# Patient Record
Sex: FEMALE | Race: WHITE | NOT HISPANIC OR LATINO | Employment: OTHER | URBAN - METROPOLITAN AREA
[De-identification: names, ages, dates, MRNs, and addresses within clinical notes are randomized per-mention and may not be internally consistent; named-entity substitution may affect disease eponyms.]

---

## 2023-10-16 ENCOUNTER — ANESTHESIA EVENT (OUTPATIENT)
Dept: PERIOP | Facility: HOSPITAL | Age: 83
End: 2023-10-16
Payer: MEDICARE

## 2023-10-16 RX ORDER — FAMOTIDINE 10 MG/ML
20 INJECTION, SOLUTION INTRAVENOUS ONCE
Status: CANCELLED | OUTPATIENT
Start: 2023-10-16 | End: 2023-10-16

## 2023-10-16 RX ORDER — SODIUM CHLORIDE 0.9 % (FLUSH) 0.9 %
10 SYRINGE (ML) INJECTION EVERY 12 HOURS SCHEDULED
Status: CANCELLED | OUTPATIENT
Start: 2023-10-16

## 2023-10-16 RX ORDER — SODIUM CHLORIDE 0.9 % (FLUSH) 0.9 %
10 SYRINGE (ML) INJECTION AS NEEDED
Status: CANCELLED | OUTPATIENT
Start: 2023-10-16

## 2023-10-16 RX ORDER — SODIUM CHLORIDE 9 MG/ML
40 INJECTION, SOLUTION INTRAVENOUS AS NEEDED
Status: CANCELLED | OUTPATIENT
Start: 2023-10-16

## 2023-10-17 ENCOUNTER — ANESTHESIA EVENT CONVERTED (OUTPATIENT)
Dept: ANESTHESIOLOGY | Facility: HOSPITAL | Age: 83
End: 2023-10-17
Payer: MEDICARE

## 2023-10-17 ENCOUNTER — ANESTHESIA (OUTPATIENT)
Dept: PERIOP | Facility: HOSPITAL | Age: 83
End: 2023-10-17
Payer: MEDICARE

## 2023-10-17 ENCOUNTER — HOSPITAL ENCOUNTER (INPATIENT)
Facility: HOSPITAL | Age: 83
LOS: 6 days | Discharge: HOME | End: 2023-10-23
Attending: COLON & RECTAL SURGERY | Admitting: COLON & RECTAL SURGERY
Payer: MEDICARE

## 2023-10-17 DIAGNOSIS — G89.18 ACUTE POSTOPERATIVE PAIN: ICD-10-CM

## 2023-10-17 DIAGNOSIS — Z90.49 S/P RIGHT COLECTOMY: Primary | ICD-10-CM

## 2023-10-17 DIAGNOSIS — C18.9 COLON CANCER: ICD-10-CM

## 2023-10-17 PROBLEM — E03.9 HYPOTHYROIDISM: Status: ACTIVE | Noted: 2023-10-17

## 2023-10-17 PROBLEM — J45.909 ASTHMA: Status: ACTIVE | Noted: 2023-10-17

## 2023-10-17 PROBLEM — K21.9 GERD (GASTROESOPHAGEAL REFLUX DISEASE): Status: ACTIVE | Noted: 2023-10-17

## 2023-10-17 LAB
ABO GROUP BLD: NORMAL
ABO GROUP BLD: NORMAL
ALBUMIN SERPL-MCNC: 3.3 G/DL (ref 3.5–5.2)
ALBUMIN/GLOB SERPL: 1.1 G/DL
ALP SERPL-CCNC: 116 U/L (ref 39–117)
ALT SERPL W P-5'-P-CCNC: 10 U/L (ref 1–33)
ANION GAP SERPL CALCULATED.3IONS-SCNC: 8 MMOL/L (ref 5–15)
AST SERPL-CCNC: 19 U/L (ref 1–32)
BILIRUB SERPL-MCNC: <0.2 MG/DL (ref 0–1.2)
BLD GP AB SCN SERPL QL: NEGATIVE
BUN SERPL-MCNC: 5 MG/DL (ref 8–23)
BUN/CREAT SERPL: 8.9 (ref 7–25)
CALCIUM SPEC-SCNC: 8.9 MG/DL (ref 8.6–10.5)
CEA SERPL-MCNC: 76.5 NG/ML
CHLORIDE SERPL-SCNC: 106 MMOL/L (ref 98–107)
CO2 SERPL-SCNC: 25 MMOL/L (ref 22–29)
CREAT SERPL-MCNC: 0.56 MG/DL (ref 0.57–1)
DEPRECATED RDW RBC AUTO: 67.3 FL (ref 37–54)
EGFRCR SERPLBLD CKD-EPI 2021: 91.3 ML/MIN/1.73
ERYTHROCYTE [DISTWIDTH] IN BLOOD BY AUTOMATED COUNT: 23 % (ref 12.3–15.4)
GLOBULIN UR ELPH-MCNC: 3 GM/DL
GLUCOSE SERPL-MCNC: 111 MG/DL (ref 65–99)
HCT VFR BLD AUTO: 31.8 % (ref 34–46.6)
HGB BLD-MCNC: 9.3 G/DL (ref 12–15.9)
MCH RBC QN AUTO: 23.9 PG (ref 26.6–33)
MCHC RBC AUTO-ENTMCNC: 29.2 G/DL (ref 31.5–35.7)
MCV RBC AUTO: 81.7 FL (ref 79–97)
PLATELET # BLD AUTO: 561 10*3/MM3 (ref 140–450)
PMV BLD AUTO: 8.8 FL (ref 6–12)
POTASSIUM SERPL-SCNC: 3.7 MMOL/L (ref 3.5–5.2)
PROT SERPL-MCNC: 6.3 G/DL (ref 6–8.5)
RBC # BLD AUTO: 3.89 10*6/MM3 (ref 3.77–5.28)
RH BLD: POSITIVE
RH BLD: POSITIVE
SODIUM SERPL-SCNC: 139 MMOL/L (ref 136–145)
T&S EXPIRATION DATE: NORMAL
WBC NRBC COR # BLD: 8.49 10*3/MM3 (ref 3.4–10.8)

## 2023-10-17 PROCEDURE — 94640 AIRWAY INHALATION TREATMENT: CPT

## 2023-10-17 PROCEDURE — 25010000002 SUGAMMADEX 200 MG/2ML SOLUTION: Performed by: NURSE ANESTHETIST, CERTIFIED REGISTERED

## 2023-10-17 PROCEDURE — 86900 BLOOD TYPING SEROLOGIC ABO: CPT | Performed by: COLON & RECTAL SURGERY

## 2023-10-17 PROCEDURE — 25010000002 BUPIVACAINE (PF) 0.25 % SOLUTION: Performed by: NURSE ANESTHETIST, CERTIFIED REGISTERED

## 2023-10-17 PROCEDURE — 88309 TISSUE EXAM BY PATHOLOGIST: CPT | Performed by: COLON & RECTAL SURGERY

## 2023-10-17 PROCEDURE — 94799 UNLISTED PULMONARY SVC/PX: CPT

## 2023-10-17 PROCEDURE — 86901 BLOOD TYPING SEROLOGIC RH(D): CPT

## 2023-10-17 PROCEDURE — 86850 RBC ANTIBODY SCREEN: CPT | Performed by: COLON & RECTAL SURGERY

## 2023-10-17 PROCEDURE — 80053 COMPREHEN METABOLIC PANEL: CPT | Performed by: COLON & RECTAL SURGERY

## 2023-10-17 PROCEDURE — 25010000002 HEPARIN (PORCINE) PER 1000 UNITS: Performed by: COLON & RECTAL SURGERY

## 2023-10-17 PROCEDURE — 88342 IMHCHEM/IMCYTCHM 1ST ANTB: CPT | Performed by: COLON & RECTAL SURGERY

## 2023-10-17 PROCEDURE — 88341 IMHCHEM/IMCYTCHM EA ADD ANTB: CPT | Performed by: COLON & RECTAL SURGERY

## 2023-10-17 PROCEDURE — 85027 COMPLETE CBC AUTOMATED: CPT | Performed by: COLON & RECTAL SURGERY

## 2023-10-17 PROCEDURE — 25010000002 ERTAPENEM PER 500 MG: Performed by: COLON & RECTAL SURGERY

## 2023-10-17 PROCEDURE — 93010 ELECTROCARDIOGRAM REPORT: CPT | Performed by: INTERNAL MEDICINE

## 2023-10-17 PROCEDURE — 25010000002 SODIUM CHLORIDE 0.9 % WITH KCL 20 MEQ 20-0.9 MEQ/L-% SOLUTION: Performed by: COLON & RECTAL SURGERY

## 2023-10-17 PROCEDURE — 25010000002 PROPOFOL 10 MG/ML EMULSION: Performed by: NURSE ANESTHETIST, CERTIFIED REGISTERED

## 2023-10-17 PROCEDURE — 25010000002 DEXAMETHASONE PER 1 MG: Performed by: NURSE ANESTHETIST, CERTIFIED REGISTERED

## 2023-10-17 PROCEDURE — 25010000002 HYDROMORPHONE PER 4 MG: Performed by: INTERNAL MEDICINE

## 2023-10-17 PROCEDURE — 0DTF4ZZ RESECTION OF RIGHT LARGE INTESTINE, PERCUTANEOUS ENDOSCOPIC APPROACH: ICD-10-PCS | Performed by: COLON & RECTAL SURGERY

## 2023-10-17 PROCEDURE — 25810000003 LACTATED RINGERS PER 1000 ML: Performed by: ANESTHESIOLOGY

## 2023-10-17 PROCEDURE — 25010000002 FENTANYL CITRATE (PF) 100 MCG/2ML SOLUTION: Performed by: NURSE ANESTHETIST, CERTIFIED REGISTERED

## 2023-10-17 PROCEDURE — 86900 BLOOD TYPING SEROLOGIC ABO: CPT

## 2023-10-17 PROCEDURE — 82378 CARCINOEMBRYONIC ANTIGEN: CPT | Performed by: COLON & RECTAL SURGERY

## 2023-10-17 PROCEDURE — 25010000002 ONDANSETRON PER 1 MG: Performed by: NURSE ANESTHETIST, CERTIFIED REGISTERED

## 2023-10-17 PROCEDURE — 86901 BLOOD TYPING SEROLOGIC RH(D): CPT | Performed by: COLON & RECTAL SURGERY

## 2023-10-17 PROCEDURE — 93005 ELECTROCARDIOGRAM TRACING: CPT | Performed by: ANESTHESIOLOGY

## 2023-10-17 PROCEDURE — 25010000002 DEXAMETHASONE SODIUM PHOSPHATE 10 MG/ML SOLUTION: Performed by: NURSE ANESTHETIST, CERTIFIED REGISTERED

## 2023-10-17 PROCEDURE — 25010000002 ONDANSETRON PER 1 MG: Performed by: COLON & RECTAL SURGERY

## 2023-10-17 DEVICE — PROXIMATE RELOADABLE LINEAR STAPLER
Type: IMPLANTABLE DEVICE | Site: ABDOMEN | Status: FUNCTIONAL
Brand: PROXIMATE

## 2023-10-17 DEVICE — PROXIMATE RELOADABLE LINEAR CUTTER WITH SAFETY LOCK-OUT, 75MM
Type: IMPLANTABLE DEVICE | Site: ABDOMEN | Status: FUNCTIONAL
Brand: PROXIMATE

## 2023-10-17 DEVICE — PROXIMATE LINEAR CUTTER RELOAD, BLUE, 75MM
Type: IMPLANTABLE DEVICE | Site: ABDOMEN | Status: FUNCTIONAL
Brand: PROXIMATE

## 2023-10-17 RX ORDER — GABAPENTIN 100 MG/1
100 CAPSULE ORAL 2 TIMES DAILY
Status: COMPLETED | OUTPATIENT
Start: 2023-10-17 | End: 2023-10-20

## 2023-10-17 RX ORDER — ALVIMOPAN 12 MG/1
12 CAPSULE ORAL ONCE
Status: COMPLETED | OUTPATIENT
Start: 2023-10-17 | End: 2023-10-17

## 2023-10-17 RX ORDER — ACETAMINOPHEN 500 MG
1000 TABLET ORAL ONCE
Status: COMPLETED | OUTPATIENT
Start: 2023-10-17 | End: 2023-10-17

## 2023-10-17 RX ORDER — LEVOTHYROXINE SODIUM 88 UG/1
88 TABLET ORAL
Status: DISCONTINUED | OUTPATIENT
Start: 2023-10-18 | End: 2023-10-23 | Stop reason: HOSPADM

## 2023-10-17 RX ORDER — NITROGLYCERIN 0.4 MG/1
0.4 TABLET SUBLINGUAL
Status: DISCONTINUED | OUTPATIENT
Start: 2023-10-17 | End: 2023-10-23 | Stop reason: HOSPADM

## 2023-10-17 RX ORDER — DEXAMETHASONE SODIUM PHOSPHATE 10 MG/ML
INJECTION, SOLUTION INTRAMUSCULAR; INTRAVENOUS
Status: COMPLETED | OUTPATIENT
Start: 2023-10-17 | End: 2023-10-17

## 2023-10-17 RX ORDER — ONDANSETRON 2 MG/ML
4 INJECTION INTRAMUSCULAR; INTRAVENOUS ONCE AS NEEDED
Status: DISCONTINUED | OUTPATIENT
Start: 2023-10-17 | End: 2023-10-17 | Stop reason: HOSPADM

## 2023-10-17 RX ORDER — HYDROCODONE BITARTRATE AND ACETAMINOPHEN 7.5; 325 MG/1; MG/1
1 TABLET ORAL EVERY 4 HOURS PRN
Status: DISCONTINUED | OUTPATIENT
Start: 2023-10-17 | End: 2023-10-23 | Stop reason: HOSPADM

## 2023-10-17 RX ORDER — LEVOTHYROXINE SODIUM 88 UG/1
88 TABLET ORAL DAILY
COMMUNITY

## 2023-10-17 RX ORDER — HEPARIN SODIUM 5000 [USP'U]/ML
5000 INJECTION, SOLUTION INTRAVENOUS; SUBCUTANEOUS ONCE
Status: COMPLETED | OUTPATIENT
Start: 2023-10-17 | End: 2023-10-17

## 2023-10-17 RX ORDER — ONDANSETRON 4 MG/1
4 TABLET, FILM COATED ORAL EVERY 6 HOURS PRN
Status: DISCONTINUED | OUTPATIENT
Start: 2023-10-17 | End: 2023-10-19

## 2023-10-17 RX ORDER — HYDROMORPHONE HYDROCHLORIDE 1 MG/ML
0.5 INJECTION, SOLUTION INTRAMUSCULAR; INTRAVENOUS; SUBCUTANEOUS
Status: DISCONTINUED | OUTPATIENT
Start: 2023-10-17 | End: 2023-10-17 | Stop reason: HOSPADM

## 2023-10-17 RX ORDER — MAGNESIUM HYDROXIDE 1200 MG/15ML
LIQUID ORAL AS NEEDED
Status: DISCONTINUED | OUTPATIENT
Start: 2023-10-17 | End: 2023-10-17 | Stop reason: HOSPADM

## 2023-10-17 RX ORDER — LIDOCAINE HYDROCHLORIDE 10 MG/ML
0.5 INJECTION, SOLUTION EPIDURAL; INFILTRATION; INTRACAUDAL; PERINEURAL ONCE AS NEEDED
Status: COMPLETED | OUTPATIENT
Start: 2023-10-17 | End: 2023-10-17

## 2023-10-17 RX ORDER — NALOXONE HCL 0.4 MG/ML
0.4 VIAL (ML) INJECTION
Status: DISCONTINUED | OUTPATIENT
Start: 2023-10-17 | End: 2023-10-23 | Stop reason: HOSPADM

## 2023-10-17 RX ORDER — ALVIMOPAN 12 MG/1
12 CAPSULE ORAL 2 TIMES DAILY
Status: DISCONTINUED | OUTPATIENT
Start: 2023-10-18 | End: 2023-10-18

## 2023-10-17 RX ORDER — BUDESONIDE AND FORMOTEROL FUMARATE DIHYDRATE 160; 4.5 UG/1; UG/1
2 AEROSOL RESPIRATORY (INHALATION)
Status: DISCONTINUED | OUTPATIENT
Start: 2023-10-17 | End: 2023-10-23 | Stop reason: HOSPADM

## 2023-10-17 RX ORDER — LIDOCAINE HYDROCHLORIDE 10 MG/ML
INJECTION, SOLUTION EPIDURAL; INFILTRATION; INTRACAUDAL; PERINEURAL AS NEEDED
Status: DISCONTINUED | OUTPATIENT
Start: 2023-10-17 | End: 2023-10-17 | Stop reason: SURG

## 2023-10-17 RX ORDER — PHENYLEPHRINE HCL IN 0.9% NACL 1 MG/10 ML
SYRINGE (ML) INTRAVENOUS AS NEEDED
Status: DISCONTINUED | OUTPATIENT
Start: 2023-10-17 | End: 2023-10-17 | Stop reason: SURG

## 2023-10-17 RX ORDER — FAMOTIDINE 20 MG/1
20 TABLET, FILM COATED ORAL ONCE
Status: COMPLETED | OUTPATIENT
Start: 2023-10-17 | End: 2023-10-17

## 2023-10-17 RX ORDER — PROPOFOL 10 MG/ML
VIAL (ML) INTRAVENOUS AS NEEDED
Status: DISCONTINUED | OUTPATIENT
Start: 2023-10-17 | End: 2023-10-17 | Stop reason: SURG

## 2023-10-17 RX ORDER — ACETAMINOPHEN 325 MG/1
650 TABLET ORAL EVERY 8 HOURS
Status: DISCONTINUED | OUTPATIENT
Start: 2023-10-17 | End: 2023-10-23 | Stop reason: HOSPADM

## 2023-10-17 RX ORDER — SODIUM CHLORIDE, SODIUM LACTATE, POTASSIUM CHLORIDE, CALCIUM CHLORIDE 600; 310; 30; 20 MG/100ML; MG/100ML; MG/100ML; MG/100ML
9 INJECTION, SOLUTION INTRAVENOUS CONTINUOUS
Status: DISCONTINUED | OUTPATIENT
Start: 2023-10-17 | End: 2023-10-18

## 2023-10-17 RX ORDER — ROCURONIUM BROMIDE 10 MG/ML
INJECTION, SOLUTION INTRAVENOUS AS NEEDED
Status: DISCONTINUED | OUTPATIENT
Start: 2023-10-17 | End: 2023-10-17 | Stop reason: SURG

## 2023-10-17 RX ORDER — HEPARIN SODIUM 5000 [USP'U]/ML
5000 INJECTION, SOLUTION INTRAVENOUS; SUBCUTANEOUS EVERY 8 HOURS SCHEDULED
Status: DISCONTINUED | OUTPATIENT
Start: 2023-10-18 | End: 2023-10-23 | Stop reason: HOSPADM

## 2023-10-17 RX ORDER — SODIUM CHLORIDE AND POTASSIUM CHLORIDE 150; 900 MG/100ML; MG/100ML
100 INJECTION, SOLUTION INTRAVENOUS CONTINUOUS
Status: DISCONTINUED | OUTPATIENT
Start: 2023-10-17 | End: 2023-10-23 | Stop reason: HOSPADM

## 2023-10-17 RX ORDER — HYDROMORPHONE HYDROCHLORIDE 1 MG/ML
0.5 INJECTION, SOLUTION INTRAMUSCULAR; INTRAVENOUS; SUBCUTANEOUS
Status: DISCONTINUED | OUTPATIENT
Start: 2023-10-17 | End: 2023-10-23 | Stop reason: HOSPADM

## 2023-10-17 RX ORDER — BUPIVACAINE HYDROCHLORIDE 2.5 MG/ML
INJECTION, SOLUTION EPIDURAL; INFILTRATION; INTRACAUDAL
Status: COMPLETED | OUTPATIENT
Start: 2023-10-17 | End: 2023-10-17

## 2023-10-17 RX ORDER — ONDANSETRON 2 MG/ML
4 INJECTION INTRAMUSCULAR; INTRAVENOUS EVERY 6 HOURS PRN
Status: DISCONTINUED | OUTPATIENT
Start: 2023-10-17 | End: 2023-10-19

## 2023-10-17 RX ORDER — LORATADINE 10 MG/1
CAPSULE, LIQUID FILLED ORAL
COMMUNITY

## 2023-10-17 RX ORDER — IBUPROFEN 400 MG/1
400 TABLET ORAL EVERY 6 HOURS PRN
Status: DISCONTINUED | OUTPATIENT
Start: 2023-10-17 | End: 2023-10-23 | Stop reason: HOSPADM

## 2023-10-17 RX ORDER — LABETALOL HYDROCHLORIDE 5 MG/ML
10 INJECTION, SOLUTION INTRAVENOUS EVERY 4 HOURS PRN
Status: DISCONTINUED | OUTPATIENT
Start: 2023-10-17 | End: 2023-10-23 | Stop reason: HOSPADM

## 2023-10-17 RX ORDER — FENTANYL CITRATE 50 UG/ML
INJECTION, SOLUTION INTRAMUSCULAR; INTRAVENOUS AS NEEDED
Status: DISCONTINUED | OUTPATIENT
Start: 2023-10-17 | End: 2023-10-17 | Stop reason: SURG

## 2023-10-17 RX ORDER — ONDANSETRON 2 MG/ML
INJECTION INTRAMUSCULAR; INTRAVENOUS AS NEEDED
Status: DISCONTINUED | OUTPATIENT
Start: 2023-10-17 | End: 2023-10-17 | Stop reason: SURG

## 2023-10-17 RX ORDER — MIDAZOLAM HYDROCHLORIDE 1 MG/ML
0.5 INJECTION INTRAMUSCULAR; INTRAVENOUS
Status: DISCONTINUED | OUTPATIENT
Start: 2023-10-17 | End: 2023-10-17 | Stop reason: HOSPADM

## 2023-10-17 RX ORDER — FLUTICASONE PROPIONATE AND SALMETEROL 250; 50 UG/1; UG/1
POWDER RESPIRATORY (INHALATION)
COMMUNITY

## 2023-10-17 RX ORDER — PREGABALIN 75 MG/1
75 CAPSULE ORAL ONCE
Status: COMPLETED | OUTPATIENT
Start: 2023-10-17 | End: 2023-10-17

## 2023-10-17 RX ORDER — DEXAMETHASONE SODIUM PHOSPHATE 4 MG/ML
INJECTION, SOLUTION INTRA-ARTICULAR; INTRALESIONAL; INTRAMUSCULAR; INTRAVENOUS; SOFT TISSUE AS NEEDED
Status: DISCONTINUED | OUTPATIENT
Start: 2023-10-17 | End: 2023-10-17 | Stop reason: SURG

## 2023-10-17 RX ORDER — DIAZEPAM 5 MG/1
5 TABLET ORAL EVERY 6 HOURS PRN
Status: DISCONTINUED | OUTPATIENT
Start: 2023-10-17 | End: 2023-10-23 | Stop reason: HOSPADM

## 2023-10-17 RX ORDER — FAMOTIDINE 10 MG
10 TABLET ORAL 2 TIMES DAILY
COMMUNITY

## 2023-10-17 RX ORDER — FENTANYL CITRATE 50 UG/ML
50 INJECTION, SOLUTION INTRAMUSCULAR; INTRAVENOUS
Status: DISCONTINUED | OUTPATIENT
Start: 2023-10-17 | End: 2023-10-17 | Stop reason: HOSPADM

## 2023-10-17 RX ADMIN — ACETAMINOPHEN 1000 MG: 500 TABLET ORAL at 13:47

## 2023-10-17 RX ADMIN — Medication 100 MCG: at 15:34

## 2023-10-17 RX ADMIN — POTASSIUM CHLORIDE AND SODIUM CHLORIDE 100 ML/HR: 900; 150 INJECTION, SOLUTION INTRAVENOUS at 18:41

## 2023-10-17 RX ADMIN — HYDROMORPHONE HYDROCHLORIDE 0.5 MG: 1 INJECTION, SOLUTION INTRAMUSCULAR; INTRAVENOUS; SUBCUTANEOUS at 21:30

## 2023-10-17 RX ADMIN — DEXAMETHASONE SODIUM PHOSPHATE 4 MG: 10 INJECTION, SOLUTION INTRAMUSCULAR; INTRAVENOUS at 15:26

## 2023-10-17 RX ADMIN — ONDANSETRON 4 MG: 2 INJECTION INTRAMUSCULAR; INTRAVENOUS at 16:58

## 2023-10-17 RX ADMIN — PREGABALIN 75 MG: 75 CAPSULE ORAL at 13:48

## 2023-10-17 RX ADMIN — Medication 100 MCG: at 15:25

## 2023-10-17 RX ADMIN — HYDROCODONE BITARTRATE AND ACETAMINOPHEN 1 TABLET: 7.5; 325 TABLET ORAL at 18:56

## 2023-10-17 RX ADMIN — BUDESONIDE AND FORMOTEROL FUMARATE DIHYDRATE 2 PUFF: 160; 4.5 AEROSOL RESPIRATORY (INHALATION) at 20:10

## 2023-10-17 RX ADMIN — FAMOTIDINE 20 MG: 20 TABLET, FILM COATED ORAL at 13:48

## 2023-10-17 RX ADMIN — ONDANSETRON 4 MG: 2 INJECTION INTRAMUSCULAR; INTRAVENOUS at 19:22

## 2023-10-17 RX ADMIN — BUPIVACAINE HYDROCHLORIDE 50 ML: 2.5 INJECTION, SOLUTION EPIDURAL; INFILTRATION; INTRACAUDAL; PERINEURAL at 15:26

## 2023-10-17 RX ADMIN — FENTANYL CITRATE 100 MCG: 50 INJECTION, SOLUTION INTRAMUSCULAR; INTRAVENOUS at 15:18

## 2023-10-17 RX ADMIN — LIDOCAINE HYDROCHLORIDE 0.5 ML: 10 INJECTION, SOLUTION EPIDURAL; INFILTRATION; INTRACAUDAL; PERINEURAL at 13:48

## 2023-10-17 RX ADMIN — PROPOFOL 120 MG: 10 INJECTION, EMULSION INTRAVENOUS at 15:18

## 2023-10-17 RX ADMIN — SUGAMMADEX 200 MG: 100 INJECTION, SOLUTION INTRAVENOUS at 16:58

## 2023-10-17 RX ADMIN — LIDOCAINE HYDROCHLORIDE 50 MG: 10 INJECTION, SOLUTION EPIDURAL; INFILTRATION; INTRACAUDAL; PERINEURAL at 15:18

## 2023-10-17 RX ADMIN — GABAPENTIN 100 MG: 100 CAPSULE ORAL at 21:23

## 2023-10-17 RX ADMIN — ACETAMINOPHEN 650 MG: 325 TABLET ORAL at 21:24

## 2023-10-17 RX ADMIN — DEXAMETHASONE SODIUM PHOSPHATE 4 MG: 4 INJECTION, SOLUTION INTRAMUSCULAR; INTRAVENOUS at 15:18

## 2023-10-17 RX ADMIN — HEPARIN SODIUM 5000 UNITS: 5000 INJECTION INTRAVENOUS; SUBCUTANEOUS at 13:48

## 2023-10-17 RX ADMIN — ROCURONIUM BROMIDE 60 MG: 10 SOLUTION INTRAVENOUS at 15:18

## 2023-10-17 RX ADMIN — PROPOFOL 30 MG: 10 INJECTION, EMULSION INTRAVENOUS at 17:04

## 2023-10-17 RX ADMIN — SODIUM CHLORIDE, POTASSIUM CHLORIDE, SODIUM LACTATE AND CALCIUM CHLORIDE 9 ML/HR: 600; 310; 30; 20 INJECTION, SOLUTION INTRAVENOUS at 13:48

## 2023-10-17 RX ADMIN — ALVIMOPAN 12 MG: 12 CAPSULE ORAL at 14:38

## 2023-10-17 RX ADMIN — ERTAPENEM SODIUM 1000 MG: 1 INJECTION INTRAMUSCULAR; INTRAVENOUS at 15:18

## 2023-10-17 RX ADMIN — ROCURONIUM BROMIDE 5 MG: 10 SOLUTION INTRAVENOUS at 16:15

## 2023-10-17 RX ADMIN — Medication 100 MCG: at 15:47

## 2023-10-17 NOTE — ANESTHESIA PROCEDURE NOTES
Airway  Urgency: elective    Date/Time: 10/17/2023 3:20 PM  Airway not difficult    General Information and Staff    Patient location during procedure: OR  CRNA/CAA: Edy Bahena, CRNA    Indications and Patient Condition  Indications for airway management: airway protection    Preoxygenated: yes  MILS not maintained throughout  Mask difficulty assessment: 1 - vent by mask    Final Airway Details  Final airway type: endotracheal airway      Successful airway: ETT  Cuffed: yes   Successful intubation technique: direct laryngoscopy  Facilitating devices/methods: intubating stylet  Endotracheal tube insertion site: oral  Blade: La  Blade size: 3  ETT size (mm): 7.0  Cormack-Lehane Classification: grade I - full view of glottis  Placement verified by: chest auscultation and capnometry   Measured from: lips  ETT/EBT  to lips (cm): 20  Number of attempts at approach: 1  Assessment: lips, teeth, and gum same as pre-op and atraumatic intubation    Additional Comments  Negative epigastric sounds, Breath sound equal bilaterally with symmetric chest rise and fall

## 2023-10-17 NOTE — OP NOTE
[facility] ([facility])    LAPAROSCOPIC RIGHT COLECTOMY  Resection including abdominal wall peritoneum with findings of puckering suspicious for direct invasion.     Procedure Note    Valeria Tracy  10/17/2023    Pre-op Diagnosis:   Referral with obstructing right colon cancer and newly diagnosed distal rectal cancer within 10 mm the dentate line.  Unintentional weight loss 15 pounds over 8 months  Rectal cancer, nonobstructing.    Post-op Diagnosis:     No evidence of hepatobiliary or other metastatic disease, very large right colon mass, resected, grossly clear margins.    Anesthesia: General with Block    Staff:   Circulator: Taniya Gilbert, STEVE; Aj Miller RN  Scrub Person: Rosa Zendejas; Bushra Trujillo  Assistant: Lakshmi Wolfe RNFA    Assistant: Lakshmi Wolfe RNFA was responsible for performing the following activities: Retraction, Suction, and Irrigation and their skilled assistance was necessary for the success of this case.    Colon Resection  Operation performed with curative intent Yes   Tumor Location (select all that apply) Ascending colon   Extent of colon and vascular resection  (select all that apply) Extended right hemicolectomy - ileocolic, right colic (if present), middle colic          Estimated Blood Loss: 100ml    Specimens: Right colon and regional lymph nodes        Drains: JESUS drain    Findings: Large mass    Complications: None evident    The procedure was reviewed in the office and expedited surgical intervention was scheduled.    Increased risk and complexity associated with synchronous cancers unintentional weight loss and deconditioning.    Plan for right colectomy noting very low rectal cancer likely to require APR and colostomy.  I would rather perform right colectomy with anastomosis and then subsequently do APR  If coloanal/ultralow anastomosis was planned with protecting loop ileostomy then I would have moved to ileostomy  today    There certainly would be some risk to primary anastomosis given comorbidities and deconditioning, seems reasonable though to proceed with primary anastomosis and close postoperative care/follow-up.    Patient was counseled on stoma locations and management as this was a possibility going into surgery depending on intraoperative findings.    We advanced to the operating room with antibiotic and DVT prophylaxis.    Block was performed by anesthesia.    GelPort was placed at the umbilicus    Laparoscopic exploration demonstrated right sided large mass with direct invasion from the ascending colon to the proximal transverse colon and appearance to suggest invasion into the peritoneal surface of the abdominal wall anterior laterally.    The ileocolic region was mobilized, the right ureter was identified, this appeared to be free of the tumor involvement.    There was puckering of the anterior lateral abdominal wall peritoneum and this was resected as well as some abdominal wall muscle in an effort to achieve radial clear margin.    The mass was dissected away from the kidney and away from the proximal duodenum, gradually the mass was mobilized away from the entire duodenum with grossly clear margin.    Having fully mobilized the mass and right colon and ileocolic region and proximal transverse colon and mid transverse colon, this was delivered extracorporeally.    The mesentery was carefully divided to optimize mesenteric harvest.    This included resection of the right colic artery.    High ligation of the ileocolic artery was performed.    TERESA division of the distal ileum and mid transverse colon was performed.    The specimen was passed off.    The antimesenteric corners of the intestine were incised, TERESA was introduced, a common lumen was established with good hemostasis.    The anastomosis was complete with the TA 60 blue load.    The tissue distal to the 60 was oversewn with running locked 3-0 Vicryl to  provide additional reinforcement.    The confluence of the anastomosis was also reinforced with interrupted 3-0 Vicryl.    This was returned to the peritoneal cavity, irrigation aspiration was performed.    10 flat JESUS was placed in the right abdomen.    The fascia was reapproximated with internal retention of 0 Vicryl #1 PDS    The incision was irrigated with dilute antiseptic solution    The final counts were announced as correct    The procedure appeared well-tolerated.      Angelito Ruiz MD     Date: 10/17/2023  Time: 17:00 EDT

## 2023-10-17 NOTE — ANESTHESIA POSTPROCEDURE EVALUATION
Patient: Valeria Tracy    Procedure Summary       Date: 10/17/23 Room / Location:  [facility] /  [facility]    Anesthesia Start: 1515 Anesthesia Stop: 1714    Procedure: COLON RESECTION RIGHT LAPAROSCOPIC (Abdomen) Diagnosis:     Surgeons: Angelito Ruiz MD Provider: José Miguel Núñez MD    Anesthesia Type: general ASA Status: 2            Anesthesia Type: general    Vitals  Vitals Value Taken Time   BP     Temp     Pulse 85 10/17/23 1713   Resp     SpO2 99 % 10/17/23 1714   Vitals shown include unfiled device data.        Post Anesthesia Care and Evaluation    Patient location during evaluation: PACU  Patient participation: complete - patient participated  Level of consciousness: awake and alert  Pain management: adequate    Airway patency: patent  Anesthetic complications: No anesthetic complications  PONV Status: none  Cardiovascular status: hemodynamically stable and acceptable  Respiratory status: nonlabored ventilation, acceptable and nasal cannula  Hydration status: acceptable

## 2023-10-17 NOTE — NURSING NOTE
"Patient seen in pre-op for stoma marking    Patient placed in sitting and lying position with abdomen exposed.  Stoma marked at the RLQ and LLQ.     Both the right upper quadrant and left upper quadrant would not be ideal locations as the patient's rib cage is directly superior to the sites and will interfere with any appliance.    Folds, creases/scars, ribs & underwear line avoided. Rectus muscle identified.    Patient able to visualize stoma marking(s) and point to \"X\" with their finger.    WOC Nurse will continue to follow daily if an ostomy is required.     Yassine Ascencio RN, BSN, CCRN, CWOCN  Wound, Ostomy and Continence (WOC) Department  [facility]        "

## 2023-10-17 NOTE — H&P
Admission HP     Patient Name: Valeria Tracy  MRN: [id number]  : 1940  DOS: 10/17/2023    Attending: Angelito Ruiz MD    Primary Care Provider: Peyton Vázquez PA      Patient Care Team:  Peyton Vázquez PA as PCP - General (Physician Assistant)    Chief complaint:  Colon mass, adenocarcinoma    Subjective   Patient is a pleasant 83 y.o. female presented for scheduled surgery by .     Per his note (Pre-op Diagnosis:   Referral with obstructing right colon cancer and newly diagnosed distal rectal cancer within 10 mm the dentate line.  Unintentional weight loss 15 pounds over 8 months  Rectal cancer, nonobstructing.).    I saw patient preoperatively and reviewed with her her diagnosis and planned surgery.    Subsequent notes indicate she underwent the following procedures:  LAPAROSCOPIC RIGHT COLECTOMY  Resection including abdominal wall peritoneum with findings of puckering suspicious for direct invasion.  Surgery was done under general anesthesia and a block and was she was admitted for further management    Allergies:  No Known Allergies     Medications Prior to Admission   Medication Sig Dispense Refill Last Dose    famotidine (PEPCID) 10 MG tablet Take 1 tablet by mouth 2 (Two) Times a Day.   Past Week    Fluticasone-Salmeterol (ADVAIR/WIXELA) 250-50 MCG/ACT DISKUS Inhale 2 (Two) Times a Day.   10/17/2023    levothyroxine (SYNTHROID, LEVOTHROID) 88 MCG tablet Take 1 tablet by mouth Daily.   10/16/2023    FLUTICASONE PROPIONATE NA 2 sprays into the nostril(s) as directed by provider Daily.       Loratadine 10 MG capsule Take  by mouth.   10/15/2023       Past Medical History:   Diagnosis Date    Acid reflux     Anemia     Asthma     History of transfusion     No Reaction    Hypothyroid     Pneumonia      Past Surgical History:   Procedure Laterality Date    BREAST LUMPECTOMY Right     CATARACT EXTRACTION Bilateral     COLONOSCOPY      SHOULDER SURGERY Right      History reviewed. No pertinent  "family history.  Social History     Tobacco Use    Smoking status: Former     Types: Cigarettes   Vaping Use    Vaping Use: Never used   Substance Use Topics    Alcohol use: Never    Drug use: Never       Review of Systems  Pertinent items are noted in HPI    Vital Signs  /77 (BP Location: Right arm, Patient Position: Sitting)   Pulse 98   Temp 97.4 °F (36.3 °C) (Temporal)   Resp 18   Ht 165.1 cm (65\")   Wt 61.2 kg (135 lb)   SpO2 95%   BMI 22.47 kg/m²     Physical Exam:    General Appearance:    Alert, cooperative, in no acute distress   Head:    Normocephalic, without obvious abnormality, atraumatic   Eyes:            Lids and lashes normal, conjunctivae and sclerae normal, no   icterus, no pallor, corneas clear   Ears:    Ears appear intact with no abnormalities noted   Throat:   No oral lesions, no thrush, oral mucosa moist   Neck:   No adenopathy, supple, trachea midline, no thyromegaly         Lungs:     Clear to auscultation,respirations regular, even and       unlabored. No wheezes or rales.    Heart:    Regular rhythm and normal rate, normal S1 and S2, no murmur    Abdomen:      Soft and benign, nontender nondistended when seen preop   Genitalia:    Deferred   Extremities:   Moves all extremities well, no edema, no cyanosis, no              redness   Pulses:   Pulses palpable and equal bilaterally   Skin:   No bleeding, bruising or rash   Neurologic:   Cranial nerves 2 - 12 grossly intact, no gross motor deficit     Results from last 7 days   Lab Units 10/17/23  1305   WBC 10*3/mm3 8.49   HEMOGLOBIN g/dL 9.3*   HEMATOCRIT % 31.8*   PLATELETS 10*3/mm3 561*     Results from last 7 days   Lab Units 10/17/23  1305   SODIUM mmol/L 139   POTASSIUM mmol/L 3.7   CHLORIDE mmol/L 106   CO2 mmol/L 25.0   BUN mg/dL 5*   CREATININE mg/dL 0.56*   CALCIUM mg/dL 8.9   BILIRUBIN mg/dL <0.2   ALK PHOS U/L 116   ALT (SGPT) U/L 10   AST (SGOT) U/L 19   GLUCOSE mg/dL 111*     No results found for: " "\"HGBA1C\"    Assessment and Plan:   Status post LAPAROSCOPIC RIGHT COLECTOMY  Resection including abdominal wall peritoneum with findings of puckering suspicious for direct invasion.    GERD (gastroesophageal reflux disease)    Asthma    Hypothyroidism      Plan postop management to include  1. Ambulation, several times daily  2. Pain control-prns   3. IS-encourage  4. DVT proph- Mechanical, subcutaneous heparin  5. Bowel regimen, alvimopan  6. Resume home medications as appropriate  7. Monitor post-op labs  8. DC planning   9. Diet, Clears, advance diet as tolerated.  IVF initially, monitor volume status.    -Asthma: Maintained on home regimen with formulary substitution as indicated.     -GERD:  Resume PPI.  Formulary substitution when indicated.    -Hypothyroidism: Maintain on replacement.    Dragon disclaimer:  Part of this encounter note is an electronic transcription/translation of spoken language to printed text. The electronic translation of spoken language may permit erroneous, or at times, nonsensical words or phrases to be inadvertently transcribed; Although I have reviewed the note for such errors, some may still exist.    Maggi Lance MD  10/17/23  15:58 EDT            "

## 2023-10-17 NOTE — ANESTHESIA PREPROCEDURE EVALUATION
Anesthesia Evaluation                  Airway   Mallampati: II  TM distance: >3 FB  Neck ROM: full  No difficulty expected  Dental - normal exam   (+) upper dentures and poor dentition    Pulmonary - normal exam   (+) asthma,  Cardiovascular - normal exam        Neuro/Psych  GI/Hepatic/Renal/Endo    (+) GERD, thyroid problem hypothyroidism    Musculoskeletal     Abdominal  - normal exam    Bowel sounds: normal.   Substance History      OB/GYN          Other                    Anesthesia Plan    ASA 2     general     (TAP blocks)  intravenous induction     Anesthetic plan, risks, benefits, and alternatives have been provided, discussed and informed consent has been obtained with: patient.    Plan discussed with CRNA.    CODE STATUS:

## 2023-10-17 NOTE — ANESTHESIA PROCEDURE NOTES
"Peripheral Block      Patient reassessed immediately prior to procedure    Patient location during procedure: OR  Reason for block: at surgeon's request and post-op pain management  Performed by  CRNA/CAA: Edy Bahena, COSMENA: Angel Ch SRNA  Preanesthetic Checklist  Completed: patient identified, IV checked, site marked, risks and benefits discussed, surgical consent, monitors and equipment checked, pre-op evaluation and timeout performed  Prep:  Pt Position: supine  Sterile barriers:cap, gloves, mask and washed/disinfected hands  Prep: ChloraPrep  Patient monitoring: blood pressure monitoring, continuous pulse oximetry and EKG  Procedure    Sedation: yes  Performed under: general  Guidance:ultrasound guided  Images:still images obtained, printed/placed on chart    Laterality:Bilateral  Block Type:TAP  Injection Technique:single-shot  Needle Type:short-bevel and echogenic  Needle Gauge:20 G  Resistance on Injection: none    Medications Used: bupivacaine PF (MARCAINE) 0.25 % injection - Injection   50 mL - 10/17/2023 3:26:00 PM  dexamethasone sodium phosphate injection - Injection   4 mg - 10/17/2023 3:26:00 PM      Medications  Preservative Free Saline:10ml  Comment:Block Injection:  LA dose divided between Right and Left block        Post Assessment  Injection Assessment: negative aspiration for heme, incremental injection and no paresthesia on injection  Patient Tolerance:comfortable throughout block  Complications:no  Additional Notes    Subcostal TAPs    A high-frequency linear transducer, with sterile cover, was placed sub-xiphoid to identify [place], right and left Rectus Abdominus Muscles (WILBUR). The transducer was moved either right or left subcostally to identify the WILBUR and the Transverse Abdominus Muscle (LLAMAS). The insertion site was prepped in sterile fashion and then localized with 2-5 ml of 1% Lidocaine. Using ultrasound-guidance, a 20-gauge B-Lopez 4\" Ultraplex 360 " "non-stimulating echogenic needle was advanced in plane, from medial to lateral, until the tip of the needle was in the fascial plane between the WILBUR and LLAMAS. 1-3ml of preservative free normal saline was used to hydro-dissect the fascial planes. After the fascial plane was verified, the local anesthetic (LA) was injected. The procedure was repeated on the opposite side for bilateral coverage. Aspiration every 5 ml to prevent intravascular injection. Injection was completed with negative aspiration of blood and negative intravascular injection. Injection pressures were normal with minimal resistance. The subcostal approach to the TAP nerve block ideally anesthetizes the intercostal nerves T6-T9.     Mid-Axillary/Lateral TAPs    A high-frequency linear transducer, with sterile cover, was placed in the midaxillary line between the ASIS and costal margin. The External Oblique Muscle (EOM), Internal Oblique Muscle (IOM), Transverse Abdominus Muscle (LLAMAS), and Peritoneum were identified. The insertion site was prepped in sterile fashion and then localized with 2-5 ml of 1% Lidocaine. Using ultrasound-guidance, a 20-gauge B-Lopez 4\" Ultraplex 360 non-stimulating echogenic needle was advanced in plane, from medial to lateral, until the tip of the needle was in the fascial plane between the IOM and LLAMAS. 1-3ml of preservative free normal saline was used to hydro-dissect the fascial planes. After the fascial plane was verified, the local anesthetic (LA) was injected. The procedure was repeated on the opposite side for bilateral coverage. Aspiration every 5 ml to prevent intravascular injection. Injection was completed with negative aspiration of blood and negative intravascular injection. Injection pressures were normal with minimal resistance. Midaxillary TAPs should reach intercostal nerves T10- T11 and the subcostal nerve T12.            "

## 2023-10-17 NOTE — INTERVAL H&P NOTE
" Pre-op    Full history and physical note from office is attached.    /77 (BP Location: Right arm, Patient Position: Sitting)   Pulse 98   Temp 97.4 °F (36.3 °C) (Temporal)   Resp 18   Ht 165.1 cm (65\")   Wt 61.2 kg (135 lb)   SpO2 95%   BMI 22.47 kg/m²     LAB Results:  Lab Results   Component Value Date    WBC 8.49 10/17/2023    HGB 9.3 (L) 10/17/2023    HCT 31.8 (L) 10/17/2023    MCV 81.7 10/17/2023     (H) 10/17/2023    GLUCOSE 111 (H) 10/17/2023    BUN 5 (L) 10/17/2023    CREATININE 0.56 (L) 10/17/2023     10/17/2023    K 3.7 10/17/2023     10/17/2023    CO2 25.0 10/17/2023    CALCIUM 8.9 10/17/2023    ALBUMIN 3.3 (L) 10/17/2023    AST 19 10/17/2023    ALT 10 10/17/2023    BILITOT <0.2 10/17/2023       Cancer Staging (if applicable)  Cancer Patient: __ yes __no __unknown__N/A; If yes, clinical stage T:__ N:__M:__, stage group or __N/A      Impression: rectal cancer      Plan: COLON RESECTION RIGHT LAPAROSCOPIC, POSSIBLE STOMA       RAINA Arana   10/17/2023   14:02 EDT  "

## 2023-10-18 PROBLEM — D64.9 ANEMIA: Status: ACTIVE | Noted: 2023-10-18

## 2023-10-18 PROBLEM — Z90.49 S/P RIGHT COLECTOMY: Status: ACTIVE | Noted: 2023-10-18

## 2023-10-18 PROBLEM — G89.18 ACUTE POSTOPERATIVE PAIN: Status: ACTIVE | Noted: 2023-10-18

## 2023-10-18 LAB
ANION GAP SERPL CALCULATED.3IONS-SCNC: 10 MMOL/L (ref 5–15)
BASOPHILS # BLD AUTO: 0.02 10*3/MM3 (ref 0–0.2)
BASOPHILS NFR BLD AUTO: 0.2 % (ref 0–1.5)
BUN SERPL-MCNC: 6 MG/DL (ref 8–23)
BUN/CREAT SERPL: 12.5 (ref 7–25)
CALCIUM SPEC-SCNC: 8.4 MG/DL (ref 8.6–10.5)
CHLORIDE SERPL-SCNC: 108 MMOL/L (ref 98–107)
CO2 SERPL-SCNC: 21 MMOL/L (ref 22–29)
CREAT SERPL-MCNC: 0.48 MG/DL (ref 0.57–1)
DEPRECATED RDW RBC AUTO: 68 FL (ref 37–54)
EGFRCR SERPLBLD CKD-EPI 2021: 94.7 ML/MIN/1.73
EOSINOPHIL # BLD AUTO: 0 10*3/MM3 (ref 0–0.4)
EOSINOPHIL NFR BLD AUTO: 0 % (ref 0.3–6.2)
ERYTHROCYTE [DISTWIDTH] IN BLOOD BY AUTOMATED COUNT: 22.7 % (ref 12.3–15.4)
GLUCOSE BLDC GLUCOMTR-MCNC: 104 MG/DL (ref 70–130)
GLUCOSE SERPL-MCNC: 130 MG/DL (ref 65–99)
HCT VFR BLD AUTO: 30.8 % (ref 34–46.6)
HGB BLD-MCNC: 8.6 G/DL (ref 12–15.9)
HYPOCHROMIA BLD QL: NORMAL
IMM GRANULOCYTES # BLD AUTO: 0.03 10*3/MM3 (ref 0–0.05)
IMM GRANULOCYTES NFR BLD AUTO: 0.3 % (ref 0–0.5)
LYMPHOCYTES # BLD AUTO: 0.47 10*3/MM3 (ref 0.7–3.1)
LYMPHOCYTES NFR BLD AUTO: 5.2 % (ref 19.6–45.3)
MCH RBC QN AUTO: 23.2 PG (ref 26.6–33)
MCHC RBC AUTO-ENTMCNC: 27.9 G/DL (ref 31.5–35.7)
MCV RBC AUTO: 83.2 FL (ref 79–97)
MICROCYTES BLD QL: NORMAL
MONOCYTES # BLD AUTO: 0.13 10*3/MM3 (ref 0.1–0.9)
MONOCYTES NFR BLD AUTO: 1.4 % (ref 5–12)
NEUTROPHILS NFR BLD AUTO: 8.36 10*3/MM3 (ref 1.7–7)
NEUTROPHILS NFR BLD AUTO: 92.9 % (ref 42.7–76)
NRBC BLD AUTO-RTO: 0 /100 WBC (ref 0–0.2)
OVALOCYTES BLD QL SMEAR: NORMAL
PLAT MORPH BLD: NORMAL
PLATELET # BLD AUTO: 505 10*3/MM3 (ref 140–450)
PMV BLD AUTO: 9 FL (ref 6–12)
POTASSIUM SERPL-SCNC: 4.9 MMOL/L (ref 3.5–5.2)
QT INTERVAL: 350 MS
QTC INTERVAL: 432 MS
RBC # BLD AUTO: 3.7 10*6/MM3 (ref 3.77–5.28)
SODIUM SERPL-SCNC: 139 MMOL/L (ref 136–145)
WBC MORPH BLD: NORMAL
WBC NRBC COR # BLD: 9.01 10*3/MM3 (ref 3.4–10.8)

## 2023-10-18 PROCEDURE — 82948 REAGENT STRIP/BLOOD GLUCOSE: CPT

## 2023-10-18 PROCEDURE — 97161 PT EVAL LOW COMPLEX 20 MIN: CPT

## 2023-10-18 PROCEDURE — 25010000002 SODIUM CHLORIDE 0.9 % WITH KCL 20 MEQ 20-0.9 MEQ/L-% SOLUTION: Performed by: COLON & RECTAL SURGERY

## 2023-10-18 PROCEDURE — 85007 BL SMEAR W/DIFF WBC COUNT: CPT | Performed by: COLON & RECTAL SURGERY

## 2023-10-18 PROCEDURE — 85025 COMPLETE CBC W/AUTO DIFF WBC: CPT | Performed by: COLON & RECTAL SURGERY

## 2023-10-18 PROCEDURE — 80048 BASIC METABOLIC PNL TOTAL CA: CPT | Performed by: COLON & RECTAL SURGERY

## 2023-10-18 PROCEDURE — 25010000002 HEPARIN (PORCINE) PER 1000 UNITS: Performed by: COLON & RECTAL SURGERY

## 2023-10-18 PROCEDURE — 94664 DEMO&/EVAL PT USE INHALER: CPT

## 2023-10-18 PROCEDURE — 25010000002 ONDANSETRON PER 1 MG: Performed by: COLON & RECTAL SURGERY

## 2023-10-18 PROCEDURE — 94799 UNLISTED PULMONARY SVC/PX: CPT

## 2023-10-18 PROCEDURE — 97530 THERAPEUTIC ACTIVITIES: CPT

## 2023-10-18 RX ADMIN — ALVIMOPAN 12 MG: 12 CAPSULE ORAL at 08:00

## 2023-10-18 RX ADMIN — BUDESONIDE AND FORMOTEROL FUMARATE DIHYDRATE 2 PUFF: 160; 4.5 AEROSOL RESPIRATORY (INHALATION) at 09:19

## 2023-10-18 RX ADMIN — ACETAMINOPHEN 650 MG: 325 TABLET ORAL at 13:34

## 2023-10-18 RX ADMIN — HEPARIN SODIUM 5000 UNITS: 5000 INJECTION INTRAVENOUS; SUBCUTANEOUS at 05:36

## 2023-10-18 RX ADMIN — GABAPENTIN 100 MG: 100 CAPSULE ORAL at 20:33

## 2023-10-18 RX ADMIN — BUDESONIDE AND FORMOTEROL FUMARATE DIHYDRATE 2 PUFF: 160; 4.5 AEROSOL RESPIRATORY (INHALATION) at 19:22

## 2023-10-18 RX ADMIN — GABAPENTIN 100 MG: 100 CAPSULE ORAL at 08:01

## 2023-10-18 RX ADMIN — ACETAMINOPHEN 650 MG: 325 TABLET ORAL at 20:33

## 2023-10-18 RX ADMIN — HEPARIN SODIUM 5000 UNITS: 5000 INJECTION INTRAVENOUS; SUBCUTANEOUS at 20:33

## 2023-10-18 RX ADMIN — POTASSIUM CHLORIDE AND SODIUM CHLORIDE 100 ML/HR: 900; 150 INJECTION, SOLUTION INTRAVENOUS at 05:38

## 2023-10-18 RX ADMIN — ONDANSETRON 4 MG: 2 INJECTION INTRAMUSCULAR; INTRAVENOUS at 20:38

## 2023-10-18 RX ADMIN — HEPARIN SODIUM 5000 UNITS: 5000 INJECTION INTRAVENOUS; SUBCUTANEOUS at 13:34

## 2023-10-18 RX ADMIN — ACETAMINOPHEN 650 MG: 325 TABLET ORAL at 05:36

## 2023-10-18 RX ADMIN — LEVOTHYROXINE SODIUM 88 MCG: 0.09 TABLET ORAL at 05:38

## 2023-10-18 NOTE — PLAN OF CARE
Problem: Adult Inpatient Plan of Care  Goal: Plan of Care Review  Outcome: Ongoing, Progressing  Goal: Patient-Specific Goal (Individualized)  Outcome: Ongoing, Progressing  Goal: Absence of Hospital-Acquired Illness or Injury  Outcome: Ongoing, Progressing  Intervention: Identify and Manage Fall Risk  Recent Flowsheet Documentation  Taken 10/18/2023 1600 by Maria Isabel Villarreal RN  Safety Promotion/Fall Prevention:   activity supervised   assistive device/personal items within reach   clutter free environment maintained   toileting scheduled   safety round/check completed   room organization consistent  Taken 10/18/2023 1400 by Maria Isabel Villarreal RN  Safety Promotion/Fall Prevention:   activity supervised   assistive device/personal items within reach   clutter free environment maintained   toileting scheduled   safety round/check completed   room organization consistent  Taken 10/18/2023 1200 by Maria Isabel Villarreal RN  Safety Promotion/Fall Prevention:   activity supervised   assistive device/personal items within reach   clutter free environment maintained   toileting scheduled   safety round/check completed   room organization consistent  Taken 10/18/2023 1000 by Maria Isabel Villarreal RN  Safety Promotion/Fall Prevention:   activity supervised   assistive device/personal items within reach   clutter free environment maintained   toileting scheduled   room organization consistent   safety round/check completed  Taken 10/18/2023 0800 by Maria Isabel Villarreal RN  Safety Promotion/Fall Prevention:   activity supervised   assistive device/personal items within reach   clutter free environment maintained   toileting scheduled   safety round/check completed   room organization consistent  Intervention: Prevent Skin Injury  Recent Flowsheet Documentation  Taken 10/18/2023 1600 by Maria Isabel Villarreal RN  Body Position: position changed independently  Skin Protection:   adhesive use limited   tubing/devices free from skin contact    transparent dressing maintained   skin-to-skin areas padded   skin-to-device areas padded  Taken 10/18/2023 1400 by Maria Isabel Villarreal RN  Body Position: position changed independently  Skin Protection:   adhesive use limited   tubing/devices free from skin contact   transparent dressing maintained   skin-to-skin areas padded   skin-to-device areas padded  Taken 10/18/2023 1200 by Maria Isabel Villarreal RN  Body Position: position changed independently  Skin Protection:   adhesive use limited   transparent dressing maintained   tubing/devices free from skin contact   skin-to-skin areas padded   skin-to-device areas padded  Taken 10/18/2023 1000 by Maria Isabel Villarreal RN  Body Position: position changed independently  Skin Protection:   adhesive use limited   tubing/devices free from skin contact   transparent dressing maintained   skin-to-skin areas padded   skin-to-device areas padded  Taken 10/18/2023 0800 by Maria Isabel Villarreal RN  Body Position: position changed independently  Skin Protection:   adhesive use limited   tubing/devices free from skin contact   transparent dressing maintained   skin-to-skin areas padded   skin-to-device areas padded  Intervention: Prevent and Manage VTE (Venous Thromboembolism) Risk  Recent Flowsheet Documentation  Taken 10/18/2023 1600 by Maria Isabel Villarreal RN  Activity Management: activity encouraged  Taken 10/18/2023 1400 by Maria Isabel Villarreal RN  Activity Management:   ambulated in room   back to bed  Taken 10/18/2023 1200 by Maria Isabel Villarreal RN  Activity Management: up in chair  Taken 10/18/2023 1000 by Maria Isabel Villarreal RN  Activity Management:   ambulated to bathroom   back to bed  Taken 10/18/2023 0800 by Maria Isabel Villarreal RN  Activity Management: activity encouraged  Goal: Optimal Comfort and Wellbeing  Outcome: Ongoing, Progressing  Goal: Readiness for Transition of Care  Outcome: Ongoing, Progressing     Problem: Skin Injury Risk Increased  Goal: Skin Health and Integrity  Outcome:  Ongoing, Progressing  Intervention: Optimize Skin Protection  Recent Flowsheet Documentation  Taken 10/18/2023 1600 by Maria Isabel Villarreal RN  Pressure Reduction Techniques: frequent weight shift encouraged  Head of Bed (HOB) Positioning: [facility] elevated  Pressure Reduction Devices: pressure-redistributing mattress utilized  Skin Protection:   adhesive use limited   tubing/devices free from skin contact   transparent dressing maintained   skin-to-skin areas padded   skin-to-device areas padded  Taken 10/18/2023 1400 by Maria Isabel Villarreal RN  Pressure Reduction Techniques: frequent weight shift encouraged  Head of Bed (HOB) Positioning: [facility] elevated  Pressure Reduction Devices: pressure-redistributing mattress utilized  Skin Protection:   adhesive use limited   tubing/devices free from skin contact   transparent dressing maintained   skin-to-skin areas padded   skin-to-device areas padded  Taken 10/18/2023 1200 by Maria Isabel Villarreal RN  Pressure Reduction Techniques: frequent weight shift encouraged  Head of Bed (HOB) Positioning: [facility] elevated  Pressure Reduction Devices: pressure-redistributing mattress utilized  Skin Protection:   adhesive use limited   transparent dressing maintained   tubing/devices free from skin contact   skin-to-skin areas padded   skin-to-device areas padded  Taken 10/18/2023 1000 by Maria Isabel Villarreal RN  Pressure Reduction Techniques: frequent weight shift encouraged  Head of Bed (HOB) Positioning: [facility] elevated  Pressure Reduction Devices: pressure-redistributing mattress utilized  Skin Protection:   adhesive use limited   tubing/devices free from skin contact   transparent dressing maintained   skin-to-skin areas padded   skin-to-device areas padded  Taken 10/18/2023 0800 by Maria Isabel Villarreal RN  Pressure Reduction Techniques: frequent weight shift encouraged  Head of Bed (HOB) Positioning: [facility] elevated  Pressure Reduction Devices: pressure-redistributing mattress utilized  Skin Protection:    adhesive use limited   tubing/devices free from skin contact   transparent dressing maintained   skin-to-skin areas padded   skin-to-device areas padded     Problem: Fall Injury Risk  Goal: Absence of Fall and Fall-Related Injury  Outcome: Ongoing, Progressing  Intervention: Promote Injury-Free Environment  Recent Flowsheet Documentation  Taken 10/18/2023 1600 by Maria Isabel Villarreal RN  Safety Promotion/Fall Prevention:   activity supervised   assistive device/personal items within reach   clutter free environment maintained   toileting scheduled   safety round/check completed   room organization consistent  Taken 10/18/2023 1400 by Maria Isabel Villarreal RN  Safety Promotion/Fall Prevention:   activity supervised   assistive device/personal items within reach   clutter free environment maintained   toileting scheduled   safety round/check completed   room organization consistent  Taken 10/18/2023 1200 by Maria Isabel Villarreal RN  Safety Promotion/Fall Prevention:   activity supervised   assistive device/personal items within reach   clutter free environment maintained   toileting scheduled   safety round/check completed   room organization consistent  Taken 10/18/2023 1000 by Maria Isabel Villarreal RN  Safety Promotion/Fall Prevention:   activity supervised   assistive device/personal items within reach   clutter free environment maintained   toileting scheduled   room organization consistent   safety round/check completed  Taken 10/18/2023 0800 by Maria Isabel Villarreal RN  Safety Promotion/Fall Prevention:   activity supervised   assistive device/personal items within reach   clutter free environment maintained   toileting scheduled   safety round/check completed   room organization consistent   Goal Outcome Evaluation:

## 2023-10-18 NOTE — PLAN OF CARE
Goal Outcome Evaluation:  Plan of Care Reviewed With: patient           Outcome Evaluation: Patient presents with mild deficits in strength, endurance, and balance. She ambulated 200' CGA with support of IV pole. She had no LOB but did appear reliant on IV pole, recommend trialing SPC next session. IPPT is indicated to address current deficits. Recommend D/C home with assist when medically appropriate.      Anticipated Discharge Disposition (PT): home with assist

## 2023-10-18 NOTE — CASE MANAGEMENT/SOCIAL WORK
Continued Stay Note  [facility]     Patient Name: Valeria Tracy  MRN: [id number]  Today's Date: 10/18/2023    Admit Date: 10/17/2023    Plan: home   Discharge Plan       Row Name 10/18/23 1004       Plan    Plan home    Plan Comments Met with Ms. Tracy at the bedside to initiate discharge plan. She lives alone in [place]. She is independent with activities of daily living and does not us any DME. She is not current with home health or outpatient services at this time. She reported that her granddaughter lives a few miles away and checks on her regularly. Ms. Tracy's Primary care provider is [facility]. She denies problems with accessing medical care or obtaining medication. Her discharge plan is home, and her granddaughter will transport. No discharge needs were identified today.  will continue to follow plan of care and assist with discharge planning needs as indicated.d    Final Discharge Disposition Code 01 - home or self-care                   Discharge Codes    No documentation.                       Rosa Schmidt RN

## 2023-10-18 NOTE — PROGRESS NOTES
"IM progress note      Valeria Tracy  [id number]  1940     LOS: 1 day     Attending: Angelito Ruiz MD    Primary Care Provider: Peyton Vázquez PA      Chief Complaint/Reason for visit:  Abd pain    Subjective   Doing well. Adequate pain control. Sitting in recliner. Tolerating sips of clears. No flatus. Voiding. Denies f/c/n/v/sob/cp.    Objective     Vital Signs    Visit Vitals  /71 (BP Location: Left arm, Patient Position: Sitting)   Pulse 82   Temp 97.5 °F (36.4 °C) (Oral)   Resp 18   Ht 165.1 cm (65\")   Wt 61.2 kg (135 lb)   SpO2 98%   BMI 22.47 kg/m²     Temp (24hrs), Av.8 °F (36.6 °C), Min:97.4 °F (36.3 °C), Max:98.4 °F (36.9 °C)      Nutrition: Clears    Respiratory: RA    Physical Exam:     General Appearance:    Alert, cooperative, in no acute distress   Head:    Normocephalic, without obvious abnormality, atraumatic    Lungs:     Normal effort, symmetric chest rise, no crepitus, clear to      auscultation bilaterally             Heart:    Regular rhythm and normal rate, normal S1 and S2   Abdomen:     Soft, expected tenderness, mild distention. Midline incision CDI   Extremities:   No clubbing, cyanosis or edema.  No deformities.    Pulses:   Pulses palpable and equal bilaterally   Skin:   No bleeding, bruising or rash   Neurologic:   Moves all extremities with no obvious focal motor deficit.  Cranial nerves 2 - 12 grossly intact     Results Review:     I reviewed the patient's new clinical results.   Results from last 7 days   Lab Units 10/18/23  0317 10/17/23  1305   WBC 10*3/mm3 9.01 8.49   HEMOGLOBIN g/dL 8.6* 9.3*   HEMATOCRIT % 30.8* 31.8*   PLATELETS 10*3/mm3 505* 561*     Results from last 7 days   Lab Units 10/18/23  0317 10/17/23  1305   SODIUM mmol/L 139 139   POTASSIUM mmol/L 4.9 3.7   CHLORIDE mmol/L 108* 106   CO2 mmol/L 21.0* 25.0   BUN mg/dL 6* 5*   CREATININE mg/dL 0.48* 0.56*   CALCIUM mg/dL 8.4* 8.9   BILIRUBIN mg/dL  --  <0.2   ALK PHOS U/L  --  116   ALT (SGPT) U/L  " --  10   AST (SGOT) U/L  --  19   GLUCOSE mg/dL 130* 111*     I reviewed the patient's new imaging including images and reports.    All medications reviewed.   acetaminophen, 650 mg, Oral, Q8H  alvimopan, 12 mg, Oral, BID  budesonide-formoterol, 2 puff, Inhalation, BID - RT  gabapentin, 100 mg, Oral, BID  heparin (porcine), 5,000 Units, Subcutaneous, Q8H  levothyroxine, 88 mcg, Oral, Q AM        Assessment & Plan     S/P right colectomy    Colon cancer    GERD (gastroesophageal reflux disease)    Asthma    Hypothyroidism    Acute postoperative pain    Anemia      Plan  1. Ambulation  2. Pain control-prns   3. IS-encouraged  4. DVT proph- mechs/heparin  5. Bowel regimen  6. Clear diet. Continue IVF   7. Monitor post-op labs  8. DC planning for home     -Asthma: Maintained on home regimen with formulary substitution as indicated.      -GERD:  Resume PPI.  Formulary substitution when indicated.     -Hypothyroidism: Maintain on replacement.      RAINA Arana  10/18/23  13:07 EDT

## 2023-10-18 NOTE — PROGRESS NOTES
"[facility] ([facility])    One bowel movement  Transient cramping earlier.    Vital Signs:  Blood pressure 146/71, pulse 82, temperature 97.5 °F (36.4 °C), temperature source Oral, resp. rate 18, height 165.1 cm (65\"), weight 61.2 kg (135 lb), SpO2 98%.    Labs past 24 hours:  Lab Results (last 24 hours)       Procedure Component Value Units Date/Time    POC Glucose Once [[id number]]  (Normal) Collected: 10/18/23 1236    Specimen: Blood Updated: 10/18/23 1238     Glucose 104 mg/dL     CBC & Differential [[id number]]  (Abnormal) Collected: 10/18/23 0317    Specimen: Blood Updated: 10/18/23 0737    Narrative:      The following orders were created for panel order CBC & Differential.  Procedure                               Abnormality         Status                     ---------                               -----------         ------                     CBC Auto Differential[[id number]]        Abnormal            Final result               Scan Slide[[id number]]                                       Final result                 Please view results for these tests on the individual orders.    CBC Auto Differential [[id number]]  (Abnormal) Collected: 10/18/23 0317    Specimen: Blood Updated: 10/18/23 0737     WBC 9.01 10*3/mm3      RBC 3.70 10*6/mm3      Hemoglobin 8.6 g/dL      Hematocrit 30.8 %      MCV 83.2 fL      MCH 23.2 pg      MCHC 27.9 g/dL      RDW 22.7 %      RDW-SD 68.0 fl      MPV 9.0 fL      Platelets 505 10*3/mm3      Neutrophil % 92.9 %      Lymphocyte % 5.2 %      Monocyte % 1.4 %      Eosinophil % 0.0 %      Basophil % 0.2 %      Immature Grans % 0.3 %      Neutrophils, Absolute 8.36 10*3/mm3      Lymphocytes, Absolute 0.47 10*3/mm3      Monocytes, Absolute 0.13 10*3/mm3      Eosinophils, Absolute 0.00 10*3/mm3      Basophils, Absolute 0.02 10*3/mm3      Immature Grans, Absolute 0.03 10*3/mm3      nRBC 0.0 /100 WBC     Narrative:      Appended report. These results have " been appended to a previously verified report.    Scan Slide [[id number]] Collected: 10/18/23 0317    Specimen: Blood Updated: 10/18/23 0737     Hypochromia Mod/2+     Microcytes Slight/1+     Ovalocytes Slight/1+     WBC Morphology Normal     Platelet Morphology Normal    Basic Metabolic Panel [[id number]]  (Abnormal) Collected: 10/18/23 0317    Specimen: Blood Updated: 10/18/23 0714     Glucose 130 mg/dL      BUN 6 mg/dL      Creatinine 0.48 mg/dL      Sodium 139 mmol/L      Potassium 4.9 mmol/L      Chloride 108 mmol/L      CO2 21.0 mmol/L      Calcium 8.4 mg/dL      BUN/Creatinine Ratio 12.5     Anion Gap 10.0 mmol/L      eGFR 94.7 mL/min/1.73     Narrative:      GFR Normal >60  Chronic Kidney Disease <60  Kidney Failure <15    The GFR formula is only valid for adults with stable renal function between ages 18 and 70.    Tissue Pathology Exam [[id number]] Collected: 10/17/23 1625    Specimen: Tissue from Large Intestine, Right / Ascending Colon Updated: 10/18/23 0709    CEA [[id number]] Collected: 10/17/23 1305    Specimen: Blood Updated: 10/17/23 1919     CEA 76.50 ng/mL     Narrative:      CEA Reference Range:    Non Smokers:   Less than 3 ng/mL  Smokers:       Less than 5 ng/mL  Results may be falsely decreased if patient taking Biotin.    Testing Method: [employer] Electrochemiluminescence Immunoassay(ECLIA)  Values obtained with different assay methods or kits cannot be used interchangeably.            I/O last shift:  I/O this shift:  In: 840 [P.O.:840]  Out: -      PHYSICAL EXAM-  Comfortable  Not distended  Soft  Minimal tenderness    Pathology:  Order Name Source Comment Collection Info Order Time   CBC (NO DIFF)   Collected By: Rafael Miles RN 10/17/2023 12:34 PM     Release to patient   Routine Release        COMPREHENSIVE METABOLIC PANEL   Collected By: Rafael Miles RN 10/17/2023 12:34 PM     Release to patient   Routine Release        CEA   Collected By: Rafael Miles RN 10/17/2023  12:34 PM     Release to patient   Routine Release        TYPE AND SCREEN   Collected By: Bebe Wynn RN 10/17/2023  1:08 PM     Release to patient   Routine Release        TISSUE PATHOLOGY EXAM Large Intestine, Right / Ascending Colon  Collected By: Angelito Ruiz MD 10/17/2023  4:26 PM     Release to patient   Routine Release        .    Assessment and Plan:  Frequent ambulation encouraged.  Diet as tolerated.    Angelito Ruiz MD  10/18/23  16:48 EDT

## 2023-10-18 NOTE — THERAPY EVALUATION
"Patient Name: Valeria Tracy  : 1940    MRN: [id number]                              Today's Date: 10/18/2023       Admit Date: 10/17/2023    Visit Dx:     ICD-10-CM ICD-9-CM   1. Colon cancer  C18.9 153.9     Patient Active Problem List   Diagnosis    GERD (gastroesophageal reflux disease)    Asthma    Hypothyroidism    Colon cancer    S/P right colectomy    Acute postoperative pain    Anemia     Past Medical History:   Diagnosis Date    Acid reflux     Anemia     Asthma     History of transfusion     No Reaction    Hypothyroid     Pneumonia      Past Surgical History:   Procedure Laterality Date    BREAST LUMPECTOMY Right     CATARACT EXTRACTION Bilateral     COLON RESECTION N/A 10/17/2023    Procedure: COLON RESECTION RIGHT LAPAROSCOPIC;  Surgeon: Angelito Ruiz MD;  Location: [facility];  Service: General;  Laterality: N/A;    COLONOSCOPY      SHOULDER SURGERY Right       General Information       Row Name 10/18/23 1309          Physical Therapy Time and Intention    Document Type evaluation  -CM     Mode of Treatment physical therapy;individual therapy  -CM       Row Name 10/18/23 1309          General Information    Patient Profile Reviewed yes  -CM     Prior Level of Function independent:;all household mobility;ADL's  ind no AD  -CM     Existing Precautions/Restrictions fall;other (see comments)  abdominal incision and JESUS  -CM     Barriers to Rehab none identified  -CM       Row Name 10/18/23 1309          Living Environment    People in Home alone;other (see comments)  her granddaughter lives close and plans to check on her at D/C  -CM       Row Name 10/18/23 1309          Home Main Entrance    Number of Stairs, Main Entrance two  -CM     Stair Railings, Main Entrance none;other (see comments)  patient reports there are \"posts\" at main entrance that she holds on to  -CM       Row Name 10/18/23 1309          Stairs Within Home, Primary    Number of Stairs, Within Home, Primary none  -CM       Row " Name 10/18/23 1309          Cognition    Orientation Status (Cognition) oriented x 4  -CM       Row Name 10/18/23 1309          Safety Issues, Functional Mobility    Safety Issues Affecting Function (Mobility) insight into deficits/self-awareness;safety precaution awareness  -CM     Impairments Affecting Function (Mobility) balance;endurance/activity tolerance;pain;postural/trunk control;strength  -CM               User Key  (r) = Recorded By, (t) = Taken By, (c) = Cosigned By      Initials Name Provider Type    Neda Abad PT Physical Therapist                   Mobility       Row Name 10/18/23 1310          Bed Mobility    Bed Mobility supine-sit  -CM     Supine-Sit [place] (Bed Mobility) contact guard;verbal cues  -CM     Assistive Device (Bed Mobility) head of bed elevated;bed rails  -CM     Comment, (Bed Mobility) cues provided for logroll technique for comfort, patient sequences well with cues  -CM       Row Name 10/18/23 1310          Sit-Stand Transfer    Sit-Stand [place] (Transfers) contact guard  -CM       Row Name 10/18/23 1310          Gait/Stairs (Locomotion)    [place] Level (Gait) contact guard;1 person assist;verbal cues  -CM     Assistive Device (Gait) other (see comments)  unilateral support on IV pole  -CM     Distance in Feet (Gait) 200  -CM     Deviations/Abnormal Patterns (Gait) bilateral deviations;tanya decreased;gait speed decreased;stride length decreased  -CM     Bilateral Gait Deviations forward flexed posture  -CM     Comment, (Gait/Stairs) Patient ambulated in moses with a step through gait pattern. No LOB or knee buckling noted. VCs provided for upright posture. Patient does appear mildly reliant on support of IV pole and reports she grabs onto things at home. Recommend trialing SPC next session.  -CM               User Key  (r) = Recorded By, (t) = Taken By, (c) = Cosigned By      Initials Name Provider Type    Neda Abad PT Physical  Therapist                   Obj/Interventions       Row Name 10/18/23 1312          Range of Motion Comprehensive    General Range of Motion bilateral lower extremity ROM WFL  -CM       Row Name 10/18/23 1312          Strength Comprehensive (MMT)    General Manual Muscle Testing (MMT) Assessment lower extremity strength deficits identified  -CM     Comment, General Manual Muscle Testing (MMT) Assessment formal assessment deferred due to pain with abdominal incision, she demonstrates at least 3+/5 bilaterally with functional activity  -CM       Row Name 10/18/23 1312          Balance    Balance Assessment sitting static balance;standing static balance;standing dynamic balance  -CM     Static Sitting Balance standby assist  -CM     Position, Sitting Balance unsupported;sitting edge of bed  -CM     Static Standing Balance contact guard  -CM     Dynamic Standing Balance contact guard  -CM     Position/Device Used, Standing Balance supported;other (see comments)  unilateral support on IV pole  -CM     Comment, Balance no LOB  -CM       Row Name 10/18/23 1312          Sensory Assessment (Somatosensory)    Sensory Assessment (Somatosensory) LE sensation intact  -CM               User Key  (r) = Recorded By, (t) = Taken By, (c) = Cosigned By      Initials Name Provider Type    CM Neda Saunders, PT Physical Therapist                   Goals/Plan       Row Name 10/18/23 1315          Bed Mobility Goal 1 (PT)    Activity/Assistive Device (Bed Mobility Goal 1, PT) sit to supine;supine to sit  -CM     [place] Level/Cues Needed (Bed Mobility Goal 1, PT) modified independence  -CM     Time Frame (Bed Mobility Goal 1, PT) long term goal (LTG);10 days  -CM     Progress/Outcomes (Bed Mobility Goal 1, PT) new goal  -CM       Row Name 10/18/23 1315          Transfer Goal 1 (PT)    Activity/Assistive Device (Transfer Goal 1, PT) sit-to-stand/stand-to-sit;bed-to-chair/chair-to-bed  -CM     [place] Level/Cues Needed  (Transfer Goal 1, PT) standby assist  -CM     Time Frame (Transfer Goal 1, PT) long term goal (LTG);10 days  -CM     Progress/Outcome (Transfer Goal 1, PT) new goal  -CM       Row Name 10/18/23 1315          Gait Training Goal 1 (PT)    Activity/Assistive Device (Gait Training Goal 1, PT) gait (walking locomotion);assistive device use  -CM     [place] Level (Gait Training Goal 1, PT) standby assist  -CM     Distance (Gait Training Goal 1, PT) 500'  -CM     Time Frame (Gait Training Goal 1, PT) long term goal (LTG);10 days  -CM     Progress/Outcome (Gait Training Goal 1, PT) new goal  -CM       Row Name 10/18/23 1315          Stairs Goal 1 (PT)    Activity/Assistive Device (Stairs Goal 1, PT) ascending stairs;descending stairs  -CM     [place] Level/Cues Needed (Stairs Goal 1, PT) contact guard required  -CM     Time Frame (Stairs Goal 1, PT) long term goal (LTG);10 days  -CM     Progress/Outcome (Stairs Goal 1, PT) new goal  -CM       Row Name 10/18/23 1315          Therapy Assessment/Plan (PT)    Planned Therapy Interventions (PT) balance training;bed mobility training;gait training;home exercise program;stretching;strengthening;stair training;ROM (range of motion);postural re-education;patient/family education;transfer training  -CM               User Key  (r) = Recorded By, (t) = Taken By, (c) = Cosigned By      Initials Name Provider Type    Neda Abad, PT Physical Therapist                   Clinical Impression       Row Name 10/18/23 1318          Pain    Pretreatment Pain Rating 3/10  -CM     Posttreatment Pain Rating 3/10  -CM     Pain Location - Side/Orientation Bilateral  -CM     Pain Location generalized  -CM     Pain Location - abdomen  -CM     Pain Intervention(s) Ambulation/increased activity;Repositioned  -CM       Row Name 10/18/23 1311          Plan of Care Review    Plan of Care Reviewed With patient  -CM     Outcome Evaluation Patient presents with mild deficits in strength,  endurance, and balance. She ambulated 200' CGA with support of IV pole. She had no LOB but did appear reliant on IV pole, recommend trialing SPC next session. IPPT is indicated to address current deficits. Recommend D/C home with assist when medically appropriate.  -CM       Row Name 10/18/23 1313          Therapy Assessment/Plan (PT)    Rehab Potential (PT) good, to achieve stated therapy goals  -CM     Criteria for Skilled Interventions Met (PT) yes;meets criteria;skilled treatment is necessary  -CM     Therapy Frequency (PT) daily  -CM       Row Name 10/18/23 1313          Vital Signs    Pre Systolic BP Rehab 131  -CM     Pre Treatment Diastolic BP 88  -CM     Pretreatment Heart Rate (beats/min) 106  -CM     Posttreatment Heart Rate (beats/min) 70  -CM     Pre SpO2 (%) 94  -CM     O2 Delivery Pre Treatment room air  -CM     O2 Delivery Intra Treatment room air  -CM     Post SpO2 (%) 96  -CM     O2 Delivery Post Treatment room air  -CM     Pre Patient Position Supine  -CM     Intra Patient Position Standing  -CM     Post Patient Position Sitting  -CM       Row Name 10/18/23 1313          Positioning and Restraints    Pre-Treatment Position in bed  -CM     Post Treatment Position chair  -CM     In Chair reclined;call light within reach;encouraged to call for assist;exit alarm on;waffle cushion;notified nsg  pillow provided to splint abdomen  -CM               User Key  (r) = Recorded By, (t) = Taken By, (c) = Cosigned By      Initials Name Provider Type    Neda Abad, PT Physical Therapist                   Outcome Measures       Row Name 10/18/23 1316 10/18/23 0800       How much help from another person do you currently need...    Turning from your back to your side while in flat bed without using bedrails? 3  -CM 3  -SW    Moving from lying on back to sitting on the side of a flat bed without bedrails? 3  -CM 3  -SW    Moving to and from a bed to a chair (including a wheelchair)? 3  -CM 3  -SW     Standing up from a chair using your arms (e.g., wheelchair, bedside chair)? 3  -CM 3  -SW    Climbing 3-5 steps with a railing? 3  -CM 3  -SW    To walk in hospital room? 3  -CM 3  -SW    AM-PAC 6 Clicks Score (PT) 18  -CM 18  -SW    Highest level of mobility 6 --> Walked 10 steps or more  -CM 6 --> Walked 10 steps or more  -SW      Row Name 10/18/23 1316          Functional Assessment    Outcome Measure Options AM-PAC 6 Clicks Basic Mobility (PT)  -CM               User Key  (r) = Recorded By, (t) = Taken By, (c) = Cosigned By      Initials Name Provider Type    Neda Abad, PT Physical Therapist    Maria Isabel Larose RN Registered Nurse                                 Physical Therapy Education       Title: PT OT SLP Therapies (In Progress)       Topic: Physical Therapy (In Progress)       Point: Mobility training (Done)       Learning Progress Summary             Patient Acceptance, E, VU by CM at 10/18/2023 1316                         Point: Home exercise program (Not Started)       Learner Progress:  Not documented in this visit.              Point: Body mechanics (Done)       Learning Progress Summary             Patient Acceptance, E, VU by CM at 10/18/2023 1316                         Point: Precautions (Done)       Learning Progress Summary             Patient Acceptance, E, VU by CM at 10/18/2023 1316                                         User Key       Initials Effective Dates Name Provider Type Discipline     09/22/22 -  Neda Saunders, PATRICIA Physical Therapist PT                  PT Recommendation and Plan  Planned Therapy Interventions (PT): balance training, bed mobility training, gait training, home exercise program, stretching, strengthening, stair training, ROM (range of motion), postural re-education, patient/family education, transfer training  Plan of Care Reviewed With: patient  Outcome Evaluation: Patient presents with mild deficits in strength, endurance, and balance.  She ambulated 200' CGA with support of IV pole. She had no LOB but did appear reliant on IV pole, recommend trialing SPC next session. IPPT is indicated to address current deficits. Recommend D/C home with assist when medically appropriate.     Time Calculation:   PT Evaluation Complexity  History, PT Evaluation Complexity: 1-2 personal factors and/or comorbidities  Examination of Body Systems (PT Eval Complexity): total of 3 or more elements  Clinical Presentation (PT Evaluation Complexity): stable  Clinical Decision Making (PT Evaluation Complexity): low complexity  Overall Complexity (PT Evaluation Complexity): low complexity     PT Charges       Row Name 10/18/23 1316             Time Calculation    Start Time 1048  -CM      PT Received On 10/18/23  -CM      PT Goal Re-Cert Due Date 10/28/23  -CM         Timed Charges    59395 - PT Therapeutic Activity Minutes 10  -CM         Untimed Charges    PT Eval/Re-eval Minutes 49  -CM         Total Minutes    Timed Charges Total Minutes 10  -CM      Untimed Charges Total Minutes 49  -CM       Total Minutes 59  -CM                User Key  (r) = Recorded By, (t) = Taken By, (c) = Cosigned By      Initials Name Provider Type    CM Neda Saunders, PT Physical Therapist                  Therapy Charges for Today       Code Description Service Date Service Provider Modifiers Qty    38263129154 HC PT THERAPEUTIC ACT EA 15 MIN 10/18/2023 Neda Saunders, PT GP 1    84930905999 HC PT EVAL LOW COMPLEXITY 4 10/18/2023 Neda Saunders, PT GP 1            PT G-Codes  Outcome Measure Options: AM-PAC 6 Clicks Basic Mobility (PT)  AM-PAC 6 Clicks Score (PT): 18  PT Discharge Summary  Anticipated Discharge Disposition (PT): home with assist    eNda Saunders PT  10/18/2023

## 2023-10-19 PROCEDURE — 25010000002 SODIUM CHLORIDE 0.9 % WITH KCL 20 MEQ 20-0.9 MEQ/L-% SOLUTION: Performed by: COLON & RECTAL SURGERY

## 2023-10-19 PROCEDURE — 25010000002 PROCHLORPERAZINE 10 MG/2ML SOLUTION: Performed by: INTERNAL MEDICINE

## 2023-10-19 PROCEDURE — 97116 GAIT TRAINING THERAPY: CPT

## 2023-10-19 PROCEDURE — 97110 THERAPEUTIC EXERCISES: CPT

## 2023-10-19 PROCEDURE — 94799 UNLISTED PULMONARY SVC/PX: CPT

## 2023-10-19 PROCEDURE — 25010000002 ONDANSETRON PER 1 MG: Performed by: COLON & RECTAL SURGERY

## 2023-10-19 PROCEDURE — 25010000002 HEPARIN (PORCINE) PER 1000 UNITS: Performed by: COLON & RECTAL SURGERY

## 2023-10-19 RX ORDER — PROCHLORPERAZINE EDISYLATE 5 MG/ML
5 INJECTION INTRAMUSCULAR; INTRAVENOUS EVERY 6 HOURS PRN
Status: DISCONTINUED | OUTPATIENT
Start: 2023-10-19 | End: 2023-10-23 | Stop reason: HOSPADM

## 2023-10-19 RX ADMIN — PROCHLORPERAZINE EDISYLATE 5 MG: 5 INJECTION INTRAMUSCULAR; INTRAVENOUS at 09:32

## 2023-10-19 RX ADMIN — GABAPENTIN 100 MG: 100 CAPSULE ORAL at 21:11

## 2023-10-19 RX ADMIN — PROCHLORPERAZINE EDISYLATE 5 MG: 5 INJECTION INTRAMUSCULAR; INTRAVENOUS at 15:23

## 2023-10-19 RX ADMIN — POTASSIUM CHLORIDE AND SODIUM CHLORIDE 100 ML/HR: 900; 150 INJECTION, SOLUTION INTRAVENOUS at 05:38

## 2023-10-19 RX ADMIN — ACETAMINOPHEN 650 MG: 325 TABLET ORAL at 14:12

## 2023-10-19 RX ADMIN — LEVOTHYROXINE SODIUM 88 MCG: 0.09 TABLET ORAL at 05:07

## 2023-10-19 RX ADMIN — HEPARIN SODIUM 5000 UNITS: 5000 INJECTION INTRAVENOUS; SUBCUTANEOUS at 21:11

## 2023-10-19 RX ADMIN — BUDESONIDE AND FORMOTEROL FUMARATE DIHYDRATE 2 PUFF: 160; 4.5 AEROSOL RESPIRATORY (INHALATION) at 20:17

## 2023-10-19 RX ADMIN — ACETAMINOPHEN 650 MG: 325 TABLET ORAL at 21:11

## 2023-10-19 RX ADMIN — ONDANSETRON 4 MG: 2 INJECTION INTRAMUSCULAR; INTRAVENOUS at 04:28

## 2023-10-19 RX ADMIN — HEPARIN SODIUM 5000 UNITS: 5000 INJECTION INTRAVENOUS; SUBCUTANEOUS at 05:07

## 2023-10-19 RX ADMIN — GABAPENTIN 100 MG: 100 CAPSULE ORAL at 08:33

## 2023-10-19 RX ADMIN — HEPARIN SODIUM 5000 UNITS: 5000 INJECTION INTRAVENOUS; SUBCUTANEOUS at 14:12

## 2023-10-19 NOTE — CONSULTS
Clinical Nutrition   Nutrition Support Assessment  Reason for Visit: Identified at risk by screening criteria, MST score 2+      Patient Name: Valeria Tracy  YOB: 1940  MRN: [id number]  Date of Encounter: 10/19/23 14:24 EDT  Admission date: 10/17/2023    Comments:      Nutrition Assessment   Admission Diagnosis:  Colon cancer [C18.9]      Problem List:    S/P right colectomy    GERD (gastroesophageal reflux disease)    Asthma    Hypothyroidism    Colon cancer    Acute postoperative pain    Anemia      PMH:   She  has a past medical history of Acid reflux, Anemia, Asthma, History of transfusion, Hypothyroid, and Pneumonia.    PSH:  She  has a past surgical history that includes Shoulder surgery (Right); Breast lumpectomy (Right); Colonoscopy; Cataract extraction (Bilateral); and Colectomy (N/A, 10/17/2023).    Applicable Nutrition Concerns:   Skin:  Oral:  GI:    Applicable Interval History:   (10/17) s/p right colon resection, clear liquid diet      Reported/Observed/Food/Nutrition Related History:   Patient and RN present during visit. Patient reports she was eating fine prior to this admission other than having to stop eating solid foods 2 days prior to day of admission/surgery. Denies any recent unintentional weight loss. Reports UBW ~140 lbs. RD checked bed scale weight - 131 lbs. Informed patient and asked patient if this sounded accurate to her based on recent weight hx. Responds that she has probably lost weight since her colonoscopy on (10/5). When asked why, patient states she is unsure. RD notes patient weighed 133 lbs at CRS office visit 1 week ago on (10/12), so 131 lbs likely accurate. Has been drinking some dairy-free OWYN ONS at home prior to surgery. Reports she typically avoids all dairy d/t it bothering her sinuses and creating drainage that bothers her throat. Discussed RD placing an actual allergy in EMR versus her choosing foods/drinks on her own from a diet  "with no restrictions (since patient occasionally consumes small amounts of dairy items per her report). Pt would like there to be no diet restrictions at this time. RD unable to offer ONS drinks during this admission d/t all in-stock options containing dairy. Encouraged pt to have her family bring the OWYN drinks from home as feasible.    Had episode of N/V this morning that was relieved with compazine. Hasn't consumed much by mouth today as she is belching with intake and is worried she might vomit again.    BM x3 within past 24 hours. No BM today yet, just flatus.      Anthropometrics     Flowsheet Rows      Flowsheet Row First Filed Value   Admission Height 165.1 cm (65\") Documented at 10/17/2023 1324   Admission Weight 61.2 kg (135 lb) Documented at 10/17/2023 1324       Height: Height: 165.1 cm (65\")  Last Filed Weight: Weight: 61.2 kg (135 lb) (10/17/23 1324)  Method: Weight Method: Stated  BMI: BMI (Calculated): 22.5  BMI classification: Normal: 18.5-24.9kg/m2  IBW:  125 lbs    UBW: Per EMR (office visits)  133 lbs (10/12/23)  139 lbs (9/26/23)  136 lbs (9/6/23)  140 lbs (6/5/23)  148 lbs (6/2022)    Weight change: Unintentional weight loss of 8 lbs (5.8%) x 3 weeks.      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)    Current Nutrition Prescription   PO: Diet: Liquid Diets; Clear Liquid; Texture: Regular Texture (IDDSI 7); Fluid Consistency: Thin (IDDSI 0)  Oral Nutrition Supplement: N/A  Intake: Insufficient data      Nutrition Diagnosis     Date:  10/19            Updated:    Problem Unintended weight loss   Etiology Energy intake < Energy needs   Signs/Symptoms Unintentional weight loss of 8 lbs (5.8%) x 3 weeks   Status:     Goal:   General: Nutrition to support treatment  PO: Tolerate PO, Increase intake, Advace diet as medically feasible/appropriate  EN/PN: N/A    Nutrition Intervention      Follow " treatment progress, Care plan reviewed, Interview for preferences, Encourage intake, Supplement offered/refused      Monitoring/Evaluation:   Per protocol, I&O, PO intake, Pertinent labs, Weight, Skin status, GI status, Symptoms      Zari Garcia RD  Time Spent: 45 min

## 2023-10-19 NOTE — THERAPY TREATMENT NOTE
Patient Name: Valeria Tracy  : 1940    MRN: [id number]                              Today's Date: 10/19/2023       Admit Date: 10/17/2023    Visit Dx:     ICD-10-CM ICD-9-CM   1. Colon cancer  C18.9 153.9     Patient Active Problem List   Diagnosis    GERD (gastroesophageal reflux disease)    Asthma    Hypothyroidism    Colon cancer    S/P right colectomy    Acute postoperative pain    Anemia     Past Medical History:   Diagnosis Date    Acid reflux     Anemia     Asthma     History of transfusion     No Reaction    Hypothyroid     Pneumonia      Past Surgical History:   Procedure Laterality Date    BREAST LUMPECTOMY Right     CATARACT EXTRACTION Bilateral     COLON RESECTION N/A 10/17/2023    Procedure: COLON RESECTION RIGHT LAPAROSCOPIC;  Surgeon: Angelito Ruiz MD;  [facility];  Service: General;  Laterality: N/A;    COLONOSCOPY      SHOULDER SURGERY Right       General Information       Row Name 10/19/23 1513          Physical Therapy Time and Intention    Document Type therapy note (daily note)  -AS     Mode of Treatment physical therapy  -AS       Row Name 10/19/23 1513          General Information    Patient Profile Reviewed yes  -AS     Existing Precautions/Restrictions fall;other (see comments)  abdominal incision, JESUS drain  -AS     Barriers to Rehab medically complex  -AS       Row Name 10/19/23 1513          Cognition    Orientation Status (Cognition) oriented x 4  -AS       Row Name 10/19/23 1513          Safety Issues, Functional Mobility    Safety Issues Affecting Function (Mobility) insight into deficits/self-awareness;safety precaution awareness;sequencing abilities;positioning of assistive device  -AS     Impairments Affecting Function (Mobility) balance;endurance/activity tolerance;pain;postural/trunk control;strength  -AS     Comment, Safety Issues/Impairments (Mobility) alert and following commands, c/o nausea  -AS               User Key  (r) = Recorded By, (t) = Taken By,  (c) = Cosigned By      Initials Name Provider Type    AS Viky Juarez, PTA Physical Therapist Assistant                   Mobility       Row Name 10/19/23 1514          Bed Mobility    Supine-Sit [place] (Bed Mobility) verbal cues;contact guard;1 person assist  -AS     Sit-Supine [place] (Bed Mobility) verbal cues;minimum assist (75% patient effort);1 person assist  -AS     Assistive Device (Bed Mobility) head of bed elevated  -AS     Comment, (Bed Mobility) increased assist with B LE's back to bed  -AS       Row Name 10/19/23 1514          Transfers    Comment, (Transfers) cues for hand placement, min assist from commode, CGA from bed  -AS       Row Name 10/19/23 1514          Bed-Chair Transfer    Bed-Chair [place] (Transfers) other (see comments)  -AS     Comment, (Bed-Chair Transfer) patient requested back to bed due to nausea  -AS       Row Name 10/19/23 1514          Sit-Stand Transfer    Sit-Stand [place] (Transfers) verbal cues;contact guard;minimum assist (75% patient effort);1 person assist  -AS     Assistive Device (Sit-Stand Transfers) walker, front-wheeled  -AS     Comment, (Sit-Stand Transfer) increased assist from lower commode  -AS       Row Name 10/19/23 1514          Gait/Stairs (Locomotion)    [place] Level (Gait) verbal cues;contact guard;1 person assist  -AS     Assistive Device (Gait) walker, front-wheeled  -AS     Distance in Feet (Gait) 10 + 120  -AS     Deviations/Abnormal Patterns (Gait) bilateral deviations;tanya decreased;gait speed decreased;stride length decreased  -AS     Bilateral Gait Deviations forward flexed posture  -AS     Comment, (Gait/Stairs) patient ambulated 10' + 120' with CGA x1 and rolling walker for support, 2 short standing rest breaks due to weakness, fatigue and nausea. Further ambulation deferred due to nausea.  -AS               User Key  (r) = Recorded By, (t) = Taken By, (c) = Cosigned By      Initials Name Provider Type    AS  Viky Juarez PTA Physical Therapist Assistant                   Obj/Interventions       Row Name 10/19/23 1518          Motor Skills    Therapeutic Exercise knee;ankle  -AS       [facility] Name 10/19/23 1518          Knee (Therapeutic Exercise)    Knee (Therapeutic Exercise) strengthening exercise  -AS     Knee Strengthening (Therapeutic Exercise) bilateral;marching while seated;LAQ (long arc quad);sitting;10 repetitions  -AS       [facility] Name 10/19/23 1518          Ankle (Therapeutic Exercise)    Ankle (Therapeutic Exercise) AROM (active range of motion)  -AS     Ankle AROM (Therapeutic Exercise) bilateral;dorsiflexion;plantarflexion;sitting;10 repetitions  -AS       [facility] Name 10/19/23 1518          Balance    Dynamic Standing Balance verbal cues;contact guard;1-person assist  -AS     Position/Device Used, Standing Balance supported;walker, front-wheeled  -AS     Comment, Balance no LOB  -AS               User Key  (r) = Recorded By, (t) = Taken By, (c) = Cosigned By      Initials Name Provider Type    AS Viky Juarez PTA Physical Therapist Assistant                   Goals/Plan    No documentation.                  Clinical Impression       Row Name 10/19/23 1518          Pain    Pretreatment Pain Rating 3/10  -AS     Posttreatment Pain Rating 3/10  -AS     Pain Location - abdomen  -AS     Pain Intervention(s) Repositioned;Ambulation/increased activity  -AS       Row Name 10/19/23 1518          Plan of Care Review    Plan of Care Reviewed With patient  -AS     Progress no change  -AS     Outcome Evaluation patient ambulated 10' + 120' with CGA x1 and rolling walker for support, 2 short standing rest breaks due to weakness, fatigue and nausea. Verbal cues for safe use of walker. Further ambulation deferred due to nausea. Recommend home with assist and HHPT at D/C.  -AS       Row Name 10/19/23 1518          Positioning and Restraints    Pre-Treatment Position in bed  -AS     Post Treatment Position bed  -AS      In Bed supine;call light within reach;encouraged to call for assist;exit alarm on  -AS               User Key  (r) = Recorded By, (t) = Taken By, (c) = Cosigned By      Initials Name Provider Type    AS Viky Juarez PTA Physical Therapist Assistant                   Outcome Measures       Row Name 10/19/23 1519 10/19/23 0800       How much help from another person do you currently need...    Turning from your back to your side while in flat bed without using bedrails? 3  -AS 3  -SW    Moving from lying on back to sitting on the side of a flat bed without bedrails? 3  -AS 3  -SW    Moving to and from a bed to a chair (including a wheelchair)? 3  -AS 3  -SW    Standing up from a chair using your arms (e.g., wheelchair, bedside chair)? 3  -AS 3  -SW    Climbing 3-5 steps with a railing? 3  -AS 3  -SW    To walk in hospital room? 3  -AS 3  -SW    AM-PAC 6 Clicks Score (PT) 18  -AS 18  -SW    Highest level of mobility 6 --> Walked 10 steps or more  -AS 6 --> Walked 10 steps or more  -SW      Row Name 10/19/23 1519          Functional Assessment    Outcome Measure Options AM-PAC 6 Clicks Basic Mobility (PT)  -AS               User Key  (r) = Recorded By, (t) = Taken By, (c) = Cosigned By      Initials Name Provider Type    AS Viky Juarez PTA Physical Therapist Assistant    Maria Isabel Larose RN Registered Nurse                                 Physical Therapy Education       Title: PT OT SLP Therapies (In Progress)       Topic: Physical Therapy (In Progress)       Point: Mobility training (In Progress)       Learning Progress Summary             Patient Acceptance, E, NR by AS at 10/19/2023 1520    Acceptance, E, VU by CM at 10/18/2023 1316                         Point: Home exercise program (In Progress)       Learning Progress Summary             Patient Acceptance, E, NR by AS at 10/19/2023 1520                         Point: Body mechanics (In Progress)       Learning Progress Summary              Patient Acceptance, E, NR by AS at 10/19/2023 1520    Acceptance, E, VU by CM at 10/18/2023 1316                         Point: Precautions (In Progress)       Learning Progress Summary             Patient Acceptance, E, NR by AS at 10/19/2023 1520    Acceptance, E, VU by CM at 10/18/2023 1316                                         User Key       Initials Effective Dates Name Provider Type Discipline    AS 04/28/23 -  Viky Juarez PTA Physical Therapist Assistant PT    CM 09/22/22 -  Neda Saunders PT Physical Therapist PT                  PT Recommendation and Plan     Plan of Care Reviewed With: patient  Progress: no change  Outcome Evaluation: patient ambulated 10' + 120' with CGA x1 and rolling walker for support, 2 short standing rest breaks due to weakness, fatigue and nausea. Verbal cues for safe use of walker. Further ambulation deferred due to nausea. Recommend home with assist and HHPT at D/C.     Time Calculation:         PT Charges       Row Name 10/19/23 1520             Time Calculation    Start Time 1443  -AS      PT Received On 10/19/23  -AS      PT Goal Re-Cert Due Date 10/28/23  -AS         Timed Charges    10862 - PT Therapeutic Exercise Minutes 8  -AS      56153 - Gait Training Minutes  15  -AS         Total Minutes    Timed Charges Total Minutes 23  -AS       Total Minutes 23  -AS                User Key  (r) = Recorded By, (t) = Taken By, (c) = Cosigned By      Initials Name Provider Type    AS Viky Juarez PTA Physical Therapist Assistant                  Therapy Charges for Today       Code Description Service Date Service Provider Modifiers Qty    19898858263 HC PT THER PROC EA 15 MIN 10/19/2023 Viky Juarez PTA GP 1    20675803431 HC GAIT TRAINING EA 15 MIN 10/19/2023 Viky Juarez PTA GP 1            PT G-Codes  Outcome Measure Options: AM-PAC 6 Clicks Basic Mobility (PT)  AM-PAC 6 Clicks Score (PT): 18       Viky Juarez PTA  10/19/2023

## 2023-10-19 NOTE — PLAN OF CARE
Problem: Adult Inpatient Plan of Care  Goal: Plan of Care Review  Outcome: Ongoing, Progressing  Goal: Patient-Specific Goal (Individualized)  Outcome: Ongoing, Progressing  Goal: Absence of Hospital-Acquired Illness or Injury  Outcome: Ongoing, Progressing  Intervention: Identify and Manage Fall Risk  Recent Flowsheet Documentation  Taken 10/19/2023 1400 by Maria Isabel Villarreal RN  Safety Promotion/Fall Prevention:   activity supervised   assistive device/personal items within reach   clutter free environment maintained   toileting scheduled   safety round/check completed   room organization consistent  Taken 10/19/2023 1200 by Maria Isabel Villarreal RN  Safety Promotion/Fall Prevention:   activity supervised   assistive device/personal items within reach   clutter free environment maintained   toileting scheduled   safety round/check completed   room organization consistent  Taken 10/19/2023 1000 by Maria Isabel Villarreal RN  Safety Promotion/Fall Prevention:   activity supervised   assistive device/personal items within reach   clutter free environment maintained   toileting scheduled   safety round/check completed   room organization consistent  Taken 10/19/2023 0800 by Maria Isabel Villarreal RN  Safety Promotion/Fall Prevention:   activity supervised   assistive device/personal items within reach   clutter free environment maintained   toileting scheduled   safety round/check completed   room organization consistent  Intervention: Prevent Skin Injury  Recent Flowsheet Documentation  Taken 10/19/2023 1400 by Maria Isabel Villarreal RN  Body Position: position changed independently  Skin Protection:   adhesive use limited   tubing/devices free from skin contact   transparent dressing maintained   skin-to-skin areas padded   skin-to-device areas padded  Taken 10/19/2023 1200 by Maria Isabel Villarreal RN  Body Position: position changed independently  Skin Protection:   adhesive use limited   tubing/devices free from skin contact    transparent dressing maintained   skin-to-skin areas padded   skin-to-device areas padded  Taken 10/19/2023 1000 by Maria Isabel Villarreal RN  Body Position: position changed independently  Skin Protection:   adhesive use limited   tubing/devices free from skin contact   transparent dressing maintained   skin-to-skin areas padded   skin-to-device areas padded  Taken 10/19/2023 0800 by Maria Isabel Villarreal RN  Body Position: position changed independently  Skin Protection:   adhesive use limited   tubing/devices free from skin contact   transparent dressing maintained   skin-to-skin areas padded   skin-to-device areas padded  Intervention: Prevent and Manage VTE (Venous Thromboembolism) Risk  Recent Flowsheet Documentation  Taken 10/19/2023 1400 by Maria Isabel Villarreal RN  Activity Management:   ambulated to bathroom   back to bed  Taken 10/19/2023 1200 by Maria Isabel Villarreal RN  Activity Management: activity encouraged  Taken 10/19/2023 1000 by Maria Isabel Villarreal RN  Activity Management: activity encouraged  Taken 10/19/2023 0800 by Maria Isabel Villarreal RN  Activity Management: activity encouraged  Goal: Optimal Comfort and Wellbeing  Outcome: Ongoing, Progressing  Goal: Readiness for Transition of Care  Outcome: Ongoing, Progressing     Problem: Skin Injury Risk Increased  Goal: Skin Health and Integrity  Outcome: Ongoing, Progressing  Intervention: Optimize Skin Protection  Recent Flowsheet Documentation  Taken 10/19/2023 1400 by Maria Isabel Villarreal RN  Pressure Reduction Techniques: frequent weight shift encouraged  Head of Bed (HOB) Positioning: HOB elevated  Pressure Reduction Devices: pressure-redistributing mattress utilized  Skin Protection:   adhesive use limited   tubing/devices free from skin contact   transparent dressing maintained   skin-to-skin areas padded   skin-to-device areas padded  Taken 10/19/2023 1200 by Maria Isabel Villarreal RN  Pressure Reduction Techniques: frequent weight shift encouraged  Head of Bed (HOB)  Positioning: [facility] elevated  Pressure Reduction Devices: pressure-redistributing mattress utilized  Skin Protection:   adhesive use limited   tubing/devices free from skin contact   transparent dressing maintained   skin-to-skin areas padded   skin-to-device areas padded  Taken 10/19/2023 1000 by Maria Isabel Villarreal RN  Pressure Reduction Techniques: frequent weight shift encouraged  Head of Bed ([facility]) Positioning: [facility] elevated  Pressure Reduction Devices: pressure-redistributing mattress utilized  Skin Protection:   adhesive use limited   tubing/devices free from skin contact   transparent dressing maintained   skin-to-skin areas padded   skin-to-device areas padded  Taken 10/19/2023 0800 by Maria Isabel Villarreal RN  Pressure Reduction Techniques: frequent weight shift encouraged  Head of Bed ([facility]) Positioning: [facility] elevated  Pressure Reduction Devices: pressure-redistributing mattress utilized  Skin Protection:   adhesive use limited   tubing/devices free from skin contact   transparent dressing maintained   skin-to-skin areas padded   skin-to-device areas padded     Problem: Fall Injury Risk  Goal: Absence of Fall and Fall-Related Injury  Outcome: Ongoing, Progressing  Intervention: Promote Injury-Free Environment  Recent Flowsheet Documentation  Taken 10/19/2023 1400 by Maria Isabel Villarreal RN  Safety Promotion/Fall Prevention:   activity supervised   assistive device/personal items within reach   clutter free environment maintained   toileting scheduled   safety round/check completed   room organization consistent  Taken 10/19/2023 1200 by Maria Isabel Villarreal RN  Safety Promotion/Fall Prevention:   activity supervised   assistive device/personal items within reach   clutter free environment maintained   toileting scheduled   safety round/check completed   room organization consistent  Taken 10/19/2023 1000 by Maria Isabel Villarreal RN  Safety Promotion/Fall Prevention:   activity supervised   assistive device/personal items within  reach   clutter free environment maintained   toileting scheduled   safety round/check completed   room organization consistent  Taken 10/19/2023 0800 by Maria Isabel Villarreal RN  Safety Promotion/Fall Prevention:   activity supervised   assistive device/personal items within reach   clutter free environment maintained   toileting scheduled   safety round/check completed   room organization consistent   Goal Outcome Evaluation:

## 2023-10-19 NOTE — PLAN OF CARE
Goal Outcome Evaluation:  Plan of Care Reviewed With: patient        Progress: no change  Outcome Evaluation: patient ambulated 10' + 120' with CGA x1 and rolling walker for support, 2 short standing rest breaks due to weakness, fatigue and nausea. Verbal cues for safe use of walker. Further ambulation deferred due to nausea. Recommend home with assist and HHPT at D/C.

## 2023-10-19 NOTE — PROGRESS NOTES
"[facility] ([facility])      Cramping earlier today, now feels better  Tolerating some diet.  Findings from surgery reviewed.    Vital Signs:  Blood pressure 150/77, pulse 78, temperature 98.2 °F (36.8 °C), temperature source Oral, resp. rate 18, height 165.1 cm (65\"), weight 61.2 kg (135 lb), SpO2 99%.    Labs past 24 hours:  Lab Results (last 24 hours)       ** No results found for the last 24 hours. **            I/O last shift:  I/O this shift:  In: 240 [P.O.:240]  Out: -      PHYSICAL EXAM-  Comfortable  Abdomen soft, incision looks good    Pathology:  Order Name Source Comment Collection Info Order Time   CBC (NO DIFF)   Collected By: Rafael Miles RN 10/17/2023 12:34 PM     Release to patient   Routine Release        COMPREHENSIVE METABOLIC PANEL   Collected By: Rafael Miles RN 10/17/2023 12:34 PM     Release to patient   Routine Release        CEA   Collected By: Rafael Miles RN 10/17/2023 12:34 PM     Release to patient   Routine Release        TYPE AND SCREEN   Collected By: Bebe Wynn RN 10/17/2023  1:08 PM     Release to patient   Routine Release        TISSUE PATHOLOGY EXAM Large Intestine, Right / Ascending Colon  Collected By: Angelito Ruiz MD 10/17/2023  4:26 PM     Release to patient   Routine Release        .    Assessment and Plan:  Goals of frequent ambulation and diet as tolerated reviewed.    Angelito Ruiz MD  10/19/23  18:53 EDT  "

## 2023-10-19 NOTE — PROGRESS NOTES
"IM progress note      Valeria Tracy  [id number]  1940     LOS: 2 days     Attending: Angelito Ruiz MD    Primary Care Provider: Peyton Vázquez PA      Chief Complaint/Reason for visit:  Abd pain    Subjective   Feels ok now. Had emesis once this am. Better after compazine.  Passed flatus.   On clears.   Good pain control.     Objective          Visit Vitals  /82 (BP Location: Left arm, Patient Position: Lying)   Pulse 78   Temp 97.9 °F (36.6 °C) (Oral)   Resp 18   Ht 165.1 cm (65\")   Wt 61.2 kg (135 lb)   SpO2 99%   BMI 22.47 kg/m²     Temp (24hrs), Av.1 °F (36.7 °C), Min:97.4 °F (36.3 °C), Max:98.6 °F (37 °C)      Nutrition: Clears    Respiratory: RA    Physical Exam:     General Appearance:    Alert, cooperative, in no acute distress   Head:    Normocephalic, without obvious abnormality, atraumatic    Lungs:     Normal effort, symmetric chest rise, no crepitus, clear to      auscultation bilaterally             Heart:    Regular rhythm and normal rate, normal S1 and S2   Abdomen:     Soft, expected tenderness, mild distention. Midline incision CDI   Extremities:   No clubbing, cyanosis or edema.  No deformities.    Pulses:   Pulses palpable and equal bilaterally   Skin:   No bleeding, bruising or rash   Neurologic:   Moves all extremities with no obvious focal motor deficit.  Cranial nerves 2 - 12 grossly intact     Results Review:     I reviewed the patient's new clinical results.   Results from last 7 days   Lab Units 10/18/23  0317 10/17/23  1305   WBC 10*3/mm3 9.01 8.49   HEMOGLOBIN g/dL 8.6* 9.3*   HEMATOCRIT % 30.8* 31.8*   PLATELETS 10*3/mm3 505* 561*     Results from last 7 days   Lab Units 10/18/23  0317 10/17/23  1305   SODIUM mmol/L 139 139   POTASSIUM mmol/L 4.9 3.7   CHLORIDE mmol/L 108* 106   CO2 mmol/L 21.0* 25.0   BUN mg/dL 6* 5*   CREATININE mg/dL 0.48* 0.56*   CALCIUM mg/dL 8.4* 8.9   BILIRUBIN mg/dL  --  <0.2   ALK PHOS U/L  --  116   ALT (SGPT) U/L  --  10   AST (SGOT) U/L  " --  19   GLUCOSE mg/dL 130* 111*     I reviewed the patient's new imaging including images and reports.    All medications reviewed.   acetaminophen, 650 mg, Oral, Q8H  budesonide-formoterol, 2 puff, Inhalation, BID - RT  gabapentin, 100 mg, Oral, BID  heparin (porcine), 5,000 Units, Subcutaneous, Q8H  levothyroxine, 88 mcg, Oral, Q AM        Assessment & Plan     S/P right colectomy    GERD (gastroesophageal reflux disease)    Asthma    Hypothyroidism    Colon cancer    Acute postoperative pain    Anemia      Plan  1. Ambulation, several times daily.   2. Pain control-prns   3. IS-encouraged  4. DVT proph- mechs/heparin  5. Bowel regimen  6. Clear diet. Continue IVF   7. Monitor post-op labs  8. DC planning for home     -Asthma: Maintained on home regimen with formulary substitution as indicated.      -GERD:  Resume PPI.  Formulary substitution when indicated.     -Hypothyroidism: Maintain on replacement.    -Symptomatic tx for emesis, if recurrent, consider NGT.    Maggi Lance MD  10/19/23  11:57 EDT

## 2023-10-19 NOTE — CASE MANAGEMENT/SOCIAL WORK
Continued Stay Note  [facility]     Patient Name: Valeria Tracy  MRN: [id number]  Today's Date: 10/19/2023    Admit Date: 10/17/2023    Plan: home   Discharge Plan       Row Name 10/19/23 1005       Plan    Plan home    Patient/Family in Agreement with Plan yes    Plan Comments Met with Ms. Tracy at the bedside to discuss discharge plan. Her plan is home with her family to transport. No discharge needs were identified today.  will continue to follow plan of care and assist with discharge planning needs as indicated.    Final Discharge Disposition Code 01 - home or self-care                   Discharge Codes    No documentation.                       Rosa Schmidt RN

## 2023-10-20 ENCOUNTER — APPOINTMENT (OUTPATIENT)
Dept: CT IMAGING | Facility: HOSPITAL | Age: 83
End: 2023-10-20
Payer: MEDICARE

## 2023-10-20 LAB
ANION GAP SERPL CALCULATED.3IONS-SCNC: 5 MMOL/L (ref 5–15)
BUN SERPL-MCNC: 4 MG/DL (ref 8–23)
BUN/CREAT SERPL: 7.8 (ref 7–25)
CALCIUM SPEC-SCNC: 8.6 MG/DL (ref 8.6–10.5)
CHLORIDE SERPL-SCNC: 106 MMOL/L (ref 98–107)
CO2 SERPL-SCNC: 27 MMOL/L (ref 22–29)
CREAT SERPL-MCNC: 0.51 MG/DL (ref 0.57–1)
CYTO UR: NORMAL
DEPRECATED RDW RBC AUTO: 65.4 FL (ref 37–54)
EGFRCR SERPLBLD CKD-EPI 2021: 93.3 ML/MIN/1.73
ERYTHROCYTE [DISTWIDTH] IN BLOOD BY AUTOMATED COUNT: 22.5 % (ref 12.3–15.4)
GLUCOSE SERPL-MCNC: 95 MG/DL (ref 65–99)
HCT VFR BLD AUTO: 29.8 % (ref 34–46.6)
HGB BLD-MCNC: 8.7 G/DL (ref 12–15.9)
LAB AP CASE REPORT: NORMAL
LAB AP CLINICAL INFORMATION: NORMAL
LAB AP DIAGNOSIS COMMENT: NORMAL
LAB AP SPECIAL STAINS: NORMAL
MCH RBC QN AUTO: 23.7 PG (ref 26.6–33)
MCHC RBC AUTO-ENTMCNC: 29.2 G/DL (ref 31.5–35.7)
MCV RBC AUTO: 81.2 FL (ref 79–97)
PATH REPORT.FINAL DX SPEC: NORMAL
PATH REPORT.GROSS SPEC: NORMAL
PLATELET # BLD AUTO: 525 10*3/MM3 (ref 140–450)
PMV BLD AUTO: 8.8 FL (ref 6–12)
POTASSIUM SERPL-SCNC: 4.1 MMOL/L (ref 3.5–5.2)
RBC # BLD AUTO: 3.67 10*6/MM3 (ref 3.77–5.28)
SODIUM SERPL-SCNC: 138 MMOL/L (ref 136–145)
WBC NRBC COR # BLD: 6.36 10*3/MM3 (ref 3.4–10.8)

## 2023-10-20 PROCEDURE — 74178 CT ABD&PLV WO CNTR FLWD CNTR: CPT

## 2023-10-20 PROCEDURE — 25010000002 HEPARIN (PORCINE) PER 1000 UNITS: Performed by: COLON & RECTAL SURGERY

## 2023-10-20 PROCEDURE — 97116 GAIT TRAINING THERAPY: CPT

## 2023-10-20 PROCEDURE — 85027 COMPLETE CBC AUTOMATED: CPT | Performed by: INTERNAL MEDICINE

## 2023-10-20 PROCEDURE — 25510000001 IOPAMIDOL 61 % SOLUTION: Performed by: COLON & RECTAL SURGERY

## 2023-10-20 PROCEDURE — 94799 UNLISTED PULMONARY SVC/PX: CPT

## 2023-10-20 PROCEDURE — 25010000002 SODIUM CHLORIDE 0.9 % WITH KCL 20 MEQ 20-0.9 MEQ/L-% SOLUTION: Performed by: COLON & RECTAL SURGERY

## 2023-10-20 PROCEDURE — 97110 THERAPEUTIC EXERCISES: CPT

## 2023-10-20 PROCEDURE — 80048 BASIC METABOLIC PNL TOTAL CA: CPT | Performed by: INTERNAL MEDICINE

## 2023-10-20 PROCEDURE — 25010000002 PROCHLORPERAZINE 10 MG/2ML SOLUTION: Performed by: INTERNAL MEDICINE

## 2023-10-20 PROCEDURE — 94761 N-INVAS EAR/PLS OXIMETRY MLT: CPT

## 2023-10-20 PROCEDURE — 71270 CT THORAX DX C-/C+: CPT

## 2023-10-20 RX ADMIN — ACETAMINOPHEN 650 MG: 325 TABLET ORAL at 13:58

## 2023-10-20 RX ADMIN — ACETAMINOPHEN 650 MG: 325 TABLET ORAL at 05:04

## 2023-10-20 RX ADMIN — HEPARIN SODIUM 5000 UNITS: 5000 INJECTION INTRAVENOUS; SUBCUTANEOUS at 21:07

## 2023-10-20 RX ADMIN — LEVOTHYROXINE SODIUM 88 MCG: 0.09 TABLET ORAL at 05:04

## 2023-10-20 RX ADMIN — HEPARIN SODIUM 5000 UNITS: 5000 INJECTION INTRAVENOUS; SUBCUTANEOUS at 13:58

## 2023-10-20 RX ADMIN — HEPARIN SODIUM 5000 UNITS: 5000 INJECTION INTRAVENOUS; SUBCUTANEOUS at 05:04

## 2023-10-20 RX ADMIN — POTASSIUM CHLORIDE AND SODIUM CHLORIDE 100 ML/HR: 900; 150 INJECTION, SOLUTION INTRAVENOUS at 08:17

## 2023-10-20 RX ADMIN — PROCHLORPERAZINE EDISYLATE 5 MG: 5 INJECTION INTRAMUSCULAR; INTRAVENOUS at 21:14

## 2023-10-20 RX ADMIN — BUDESONIDE AND FORMOTEROL FUMARATE DIHYDRATE 2 PUFF: 160; 4.5 AEROSOL RESPIRATORY (INHALATION) at 19:44

## 2023-10-20 RX ADMIN — IOPAMIDOL 100 ML: 612 INJECTION, SOLUTION INTRAVENOUS at 23:24

## 2023-10-20 RX ADMIN — BUDESONIDE AND FORMOTEROL FUMARATE DIHYDRATE 2 PUFF: 160; 4.5 AEROSOL RESPIRATORY (INHALATION) at 08:40

## 2023-10-20 RX ADMIN — GABAPENTIN 100 MG: 100 CAPSULE ORAL at 08:15

## 2023-10-20 NOTE — PROGRESS NOTES
Doing well despite overall deconditioned status with obstructing right colon cancer and synchronous rectal cancer.    The postoperative course has been uneventful    Tolerating diet, energy is increasing  Good bowel function.    No stoma  We will remove JESUS drain    Further staging of rectal cancer now that the acute phenomenon associated with the obstructing right colon cancer has been resected.    CT of the chest abdomen and pelvis tomorrow  MRI with rectal cancer protocol    If these are both done tomorrow, likely been home tomorrow  Otherwise home when the studies are complete -noting significant travel distance.

## 2023-10-20 NOTE — CASE MANAGEMENT/SOCIAL WORK
Continued Stay Note  [facility]     Patient Name: Valeria Tracy  MRN: [id number]  Today's Date: 10/20/2023    Admit Date: 10/17/2023    Plan: home with home health   Discharge Plan       Row Name 10/20/23 1231       Plan    Plan home with home health    Plan Comments Met with Ms. Tracy at the bedside to discuss discharge plan. Therapy is recommending home health at discharge. Patient was agreeable and requested referral to [facility]. CM spoke with [facility] and  faxed referral to [facility] in admissions. CM is awaiting response to referral. Ms. Tracy reported her granddaughter will provide transportation home at discharge.  will continue to follow plan of care and assist with discharge planning needs as indicated.    Prema with [facility] accepted patient for skilled nursing and PT. PT will evaluate for OT.    Final Discharge Disposition Code 06 - home with home health care                   Discharge Codes    No documentation.                       Rosa Schmidt RN

## 2023-10-20 NOTE — THERAPY TREATMENT NOTE
Patient Name: Valeria Tracy  : 1940    MRN: [id number]                              Today's Date: 10/20/2023       Admit Date: 10/17/2023    Visit Dx:     ICD-10-CM ICD-9-CM   1. Acute postoperative pain  G89.18 338.18   2. Colon cancer  C18.9 153.9   3. S/P right colectomy  Z90.49 V45.89     Patient Active Problem List   Diagnosis    GERD (gastroesophageal reflux disease)    Asthma    Hypothyroidism    Colon cancer    S/P right colectomy    Acute postoperative pain    Anemia     Past Medical History:   Diagnosis Date    Acid reflux     Anemia     Asthma     History of transfusion     No Reaction    Hypothyroid     Pneumonia      Past Surgical History:   Procedure Laterality Date    BREAST LUMPECTOMY Right     CATARACT EXTRACTION Bilateral     COLON RESECTION N/A 10/17/2023    Procedure: COLON RESECTION RIGHT LAPAROSCOPIC;  Surgeon: Angelito Ruiz MD;  Location: [facility];  Service: General;  Laterality: N/A;    COLONOSCOPY      SHOULDER SURGERY Right       General Information       Row Name 10/20/23 1521          Physical Therapy Time and Intention    Document Type therapy note (daily note)  -     Mode of Treatment physical therapy  -       Row Name 10/20/23 1521          General Information    Patient Profile Reviewed yes  -     Existing Precautions/Restrictions fall;other (see comments)  abdominal incision, JESUS drain  -     Barriers to Rehab medically complex;hearing deficit  -       Row Name 10/20/23 1521          Cognition    Orientation Status (Cognition) oriented x 4  -       Row Name 10/20/23 1521          Safety Issues, Functional Mobility    Safety Issues Affecting Function (Mobility) insight into deficits/self-awareness;safety precaution awareness;safety precautions follow-through/compliance  -     Impairments Affecting Function (Mobility) balance;endurance/activity tolerance;pain;postural/trunk control;strength  -               User Key  (r) = Recorded By, (t) = Taken By, (c) =  Cosigned By      Initials Name Provider Type     Massiel Everett PT Physical Therapist                   Mobility       Row Name 10/20/23 1522          Bed Mobility    Bed Mobility supine-sit  -     Supine-Sit Fisher (Bed Mobility) contact guard;verbal cues  -     Assistive Device (Bed Mobility) head of bed elevated  -     Comment, (Bed Mobility) VCs for hand placement and sequencing. Cues for log rolling for comfort. Denied dizziniess upon sitting  -[facility] Name 10/20/23 1522          Transfers    Comment, (Transfers) VCs for hand placement and sequencing using FWW. Denied dizziness or nausea  -[facility] Name 10/20/23 1522          Sit-Stand Transfer    Sit-Stand Fisher (Transfers) standby assist;verbal cues  -     Assistive Device (Sit-Stand Transfers) walker, front-wheeled  -     Comment, (Sit-Stand Transfer) STS x2 from EOB  -[facility] Name 10/20/23 1522          Gait/Stairs (Locomotion)    Fisher Level (Gait) contact guard;verbal cues  -     Assistive Device (Gait) walker, front-wheeled;other (see comments)  No AD  -     Distance in Feet (Gait) 350  -     Deviations/Abnormal Patterns (Gait) bilateral deviations;tanya decreased;gait speed decreased;stride length decreased;base of support, narrow  -     Bilateral Gait Deviations forward flexed posture;heel strike decreased  -     Comment, (Gait/Stairs) Pt ambulated 50 ft w/ FWW + 300 ft unsupported. Pt SBA when using FWW, CGA unsupported. Pt demo'd step through pattern w/ decreased speed. Cues for sequencing and upright posture. Pt noted to be furniture walking once back in room. No LOB noted. Further distance limited by fatigue  -               User Key  (r) = Recorded By, (t) = Taken By, (c) = Cosigned By      Initials Name Provider Type     Massiel Everett PT Physical Therapist                   Obj/Interventions       Row Name 10/20/23 1526          Motor Skills    Therapeutic Exercise hip;knee;ankle   -[facility] Name 10/20/23 1526          Hip (Therapeutic Exercise)    Hip (Therapeutic Exercise) strengthening exercise  -     Hip Strengthening (Therapeutic Exercise) left;aBduction;aDduction;heel slides;supine;marching while seated;sitting;15 repititions  -[facility] Name 10/20/23 1526          Knee (Therapeutic Exercise)    Knee (Therapeutic Exercise) strengthening exercise  -     Knee Strengthening (Therapeutic Exercise) bilateral;LAQ (long arc quad);supine;15 repititions  -[facility] Name 10/20/23 1526          Ankle (Therapeutic Exercise)    Ankle (Therapeutic Exercise) AROM (active range of motion)  -     Ankle AROM (Therapeutic Exercise) bilateral;dorsiflexion;plantarflexion;supine;15 repititions  -[facility] Name 10/20/23 1526          Balance    Balance Assessment sitting static balance;sitting dynamic balance;sit to stand dynamic balance;standing static balance;standing dynamic balance  -     Static Sitting Balance supervision  -     Dynamic Sitting Balance standby assist  -     Position, Sitting Balance unsupported;sitting edge of bed  -     Sit to Stand Dynamic Balance standby assist  -     Static Standing Balance standby assist  -     Dynamic Standing Balance contact guard  -     Position/Device Used, Standing Balance unsupported;supported;walker, 4-wheeled  -     Comment, Balance no LOB noted  -               User Key  (r) = Recorded By, (t) = Taken By, (c) = Cosigned By      Initials Name Provider Type     Massiel Everett, PATRICIA Physical Therapist                   Goals/Plan    No documentation.                  Clinical Impression       [facility] Name 10/20/23 1529          Pain    Pretreatment Pain Rating 0/10 - no pain  -     Posttreatment Pain Rating 0/10 - no pain  -     Pre/Posttreatment Pain Comment Pt denies pain at rest. c/o mild discomfort w/ sup>sit  -     Pain Intervention(s) Repositioned;Ambulation/increased activity  -[facility] Name 10/20/23 1529           Plan of Care Review    Plan of Care Reviewed With patient  -     Progress improving  -     Outcome Evaluation Pt demonstrating improvements this date by ambulating further distance, unsupported, w/ CGA. Pt continues to present below baseline d/t pain and deficits in strength, balance, and activity tolerance. Pt would conitnue to benefit from skilled IP PT. Recommend home w/ assist and HHPT at d/c.  -       Row Name 10/20/23 1529          Vital Signs    Pre Systolic BP Rehab 126  -     Pre Treatment Diastolic BP 66  -LH     Post Systolic BP Rehab 148  -LH     Post Treatment Diastolic BP 81  -LH     Pretreatment Heart Rate (beats/min) 88  -LH     Posttreatment Heart Rate (beats/min) 85  -LH     Pre SpO2 (%) 98  -LH     O2 Delivery Pre Treatment room air  -     O2 Delivery Intra Treatment room air  -     Post SpO2 (%) 99  -LH     O2 Delivery Post Treatment room air  -     Pre Patient Position Supine  -     Intra Patient Position Standing  -     Post Patient Position Sitting  -       Row Name 10/20/23 1529          Positioning and Restraints    Pre-Treatment Position in bed  -     Post Treatment Position chair  -     In Chair reclined;call light within reach;encouraged to call for assist;exit alarm on;waffle cushion;legs elevated;notified [facility]               User Key  (r) = Recorded By, (t) = Taken By, (c) = Cosigned By      Initials Name Provider Type     Massiel Everett, PT Physical Therapist                   Outcome Measures       Row Name 10/20/23 1531          How much help from another person do you currently need...    Turning from your back to your side while in flat bed without using bedrails? 3  -LH     Moving from lying on back to sitting on the side of a flat bed without bedrails? 3  -LH     Moving to and from a bed to a chair (including a wheelchair)? 3  -LH     Standing up from a chair using your arms (e.g., wheelchair, bedside chair)? 3  -LH     Climbing 3-5 steps with a  railing? 3  -     To walk in hospital room? 3  -     AM-PAC 6 Clicks Score (PT) 18  -     Highest level of mobility 6 --> Walked 10 steps or more  -       Row Name 10/20/23 1531          Functional Assessment    Outcome Measure Options AM-PAC 6 Clicks Basic Mobility (PT)  -               User Key  (r) = Recorded By, (t) = Taken By, (c) = Cosigned By      Initials Name Provider Type     Massiel Everett, PT Physical Therapist                                 Physical Therapy Education       Title: PT OT SLP Therapies (Done)       Topic: Physical Therapy (Done)       Point: Mobility training (Done)       Learning Progress Summary             Patient Acceptance, E, VU,NR by  at 10/20/2023 1531    Acceptance, E, NR by AS at 10/19/2023 1520    Acceptance, E, VU by CM at 10/18/2023 1316                         Point: Home exercise program (Done)       Learning Progress Summary             Patient Acceptance, E, VU,NR by  at 10/20/2023 1531    Acceptance, E, NR by AS at 10/19/2023 1520                         Point: Body mechanics (Done)       Learning Progress Summary             Patient Acceptance, E, VU,NR by  at 10/20/2023 1531    Acceptance, E, NR by AS at 10/19/2023 1520    Acceptance, E, VU by  at 10/18/2023 1316                         Point: Precautions (Done)       Learning Progress Summary             Patient Acceptance, E, VU,NR by  at 10/20/2023 1531    Acceptance, E, NR by AS at 10/19/2023 1520    Acceptance, E, VU by CM at 10/18/2023 1316                                         User Key       Initials Effective Dates Name Provider Type Discipline    AS 04/28/23 -  Viky Juarez, PTA Physical Therapist Assistant PT     09/22/22 -  Neda Saunders, PT Physical Therapist PT     09/21/23 -  Massiel Everett, PT Physical Therapist PT                  PT Recommendation and Plan     Plan of Care Reviewed With: patient  Progress: improving  Outcome Evaluation: Pt demonstrating  improvements this date by ambulating further distance, unsupported, w/ CGA. Pt continues to present below baseline d/t pain and deficits in strength, balance, and activity tolerance. Pt would conitnue to benefit from skilled IP PT. Recommend home w/ assist and HHPT at d/c.     Time Calculation:         PT Charges       Row Name 10/20/23 1532             Time Calculation    Start Time 1456  -LH      PT Received On 10/20/23  -      PT Goal Re-Cert Due Date 10/28/23  -         Timed Charges    03328 - PT Therapeutic Exercise Minutes 10  -      49489 - Gait Training Minutes  14  -LH         Total Minutes    Timed Charges Total Minutes 24  -       Total Minutes 24  -                User Key  (r) = Recorded By, (t) = Taken By, (c) = Cosigned By      Initials Name Provider Type     Massiel Everett, PT Physical Therapist                  Therapy Charges for Today       Code Description Service Date Service Provider Modifiers Qty    95091999637 HC PT THER PROC EA 15 MIN 10/20/2023 Massiel Everett, PT GP 1    92334093607 HC GAIT TRAINING EA 15 MIN 10/20/2023 Massiel Everett, PT GP 1            PT G-Codes  Outcome Measure Options: AM-PAC 6 Clicks Basic Mobility (PT)  AM-PAC 6 Clicks Score (PT): 18  PT Discharge Summary  Anticipated Discharge Disposition (PT): home with assist, home with home health    Massiel Everett PT  10/20/2023

## 2023-10-20 NOTE — PLAN OF CARE
Goal Outcome Evaluation:  Plan of Care Reviewed With: patient        Progress: improving  Outcome Evaluation: Pt demonstrating improvements this date by ambulating further distance, unsupported, w/ CGA. Pt continues to present below baseline d/t pain and deficits in strength, balance, and activity tolerance. Pt would conitnue to benefit from skilled IP PT. Recommend home w/ assist and HHPT at d/c.      Anticipated Discharge Disposition (PT): home with assist, home with home health

## 2023-10-20 NOTE — PROGRESS NOTES
"IM progress note      Valeria Tracy  [id number]  1940     LOS: 3 days     Attending: Angelito Ruiz MD    Primary Care Provider: Peyton Vázquez PA      Chief Complaint/Reason for visit:  Abd pain    Subjective   Doing well. Tolerating full liquids. No further nausea or emesis. No f/c    Objective          Visit Vitals  /66 (BP Location: Left arm, Patient Position: Lying)   Pulse 88   Temp 97.8 °F (36.6 °C) (Oral)   Resp 18   Ht 165.1 cm (65\")   Wt 61.2 kg (135 lb)   SpO2 95%   BMI 22.47 kg/m²     Temp (24hrs), Av.7 °F (36.5 °C), Min:96.7 °F (35.9 °C), Max:98.2 °F (36.8 °C)         Physical Exam:     General Appearance:    Alert, cooperative, in no acute distress   Head:    Normocephalic, without obvious abnormality, atraumatic    Lungs:     Normal effort, symmetric chest rise, no crepitus, clear to      auscultation bilaterally             Heart:    Regular rhythm and normal rate, normal S1 and S2   Abdomen:     Soft, expected tenderness, mild distention. Midline incision CDI   Extremities:   No clubbing, cyanosis or edema.  No deformities.    Pulses:   Pulses palpable and equal bilaterally   Skin:   No bleeding, bruising or rash   Neurologic:   Moves all extremities with no obvious focal motor deficit.  Cranial nerves 2 - 12 grossly intact     Results Review:     I reviewed the patient's new clinical results.   Results from last 7 days   Lab Units 10/20/23  0456 10/18/23  0317 10/17/23  1305   WBC 10*3/mm3 6.36 9.01 8.49   HEMOGLOBIN g/dL 8.7* 8.6* 9.3*   HEMATOCRIT % 29.8* 30.8* 31.8*   PLATELETS 10*3/mm3 525* 505* 561*     Results from last 7 days   Lab Units 10/20/23  0456 10/18/23  0317 10/17/23  1305   SODIUM mmol/L 138 139 139   POTASSIUM mmol/L 4.1 4.9 3.7   CHLORIDE mmol/L 106 108* 106   CO2 mmol/L 27.0 21.0* 25.0   BUN mg/dL 4* 6* 5*   CREATININE mg/dL 0.51* 0.48* 0.56*   CALCIUM mg/dL 8.6 8.4* 8.9   BILIRUBIN mg/dL  --   --  <0.2   ALK PHOS U/L  --   --  116   ALT (SGPT) U/L  --   --  " 10   AST (SGOT) U/L  --   --  19   GLUCOSE mg/dL 95 130* 111*     I reviewed the patient's new imaging including images and reports.    All medications reviewed.   acetaminophen, 650 mg, Oral, Q8H  budesonide-formoterol, 2 puff, Inhalation, BID - RT  heparin (porcine), 5,000 Units, Subcutaneous, Q8H  levothyroxine, 88 mcg, Oral, Q AM        Assessment & Plan     S/P right colectomy    GERD (gastroesophageal reflux disease)    Asthma    Hypothyroidism    Colon cancer    Acute postoperative pain    Anemia      Plan  1. Ambulation, several times daily.   2. Pain control-prns   3. IS-encouraged  4. DVT proph- mechs/heparin  5. Bowel regimen  6. ADAT  7. Monitor post-op labs  8. DC planning for home . Likely tomorrow if tolerating PO and has stable bowel function.     -Asthma: Maintained on home regimen with formulary substitution as indicated.      -GERD:  Resumed PPI.  Formulary substitution when indicated.     -Hypothyroidism: Maintain on replacement.    -n/vom, resolved.     Maggi Lance MD  10/20/23  15:12 EDT

## 2023-10-20 NOTE — DISCHARGE PLACEMENT REQUEST
To: Admissions (Prema)  From: Rosa Schmidt,    [phone]       [address]  [address] [facility] [address]  Phone:  [phone]  Fax:  [phone] Date: Oct 20, 2023      Ambulatory Referral to Home Health     Patient:  Valeria Tracy MRN:  [id number]   [address] [facility] [address] :  1940  SSN:    Phone: [phone] Sex:  F      INSURANCE PAYOR PLAN GROUP # SUBSCRIBER ID   Primary:    [employer] MEDICARE REPLACEMENT [id number]      Referring Provider Information:  NORRIS FRANCE Phone: [phone] Fax: [phone]       Referral Information:   # Visits:  999 Referral Type: Home Health [42]   Urgency:  Routine Referral Reason: Specialty Services Required   Start Date: Oct 20, 2023 End Date:  To be determined by Insurer   Diagnosis: Colon cancer (C18.9 [ICD-10-CM] 153.9 [ICD-9-CM])  Acute postoperative pain (G89.18 [ICD-10-CM] 338.18 [ICD-9-CM])  S/P right colectomy (Z90.49 [ICD-10-CM] V45.89 [ICD-9-CM])      Refer to Dept:   Refer to Provider:   Refer to Provider Phone:   Refer to Facility:    PT evaluate for OT.  Face to Face Visit Date: 10/20/2023  Follow-up provider for Plan of Care? I treated the patient in an acute care facility and will not continue treatment after discharge.  Follow-up provider: KATHRYN VALERA [[id number]]  Reason/Clinical Findings: Impaired functional mobility, endurance, balance, and gait.  Describe mobility limitations that make leaving home difficult: Impaired functional mobility, endurance, balance, and gait.  Nursing/Therapeutic Services Requested: Skilled Nursing  Nursing/Therapeutic Services Requested: Physical Therapy  Skilled nursing orders: Medication education  Skilled nursing orders: Cardiopulmonary assessments  PT orders: Therapeutic exercise  PT orders: Gait Training  PT orders: Strengthening  PT orders: Home safety assessment  Weight Bearing Status: As Tolerated  Frequency: 1  "Week 1     This document serves as a request of services and does not constitute Insurance authorization or approval of services.  To determine eligibility, please contact the members Insurance carrier to verify and review coverage.     If you have medical questions regarding this request for services. Please contact [address] at [phone] during normal business hours.        Authorizing Provider:Maggi Lance MD  Authorizing Provider's NPI: [id number]  Order Entered By: Rosa Schmidt RN 10/20/2023 11:58 AM     Electronically signed by: Maggi Lance MD 10/20/2023 11:58 AM           Valeria Tracy (83 y.o. Female)       Date of Birth   1940    Social Security Number       Address   [address] [place] [address]    Home Phone   [phone]    MRN   [id number]       Voodoo   None    Marital Status   Single                            Admission Date   10/17/23    Admission Type   Elective    Admitting Provider   Angelito Ruiz MD    Attending Provider   Angelito Ruiz MD    Department, Room/Bed   [address], S570/1       Discharge Date       Discharge Disposition       Discharge Destination                                 Attending Provider: Angelito Ruiz MD    Allergies: No Known Allergies    Isolation: None   Infection: None   Code Status: CPR    Ht: 165.1 cm (65\")   Wt: 61.2 kg (135 lb)    Admission Cmt: None   Principal Problem: S/P right colectomy [Z90.49]                   Active Insurance as of 10/17/2023       Primary Coverage       Payor Plan Insurance Group Employer/Plan Group    HUMANA MEDICARE REPLACEMENT HUMANA MEDICARE REPLACEMENT [id number]       Payor Plan Address Payor Plan Phone Number Payor Plan Fax Number Effective Dates    [address] [address] [phone]  1/1/2022 - None Entered    [facility] [address]         Subscriber Name Subscriber Birth Date Member ID       HORACIONEISHADU TORRESYCE 1940 [id number]          "            Emergency Contacts        (Rel.) Home Phone Work Phone Mobile Phone    ANNABELLA SALES (Grandchild) -- -- [phone]                 History & Physical        Maggi Lance MD at 10/17/23 1557          Admission HP     Patient Name: Valeria Tracy  MRN: [id number]  : 1940  DOS: 10/17/2023    Attending: Angelito Ruiz MD    Primary Care Provider: Peyton Vázquez PA      Patient Care Team:  Peyton Vázquez PA as PCP - General (Physician Assistant)    Chief complaint:  Colon mass, adenocarcinoma    Subjective  Patient is a pleasant 83 y.o. female presented for scheduled surgery by .     Per his note (Pre-op Diagnosis:   Referral with obstructing right colon cancer and newly diagnosed distal rectal cancer within 10 mm the dentate line.  Unintentional weight loss 15 pounds over 8 months  Rectal cancer, nonobstructing.).    I saw patient preoperatively and reviewed with her her diagnosis and planned surgery.    Subsequent notes indicate she underwent the following procedures:  LAPAROSCOPIC RIGHT COLECTOMY  Resection including abdominal wall peritoneum with findings of puckering suspicious for direct invasion.  Surgery was done under general anesthesia and a block and was she was admitted for further management    Allergies:  No Known Allergies     Medications Prior to Admission   Medication Sig Dispense Refill Last Dose    famotidine (PEPCID) 10 MG tablet Take 1 tablet by mouth 2 (Two) Times a Day.   Past Week    Fluticasone-Salmeterol (ADVAIR/WIXELA) 250-50 MCG/ACT DISKUS Inhale 2 (Two) Times a Day.   10/17/2023    levothyroxine (SYNTHROID, LEVOTHROID) 88 MCG tablet Take 1 tablet by mouth Daily.   10/16/2023    FLUTICASONE PROPIONATE NA 2 sprays into the nostril(s) as directed by provider Daily.       Loratadine 10 MG capsule Take  by mouth.   10/15/2023       Past Medical History:   Diagnosis Date    Acid reflux     Anemia     Asthma     History of transfusion     No  "Reaction    Hypothyroid     Pneumonia      Past Surgical History:   Procedure Laterality Date    BREAST LUMPECTOMY Right     CATARACT EXTRACTION Bilateral     COLONOSCOPY      SHOULDER SURGERY Right      History reviewed. No pertinent family history.  Social History     Tobacco Use    Smoking status: Former     Types: Cigarettes   Vaping Use    Vaping Use: Never used   Substance Use Topics    Alcohol use: Never    Drug use: Never       Review of Systems  Pertinent items are noted in HPI    Vital Signs  /77 (BP Location: Right arm, Patient Position: Sitting)   Pulse 98   Temp 97.4 °F (36.3 °C) (Temporal)   Resp 18   Ht 165.1 cm (65\")   Wt 61.2 kg (135 lb)   SpO2 95%   BMI 22.47 kg/m²     Physical Exam:    General Appearance:    Alert, cooperative, in no acute distress   Head:    Normocephalic, without obvious abnormality, atraumatic   Eyes:            Lids and lashes normal, conjunctivae and sclerae normal, no   icterus, no pallor, corneas clear   Ears:    Ears appear intact with no abnormalities noted   Throat:   No oral lesions, no thrush, oral mucosa moist   Neck:   No adenopathy, supple, trachea midline, no thyromegaly         Lungs:     Clear to auscultation,respirations regular, even and       unlabored. No wheezes or rales.    Heart:    Regular rhythm and normal rate, normal S1 and S2, no murmur    Abdomen:      Soft and benign, nontender nondistended when seen preop   Genitalia:    Deferred   Extremities:   Moves all extremities well, no edema, no cyanosis, no              redness   Pulses:   Pulses palpable and equal bilaterally   Skin:   No bleeding, bruising or rash   Neurologic:   Cranial nerves 2 - 12 grossly intact, no gross motor deficit     Results from last 7 days   Lab Units 10/17/23  1305   WBC 10*3/mm3 8.49   HEMOGLOBIN g/dL 9.3*   HEMATOCRIT % 31.8*   PLATELETS 10*3/mm3 561*     Results from last 7 days   Lab Units 10/17/23  1305   SODIUM mmol/L 139   POTASSIUM mmol/L 3.7   CHLORIDE " "mmol/L 106   CO2 mmol/L 25.0   BUN mg/dL 5*   CREATININE mg/dL 0.56*   CALCIUM mg/dL 8.9   BILIRUBIN mg/dL <0.2   ALK PHOS U/L 116   ALT (SGPT) U/L 10   AST (SGOT) U/L 19   GLUCOSE mg/dL 111*     No results found for: \"HGBA1C\"    Assessment and Plan:   Status post LAPAROSCOPIC RIGHT COLECTOMY  Resection including abdominal wall peritoneum with findings of puckering suspicious for direct invasion.    GERD (gastroesophageal reflux disease)    Asthma    Hypothyroidism      Plan postop management to include  1. Ambulation, several times daily  2. Pain control-prns   3. IS-encourage  4. DVT proph- Mechanical, subcutaneous heparin  5. Bowel regimen, alvimopan  6. Resume home medications as appropriate  7. Monitor post-op labs  8. DC planning   9. Diet, Clears, advance diet as tolerated.  IVF initially, monitor volume status.    -Asthma: Maintained on home regimen with formulary substitution as indicated.     -GERD:  Resume PPI.  Formulary substitution when indicated.    -Hypothyroidism: Maintain on replacement.    Dragon disclaimer:  Part of this encounter note is an electronic transcription/translation of spoken language to printed text. The electronic translation of spoken language may permit erroneous, or at times, nonsensical words or phrases to be inadvertently transcribed; Although I have reviewed the note for such errors, some may still exist.    Maggi Lance MD  10/17/23  15:58 EDT              Electronically signed by Maggi Lance MD at 10/17/23 2127       Pina Norman APRN at 10/17/23 1402       Attestation signed by Angelito Ruiz MD at 10/18/23 1638    [facility] Pre-op    Full history and physical note from office is attached.    /77 (BP Location: Right arm, Patient Position: Sitting)   Pulse 98   Temp 97.4 °F (36.3 °C) (Temporal)   Resp 18   Ht 165.1 cm (65\")   Wt 61.2 kg (135 lb)   SpO2 95%   BMI 22.47 kg/m²     LAB Results:  Lab Results " "  Component Value Date    WBC 8.49 10/17/2023    HGB 9.3 (L) 10/17/2023    HCT 31.8 (L) 10/17/2023    MCV 81.7 10/17/2023     (H) 10/17/2023    GLUCOSE 111 (H) 10/17/2023    BUN 5 (L) 10/17/2023    CREATININE 0.56 (L) 10/17/2023     10/17/2023    K 3.7 10/17/2023     10/17/2023    CO2 25.0 10/17/2023    CALCIUM 8.9 10/17/2023    ALBUMIN 3.3 (L) 10/17/2023    AST 19 10/17/2023    ALT 10 10/17/2023    BILITOT <0.2 10/17/2023       Cancer Staging (if applicable)  Cancer Patient: __ yes __no __unknown__N/A; If yes, clinical stage T:__ N:__M:__, stage group or __N/A      Impression: rectal cancer      Plan: COLON RESECTION RIGHT LAPAROSCOPIC, POSSIBLE STOMA       RAINA Arana   10/17/2023   14:02 EDT    Electronically signed by Angelito Ruiz MD at 10/18/23 1638   Source Note        [Media Unavailable] Scan on 10/17/2023 by [employer], [employer]: [facility], [facility], 10/12/2023          Electronically signed by [employer], [employer] at 10/17/23 1355                 H&P signed by [employer], [employer] at 10/17/23 1355         [Media Unavailable] Scan on 10/17/2023 by [employer], [employer]: [facility], [facility], 10/12/2023          Electronically signed by [employer], [employer] at 10/17/23 1355          Physician Progress Notes (most recent note)        Angelito Ruiz MD at 10/19/23 1853          [facility] ([facility])      Cramping earlier today, now feels better  Tolerating some diet.  Findings from surgery reviewed.    Vital Signs:  Blood pressure 150/77, pulse 78, temperature 98.2 °F (36.8 °C), temperature source Oral, resp. rate 18, height 165.1 cm (65\"), weight 61.2 kg (135 lb), SpO2 99%.    Labs past 24 hours:  Lab Results (last 24 hours)       ** No results found for the last 24 hours. **            I/O last shift:  I/O this shift:  In: 240 [P.O.:240]  Out: -      PHYSICAL EXAM-  Comfortable  Abdomen soft, incision looks good    Pathology:  Order Name Source " Comment Collection Info Order Time   CBC (NO DIFF)   Collected By: Rafael Miles RN 10/17/2023 12:34 PM     Release to patient   Routine Release        COMPREHENSIVE METABOLIC PANEL   Collected By: Rafael Miles RN 10/17/2023 12:34 PM     Release to patient   Routine Release        CEA   Collected By: Rafael Miles RN 10/17/2023 12:34 PM     Release to patient   Routine Release        TYPE AND SCREEN   Collected By: Bebe Wynn RN 10/17/2023  1:08 PM     Release to patient   Routine Release        TISSUE PATHOLOGY EXAM Large Intestine, Right / Ascending Colon  Collected By: Angelito Ruiz MD 10/17/2023  4:26 PM     Release to patient   Routine Release        .    Assessment and Plan:  Goals of frequent ambulation and diet as tolerated reviewed.    Angelito Ruiz MD  10/19/23  18:53 EDT    Electronically signed by Angelito Ruiz MD at 10/19/23 1853       Consult Notes (most recent note)    No notes of this type exist for this encounter.          Physical Therapy Notes (most recent note)        Viky Juarez PTA at 10/19/23 1443  Version 1 of 1         Patient Name: Valeria Tracy  : 1940    MRN: [id number]                              Today's Date: 10/19/2023       Admit Date: 10/17/2023    Visit Dx:     ICD-10-CM ICD-9-CM   1. Colon cancer  C18.9 153.9     Patient Active Problem List   Diagnosis    GERD (gastroesophageal reflux disease)    Asthma    Hypothyroidism    Colon cancer    S/P right colectomy    Acute postoperative pain    Anemia     Past Medical History:   Diagnosis Date    Acid reflux     Anemia     Asthma     History of transfusion     No Reaction    Hypothyroid     Pneumonia      Past Surgical History:   Procedure Laterality Date    BREAST LUMPECTOMY Right     CATARACT EXTRACTION Bilateral     COLON RESECTION N/A 10/17/2023    Procedure: COLON RESECTION RIGHT LAPAROSCOPIC;  Surgeon: Angelito Ruiz MD;  Location: [facility];  Service: General;  Laterality: N/A;     COLONOSCOPY      SHOULDER SURGERY Right       General Information       Row Name 10/19/23 1513          Physical Therapy Time and Intention    Document Type therapy note (daily note)  -AS     Mode of Treatment physical therapy  -AS       Row Name 10/19/23 1513          General Information    Patient Profile Reviewed yes  -AS     Existing Precautions/Restrictions fall;other (see comments)  abdominal incision, JESUS drain  -AS     Barriers to Rehab medically complex  -AS       Row Name 10/19/23 1513          Cognition    Orientation Status (Cognition) oriented x 4  -AS       Row Name 10/19/23 1513          Safety Issues, Functional Mobility    Safety Issues Affecting Function (Mobility) insight into deficits/self-awareness;safety precaution awareness;sequencing abilities;positioning of assistive device  -AS     Impairments Affecting Function (Mobility) balance;endurance/activity tolerance;pain;postural/trunk control;strength  -AS     Comment, Safety Issues/Impairments (Mobility) alert and following commands, c/o nausea  -AS               User Key  (r) = Recorded By, (t) = Taken By, (c) = Cosigned By      Initials Name Provider Type    AS Viky Juarez PTA Physical Therapist Assistant                   Mobility       Row Name 10/19/23 1514          Bed Mobility    Supine-Sit [place] (Bed Mobility) verbal cues;contact guard;1 person assist  -AS     Sit-Supine Dare (Bed Mobility) verbal cues;minimum assist (75% patient effort);1 person assist  -AS     Assistive Device (Bed Mobility) head of bed elevated  -AS     Comment, (Bed Mobility) increased assist with B LE's back to bed  -AS       Row Name 10/19/23 1514          Transfers    Comment, (Transfers) cues for hand placement, min assist from commode, CGA from bed  -AS       Row Name 10/19/23 1514          Bed-Chair Transfer    Bed-Chair Dare (Transfers) other (see comments)  -AS     Comment, (Bed-Chair Transfer) patient requested back to bed due  to nausea  -AS       Row Name 10/19/23 1514          Sit-Stand Transfer    Sit-Stand [place] (Transfers) verbal cues;contact guard;minimum assist (75% patient effort);1 person assist  -AS     Assistive Device (Sit-Stand Transfers) walker, front-wheeled  -AS     Comment, (Sit-Stand Transfer) increased assist from lower commode  -AS       Row Name 10/19/23 1514          Gait/Stairs (Locomotion)    [place] Level (Gait) verbal cues;contact guard;1 person assist  -AS     Assistive Device (Gait) walker, front-wheeled  -AS     Distance in Feet (Gait) 10 + 120  -AS     Deviations/Abnormal Patterns (Gait) bilateral deviations;tanya decreased;gait speed decreased;stride length decreased  -AS     Bilateral Gait Deviations forward flexed posture  -AS     Comment, (Gait/Stairs) patient ambulated 10' + 120' with CGA x1 and rolling walker for support, 2 short standing rest breaks due to weakness, fatigue and nausea. Further ambulation deferred due to nausea.  -AS               User Key  (r) = Recorded By, (t) = Taken By, (c) = Cosigned By      Initials Name Provider Type    AS Viky Juarez PTA Physical Therapist Assistant                   Obj/Interventions       Row Name 10/19/23 1518          Motor Skills    Therapeutic Exercise knee;ankle  -AS       Row Name 10/19/23 1518          Knee (Therapeutic Exercise)    Knee (Therapeutic Exercise) strengthening exercise  -AS     Knee Strengthening (Therapeutic Exercise) bilateral;marching while seated;LAQ (long arc quad);sitting;10 repetitions  -AS       Row Name 10/19/23 1518          Ankle (Therapeutic Exercise)    Ankle (Therapeutic Exercise) AROM (active range of motion)  -AS     Ankle AROM (Therapeutic Exercise) bilateral;dorsiflexion;plantarflexion;sitting;10 repetitions  -AS       Row Name 10/19/23 1518          Balance    Dynamic Standing Balance verbal cues;contact guard;1-person assist  -AS     Position/Device Used, Standing Balance supported;walker,  front-wheeled  -AS     Comment, Balance no LOB  -AS               User Key  (r) = Recorded By, (t) = Taken By, (c) = Cosigned By      Initials Name Provider Type    AS Viky Juarez PTA Physical Therapist Assistant                   Goals/Plan    No documentation.                  Clinical Impression       Row Name 10/19/23 1518          Pain    Pretreatment Pain Rating 3/10  -AS     Posttreatment Pain Rating 3/10  -AS     Pain Location - abdomen  -AS     Pain Intervention(s) Repositioned;Ambulation/increased activity  -AS       Row Name 10/19/23 1518          Plan of Care Review    Plan of Care Reviewed With patient  -AS     Progress no change  -AS     Outcome Evaluation patient ambulated 10' + 120' with CGA x1 and rolling walker for support, 2 short standing rest breaks due to weakness, fatigue and nausea. Verbal cues for safe use of walker. Further ambulation deferred due to nausea. Recommend home with assist and HHPT at D/C.  -AS       Row Name 10/19/23 1518          Positioning and Restraints    Pre-Treatment Position in bed  -AS     Post Treatment Position bed  -AS     In Bed supine;call light within reach;encouraged to call for assist;exit alarm on  -AS               User Key  (r) = Recorded By, (t) = Taken By, (c) = Cosigned By      Initials Name Provider Type    AS Viky Juarez PTA Physical Therapist Assistant                   Outcome Measures       Row Name 10/19/23 1519 10/19/23 0800       How much help from another person do you currently need...    Turning from your back to your side while in flat bed without using bedrails? 3  -AS 3  -SW    Moving from lying on back to sitting on the side of a flat bed without bedrails? 3  -AS 3  -SW    Moving to and from a bed to a chair (including a wheelchair)? 3  -AS 3  -SW    Standing up from a chair using your arms (e.g., wheelchair, bedside chair)? 3  -AS 3  -SW    Climbing 3-5 steps with a railing? 3  -AS 3  -SW    To walk in hospital room? 3   -AS 3  -SW    AM-PAC 6 Clicks Score (PT) 18  -AS 18  -SW    Highest level of mobility 6 --> Walked 10 steps or more  -AS 6 --> Walked 10 steps or more  -SW      Row Name 10/19/23 1519          Functional Assessment    Outcome Measure Options AM-PAC 6 Clicks Basic Mobility (PT)  -AS               User Key  (r) = Recorded By, (t) = Taken By, (c) = Cosigned By      Initials Name Provider Type    AS Viky Juarez PTA Physical Therapist Assistant    Maria Isabel Larose RN Registered Nurse                                 Physical Therapy Education       Title: PT OT SLP Therapies (In Progress)       Topic: Physical Therapy (In Progress)       Point: Mobility training (In Progress)       Learning Progress Summary             Patient Acceptance, E, NR by AS at 10/19/2023 1520    Acceptance, E, VU by CM at 10/18/2023 1316                         Point: Home exercise program (In Progress)       Learning Progress Summary             Patient Acceptance, E, NR by AS at 10/19/2023 1520                         Point: Body mechanics (In Progress)       Learning Progress Summary             Patient Acceptance, E, NR by AS at 10/19/2023 1520    Acceptance, E, VU by CM at 10/18/2023 1316                         Point: Precautions (In Progress)       Learning Progress Summary             Patient Acceptance, E, NR by AS at 10/19/2023 1520    Acceptance, E, VU by CM at 10/18/2023 1316                                         User Key       Initials Effective Dates Name Provider Type Discipline    AS 04/28/23 -  Viky Juarez PTA Physical Therapist Assistant PT    CM 09/22/22 -  Neda Saunders PT Physical Therapist PT                  PT Recommendation and Plan     Plan of Care Reviewed With: patient  Progress: no change  Outcome Evaluation: patient ambulated 10' + 120' with CGA x1 and rolling walker for support, 2 short standing rest breaks due to weakness, fatigue and nausea. Verbal cues for safe use of walker.  Further ambulation deferred due to nausea. Recommend home with assist and HHPT at D/C.     Time Calculation:         PT Charges       Row Name 10/19/23 1520             Time Calculation    Start Time 1443  -AS      PT Received On 10/19/23  -AS      PT Goal Re-Cert Due Date 10/28/23  -AS         Timed Charges    91136 - PT Therapeutic Exercise Minutes 8  -AS      07653 - Gait Training Minutes  15  -AS         Total Minutes    Timed Charges Total Minutes 23  -AS       Total Minutes 23  -AS                User Key  (r) = Recorded By, (t) = Taken By, (c) = Cosigned By      Initials Name Provider Type    AS Viky Juarez PTA Physical Therapist Assistant                  Therapy Charges for Today       Code Description Service Date Service Provider Modifiers Qty    70667668538 HC PT THER PROC EA 15 MIN 10/19/2023 Viky Juarez PTA GP 1    22906856962 HC GAIT TRAINING EA 15 MIN 10/19/2023 Viky Juarez PTA GP 1            PT G-Codes  Outcome Measure Options: AM-PAC 6 Clicks Basic Mobility (PT)  AM-PAC 6 Clicks Score (PT): 18       Viky Juarez PTA  10/19/2023      Electronically signed by Viky Juarez PTA at 10/19/23 1526       Occupational Therapy Notes (most recent note)    No notes exist for this encounter.

## 2023-10-21 ENCOUNTER — APPOINTMENT (OUTPATIENT)
Dept: MRI IMAGING | Facility: HOSPITAL | Age: 83
End: 2023-10-21
Payer: MEDICARE

## 2023-10-21 PROCEDURE — 94799 UNLISTED PULMONARY SVC/PX: CPT

## 2023-10-21 PROCEDURE — 94761 N-INVAS EAR/PLS OXIMETRY MLT: CPT

## 2023-10-21 PROCEDURE — 25010000002 HEPARIN (PORCINE) PER 1000 UNITS: Performed by: COLON & RECTAL SURGERY

## 2023-10-21 PROCEDURE — 25010000002 PROCHLORPERAZINE 10 MG/2ML SOLUTION: Performed by: INTERNAL MEDICINE

## 2023-10-21 PROCEDURE — 25010000002 SODIUM CHLORIDE 0.9 % WITH KCL 20 MEQ 20-0.9 MEQ/L-% SOLUTION: Performed by: COLON & RECTAL SURGERY

## 2023-10-21 RX ADMIN — BUDESONIDE AND FORMOTEROL FUMARATE DIHYDRATE 2 PUFF: 160; 4.5 AEROSOL RESPIRATORY (INHALATION) at 07:35

## 2023-10-21 RX ADMIN — POTASSIUM CHLORIDE AND SODIUM CHLORIDE 100 ML/HR: 900; 150 INJECTION, SOLUTION INTRAVENOUS at 16:38

## 2023-10-21 RX ADMIN — PROCHLORPERAZINE EDISYLATE 5 MG: 5 INJECTION INTRAMUSCULAR; INTRAVENOUS at 05:11

## 2023-10-21 RX ADMIN — ACETAMINOPHEN 650 MG: 325 TABLET ORAL at 21:21

## 2023-10-21 RX ADMIN — PROCHLORPERAZINE EDISYLATE 5 MG: 5 INJECTION INTRAMUSCULAR; INTRAVENOUS at 11:56

## 2023-10-21 RX ADMIN — HEPARIN SODIUM 5000 UNITS: 5000 INJECTION INTRAVENOUS; SUBCUTANEOUS at 21:21

## 2023-10-21 RX ADMIN — ACETAMINOPHEN 650 MG: 325 TABLET ORAL at 15:00

## 2023-10-21 RX ADMIN — HEPARIN SODIUM 5000 UNITS: 5000 INJECTION INTRAVENOUS; SUBCUTANEOUS at 05:11

## 2023-10-21 RX ADMIN — HEPARIN SODIUM 5000 UNITS: 5000 INJECTION INTRAVENOUS; SUBCUTANEOUS at 15:00

## 2023-10-21 RX ADMIN — BUDESONIDE AND FORMOTEROL FUMARATE DIHYDRATE 2 PUFF: 160; 4.5 AEROSOL RESPIRATORY (INHALATION) at 19:18

## 2023-10-21 NOTE — PROGRESS NOTES
"IM progress note      Valeria Tracy  [id number]  1940     LOS: 4 days     Attending: Angelito Ruiz MD    Primary Care Provider: Peyton Vázquez PA      Chief Complaint/Reason for visit:  Abd pain    Subjective   Doing well. No f/c/sob. Nausea, emesis once . Unclear when last BM was.  CT done. But unable to do MRI yet when seen.    Objective          Visit Vitals  /78 (BP Location: Left arm, Patient Position: Sitting)   Pulse 96   Temp 98.2 °F (36.8 °C) (Oral)   Resp 18   Ht 165.1 cm (65\")   Wt 61.2 kg (135 lb)   SpO2 98%   BMI 22.47 kg/m²     Temp (24hrs), Av.9 °F (36.6 °C), Min:97.4 °F (36.3 °C), Max:98.2 °F (36.8 °C)         Physical Exam:     General Appearance:    Alert, cooperative, in no acute distress   Head:    Normocephalic, without obvious abnormality, atraumatic    Lungs:     Normal effort, symmetric chest rise, no crepitus, clear to      auscultation bilaterally             Heart:    Regular rhythm and normal rate, normal S1 and S2   Abdomen:     Soft, expected tenderness,   Midline incision CDI   Extremities:   No clubbing, cyanosis or edema.  No deformities.    Pulses:   Pulses palpable and equal bilaterally   Skin:   No bleeding, bruising or rash   Neurologic:   Moves all extremities with no obvious focal motor deficit.  Cranial nerves 2 - 12 grossly intact     Results Review:     I reviewed the patient's new clinical results.   Results from last 7 days   Lab Units 10/20/23  0456 10/18/23  0317 10/17/23  1305   WBC 10*3/mm3 6.36 9.01 8.49   HEMOGLOBIN g/dL 8.7* 8.6* 9.3*   HEMATOCRIT % 29.8* 30.8* 31.8*   PLATELETS 10*3/mm3 525* 505* 561*     Results from last 7 days   Lab Units 10/20/23  0456 10/18/23  0317 10/17/23  1305   SODIUM mmol/L 138 139 139   POTASSIUM mmol/L 4.1 4.9 3.7   CHLORIDE mmol/L 106 108* 106   CO2 mmol/L 27.0 21.0* 25.0   BUN mg/dL 4* 6* 5*   CREATININE mg/dL 0.51* 0.48* 0.56*   CALCIUM mg/dL 8.6 8.4* 8.9   BILIRUBIN mg/dL  --   --  <0.2   ALK PHOS U/L  --   --  " 116   ALT (SGPT) U/L  --   --  10   AST (SGOT) U/L  --   --  19   GLUCOSE mg/dL 95 130* 111*     I reviewed the patient's new imaging including images and reports.    All medications reviewed.   acetaminophen, 650 mg, Oral, Q8H  budesonide-formoterol, 2 puff, Inhalation, BID - RT  heparin (porcine), 5,000 Units, Subcutaneous, Q8H  levothyroxine, 88 mcg, Oral, Q AM        Assessment & Plan     S/P right colectomy    GERD (gastroesophageal reflux disease)    Asthma    Hypothyroidism    Colon cancer    Acute postoperative pain    Anemia      Plan  1. Ambulation, several times daily.   2. Pain control-prns   3. IS-encouraged  4. DVT proph- mechs/heparin  5. Bowel regimen  6. ADAT  7. Monitor post-op labs  8. DC planning for home . After MRI if taking po with no emesis.     -Asthma: Maintained on home regimen with formulary substitution as indicated.      -GERD:  Resumed PPI.  Formulary substitution when indicated.     -Hypothyroidism: Maintain on replacement.         Maggi Lance MD  10/21/23  16:08 EDT

## 2023-10-21 NOTE — PLAN OF CARE
Goal Outcome Evaluation:  Plan of Care Reviewed With: patient        Progress: no change  Outcome Evaluation: VSS, No BM at this shift, pt vomited x3, advised for NG tube placement but pt refused. Pt felt better after ambulation in the [facility] and sit on the chair. will continue to monitor.      Problem: Adult Inpatient Plan of Care  Goal: Absence of Hospital-Acquired Illness or Injury  Intervention: Identify and Manage Fall Risk  Recent Flowsheet Documentation  Taken 10/20/2023 2000 by Tono Pike RN  Safety Promotion/Fall Prevention: safety round/check completed  Intervention: Prevent Skin Injury  Recent Flowsheet Documentation  Taken 10/20/2023 2000 by Tono Pike RN  Body Position: position changed independently  Intervention: Prevent and Manage VTE (Venous Thromboembolism) Risk  Recent Flowsheet Documentation  Taken 10/20/2023 2000 by Tono Pike RN  Activity Management: activity encouraged     Problem: Adult Inpatient Plan of Care  Goal: Absence of Hospital-Acquired Illness or Injury  Intervention: Prevent Skin Injury  Recent Flowsheet Documentation  Taken 10/20/2023 2000 by Tono Pike RN  Body Position: position changed independently     Problem: Adult Inpatient Plan of Care  Goal: Absence of Hospital-Acquired Illness or Injury  Intervention: Prevent and Manage VTE (Venous Thromboembolism) Risk  Recent Flowsheet Documentation  Taken 10/20/2023 2000 by Tono Pike RN  Activity Management: activity encouraged

## 2023-10-21 NOTE — PROGRESS NOTES
No difficulties overnight    CT of the chest abdomen pelvis without findings to suggest metastatic disease on work-up of synchronous right colon obstructing cancer and rectal cancer    Plans for MRI with rectal cancer protocol, then home today or tomorrow.    Discharge instructions reiterated.

## 2023-10-22 ENCOUNTER — APPOINTMENT (OUTPATIENT)
Dept: MRI IMAGING | Facility: HOSPITAL | Age: 83
End: 2023-10-22
Payer: MEDICARE

## 2023-10-22 LAB
BASOPHILS # BLD AUTO: 0.04 10*3/MM3 (ref 0–0.2)
BASOPHILS NFR BLD AUTO: 0.4 % (ref 0–1.5)
DEPRECATED RDW RBC AUTO: 62.9 FL (ref 37–54)
EOSINOPHIL # BLD AUTO: 0.2 10*3/MM3 (ref 0–0.4)
EOSINOPHIL NFR BLD AUTO: 2 % (ref 0.3–6.2)
ERYTHROCYTE [DISTWIDTH] IN BLOOD BY AUTOMATED COUNT: 22.5 % (ref 12.3–15.4)
HCT VFR BLD AUTO: 29.6 % (ref 34–46.6)
HGB BLD-MCNC: 9.1 G/DL (ref 12–15.9)
IMM GRANULOCYTES # BLD AUTO: 0.08 10*3/MM3 (ref 0–0.05)
IMM GRANULOCYTES NFR BLD AUTO: 0.8 % (ref 0–0.5)
LYMPHOCYTES # BLD AUTO: 0.73 10*3/MM3 (ref 0.7–3.1)
LYMPHOCYTES NFR BLD AUTO: 7.5 % (ref 19.6–45.3)
MCH RBC QN AUTO: 24.2 PG (ref 26.6–33)
MCHC RBC AUTO-ENTMCNC: 30.7 G/DL (ref 31.5–35.7)
MCV RBC AUTO: 78.7 FL (ref 79–97)
MONOCYTES # BLD AUTO: 0.43 10*3/MM3 (ref 0.1–0.9)
MONOCYTES NFR BLD AUTO: 4.4 % (ref 5–12)
NEUTROPHILS NFR BLD AUTO: 8.29 10*3/MM3 (ref 1.7–7)
NEUTROPHILS NFR BLD AUTO: 84.9 % (ref 42.7–76)
NRBC BLD AUTO-RTO: 0 /100 WBC (ref 0–0.2)
PLATELET # BLD AUTO: 500 10*3/MM3 (ref 140–450)
PMV BLD AUTO: 8.6 FL (ref 6–12)
RBC # BLD AUTO: 3.76 10*6/MM3 (ref 3.77–5.28)
WBC NRBC COR # BLD: 9.77 10*3/MM3 (ref 3.4–10.8)

## 2023-10-22 PROCEDURE — 94761 N-INVAS EAR/PLS OXIMETRY MLT: CPT

## 2023-10-22 PROCEDURE — 94799 UNLISTED PULMONARY SVC/PX: CPT

## 2023-10-22 PROCEDURE — 25010000002 PROCHLORPERAZINE 10 MG/2ML SOLUTION: Performed by: INTERNAL MEDICINE

## 2023-10-22 PROCEDURE — 85025 COMPLETE CBC W/AUTO DIFF WBC: CPT | Performed by: COLON & RECTAL SURGERY

## 2023-10-22 PROCEDURE — 25010000002 HEPARIN (PORCINE) PER 1000 UNITS: Performed by: COLON & RECTAL SURGERY

## 2023-10-22 RX ADMIN — DIAZEPAM 5 MG: 5 TABLET ORAL at 03:15

## 2023-10-22 RX ADMIN — ACETAMINOPHEN 650 MG: 325 TABLET ORAL at 05:21

## 2023-10-22 RX ADMIN — HEPARIN SODIUM 5000 UNITS: 5000 INJECTION INTRAVENOUS; SUBCUTANEOUS at 20:57

## 2023-10-22 RX ADMIN — BUDESONIDE AND FORMOTEROL FUMARATE DIHYDRATE 2 PUFF: 160; 4.5 AEROSOL RESPIRATORY (INHALATION) at 19:18

## 2023-10-22 RX ADMIN — LEVOTHYROXINE SODIUM 88 MCG: 0.09 TABLET ORAL at 05:21

## 2023-10-22 RX ADMIN — ACETAMINOPHEN 650 MG: 325 TABLET ORAL at 13:47

## 2023-10-22 RX ADMIN — Medication 10 MG: at 12:35

## 2023-10-22 RX ADMIN — PROCHLORPERAZINE EDISYLATE 5 MG: 5 INJECTION INTRAMUSCULAR; INTRAVENOUS at 18:53

## 2023-10-22 RX ADMIN — HEPARIN SODIUM 5000 UNITS: 5000 INJECTION INTRAVENOUS; SUBCUTANEOUS at 05:23

## 2023-10-22 RX ADMIN — HEPARIN SODIUM 5000 UNITS: 5000 INJECTION INTRAVENOUS; SUBCUTANEOUS at 13:47

## 2023-10-22 RX ADMIN — PROCHLORPERAZINE EDISYLATE 5 MG: 5 INJECTION INTRAMUSCULAR; INTRAVENOUS at 04:18

## 2023-10-22 RX ADMIN — ACETAMINOPHEN 650 MG: 325 TABLET ORAL at 20:57

## 2023-10-22 RX ADMIN — BUDESONIDE AND FORMOTEROL FUMARATE DIHYDRATE 2 PUFF: 160; 4.5 AEROSOL RESPIRATORY (INHALATION) at 07:21

## 2023-10-22 NOTE — PLAN OF CARE
Problem: Adult Inpatient Plan of Care  Goal: Plan of Care Review  Outcome: Ongoing, Progressing  Flowsheets (Taken 10/22/2023 0631)  Progress: no change  Plan of Care Reviewed With: patient  Goal: Patient-Specific Goal (Individualized)  Outcome: Ongoing, Progressing  Goal: Absence of Hospital-Acquired Illness or Injury  Outcome: Ongoing, Progressing  Intervention: Identify and Manage Fall Risk  Recent Flowsheet Documentation  Taken 10/22/2023 0600 by Kwame Hughes RN  Safety Promotion/Fall Prevention: safety round/check completed  Taken 10/22/2023 0400 by Kwame Hughes RN  Safety Promotion/Fall Prevention: safety round/check completed  Taken 10/22/2023 0200 by Kwame Hughes RN  Safety Promotion/Fall Prevention: safety round/check completed  Taken 10/22/2023 0000 by Kwame Hughes RN  Safety Promotion/Fall Prevention: safety round/check completed  Taken 10/21/2023 2200 by Kwame Hughes RN  Safety Promotion/Fall Prevention: safety round/check completed  Taken 10/21/2023 2000 by Kwame Hughes RN  Safety Promotion/Fall Prevention:   assistive device/personal items within reach   clutter free environment maintained   nonskid shoes/slippers when out of bed   safety round/check completed  Intervention: Prevent Skin Injury  Recent Flowsheet Documentation  Taken 10/21/2023 2000 by Kwame Hughes RN  Body Position: position changed independently  Intervention: Prevent and Manage VTE (Venous Thromboembolism) Risk  Recent Flowsheet Documentation  Taken 10/21/2023 2200 by Kwame Hughes RN  Activity Management: ambulated in room  Intervention: Prevent Infection  Recent Flowsheet Documentation  Taken 10/21/2023 2000 by Kwame Hughes RN  Infection Prevention: environmental surveillance performed  Goal: Optimal Comfort and Wellbeing  Outcome: Ongoing, Progressing  Goal: Readiness for Transition of Care  Outcome: Ongoing, Progressing   Goal Outcome Evaluation:  Plan of Care Reviewed With: patient        Progress: no change

## 2023-10-22 NOTE — PROGRESS NOTES
"IM progress note      Valeria Tracy  [id number]  1940     LOS: 5 days     Attending: Angelito Ruiz MD    Primary Care Provider: Peyton Vázquez PA      Chief Complaint/Reason for visit:  Abd pain    Subjective   Nausea intermittently. No emesis. No BM today.     Objective          Visit Vitals  /87 (BP Location: Left arm, Patient Position: Lying)   Pulse 90   Temp 97.2 °F (36.2 °C) (Oral)   Resp 16   Ht 165.1 cm (65\")   Wt 61.2 kg (135 lb)   SpO2 96%   BMI 22.47 kg/m²     Temp (24hrs), Av.8 °F (36.6 °C), Min:97.2 °F (36.2 °C), Max:98.3 °F (36.8 °C)         Physical Exam:     General Appearance:    Alert, cooperative, in no acute distress   Head:    Normocephalic, without obvious abnormality, atraumatic    Lungs:     Normal effort, symmetric chest rise, no crepitus, clear to      auscultation bilaterally             Heart:    Regular rhythm and normal rate, normal S1 and S2   Abdomen:     Soft, expected tenderness,   Midline incision CDI   Extremities:   No clubbing, cyanosis or edema.  No deformities.    Pulses:   Pulses palpable and equal bilaterally   Skin:   No bleeding, bruising or rash   Neurologic:   Moves all extremities with no obvious focal motor deficit.  Cranial nerves 2 - 12 grossly intact     Results Review:     I reviewed the patient's new clinical results.   Results from last 7 days   Lab Units 10/22/23  0638 10/20/23  0456 10/18/23  0317   WBC 10*3/mm3 9.77 6.36 9.01   HEMOGLOBIN g/dL 9.1* 8.7* 8.6*   HEMATOCRIT % 29.6* 29.8* 30.8*   PLATELETS 10*3/mm3 500* 525* 505*     Results from last 7 days   Lab Units 10/20/23  0456 10/18/23  0317 10/17/23  1305   SODIUM mmol/L 138 139 139   POTASSIUM mmol/L 4.1 4.9 3.7   CHLORIDE mmol/L 106 108* 106   CO2 mmol/L 27.0 21.0* 25.0   BUN mg/dL 4* 6* 5*   CREATININE mg/dL 0.51* 0.48* 0.56*   CALCIUM mg/dL 8.6 8.4* 8.9   BILIRUBIN mg/dL  --   --  <0.2   ALK PHOS U/L  --   --  116   ALT (SGPT) U/L  --   --  10   AST (SGOT) U/L  --   --  19 "   GLUCOSE mg/dL 95 130* 111*     I reviewed the patient's new imaging including images and reports.    All medications reviewed.   acetaminophen, 650 mg, Oral, Q8H  budesonide-formoterol, 2 puff, Inhalation, BID - RT  heparin (porcine), 5,000 Units, Subcutaneous, Q8H  levothyroxine, 88 mcg, Oral, Q AM        Assessment & Plan     S/P right colectomy    GERD (gastroesophageal reflux disease)    Asthma    Hypothyroidism    Colon cancer    Acute postoperative pain    Anemia      Plan  1. Ambulation, several times daily.   2. Pain control-prns   3. IS-encouraged  4. DVT proph- mechs/heparin  5. Bowel regimen  6. ADAT  7. Monitor post-op labs  8. DC planning for home . When tolerating po and having steady bowel function.    -Asthma: Maintained on home regimen with formulary substitution as indicated.      -GERD:  Resumed PPI.  Formulary substitution when indicated.     -Hypothyroidism: Maintain on replacement.       Trying to get MRI done , limited by nausea when laying down for test    Maggi Lance MD  10/22/23  12:22 EDT

## 2023-10-22 NOTE — PLAN OF CARE
Problem: Adult Inpatient Plan of Care  Goal: Plan of Care Review  Outcome: Ongoing, Progressing  Goal: Patient-Specific Goal (Individualized)  Outcome: Ongoing, Progressing  Goal: Absence of Hospital-Acquired Illness or Injury  Outcome: Ongoing, Progressing  Intervention: Identify and Manage Fall Risk  Recent Flowsheet Documentation  Taken 10/22/2023 1400 by Maria Isabel Villarreal RN  Safety Promotion/Fall Prevention:   activity supervised   assistive device/personal items within reach   clutter free environment maintained   toileting scheduled   safety round/check completed   room organization consistent  Taken 10/22/2023 1200 by Maria Isabel Villarreal RN  Safety Promotion/Fall Prevention:   activity supervised   assistive device/personal items within reach   clutter free environment maintained   toileting scheduled   safety round/check completed   room organization consistent  Taken 10/22/2023 1000 by Maria Isabel Villarreal RN  Safety Promotion/Fall Prevention:   activity supervised   assistive device/personal items within reach   clutter free environment maintained   toileting scheduled   safety round/check completed   room organization consistent  Taken 10/22/2023 0800 by Maria Isabel Villarreal RN  Safety Promotion/Fall Prevention:   activity supervised   assistive device/personal items within reach   clutter free environment maintained   toileting scheduled   safety round/check completed   room organization consistent  Intervention: Prevent Skin Injury  Recent Flowsheet Documentation  Taken 10/22/2023 1400 by Maria Isabel Villarreal RN  Body Position: position changed independently  Skin Protection:   adhesive use limited   tubing/devices free from skin contact   transparent dressing maintained   skin-to-skin areas padded   skin-to-device areas padded  Taken 10/22/2023 1200 by Maria Isabel Villarreal RN  Body Position: position changed independently  Skin Protection:   adhesive use limited   tubing/devices free from skin contact    transparent dressing maintained   skin-to-skin areas padded   skin-to-device areas padded  Taken 10/22/2023 1000 by Maria Isabel Villarreal RN  Body Position: position changed independently  Skin Protection:   adhesive use limited   tubing/devices free from skin contact   transparent dressing maintained   skin-to-skin areas padded   skin-to-device areas padded  Taken 10/22/2023 0800 by Maria Isabel Villarreal RN  Body Position: position changed independently  Skin Protection:   adhesive use limited   tubing/devices free from skin contact   transparent dressing maintained   skin-to-skin areas padded   skin-to-device areas padded  Intervention: Prevent and Manage VTE (Venous Thromboembolism) Risk  Recent Flowsheet Documentation  Taken 10/22/2023 1400 by Maria Isabel Villarreal RN  Activity Management: activity encouraged  Taken 10/22/2023 1200 by Maria Isabel Villarreal RN  Activity Management: activity encouraged  Taken 10/22/2023 1000 by Maria Isabel Villarreal RN  Activity Management: activity encouraged  Taken 10/22/2023 0800 by Maria Isabel Villarreal RN  Activity Management:   ambulated outside room   back to bed  Goal: Optimal Comfort and Wellbeing  Outcome: Ongoing, Progressing  Intervention: Provide Person-Centered Care  Recent Flowsheet Documentation  Taken 10/22/2023 0800 by Maria Isabel Villarreal RN  Trust Relationship/Rapport: care explained  Goal: Readiness for Transition of Care  Outcome: Ongoing, Progressing     Problem: Skin Injury Risk Increased  Goal: Skin Health and Integrity  Outcome: Ongoing, Progressing  Intervention: Optimize Skin Protection  Recent Flowsheet Documentation  Taken 10/22/2023 1400 by Maria Isabel Villarreal RN  Pressure Reduction Techniques: frequent weight shift encouraged  Head of Bed (HOB) Positioning: HOB elevated  Pressure Reduction Devices: positioning supports utilized  Skin Protection:   adhesive use limited   tubing/devices free from skin contact   transparent dressing maintained   skin-to-skin areas padded    skin-to-device areas padded  Taken 10/22/2023 1200 by Maria Isabel Villarreal RN  Pressure Reduction Techniques: frequent weight shift encouraged  Head of Bed (HOB) Positioning: [facility] elevated  Pressure Reduction Devices: positioning supports utilized  Skin Protection:   adhesive use limited   tubing/devices free from skin contact   transparent dressing maintained   skin-to-skin areas padded   skin-to-device areas padded  Taken 10/22/2023 1000 by Maria Isabel Villarreal RN  Pressure Reduction Techniques: frequent weight shift encouraged  Head of Bed (HOB) Positioning: [facility] elevated  Pressure Reduction Devices: positioning supports utilized  Skin Protection:   adhesive use limited   tubing/devices free from skin contact   transparent dressing maintained   skin-to-skin areas padded   skin-to-device areas padded  Taken 10/22/2023 0800 by Maria Isabel Villarreal RN  Pressure Reduction Techniques: frequent weight shift encouraged  Head of Bed (HOB) Positioning: [facility] elevated  Pressure Reduction Devices: positioning supports utilized  Skin Protection:   adhesive use limited   tubing/devices free from skin contact   transparent dressing maintained   skin-to-skin areas padded   skin-to-device areas padded     Problem: Fall Injury Risk  Goal: Absence of Fall and Fall-Related Injury  Outcome: Ongoing, Progressing  Intervention: Promote Injury-Free Environment  Recent Flowsheet Documentation  Taken 10/22/2023 1400 by Maria Isabel Villarreal RN  Safety Promotion/Fall Prevention:   activity supervised   assistive device/personal items within reach   clutter free environment maintained   toileting scheduled   safety round/check completed   room organization consistent  Taken 10/22/2023 1200 by Maria Isabel Villarreal RN  Safety Promotion/Fall Prevention:   activity supervised   assistive device/personal items within reach   clutter free environment maintained   toileting scheduled   safety round/check completed   room organization consistent  Taken 10/22/2023  1000 by Maria Isabel Villarreal, RN  Safety Promotion/Fall Prevention:   activity supervised   assistive device/personal items within reach   clutter free environment maintained   toileting scheduled   safety round/check completed   room organization consistent  Taken 10/22/2023 0800 by Maria Isabel Villarreal, RN  Safety Promotion/Fall Prevention:   activity supervised   assistive device/personal items within reach   clutter free environment maintained   toileting scheduled   safety round/check completed   room organization consistent   Goal Outcome Evaluation:

## 2023-10-22 NOTE — PROGRESS NOTES
Imaging complete, but, feels nauseated this morning, emesis container at the bedside but has not been required    Abdomen is soft, no tenderness or distention to suggest acute abdominal process.    We will check CBC with differential.    Frequent ambulation encouraged, likely some ileus    We discussed goals of tolerating diet and doing well prior to reconsideration of discharge to home.    Needs to be tolerating diet well to go home.

## 2023-10-23 ENCOUNTER — READMISSION MANAGEMENT (OUTPATIENT)
Dept: CALL CENTER | Facility: HOSPITAL | Age: 83
End: 2023-10-23
Payer: MEDICARE

## 2023-10-23 VITALS
WEIGHT: 135 LBS | DIASTOLIC BLOOD PRESSURE: 77 MMHG | RESPIRATION RATE: 16 BRPM | SYSTOLIC BLOOD PRESSURE: 156 MMHG | BODY MASS INDEX: 22.49 KG/M2 | HEART RATE: 81 BPM | HEIGHT: 65 IN | TEMPERATURE: 98.1 F | OXYGEN SATURATION: 96 %

## 2023-10-23 LAB
BASOPHILS # BLD AUTO: 0.03 10*3/MM3 (ref 0–0.2)
BASOPHILS NFR BLD AUTO: 0.3 % (ref 0–1.5)
DEPRECATED RDW RBC AUTO: 62.2 FL (ref 37–54)
EOSINOPHIL # BLD AUTO: 0.22 10*3/MM3 (ref 0–0.4)
EOSINOPHIL NFR BLD AUTO: 2.5 % (ref 0.3–6.2)
ERYTHROCYTE [DISTWIDTH] IN BLOOD BY AUTOMATED COUNT: 22.1 % (ref 12.3–15.4)
HCT VFR BLD AUTO: 28.2 % (ref 34–46.6)
HGB BLD-MCNC: 8.6 G/DL (ref 12–15.9)
IMM GRANULOCYTES # BLD AUTO: 0.06 10*3/MM3 (ref 0–0.05)
IMM GRANULOCYTES NFR BLD AUTO: 0.7 % (ref 0–0.5)
LYMPHOCYTES # BLD AUTO: 0.88 10*3/MM3 (ref 0.7–3.1)
LYMPHOCYTES NFR BLD AUTO: 10.1 % (ref 19.6–45.3)
MCH RBC QN AUTO: 23.9 PG (ref 26.6–33)
MCHC RBC AUTO-ENTMCNC: 30.5 G/DL (ref 31.5–35.7)
MCV RBC AUTO: 78.3 FL (ref 79–97)
MONOCYTES # BLD AUTO: 0.42 10*3/MM3 (ref 0.1–0.9)
MONOCYTES NFR BLD AUTO: 4.8 % (ref 5–12)
NEUTROPHILS NFR BLD AUTO: 7.07 10*3/MM3 (ref 1.7–7)
NEUTROPHILS NFR BLD AUTO: 81.6 % (ref 42.7–76)
NRBC BLD AUTO-RTO: 0 /100 WBC (ref 0–0.2)
PLATELET # BLD AUTO: 484 10*3/MM3 (ref 140–450)
PMV BLD AUTO: 8.9 FL (ref 6–12)
RBC # BLD AUTO: 3.6 10*6/MM3 (ref 3.77–5.28)
WBC NRBC COR # BLD: 8.68 10*3/MM3 (ref 3.4–10.8)

## 2023-10-23 PROCEDURE — 25010000002 SODIUM CHLORIDE 0.9 % WITH KCL 20 MEQ 20-0.9 MEQ/L-% SOLUTION: Performed by: COLON & RECTAL SURGERY

## 2023-10-23 PROCEDURE — 97116 GAIT TRAINING THERAPY: CPT

## 2023-10-23 PROCEDURE — 94799 UNLISTED PULMONARY SVC/PX: CPT

## 2023-10-23 PROCEDURE — 85025 COMPLETE CBC W/AUTO DIFF WBC: CPT | Performed by: COLON & RECTAL SURGERY

## 2023-10-23 PROCEDURE — 25010000002 HEPARIN (PORCINE) PER 1000 UNITS: Performed by: COLON & RECTAL SURGERY

## 2023-10-23 PROCEDURE — 25010000002 PROCHLORPERAZINE 10 MG/2ML SOLUTION: Performed by: INTERNAL MEDICINE

## 2023-10-23 PROCEDURE — 97110 THERAPEUTIC EXERCISES: CPT

## 2023-10-23 RX ORDER — HYDROCODONE BITARTRATE AND ACETAMINOPHEN 7.5; 325 MG/1; MG/1
1 TABLET ORAL EVERY 4 HOURS PRN
Qty: 10 TABLET | Refills: 0 | Status: SHIPPED | OUTPATIENT
Start: 2023-10-23 | End: 2023-10-27

## 2023-10-23 RX ORDER — AMLODIPINE BESYLATE 5 MG/1
5 TABLET ORAL
Status: DISCONTINUED | OUTPATIENT
Start: 2023-10-23 | End: 2023-10-23 | Stop reason: HOSPADM

## 2023-10-23 RX ORDER — PROCHLORPERAZINE MALEATE 5 MG/1
5 TABLET ORAL EVERY 6 HOURS PRN
Qty: 15 TABLET | Refills: 0 | Status: SHIPPED | OUTPATIENT
Start: 2023-10-23

## 2023-10-23 RX ORDER — ACETAMINOPHEN 325 MG/1
650 TABLET ORAL EVERY 8 HOURS
Start: 2023-10-23

## 2023-10-23 RX ORDER — AMLODIPINE BESYLATE 5 MG/1
5 TABLET ORAL
Qty: 15 TABLET | Refills: 0 | Status: SHIPPED | OUTPATIENT
Start: 2023-10-24

## 2023-10-23 RX ADMIN — HEPARIN SODIUM 5000 UNITS: 5000 INJECTION INTRAVENOUS; SUBCUTANEOUS at 05:58

## 2023-10-23 RX ADMIN — PROCHLORPERAZINE EDISYLATE 5 MG: 5 INJECTION INTRAMUSCULAR; INTRAVENOUS at 11:44

## 2023-10-23 RX ADMIN — POTASSIUM CHLORIDE AND SODIUM CHLORIDE 100 ML/HR: 900; 150 INJECTION, SOLUTION INTRAVENOUS at 01:31

## 2023-10-23 RX ADMIN — HEPARIN SODIUM 5000 UNITS: 5000 INJECTION INTRAVENOUS; SUBCUTANEOUS at 13:20

## 2023-10-23 RX ADMIN — BUDESONIDE AND FORMOTEROL FUMARATE DIHYDRATE 2 PUFF: 160; 4.5 AEROSOL RESPIRATORY (INHALATION) at 09:24

## 2023-10-23 RX ADMIN — AMLODIPINE BESYLATE 5 MG: 5 TABLET ORAL at 10:38

## 2023-10-23 RX ADMIN — LEVOTHYROXINE SODIUM 88 MCG: 0.09 TABLET ORAL at 05:58

## 2023-10-23 RX ADMIN — ACETAMINOPHEN 650 MG: 325 TABLET ORAL at 13:20

## 2023-10-23 RX ADMIN — Medication 10 MG: at 08:52

## 2023-10-23 NOTE — THERAPY TREATMENT NOTE
Patient Name: Valeria Tracy  : 1940    MRN: [id number]                              Today's Date: 10/23/2023       Admit Date: 10/17/2023    Visit Dx:     ICD-10-CM ICD-9-CM   1. Acute postoperative pain  G89.18 338.18   2. Colon cancer  C18.9 153.9   3. S/P right colectomy  Z90.49 V45.89     Patient Active Problem List   Diagnosis    GERD (gastroesophageal reflux disease)    Asthma    Hypothyroidism    Colon cancer    S/P right colectomy    Acute postoperative pain    Anemia     Past Medical History:   Diagnosis Date    Acid reflux     Anemia     Asthma     History of transfusion     No Reaction    Hypothyroid     Pneumonia      Past Surgical History:   Procedure Laterality Date    BREAST LUMPECTOMY Right     CATARACT EXTRACTION Bilateral     COLON RESECTION N/A 10/17/2023    Procedure: COLON RESECTION RIGHT LAPAROSCOPIC;  Surgeon: Angelito Ruiz MD;  Location: [facility];  Service: General;  Laterality: N/A;    COLONOSCOPY      SHOULDER SURGERY Right       General Information       Row Name 10/23/23 1121          Physical Therapy Time and Intention    Document Type therapy note (daily note)  -AS     Mode of Treatment physical therapy  -AS       Row Name 10/23/23 1121          General Information    Patient Profile Reviewed yes  -AS     Existing Precautions/Restrictions fall;other (see comments)  abdominal incision  -AS     Barriers to Rehab medically complex;hearing deficit  -AS       Row Name 10/23/23 1121          Cognition    Orientation Status (Cognition) oriented x 3  -AS       Row Name 10/23/23 1121          Safety Issues, Functional Mobility    Safety Issues Affecting Function (Mobility) awareness of need for assistance;insight into deficits/self-awareness;positioning of assistive device;safety precaution awareness;safety precautions follow-through/compliance;sequencing abilities  -AS     Impairments Affecting Function (Mobility) balance;endurance/activity tolerance;pain;postural/trunk  control;strength  -AS     Comment, Safety Issues/Impairments (Mobility) c/o nausea  -AS               User Key  (r) = Recorded By, (t) = Taken By, (c) = Cosigned By      Initials Name Provider Type    AS Viky Juarez PTA Physical Therapist Assistant                   Mobility       Row Name 10/23/23 1121          Bed Mobility    Comment, (Bed Mobility) [facility] PRE/POST TX  -AS       Row Name 10/23/23 1121          Transfers    Comment, (Transfers) cues for hand placement  -AS       Row Name 10/23/23 1121          Sit-Stand Transfer    Sit-Stand Mathis (Transfers) standby assist  -AS     Assistive Device (Sit-Stand Transfers) walker, front-wheeled  -AS       Row Name 10/23/23 1121          Gait/Stairs (Locomotion)    Mathis Level (Gait) verbal cues;contact guard;1 person assist  -AS     Assistive Device (Gait) walker, front-wheeled  -AS     Distance in Feet (Gait) 100 + 100  -AS     Deviations/Abnormal Patterns (Gait) bilateral deviations;tanya decreased;gait speed decreased;stride length decreased;base of support, narrow  -AS     Bilateral Gait Deviations forward flexed posture;heel strike decreased  -AS     Comment, (Gait/Stairs) patient ambulated 100' + 100' with CGA x1, used rolling walker this date due to increased weakness and unsteadiness, gait mechanics and balance improved with use of walker, Further ambulation deferred due to weakness and nausea.  -AS               User Key  (r) = Recorded By, (t) = Taken By, (c) = Cosigned By      Initials Name Provider Type    AS Viky Juarez PTA Physical Therapist Assistant                   Obj/Interventions       Row Name 10/23/23 1124          Motor Skills    Therapeutic Exercise knee;ankle;shoulder  -AS       Row Name 10/23/23 1124          Shoulder (Therapeutic Exercise)    Shoulder (Therapeutic Exercise) AROM (active range of motion)  -AS     Shoulder AROM (Therapeutic Exercise) bilateral;flexion;extension;aBduction;aDduction;sitting;10  repetitions  bicep curls  -AS       Row Name 10/23/23 1124          Knee (Therapeutic Exercise)    Knee Strengthening (Therapeutic Exercise) bilateral;marching while seated;LAQ (long arc quad);sitting;10 repetitions  -AS       Row Name 10/23/23 1124          Ankle (Therapeutic Exercise)    Ankle (Therapeutic Exercise) AROM (active range of motion)  -AS     Ankle AROM (Therapeutic Exercise) bilateral;dorsiflexion;plantarflexion;sitting;10 repetitions  -AS       Row Name 10/23/23 1124          Balance    Dynamic Standing Balance verbal cues;contact guard;1-person assist  -AS     Position/Device Used, Standing Balance supported;walker, front-wheeled  -AS     Comment, Balance no LOB, unsteadiness  -AS               User Key  (r) = Recorded By, (t) = Taken By, (c) = Cosigned By      Initials Name Provider Type    AS Viky Juarez PTA Physical Therapist Assistant                   Goals/Plan    No documentation.                  Clinical Impression       Row Name 10/23/23 1129          Pain    Pretreatment Pain Rating 0/10 - no pain  -AS     Posttreatment Pain Rating 0/10 - no pain  -AS       Row Name 10/23/23 1129          Plan of Care Review    Plan of Care Reviewed With patient  -AS     Progress no change  -AS     Outcome Evaluation patient ambulated 100' + 100' with CGA x1, used rolling walker this date due to increased weakness and unsteadiness, gait mechanics and balance improved with use of walker, Further ambulation deferred due to weakness and nausea. Recommend home with assist and HHPT.  -AS       Row Name 10/23/23 1129          Positioning and Restraints    Pre-Treatment Position sitting in chair/recliner  -AS     Post Treatment Position chair  -AS     In Chair reclined;call light within reach;encouraged to call for assist;notified nsg;exit alarm on;waffle cushion;legs elevated  -AS               User Key  (r) = Recorded By, (t) = Taken By, (c) = Cosigned By      Initials Name Provider Type    AS Larry  Viky Arias PTA Physical Therapist Assistant                   Outcome Measures       Row Name 10/23/23 1131 10/23/23 0800       How much help from another person do you currently need...    Turning from your back to your side while in flat bed without using bedrails? 4  -AS 4  -SW    Moving from lying on back to sitting on the side of a flat bed without bedrails? 4  -AS 4  -SW    Moving to and from a bed to a chair (including a wheelchair)? 3  -AS 3  -SW    Standing up from a chair using your arms (e.g., wheelchair, bedside chair)? 3  -AS 3  -SW    Climbing 3-5 steps with a railing? 3  -AS 3  -SW    To walk in hospital room? 3  -AS 3  -SW    AM-PAC 6 Clicks Score (PT) 20  -AS 20  -SW    Highest level of mobility 6 --> Walked 10 steps or more  -AS 6 --> Walked 10 steps or more  -SW      Row Name 10/23/23 1131          Functional Assessment    Outcome Measure Options AM-PAC 6 Clicks Basic Mobility (PT)  -AS               User Key  (r) = Recorded By, (t) = Taken By, (c) = Cosigned By      Initials Name Provider Type    AS Viky Juarez PTA Physical Therapist Assistant    Maria Isabel Larose RN Registered Nurse                                 Physical Therapy Education       Title: PT OT SLP Therapies (In Progress)       Topic: Physical Therapy (In Progress)       Point: Mobility training (In Progress)       Learning Progress Summary             Patient Acceptance, E, NR by AS at 10/23/2023 1132    Acceptance, E, VU,NR by  at 10/20/2023 1531    Acceptance, E, NR by AS at 10/19/2023 1520    Acceptance, E, VU by  at 10/18/2023 1316                         Point: Home exercise program (In Progress)       Learning Progress Summary             Patient Acceptance, E, NR by AS at 10/23/2023 1132    Acceptance, E, VU,NR by  at 10/20/2023 1531    Acceptance, E, NR by AS at 10/19/2023 1520                         Point: Body mechanics (In Progress)       Learning Progress Summary             Patient Acceptance,  E, NR by AS at 10/23/2023 1132    Acceptance, E, VU,NR by  at 10/20/2023 1531    Acceptance, E, NR by AS at 10/19/2023 1520    Acceptance, E, VU by  at 10/18/2023 1316                         Point: Precautions (In Progress)       Learning Progress Summary             Patient Acceptance, E, NR by AS at 10/23/2023 1132    Acceptance, E, VU,NR by  at 10/20/2023 1531    Acceptance, E, NR by AS at 10/19/2023 1520    Acceptance, E, VU by  at 10/18/2023 1316                                         User Key       Initials Effective Dates Name Provider Type Discipline    AS 04/28/23 -  Viky Juarez PTA Physical Therapist Assistant PT     09/22/22 -  Neda Saunders, PT Physical Therapist PT     09/21/23 -  Massiel Everett, PT Physical Therapist PT                  PT Recommendation and Plan     Plan of Care Reviewed With: patient  Progress: no change  Outcome Evaluation: patient ambulated 100' + 100' with CGA x1, used rolling walker this date due to increased weakness and unsteadiness, gait mechanics and balance improved with use of walker, Further ambulation deferred due to weakness and nausea. Recommend home with assist and HHPT.     Time Calculation:         PT Charges       Row Name 10/23/23 1132             Time Calculation    Start Time 1105  -AS      PT Received On 10/23/23  -AS      PT Goal Re-Cert Due Date 10/28/23  -AS         Timed Charges    94935 - PT Therapeutic Exercise Minutes 10  -AS      98556 - Gait Training Minutes  14  -AS         Total Minutes    Timed Charges Total Minutes 24  -AS       Total Minutes 24  -AS                User Key  (r) = Recorded By, (t) = Taken By, (c) = Cosigned By      Initials Name Provider Type    AS Viky Juarez PTA Physical Therapist Assistant                  Therapy Charges for Today       Code Description Service Date Service Provider Modifiers Qty    10511188653 HC PT THER PROC EA 15 MIN 10/23/2023 Viky Juarez PTA GP 1     [id number]  GAIT TRAINING EA 15 MIN 10/23/2023 Viky Juarez, PTA GP 1            PT G-Codes  Outcome Measure Options: AM-PAC 6 Clicks Basic Mobility (PT)  AM-PAC 6 Clicks Score (PT): 20       Viky Juarez PTA  10/23/2023

## 2023-10-23 NOTE — OUTREACH NOTE
Prep Survey      Flowsheet Row Responses   [facility] facility patient discharged from? [facility]   Is LACE score < 7 ? No   Eligibility Readm Mgmt   Discharge diagnosis s/p colectomy   Does the patient have one of the following disease processes/diagnoses(primary or secondary)? General Surgery   Does the patient have Home health ordered? Yes   What is the Home health agency?  [facility]    Is there a DME ordered? No   Prep survey completed? Yes            CARLITOS GUILLERMO - Registered Nurse

## 2023-10-23 NOTE — PLAN OF CARE
Goal Outcome Evaluation:  Plan of Care Reviewed With: patient        Progress: no change  Outcome Evaluation: patient ambulated 100' + 100' with CGA x1, used rolling walker this date due to increased weakness and unsteadiness, gait mechanics and balance improved with use of walker, Further ambulation deferred due to weakness and nausea. Recommend home with assist and HHPT.

## 2023-10-23 NOTE — CASE MANAGEMENT/SOCIAL WORK
Case Management Discharge Note      Final Note: Met with patient at the bedside to finalize discharge plan. Her plan is home with [facility] (skilled nursing and PT). CM called [facility] to inform that patient is discharging today. No other needs were identified.         Selected Continued Care - Admitted Since 10/17/2023       Destination    No services have been selected for the patient.                Durable Medical Equipment    No services have been selected for the patient.                Dialysis/Infusion    No services have been selected for the patient.                Home Medical Care       Service Provider Selected Services Address Phone Fax Patient Preferred    [facility] ,  Home Rehabilitation [address] [id number] [phone] --              Therapy    No services have been selected for the patient.                Community Resources    No services have been selected for the patient.                Community & DME    No services have been selected for the patient.                         Final Discharge Disposition Code: 06 - home with home health care

## 2023-10-23 NOTE — DISCHARGE SUMMARY
Patient Name: Valeria Tracy  MRN: [id number]  : 1940  DOS: 10/23/2023    Attending: Angelito Ruiz MD    Primary Care Provider: Peyton Vázquez PA    Date of Admission:.10/17/2023 10:23 AM    Date of Discharge:  10/23/2023    Discharge Diagnosis:   S/P right colectomy    GERD (gastroesophageal reflux disease)    Asthma    Hypothyroidism    Colon cancer    Acute postoperative pain    Anemia      Hospital Course  At admit    Patient is a pleasant 83 y.o. female presented for scheduled surgery by .      Per his note (Pre-op Diagnosis:   Referral with obstructing right colon cancer and newly diagnosed distal rectal cancer within 10 mm the dentate line.  Unintentional weight loss 15 pounds over 8 months  Rectal cancer, nonobstructing.).     I saw patient preoperatively and reviewed with her her diagnosis and planned surgery.     Subsequent notes indicate she underwent the following procedures:  LAPAROSCOPIC RIGHT COLECTOMY  Resection including abdominal wall peritoneum with findings of puckering suspicious for direct invasion.  Surgery was done under general anesthesia and a block and was she was admitted for further management    After admit    She was provided pain medication as needed for pain control.  Patient received DVT prophylaxis with subcutaneous heparin as well as mechanicals      Patient was started on clear liquid diet, this was advanced when she showed evidence of bowel function return.      Tolerated diet without difficulty.    During her stay she used an IS for atelectasis prophylaxis.    Home medications were resumed as appropriate, and labs were monitored and remained fairly stable.       With the progress she has made, pt is ready for DC home today.      Discussed with patient regarding plan and she shows understanding and agreement.     MRI was not able to be done, it will be arranged through 's office as outpatient.     Procedures Performed  Procedure(s):  COLON RESECTION  RIGHT LAPAROSCOPIC       Pertinent Test Results:    I reviewed the patient's new clinical results.   Results from last 7 days   Lab Units 10/23/23  0441 10/22/23  0638 10/20/23  0456   WBC 10*3/mm3 8.68 9.77 6.36   HEMOGLOBIN g/dL 8.6* 9.1* 8.7*   HEMATOCRIT % 28.2* 29.6* 29.8*   PLATELETS 10*3/mm3 484* 500* 525*     Results from last 7 days   Lab Units 10/20/23  0456 10/18/23  0317 10/17/23  1305   SODIUM mmol/L 138 139 139   POTASSIUM mmol/L 4.1 4.9 3.7   CHLORIDE mmol/L 106 108* 106   CO2 mmol/L 27.0 21.0* 25.0   BUN mg/dL 4* 6* 5*   CREATININE mg/dL 0.51* 0.48* 0.56*   CALCIUM mg/dL 8.6 8.4* 8.9   BILIRUBIN mg/dL  --   --  <0.2   ALK PHOS U/L  --   --  116   ALT (SGPT) U/L  --   --  10   AST (SGOT) U/L  --   --  19   GLUCOSE mg/dL 95 130* 111*     I reviewed the patient's new imaging including images and reports.      CT abdomen pelvis 10/20/2023  IMPRESSION:  1. Rectum is decompressed which limits its evaluation. There may be a 2.6 cm intraluminal mass within the rectum seen on series 902, image 71 versus redundant mucosa. This could represent site of rectal neoplasm as suggested in the provided clinical   history. Please correlate with findings on endoscopy and any available histology.    2.4 mm nodular focus within the right upper lobe (series 903, image 48). 8 mm groundglass nodular focus within the lingula (series 5, image 49). These findings are indeterminate. Recommend short interval chest CT follow-up to assess for stability.  3.Pulmonary emphysema. Please correlate with patient's smoking history/risk factors to determine whether the patient meets criteria for routine lung cancer screening with low dose chest CT.  4.Bilateral adrenal nodules as described above, incompletely characterized.  5. 13 mm hypodense lesion within the body of the pancreas (series 5, image 36). Appearance is suggestive of a pancreatic cyst or sidebranch IPMN. This may be further characterized with MRI if indicated.   6.There is a  "moderate compression fracture of T9 and a minimal compression fracture of L2 which appear chronic. Please correlate clinically for any pain/tenderness at these sites.   7.Small hiatal hernia with distal esophageal wall thickening with major could the presence of esophagitis. Please correlate clinically.  8.Additional findings as detailed above.     Electronically Signed: Davy Magana MD    10/21/2023 8:15 AM EDT    Workstation ID: [id number]  Physical therapy    Discharge Assessment:    Vital Signs  Visit Vitals  /77 (BP Location: Left arm, Patient Position: Sitting)   Pulse 81   Temp 98.1 °F (36.7 °C) (Oral)   Resp 16   Ht 165.1 cm (65\")   Wt 61.2 kg (135 lb)   SpO2 96%   BMI 22.47 kg/m²     Temp (24hrs), Av.1 °F (36.7 °C), Min:98 °F (36.7 °C), Max:98.1 °F (36.7 °C)      General Appearance:    Alert, cooperative, in no acute distress   Lungs:     Clear to auscultation,respirations regular, even and                   unlabored    Heart:    Regular rhythm and normal rate, normal S1 and S2    Abdomen:   Soft and benign with clean incisions   Extremities:   Moves all extremities well, no edema, no cyanosis, no              redness   Pulses:   Pulses palpable and equal bilaterally   Skin:   No bleeding, bruising or rash            Discharge Disposition: Home          Discharge Medications        New Medications        Instructions Start Date   acetaminophen 325 MG tablet  Commonly known as: TYLENOL   650 mg, Oral, Every 8 Hours      amLODIPine 5 MG tablet  Commonly known as: NORVASC   5 mg, Oral, Every 24 Hours Scheduled   Start Date: 2023     HYDROcodone-acetaminophen 7.5-325 MG per tablet  Commonly known as: NORCO   1 tablet, Oral, Every 4 Hours PRN      prochlorperazine 5 MG tablet  Commonly known as: COMPAZINE   5 mg, Oral, Every 6 Hours PRN             Continue These Medications        Instructions Start Date   famotidine 10 MG tablet  Commonly known as: PEPCID   10 mg, Oral, 2 Times Daily    "   FLUTICASONE PROPIONATE NA   2 sprays, Nasal, Daily      Fluticasone-Salmeterol 250-50 MCG/ACT DISKUS  Commonly known as: ADVAIR/WIXELA   Inhalation, 2 Times Daily - RT      levothyroxine 88 MCG tablet  Commonly known as: SYNTHROID, LEVOTHROID   88 mcg, Oral, Daily      Loratadine 10 MG capsule   Oral               Discharge Diet: Soft GI low fiber    Activity at Discharge: Ambulate, restrictions per Dr. Ruiz.    Follow-up Appointments:  Angelito Ruiz MD per his orders      Discharge took over 30 min    Dragon disclaimer:  Part of this encounter note is an electronic transcription/translation of spoken language to printed text. The electronic translation of spoken language may permit erroneous, or at times, nonsensical words or phrases to be inadvertently transcribed; Although I have reviewed the note for such errors, some may still exist.       Maggi Lance MD  10/23/23  13:11 EDT

## 2023-10-23 NOTE — PROGRESS NOTES
Doing well, efforts to get rectal cancer protocol MRI impeding discharge for several days    We will DC to home and do MRI as an outpatient even though it will require a drive…    No difficulties today  Good tolerance of diet

## 2023-10-26 ENCOUNTER — READMISSION MANAGEMENT (OUTPATIENT)
Dept: CALL CENTER | Facility: HOSPITAL | Age: 83
End: 2023-10-26
Payer: MEDICARE

## 2023-10-26 NOTE — OUTREACH NOTE
General Surgery Week 1 Survey      Flowsheet Row Responses   [facility] patient discharged from? [place]   Does the patient have one of the following disease processes/diagnoses(primary or secondary)? General Surgery   Week 1 attempt successful? Yes   Call start time 1503   Call end time 1510   Discharge diagnosis s/p colectomy   Person spoke with today (if not patient) and relationship patient   Meds reviewed with patient/caregiver? Yes   Is the patient having any side effects they believe may be caused by any medication additions or changes? No   Does the patient have all medications related to this admission filled (includes all antibiotics, pain medications, etc.) Yes   Is the patient taking all medications as directed (includes completed medication regime)? Yes   Does the patient have a follow up appointment scheduled with their surgeon? Yes   Has the patient kept scheduled appointments due by today? Yes   Comments PCP on 10/30 at 1pm.  11/13 8am for MRI   What is the Home health agency?  [facility]    Has home health visited the patient within 72 hours of discharge? Yes   Home health comments HH is visiting   DME comments Pt requested a walker, HH is working on this now   Psychosocial issues? No   Did the patient receive a copy of their discharge instructions? Yes   Nursing interventions Reviewed instructions with patient   What is the patient's perception of their health status since discharge? Improving   If the patient is a current smoker, are they able to teach back resources for cessation? Not a smoker   Is the patient/caregiver able to teach back the hierarchy of who to call/visit for symptoms/problems? PCP, Specialist, Home health nurse, Urgent Care, ED, 911 Yes   Week 1 call completed? Yes   Is the patient interested in additional calls from an ambulatory ? No   Would this patient benefit from a Referral to Amb Social Work? No   Wrap up additional comments Pt is doing ok at this  time.  [employer] Whe[employer] will come tomorrow.   Call end time 1510            CARLITOS GUILLERMO - Registered Nurse

## 2023-11-06 ENCOUNTER — READMISSION MANAGEMENT (OUTPATIENT)
Dept: CALL CENTER | Facility: HOSPITAL | Age: 83
End: 2023-11-06
Payer: MEDICARE

## 2023-11-09 ENCOUNTER — READMISSION MANAGEMENT (OUTPATIENT)
Dept: CALL CENTER | Facility: HOSPITAL | Age: 83
End: 2023-11-09
Payer: MEDICARE

## 2023-11-09 NOTE — OUTREACH NOTE
General Surgery Week 2 Survey      Flowsheet Row Responses   [facility] patient discharged from? [facility]   Does the patient have one of the following disease processes/diagnoses(primary or secondary)? General Surgery   Week 2 attempt successful? Yes   Call start time 1142   Call end time 1146   Discharge diagnosis s/p colectomy   Meds reviewed with patient/caregiver? Yes   Is the patient taking all medications as directed (includes completed medication regime)? Yes   Does the patient have a follow up appointment scheduled with their surgeon? Yes   Has the patient kept scheduled appointments due by today? Yes   What is the Home health agency?  [facility]   Has home health visited the patient within 72 hours of discharge? Yes   DME comments HaS A WALKER   Psychosocial issues? No   Did the patient receive a copy of their discharge instructions? Yes   Nursing interventions Reviewed instructions with patient   What is the patient's perception of their health status since discharge? Improving   Nursing interventions Nurse provided patient education   Is the patient /caregiver able to teach back basic post-op care? Take showers only when approved by MD-sponge bathe until then, No tub bath, swimming, or hot tub until instructed by MD, Lifting as instructed by MD in discharge instructions   Is the patient/caregiver able to teach back signs and symptoms of incisional infection? Pus or odor from incision, Increased redness, swelling or pain at the incisonal site, Increased drainage or bleeding, Incisional warmth, Fever   Is the patient/caregiver able to teach back steps to recovery at home? Set small, achievable goals for return to baseline health   Is the patient/caregiver able to teach back the hierarchy of who to call/visit for symptoms/problems? PCP, Specialist, Home health nurse, Urgent Care, ED, 911 Yes   Week 2 call completed? Yes   Graduated Yes   Graduated/Revoked comments Pt reports she is doing well. No  needs.   Call end time 1146            SUNIL H - Registered Nurse

## 2024-08-07 ENCOUNTER — TRANSCRIBE ORDERS (OUTPATIENT)
Dept: ADMINISTRATIVE | Facility: HOSPITAL | Age: 84
End: 2024-08-07
Payer: MEDICARE

## 2024-08-07 DIAGNOSIS — C20 RECTAL CANCER: Primary | ICD-10-CM

## 2024-08-22 ENCOUNTER — HOSPITAL ENCOUNTER (OUTPATIENT)
Dept: MRI IMAGING | Facility: HOSPITAL | Age: 84
Discharge: HOME | End: 2024-08-22
Admitting: COLON & RECTAL SURGERY
Payer: MEDICARE

## 2024-08-22 ENCOUNTER — LAB REQUISITION (OUTPATIENT)
Dept: LAB | Facility: HOSPITAL | Age: 84
End: 2024-08-22
Payer: MEDICARE

## 2024-08-22 ENCOUNTER — PATIENT OUTREACH (OUTPATIENT)
Dept: ONCOLOGY | Facility: CLINIC | Age: 84
End: 2024-08-22
Payer: MEDICARE

## 2024-08-22 DIAGNOSIS — C20 MALIGNANT NEOPLASM OF RECTUM: ICD-10-CM

## 2024-08-22 DIAGNOSIS — C20 RECTAL CANCER: ICD-10-CM

## 2024-08-22 PROCEDURE — 72195 MRI PELVIS W/O DYE: CPT

## 2024-08-26 ENCOUNTER — NURSE NAVIGATOR (OUTPATIENT)
Dept: ONCOLOGY | Facility: CLINIC | Age: 84
End: 2024-08-26
Payer: MEDICARE

## 2024-08-26 NOTE — PROGRESS NOTES
[facility] Multidisciplinary Conference: Initial /Treated Elsewhere Treatment Plan       Treating Physician: Dr. Angelito Ruiz, Colorectal Surgeon  Pathologist at TB: Dr. Delfino Salas  Radiologist at TB: Dr. Edwin Salazar  Radiologist Reading MRI Pelvis with RP: Dr. Edwin Salazar    Initial TB Date: 24  Encounter Provider: Deepika Priest RN  Place of Service: [facility]  Patient Care Team:  Peyton Vázquez PA as PCP - General (Physician Assistant)    Patient Name: Valeria Tracy  Age/Sex: 84 y.o. female  : 1940  MRN: [id number]  Address: [address] [place] [address]  Home Phone: [phone]     ENDOSCOPIC / IMAGING EVALUATION     Procedure Date: 10/5/2023         Performing Facility: [facility]    Tumor Distance from dentate line (cm): 3 CM    Tumor Description:        Friable       Bulky mass lesion within the rectum, spongy and consistency with significant friability. This arose from the posterior wall of the rectum greater than 5 CM in size, arising from a broad base with distal extent to within a proximally 3 CM of the dentate line.    Tumor Location:        Low rectum    Indication of Sphincter involvement: Not reported on MRI that done after pt had neoadjuvant therapy.    Peritoneal Reflection: Below     CEA Level:   CEA   Date Value Ref Range Status   10/17/2023 76.50 ng/mL Final   2024                        3.3                                                [facility]     Pretreatment circumferential resection margin status:  MRI from 24 shows within the posterior mesorectal fat at this location at 6 o'clock, there are thin linear.radiating areas of T2 hypointensity suggestive of desmoplastic stranding.    IMAGING   CT (CAP) date: CT C/A/P-10/13/2023   PET/CT date: N/A MRI Pelvis w/ Rectal Protocol date: 2024       BIOPSY RESULTS   Histologic Type: Adenocarcinoma  Differentiation: moderately diff  MSI: Intact  Specimen Site and Character: Rectum bulky  mass lesion that was spongy and consistency with significant friability. This arose from the posterior wall of the rectum greater than 5 CM in size, arising from a broad base with distal extent to within a proximally 3 CM of the dentate line.  Lymphovascular Invasion: N/A, no MRI before treatment elsewhere.  Lymph Nodes: 4 x 4 MM LN in the mesorectum at the 7 o'clock position, without prominent diffusion signal or suspicious morphologic features.    CLINICAL STAGING        Staging incomplete TxNxMx, due to pt not getting an MRI pelvis with RP before treatment elsewhere.    GUIDELINE CONCORDANCE   Recommended treatment is in accordance with National Comprehensive Cancer Network (NCCN): Yes    REFERRAL SUPPORT   Referral Support options reviewed and outcomes:       Surgery: Recommended  Sees Dr. Angelito Ruiz       Medical Oncology: Initiated  Saw Dr. Rochelle park       Radiation Oncology: Initiated  Saw Dr. Shakira park       Palliative Care Consultation: PRN        Research: No       Genetics: No       Psychosocial APRN: PRN      Deepika Priest RN - 08/26/24, 3:24 PM EDT

## 2024-08-26 NOTE — PROGRESS NOTES
Patient's initial TDS/treated elsewhere from 8/26/2024 was sent per secured email to the presenting Physician, Dr. Angelito Ruiz, Colorectal Surgeon, on 8/26/2024.

## 2024-09-23 ENCOUNTER — PRE-ADMISSION TESTING (OUTPATIENT)
Dept: PREADMISSION TESTING | Facility: HOSPITAL | Age: 84
End: 2024-09-23
Payer: MEDICARE

## 2024-09-23 ENCOUNTER — HOSPITAL ENCOUNTER (OUTPATIENT)
Dept: CT IMAGING | Facility: HOSPITAL | Age: 84
Discharge: HOME | End: 2024-09-23
Payer: MEDICARE

## 2024-09-23 VITALS — HEIGHT: 65 IN | WEIGHT: 136.24 LBS | BODY MASS INDEX: 22.7 KG/M2

## 2024-09-23 DIAGNOSIS — C20 RECTAL CANCER: ICD-10-CM

## 2024-09-23 LAB
ALBUMIN SERPL-MCNC: 4.3 G/DL (ref 3.5–5.2)
ALBUMIN/GLOB SERPL: 1.5 G/DL
ALP SERPL-CCNC: 119 U/L (ref 39–117)
ALT SERPL W P-5'-P-CCNC: 8 U/L (ref 1–33)
ANION GAP SERPL CALCULATED.3IONS-SCNC: 9 MMOL/L (ref 5–15)
AST SERPL-CCNC: 22 U/L (ref 1–32)
BILIRUB SERPL-MCNC: 0.5 MG/DL (ref 0–1.2)
BUN SERPL-MCNC: 8 MG/DL (ref 8–23)
BUN/CREAT SERPL: 12.3 (ref 7–25)
CALCIUM SPEC-SCNC: 9.6 MG/DL (ref 8.6–10.5)
CEA SERPL-MCNC: 4.23 NG/ML
CHLORIDE SERPL-SCNC: 101 MMOL/L (ref 98–107)
CO2 SERPL-SCNC: 29 MMOL/L (ref 22–29)
CREAT SERPL-MCNC: 0.65 MG/DL (ref 0.57–1)
DEPRECATED RDW RBC AUTO: 47 FL (ref 37–54)
EGFRCR SERPLBLD CKD-EPI 2021: 87.5 ML/MIN/1.73
ERYTHROCYTE [DISTWIDTH] IN BLOOD BY AUTOMATED COUNT: 13 % (ref 12.3–15.4)
GLOBULIN UR ELPH-MCNC: 2.8 GM/DL
GLUCOSE SERPL-MCNC: 89 MG/DL (ref 65–99)
HBA1C MFR BLD: 5.2 % (ref 4.8–5.6)
HCT VFR BLD AUTO: 38.6 % (ref 34–46.6)
HGB BLD-MCNC: 12.1 G/DL (ref 12–15.9)
MCH RBC QN AUTO: 30.9 PG (ref 26.6–33)
MCHC RBC AUTO-ENTMCNC: 31.3 G/DL (ref 31.5–35.7)
MCV RBC AUTO: 98.7 FL (ref 79–97)
PLATELET # BLD AUTO: 302 10*3/MM3 (ref 140–450)
PMV BLD AUTO: 9.1 FL (ref 6–12)
POTASSIUM SERPL-SCNC: 4.2 MMOL/L (ref 3.5–5.2)
PROT SERPL-MCNC: 7.1 G/DL (ref 6–8.5)
RBC # BLD AUTO: 3.91 10*6/MM3 (ref 3.77–5.28)
SODIUM SERPL-SCNC: 139 MMOL/L (ref 136–145)
WBC NRBC COR # BLD AUTO: 5.08 10*3/MM3 (ref 3.4–10.8)

## 2024-09-23 PROCEDURE — 80053 COMPREHEN METABOLIC PANEL: CPT

## 2024-09-23 PROCEDURE — 36415 COLL VENOUS BLD VENIPUNCTURE: CPT

## 2024-09-23 PROCEDURE — 85027 COMPLETE CBC AUTOMATED: CPT

## 2024-09-23 PROCEDURE — 82378 CARCINOEMBRYONIC ANTIGEN: CPT

## 2024-09-23 PROCEDURE — 71260 CT THORAX DX C+: CPT

## 2024-09-23 PROCEDURE — 25510000001 IOPAMIDOL 61 % SOLUTION: Performed by: COLON & RECTAL SURGERY

## 2024-09-23 PROCEDURE — 93005 ELECTROCARDIOGRAM TRACING: CPT

## 2024-09-23 PROCEDURE — 83036 HEMOGLOBIN GLYCOSYLATED A1C: CPT

## 2024-09-23 RX ORDER — ECHINACEA PURPUREA EXTRACT 125 MG
1 TABLET ORAL AS NEEDED
COMMUNITY

## 2024-09-23 RX ORDER — IOPAMIDOL 612 MG/ML
100 INJECTION, SOLUTION INTRAVASCULAR
Status: COMPLETED | OUTPATIENT
Start: 2024-09-23 | End: 2024-09-23

## 2024-09-23 RX ORDER — FLUTICASONE PROPIONATE AND SALMETEROL XINAFOATE 115; 21 UG/1; UG/1
2 AEROSOL, METERED RESPIRATORY (INHALATION)
COMMUNITY

## 2024-09-23 RX ORDER — ALBUTEROL SULFATE 90 UG/1
2 INHALANT RESPIRATORY (INHALATION) EVERY 4 HOURS PRN
COMMUNITY

## 2024-09-23 RX ORDER — IBUPROFEN 200 MG
200 TABLET ORAL EVERY 6 HOURS PRN
COMMUNITY

## 2024-09-23 RX ORDER — MULTIVIT-MIN/FOLIC ACID/BIOTIN 200-300MCG
1 TABLET,CHEWABLE ORAL DAILY
COMMUNITY

## 2024-09-23 RX ADMIN — IOPAMIDOL 80 ML: 612 INJECTION, SOLUTION INTRAVENOUS at 16:25

## 2024-09-23 NOTE — NURSING NOTE
Patient seen in PAT and marked for APR.    Site marked:LLQ. Pt's lower quadrant more easily visualized for patient with greatest flat surface area for pouching. Upper quadrant with minimal space and difficult for patient to visualize.     Patient placed in sitting position and asked to lean forward and side to side. Rectus muscle was identified, and the neel was placed within the patient's visual field. Bony prominences, scars, and creases were avoided. Marked on the flattest area.     Explained WOC role, answered questions, gave education materials. Gave extra marker and tegaderm should neel fade.    WOCN will continue to follow daily if stoma warranted.

## 2024-09-26 LAB
QT INTERVAL: 360 MS
QTC INTERVAL: 405 MS

## 2024-09-30 ENCOUNTER — ANESTHESIA EVENT (OUTPATIENT)
Dept: PERIOP | Facility: HOSPITAL | Age: 84
End: 2024-09-30
Payer: MEDICARE

## 2024-09-30 RX ORDER — FAMOTIDINE 10 MG/ML
20 INJECTION, SOLUTION INTRAVENOUS ONCE
Status: CANCELLED | OUTPATIENT
Start: 2024-09-30 | End: 2024-09-30

## 2024-10-01 ENCOUNTER — HOSPITAL ENCOUNTER (INPATIENT)
Facility: HOSPITAL | Age: 84
LOS: 17 days | Discharge: HOME | End: 2024-10-18
Attending: COLON & RECTAL SURGERY | Admitting: COLON & RECTAL SURGERY
Payer: MEDICARE

## 2024-10-01 ENCOUNTER — ANESTHESIA (OUTPATIENT)
Dept: PERIOP | Facility: HOSPITAL | Age: 84
End: 2024-10-01
Payer: MEDICARE

## 2024-10-01 ENCOUNTER — ANESTHESIA EVENT CONVERTED (OUTPATIENT)
Dept: ANESTHESIOLOGY | Facility: HOSPITAL | Age: 84
End: 2024-10-01
Payer: MEDICARE

## 2024-10-01 DIAGNOSIS — D59.6 HEMOGLOBINURIA DUE TO HEMOLYSIS FROM OTHER EXTERNAL CAUSES: ICD-10-CM

## 2024-10-01 DIAGNOSIS — R33.8 POSTOPERATIVE URINARY RETENTION: ICD-10-CM

## 2024-10-01 DIAGNOSIS — N99.89 POSTOPERATIVE URINARY RETENTION: ICD-10-CM

## 2024-10-01 DIAGNOSIS — C20 RECTAL CANCER: ICD-10-CM

## 2024-10-01 DIAGNOSIS — E43 SEVERE MALNUTRITION: ICD-10-CM

## 2024-10-01 DIAGNOSIS — Z90.49 S/P RIGHT COLECTOMY: Primary | ICD-10-CM

## 2024-10-01 DIAGNOSIS — Z90.49 S/P COLON RESECTION: ICD-10-CM

## 2024-10-01 LAB
ABO GROUP BLD: NORMAL
BLD GP AB SCN SERPL QL: NEGATIVE
RH BLD: POSITIVE
T&S EXPIRATION DATE: NORMAL

## 2024-10-01 PROCEDURE — 25810000003 LACTATED RINGERS PER 1000 ML: Performed by: ANESTHESIOLOGY

## 2024-10-01 PROCEDURE — 25010000002 LIDOCAINE PF 1% 1 % SOLUTION: Performed by: ANESTHESIOLOGY

## 2024-10-01 PROCEDURE — 25010000002 DEXAMETHASONE SODIUM PHOSPHATE 10 MG/ML SOLUTION

## 2024-10-01 PROCEDURE — 86923 COMPATIBILITY TEST ELECTRIC: CPT

## 2024-10-01 PROCEDURE — 25010000002 SODIUM CHLORIDE 0.9 % WITH KCL 20 MEQ 20-0.9 MEQ/L-% SOLUTION: Performed by: COLON & RECTAL SURGERY

## 2024-10-01 PROCEDURE — 25010000002 PROPOFOL 10 MG/ML EMULSION: Performed by: ANESTHESIOLOGY

## 2024-10-01 PROCEDURE — 25010000002 FENTANYL CITRATE (PF) 50 MCG/ML SOLUTION

## 2024-10-01 PROCEDURE — 25010000002 HEPARIN (PORCINE) PER 1000 UNITS: Performed by: COLON & RECTAL SURGERY

## 2024-10-01 PROCEDURE — 25010000002 SUGAMMADEX 200 MG/2ML SOLUTION

## 2024-10-01 PROCEDURE — 0DBN0ZZ EXCISION OF SIGMOID COLON, OPEN APPROACH: ICD-10-PCS | Performed by: COLON & RECTAL SURGERY

## 2024-10-01 PROCEDURE — 25010000002 HYDROMORPHONE 1 MG/ML SOLUTION: Performed by: COLON & RECTAL SURGERY

## 2024-10-01 PROCEDURE — 86901 BLOOD TYPING SEROLOGIC RH(D): CPT | Performed by: COLON & RECTAL SURGERY

## 2024-10-01 PROCEDURE — 0DTQ0ZZ RESECTION OF ANUS, OPEN APPROACH: ICD-10-PCS | Performed by: COLON & RECTAL SURGERY

## 2024-10-01 PROCEDURE — 0DTP0ZZ RESECTION OF RECTUM, OPEN APPROACH: ICD-10-PCS | Performed by: COLON & RECTAL SURGERY

## 2024-10-01 PROCEDURE — 25010000002 HYDROMORPHONE PER 4 MG

## 2024-10-01 PROCEDURE — C1769 GUIDE WIRE: HCPCS | Performed by: COLON & RECTAL SURGERY

## 2024-10-01 PROCEDURE — 25010000002 DROPERIDOL PER 5 MG

## 2024-10-01 PROCEDURE — 88309 TISSUE EXAM BY PATHOLOGIST: CPT | Performed by: COLON & RECTAL SURGERY

## 2024-10-01 PROCEDURE — 86900 BLOOD TYPING SEROLOGIC ABO: CPT | Performed by: COLON & RECTAL SURGERY

## 2024-10-01 PROCEDURE — 0UBG0ZZ EXCISION OF VAGINA, OPEN APPROACH: ICD-10-PCS | Performed by: COLON & RECTAL SURGERY

## 2024-10-01 PROCEDURE — 94640 AIRWAY INHALATION TREATMENT: CPT

## 2024-10-01 PROCEDURE — 25010000002 ERTAPENEM PER 500 MG: Performed by: COLON & RECTAL SURGERY

## 2024-10-01 PROCEDURE — 86850 RBC ANTIBODY SCREEN: CPT | Performed by: COLON & RECTAL SURGERY

## 2024-10-01 PROCEDURE — 25010000002 ONDANSETRON PER 1 MG: Performed by: ANESTHESIOLOGY

## 2024-10-01 PROCEDURE — 25010000002 BUPIVACAINE (PF) 0.25 % SOLUTION

## 2024-10-01 PROCEDURE — 25010000002 FENTANYL CITRATE (PF) 100 MCG/2ML SOLUTION: Performed by: ANESTHESIOLOGY

## 2024-10-01 PROCEDURE — 25010000002 LABETALOL 5 MG/ML SOLUTION: Performed by: ANESTHESIOLOGY

## 2024-10-01 PROCEDURE — 0D1N0Z4 BYPASS SIGMOID COLON TO CUTANEOUS, OPEN APPROACH: ICD-10-PCS | Performed by: COLON & RECTAL SURGERY

## 2024-10-01 DEVICE — PROXIMATE RELOADABLE LINEAR CUTTER WITH SAFETY LOCK-OUT, 75MM
Type: IMPLANTABLE DEVICE | Site: ABDOMEN | Status: FUNCTIONAL
Brand: PROXIMATE

## 2024-10-01 RX ORDER — DIAZEPAM 5 MG
5 TABLET ORAL EVERY 6 HOURS PRN
Status: DISPENSED | OUTPATIENT
Start: 2024-10-01 | End: 2024-10-08

## 2024-10-01 RX ORDER — PROPOFOL 10 MG/ML
VIAL (ML) INTRAVENOUS AS NEEDED
Status: DISCONTINUED | OUTPATIENT
Start: 2024-10-01 | End: 2024-10-01 | Stop reason: SURG

## 2024-10-01 RX ORDER — LEVOTHYROXINE SODIUM 88 UG/1
88 TABLET ORAL
Status: DISCONTINUED | OUTPATIENT
Start: 2024-10-02 | End: 2024-10-13

## 2024-10-01 RX ORDER — IBUPROFEN 400 MG/1
400 TABLET, FILM COATED ORAL EVERY 6 HOURS PRN
Status: DISCONTINUED | OUTPATIENT
Start: 2024-10-01 | End: 2024-10-01

## 2024-10-01 RX ORDER — FENTANYL CITRATE 50 UG/ML
50 INJECTION, SOLUTION INTRAMUSCULAR; INTRAVENOUS
Status: DISCONTINUED | OUTPATIENT
Start: 2024-10-01 | End: 2024-10-01 | Stop reason: HOSPADM

## 2024-10-01 RX ORDER — ACETAMINOPHEN 500 MG
1000 TABLET ORAL ONCE
Status: COMPLETED | OUTPATIENT
Start: 2024-10-01 | End: 2024-10-01

## 2024-10-01 RX ORDER — CETIRIZINE HYDROCHLORIDE 10 MG/1
5 TABLET ORAL DAILY
Status: DISCONTINUED | OUTPATIENT
Start: 2024-10-01 | End: 2024-10-13

## 2024-10-01 RX ORDER — SODIUM CHLORIDE 0.9 % (FLUSH) 0.9 %
3 SYRINGE (ML) INJECTION EVERY 12 HOURS SCHEDULED
Status: DISCONTINUED | OUTPATIENT
Start: 2024-10-01 | End: 2024-10-01 | Stop reason: HOSPADM

## 2024-10-01 RX ORDER — EPHEDRINE SULFATE 50 MG/ML
INJECTION, SOLUTION INTRAVENOUS AS NEEDED
Status: DISCONTINUED | OUTPATIENT
Start: 2024-10-01 | End: 2024-10-01 | Stop reason: SURG

## 2024-10-01 RX ORDER — NITROGLYCERIN 0.4 MG/1
0.4 TABLET SUBLINGUAL
Status: DISCONTINUED | OUTPATIENT
Start: 2024-10-01 | End: 2024-10-18 | Stop reason: HOSPADM

## 2024-10-01 RX ORDER — DEXMEDETOMIDINE HYDROCHLORIDE 4 UG/ML
INJECTION, SOLUTION INTRAVENOUS AS NEEDED
Status: DISCONTINUED | OUTPATIENT
Start: 2024-10-01 | End: 2024-10-01 | Stop reason: SURG

## 2024-10-01 RX ORDER — MELOXICAM 15 MG/1
15 TABLET ORAL ONCE
Status: COMPLETED | OUTPATIENT
Start: 2024-10-01 | End: 2024-10-01

## 2024-10-01 RX ORDER — IPRATROPIUM BROMIDE AND ALBUTEROL SULFATE 2.5; .5 MG/3ML; MG/3ML
3 SOLUTION RESPIRATORY (INHALATION) ONCE AS NEEDED
Status: DISCONTINUED | OUTPATIENT
Start: 2024-10-01 | End: 2024-10-01 | Stop reason: HOSPADM

## 2024-10-01 RX ORDER — SODIUM CHLORIDE 0.9 % (FLUSH) 0.9 %
10 SYRINGE (ML) INJECTION AS NEEDED
Status: DISCONTINUED | OUTPATIENT
Start: 2024-10-01 | End: 2024-10-01 | Stop reason: HOSPADM

## 2024-10-01 RX ORDER — FENTANYL CITRATE 50 UG/ML
INJECTION, SOLUTION INTRAMUSCULAR; INTRAVENOUS
Status: COMPLETED
Start: 2024-10-01 | End: 2024-10-01

## 2024-10-01 RX ORDER — BUDESONIDE AND FORMOTEROL FUMARATE DIHYDRATE 160; 4.5 UG/1; UG/1
2 AEROSOL RESPIRATORY (INHALATION)
Status: DISCONTINUED | OUTPATIENT
Start: 2024-10-01 | End: 2024-10-18 | Stop reason: HOSPADM

## 2024-10-01 RX ORDER — ONDANSETRON 4 MG/1
4 TABLET, ORALLY DISINTEGRATING ORAL EVERY 6 HOURS PRN
Status: DISCONTINUED | OUTPATIENT
Start: 2024-10-01 | End: 2024-10-13

## 2024-10-01 RX ORDER — ONDANSETRON 2 MG/ML
INJECTION INTRAMUSCULAR; INTRAVENOUS AS NEEDED
Status: DISCONTINUED | OUTPATIENT
Start: 2024-10-01 | End: 2024-10-01 | Stop reason: SURG

## 2024-10-01 RX ORDER — LIDOCAINE HYDROCHLORIDE 10 MG/ML
0.5 INJECTION, SOLUTION EPIDURAL; INFILTRATION; INTRACAUDAL; PERINEURAL ONCE AS NEEDED
Status: COMPLETED | OUTPATIENT
Start: 2024-10-01 | End: 2024-10-01

## 2024-10-01 RX ORDER — SODIUM CHLORIDE 9 MG/ML
40 INJECTION, SOLUTION INTRAVENOUS AS NEEDED
Status: DISCONTINUED | OUTPATIENT
Start: 2024-10-01 | End: 2024-10-01 | Stop reason: HOSPADM

## 2024-10-01 RX ORDER — HYDROCODONE BITARTRATE AND ACETAMINOPHEN 7.5; 325 MG/1; MG/1
1 TABLET ORAL EVERY 4 HOURS PRN
Status: DISPENSED | OUTPATIENT
Start: 2024-10-01 | End: 2024-10-11

## 2024-10-01 RX ORDER — NALOXONE HCL 0.4 MG/ML
0.4 VIAL (ML) INJECTION AS NEEDED
Status: DISCONTINUED | OUTPATIENT
Start: 2024-10-01 | End: 2024-10-01 | Stop reason: HOSPADM

## 2024-10-01 RX ORDER — IBUPROFEN 400 MG/1
400 TABLET, FILM COATED ORAL EVERY 6 HOURS PRN
Status: DISCONTINUED | OUTPATIENT
Start: 2024-10-02 | End: 2024-10-13

## 2024-10-01 RX ORDER — ALBUTEROL SULFATE 0.83 MG/ML
2.5 SOLUTION RESPIRATORY (INHALATION) EVERY 6 HOURS PRN
Status: DISCONTINUED | OUTPATIENT
Start: 2024-10-01 | End: 2024-10-18 | Stop reason: HOSPADM

## 2024-10-01 RX ORDER — PREGABALIN 75 MG/1
75 CAPSULE ORAL ONCE
Status: COMPLETED | OUTPATIENT
Start: 2024-10-01 | End: 2024-10-01

## 2024-10-01 RX ORDER — SODIUM CHLORIDE AND POTASSIUM CHLORIDE 150; 900 MG/100ML; MG/100ML
100 INJECTION, SOLUTION INTRAVENOUS CONTINUOUS
Status: DISCONTINUED | OUTPATIENT
Start: 2024-10-01 | End: 2024-10-02

## 2024-10-01 RX ORDER — ACETAMINOPHEN 325 MG/1
650 TABLET ORAL EVERY 8 HOURS SCHEDULED
Status: DISCONTINUED | OUTPATIENT
Start: 2024-10-01 | End: 2024-10-13

## 2024-10-01 RX ORDER — HEPARIN SODIUM 5000 [USP'U]/ML
5000 INJECTION, SOLUTION INTRAVENOUS; SUBCUTANEOUS ONCE
Status: COMPLETED | OUTPATIENT
Start: 2024-10-01 | End: 2024-10-01

## 2024-10-01 RX ORDER — LIDOCAINE HYDROCHLORIDE 10 MG/ML
INJECTION, SOLUTION EPIDURAL; INFILTRATION; INTRACAUDAL; PERINEURAL AS NEEDED
Status: DISCONTINUED | OUTPATIENT
Start: 2024-10-01 | End: 2024-10-01 | Stop reason: SURG

## 2024-10-01 RX ORDER — FAMOTIDINE 20 MG/1
20 TABLET, FILM COATED ORAL ONCE
Status: COMPLETED | OUTPATIENT
Start: 2024-10-01 | End: 2024-10-01

## 2024-10-01 RX ORDER — DROPERIDOL 2.5 MG/ML
0.62 INJECTION, SOLUTION INTRAMUSCULAR; INTRAVENOUS ONCE AS NEEDED
Status: DISCONTINUED | OUTPATIENT
Start: 2024-10-01 | End: 2024-10-01 | Stop reason: HOSPADM

## 2024-10-01 RX ORDER — LABETALOL HYDROCHLORIDE 5 MG/ML
5 INJECTION, SOLUTION INTRAVENOUS
Status: DISCONTINUED | OUTPATIENT
Start: 2024-10-01 | End: 2024-10-01 | Stop reason: HOSPADM

## 2024-10-01 RX ORDER — ACETAMINOPHEN 650 MG
TABLET, EXTENDED RELEASE ORAL AS NEEDED
Status: DISCONTINUED | OUTPATIENT
Start: 2024-10-01 | End: 2024-10-01 | Stop reason: HOSPADM

## 2024-10-01 RX ORDER — DROPERIDOL 2.5 MG/ML
INJECTION, SOLUTION INTRAMUSCULAR; INTRAVENOUS
Status: COMPLETED
Start: 2024-10-01 | End: 2024-10-01

## 2024-10-01 RX ORDER — HYDROMORPHONE HYDROCHLORIDE 1 MG/ML
0.5 INJECTION, SOLUTION INTRAMUSCULAR; INTRAVENOUS; SUBCUTANEOUS
Status: DISCONTINUED | OUTPATIENT
Start: 2024-10-01 | End: 2024-10-01 | Stop reason: HOSPADM

## 2024-10-01 RX ORDER — ONDANSETRON 2 MG/ML
4 INJECTION INTRAMUSCULAR; INTRAVENOUS ONCE AS NEEDED
Status: DISCONTINUED | OUTPATIENT
Start: 2024-10-01 | End: 2024-10-01 | Stop reason: HOSPADM

## 2024-10-01 RX ORDER — HEPARIN SODIUM 5000 [USP'U]/ML
5000 INJECTION, SOLUTION INTRAVENOUS; SUBCUTANEOUS EVERY 8 HOURS SCHEDULED
Status: DISCONTINUED | OUTPATIENT
Start: 2024-10-02 | End: 2024-10-18 | Stop reason: HOSPADM

## 2024-10-01 RX ORDER — MIDAZOLAM HYDROCHLORIDE 1 MG/ML
0.5 INJECTION INTRAMUSCULAR; INTRAVENOUS
Status: DISCONTINUED | OUTPATIENT
Start: 2024-10-01 | End: 2024-10-01 | Stop reason: HOSPADM

## 2024-10-01 RX ORDER — DROPERIDOL 2.5 MG/ML
0.62 INJECTION, SOLUTION INTRAMUSCULAR; INTRAVENOUS
Status: DISCONTINUED | OUTPATIENT
Start: 2024-10-01 | End: 2024-10-01 | Stop reason: HOSPADM

## 2024-10-01 RX ORDER — ALVIMOPAN 12 MG/1
12 CAPSULE ORAL ONCE
Status: COMPLETED | OUTPATIENT
Start: 2024-10-01 | End: 2024-10-01

## 2024-10-01 RX ORDER — SODIUM CHLORIDE, SODIUM LACTATE, POTASSIUM CHLORIDE, CALCIUM CHLORIDE 600; 310; 30; 20 MG/100ML; MG/100ML; MG/100ML; MG/100ML
9 INJECTION, SOLUTION INTRAVENOUS CONTINUOUS
Status: DISCONTINUED | OUTPATIENT
Start: 2024-10-01 | End: 2024-10-03

## 2024-10-01 RX ORDER — BUPIVACAINE HYDROCHLORIDE 2.5 MG/ML
INJECTION, SOLUTION EPIDURAL; INFILTRATION; INTRACAUDAL
Status: COMPLETED | OUTPATIENT
Start: 2024-10-01 | End: 2024-10-01

## 2024-10-01 RX ORDER — HYDRALAZINE HYDROCHLORIDE 20 MG/ML
5 INJECTION INTRAMUSCULAR; INTRAVENOUS
Status: DISCONTINUED | OUTPATIENT
Start: 2024-10-01 | End: 2024-10-01 | Stop reason: HOSPADM

## 2024-10-01 RX ORDER — SODIUM CHLORIDE 0.9 % (FLUSH) 0.9 %
10 SYRINGE (ML) INJECTION EVERY 12 HOURS SCHEDULED
Status: DISCONTINUED | OUTPATIENT
Start: 2024-10-01 | End: 2024-10-01 | Stop reason: HOSPADM

## 2024-10-01 RX ORDER — NALOXONE HCL 0.4 MG/ML
0.4 VIAL (ML) INJECTION
Status: DISCONTINUED | OUTPATIENT
Start: 2024-10-01 | End: 2024-10-18 | Stop reason: HOSPADM

## 2024-10-01 RX ORDER — FENTANYL CITRATE 50 UG/ML
INJECTION, SOLUTION INTRAMUSCULAR; INTRAVENOUS AS NEEDED
Status: DISCONTINUED | OUTPATIENT
Start: 2024-10-01 | End: 2024-10-01 | Stop reason: SURG

## 2024-10-01 RX ORDER — MAGNESIUM HYDROXIDE 1200 MG/15ML
LIQUID ORAL AS NEEDED
Status: DISCONTINUED | OUTPATIENT
Start: 2024-10-01 | End: 2024-10-01 | Stop reason: HOSPADM

## 2024-10-01 RX ORDER — ROCURONIUM BROMIDE 10 MG/ML
INJECTION, SOLUTION INTRAVENOUS AS NEEDED
Status: DISCONTINUED | OUTPATIENT
Start: 2024-10-01 | End: 2024-10-01 | Stop reason: SURG

## 2024-10-01 RX ORDER — FAMOTIDINE 20 MG/1
10 TABLET, FILM COATED ORAL 2 TIMES DAILY
Status: DISCONTINUED | OUTPATIENT
Start: 2024-10-01 | End: 2024-10-10

## 2024-10-01 RX ORDER — SODIUM CHLORIDE 0.9 % (FLUSH) 0.9 %
3-10 SYRINGE (ML) INJECTION AS NEEDED
Status: DISCONTINUED | OUTPATIENT
Start: 2024-10-01 | End: 2024-10-01 | Stop reason: HOSPADM

## 2024-10-01 RX ORDER — GABAPENTIN 100 MG/1
100 CAPSULE ORAL 2 TIMES DAILY
Status: COMPLETED | OUTPATIENT
Start: 2024-10-01 | End: 2024-10-04

## 2024-10-01 RX ORDER — LABETALOL HYDROCHLORIDE 5 MG/ML
10 INJECTION, SOLUTION INTRAVENOUS EVERY 4 HOURS PRN
Status: DISCONTINUED | OUTPATIENT
Start: 2024-10-01 | End: 2024-10-18 | Stop reason: HOSPADM

## 2024-10-01 RX ORDER — ONDANSETRON 2 MG/ML
4 INJECTION INTRAMUSCULAR; INTRAVENOUS EVERY 6 HOURS PRN
Status: DISCONTINUED | OUTPATIENT
Start: 2024-10-01 | End: 2024-10-13

## 2024-10-01 RX ORDER — DEXAMETHASONE SODIUM PHOSPHATE 10 MG/ML
INJECTION, SOLUTION INTRAMUSCULAR; INTRAVENOUS
Status: COMPLETED | OUTPATIENT
Start: 2024-10-01 | End: 2024-10-01

## 2024-10-01 RX ORDER — LABETALOL HYDROCHLORIDE 5 MG/ML
INJECTION, SOLUTION INTRAVENOUS AS NEEDED
Status: DISCONTINUED | OUTPATIENT
Start: 2024-10-01 | End: 2024-10-01 | Stop reason: SURG

## 2024-10-01 RX ADMIN — PROPOFOL 30 MG: 10 INJECTION, EMULSION INTRAVENOUS at 15:17

## 2024-10-01 RX ADMIN — ERTAPENEM 1000 MG: 1 INJECTION INTRAMUSCULAR; INTRAVENOUS at 12:29

## 2024-10-01 RX ADMIN — LABETALOL HYDROCHLORIDE 5 MG: 5 INJECTION, SOLUTION INTRAVENOUS at 13:59

## 2024-10-01 RX ADMIN — BUPIVACAINE HYDROCHLORIDE 60 ML: 2.5 INJECTION, SOLUTION EPIDURAL; INFILTRATION; INTRACAUDAL; PERINEURAL at 12:36

## 2024-10-01 RX ADMIN — ALVIMOPAN 12 MG: 12 CAPSULE ORAL at 09:07

## 2024-10-01 RX ADMIN — DROPERIDOL 0.62 MG: 2.5 INJECTION, SOLUTION INTRAMUSCULAR; INTRAVENOUS at 16:54

## 2024-10-01 RX ADMIN — DEXMEDETOMIDINE HYDROCHLORIDE IN 0.9% SODIUM CHLORIDE 4 MCG: 4 INJECTION INTRAVENOUS at 14:09

## 2024-10-01 RX ADMIN — PROPOFOL 30 MG: 10 INJECTION, EMULSION INTRAVENOUS at 14:55

## 2024-10-01 RX ADMIN — SODIUM CHLORIDE, POTASSIUM CHLORIDE, SODIUM LACTATE AND CALCIUM CHLORIDE 9 ML/HR: 600; 310; 30; 20 INJECTION, SOLUTION INTRAVENOUS at 09:08

## 2024-10-01 RX ADMIN — LIDOCAINE HYDROCHLORIDE 0.5 ML: 10 INJECTION, SOLUTION EPIDURAL; INFILTRATION; INTRACAUDAL; PERINEURAL at 09:08

## 2024-10-01 RX ADMIN — ACETAMINOPHEN 1000 MG: 500 TABLET ORAL at 09:08

## 2024-10-01 RX ADMIN — ONDANSETRON 4 MG: 2 INJECTION INTRAMUSCULAR; INTRAVENOUS at 14:52

## 2024-10-01 RX ADMIN — MELOXICAM 15 MG: 15 TABLET ORAL at 09:07

## 2024-10-01 RX ADMIN — SUGAMMADEX 200 MG: 100 INJECTION, SOLUTION INTRAVENOUS at 15:13

## 2024-10-01 RX ADMIN — ROCURONIUM BROMIDE 10 MG: 10 INJECTION INTRAVENOUS at 14:18

## 2024-10-01 RX ADMIN — DEXMEDETOMIDINE HYDROCHLORIDE IN 0.9% SODIUM CHLORIDE 4 MCG: 4 INJECTION INTRAVENOUS at 14:13

## 2024-10-01 RX ADMIN — FAMOTIDINE 20 MG: 20 TABLET, FILM COATED ORAL at 09:07

## 2024-10-01 RX ADMIN — EPHEDRINE SULFATE 5 MG: 50 INJECTION INTRAVENOUS at 14:37

## 2024-10-01 RX ADMIN — HEPARIN SODIUM 5000 UNITS: 5000 INJECTION INTRAVENOUS; SUBCUTANEOUS at 09:08

## 2024-10-01 RX ADMIN — BUDESONIDE AND FORMOTEROL FUMARATE DIHYDRATE 2 PUFF: 160; 4.5 AEROSOL RESPIRATORY (INHALATION) at 19:24

## 2024-10-01 RX ADMIN — LABETALOL HYDROCHLORIDE 5 MG: 5 INJECTION, SOLUTION INTRAVENOUS at 13:34

## 2024-10-01 RX ADMIN — GABAPENTIN 100 MG: 100 CAPSULE ORAL at 19:54

## 2024-10-01 RX ADMIN — SODIUM CHLORIDE, POTASSIUM CHLORIDE, SODIUM LACTATE AND CALCIUM CHLORIDE: 600; 310; 30; 20 INJECTION, SOLUTION INTRAVENOUS at 14:11

## 2024-10-01 RX ADMIN — DEXAMETHASONE SODIUM PHOSPHATE 4 MG: 10 INJECTION, SOLUTION INTRAMUSCULAR; INTRAVENOUS at 12:36

## 2024-10-01 RX ADMIN — FENTANYL CITRATE 50 MCG: 50 INJECTION, SOLUTION INTRAMUSCULAR; INTRAVENOUS at 16:25

## 2024-10-01 RX ADMIN — POTASSIUM CHLORIDE AND SODIUM CHLORIDE 100 ML/HR: 900; 150 INJECTION, SOLUTION INTRAVENOUS at 17:56

## 2024-10-01 RX ADMIN — FAMOTIDINE 10 MG: 20 TABLET, FILM COATED ORAL at 19:54

## 2024-10-01 RX ADMIN — PREGABALIN 75 MG: 75 CAPSULE ORAL at 09:08

## 2024-10-01 RX ADMIN — DEXMEDETOMIDINE HYDROCHLORIDE IN 0.9% SODIUM CHLORIDE 8 MCG: 4 INJECTION INTRAVENOUS at 14:00

## 2024-10-01 RX ADMIN — ROCURONIUM BROMIDE 10 MG: 10 INJECTION INTRAVENOUS at 13:27

## 2024-10-01 RX ADMIN — LABETALOL HYDROCHLORIDE 5 MG: 5 INJECTION, SOLUTION INTRAVENOUS at 13:41

## 2024-10-01 RX ADMIN — PROPOFOL 100 MG: 10 INJECTION, EMULSION INTRAVENOUS at 12:23

## 2024-10-01 RX ADMIN — FENTANYL CITRATE 100 MCG: 50 INJECTION, SOLUTION INTRAMUSCULAR; INTRAVENOUS at 12:23

## 2024-10-01 RX ADMIN — DEXAMETHASONE SODIUM PHOSPHATE 6 MG: 10 INJECTION, SOLUTION INTRAMUSCULAR; INTRAVENOUS at 12:29

## 2024-10-01 RX ADMIN — ROCURONIUM BROMIDE 50 MG: 10 INJECTION INTRAVENOUS at 12:23

## 2024-10-01 RX ADMIN — HYDROMORPHONE HYDROCHLORIDE 0.5 MG: 1 INJECTION, SOLUTION INTRAMUSCULAR; INTRAVENOUS; SUBCUTANEOUS at 16:54

## 2024-10-01 RX ADMIN — LIDOCAINE HYDROCHLORIDE 50 MG: 10 INJECTION, SOLUTION EPIDURAL; INFILTRATION; INTRACAUDAL; PERINEURAL at 12:23

## 2024-10-01 RX ADMIN — ACETAMINOPHEN 650 MG: 325 TABLET ORAL at 17:56

## 2024-10-01 RX ADMIN — LABETALOL HYDROCHLORIDE 5 MG: 5 INJECTION, SOLUTION INTRAVENOUS at 13:58

## 2024-10-01 RX ADMIN — CETIRIZINE HYDROCHLORIDE 5 MG: 10 TABLET, FILM COATED ORAL at 19:54

## 2024-10-01 RX ADMIN — PROPOFOL 50 MG: 10 INJECTION, EMULSION INTRAVENOUS at 13:59

## 2024-10-01 RX ADMIN — DEXMEDETOMIDINE HYDROCHLORIDE IN 0.9% SODIUM CHLORIDE 4 MCG: 4 INJECTION INTRAVENOUS at 14:02

## 2024-10-01 RX ADMIN — HYDROMORPHONE HYDROCHLORIDE 1 MG: 1 INJECTION, SOLUTION INTRAMUSCULAR; INTRAVENOUS; SUBCUTANEOUS at 19:58

## 2024-10-01 NOTE — ANESTHESIA PROCEDURE NOTES
"Peripheral Block      Patient reassessed immediately prior to procedure    Patient location during procedure: OR  Start time: 10/1/2024 12:30 PM  Stop time: 10/1/2024 12:36 PM  Reason for block: at surgeon's request and post-op pain management  Performed by  CRNA/CAA: Schirmer, Shelley P, CRNA  Preanesthetic Checklist  Completed: patient identified, IV checked, site marked, risks and benefits discussed, surgical consent, monitors and equipment checked, pre-op evaluation and timeout performed  Prep:  Pt Position: supine  Sterile barriers:cap, gloves, mask and washed/disinfected hands  Prep: ChloraPrep  Patient monitoring: blood pressure monitoring, continuous pulse oximetry and EKG  Procedure    Sedation: yes  Performed under: general  Guidance:ultrasound guided  Images:still images obtained, printed/placed on chart    Laterality:Bilateral  Block Type:TAP  Injection Technique:single-shot  Needle Type:short-bevel and echogenic  Needle Gauge:20 G  Resistance on Injection: none    Medications Used: dexamethasone sodium phosphate injection - Injection   4 mg - 10/1/2024 12:36:00 PM  bupivacaine PF (MARCAINE) 0.25 % injection - Injection   60 mL - 10/1/2024 12:36:00 PM      Medications  Comment:Block Injection:  LA dose divided between Right and Left block        Post Assessment  Injection Assessment: negative aspiration for heme, incremental injection and no paresthesia on injection  Patient Tolerance:comfortable throughout block  Complications:no  Additional Notes    Subcostal TAPs    A high-frequency linear transducer, with sterile cover, was placed sub-xiphoid to identify Line[place] Alba, right and left Rectus Abdominus Muscles (WILBUR). The transducer was moved either right or left subcostally to identify the WILBUR and the Transverse Abdominus Muscle (LLAMAS). The insertion site was prepped in sterile fashion and then localized with 2-5 ml of 1% Lidocaine. Using ultrasound-guidance, a 20-gauge B-Lopez 4\" Ultraplex 360 " "non-stimulating echogenic needle was advanced in plane, from medial to lateral, until the tip of the needle was in the fascial plane between the WILBUR and LLAMAS. 1-3ml of preservative free normal saline was used to hydro-dissect the fascial planes. After the fascial plane was verified, the local anesthetic (LA) was injected. The procedure was repeated on the opposite side for bilateral coverage. Aspiration every 5 ml to prevent intravascular injection. Injection was completed with negative aspiration of blood and negative intravascular injection. Injection pressures were normal with minimal resistance. The subcostal approach to the TAP nerve block ideally anesthetizes the intercostal nerves T6-T9.     Mid-Axillary/Lateral TAPs    A high-frequency linear transducer, with sterile cover, was placed in the midaxillary line between the ASIS and costal margin. The External Oblique Muscle (EOM), Internal Oblique Muscle (IOM), Transverse Abdominus Muscle (LLAMAS), and Peritoneum were identified. The insertion site was prepped in sterile fashion and then localized with 2-5 ml of 1% Lidocaine. Using ultrasound-guidance, a 20-gauge B-Lopez 4\" Ultraplex 360 non-stimulating echogenic needle was advanced in plane, from medial to lateral, until the tip of the needle was in the fascial plane between the IOM and LLAMAS. 1-3ml of preservative free normal saline was used to hydro-dissect the fascial planes. After the fascial plane was verified, the local anesthetic (LA) was injected. The procedure was repeated on the opposite side for bilateral coverage. Aspiration every 5 ml to prevent intravascular injection. Injection was completed with negative aspiration of blood and negative intravascular injection. Injection pressures were normal with minimal resistance. Midaxillary TAPs should reach intercostal nerves T10- T11 and the subcostal nerve T12.    Performed by: Aj Rajan MD            "

## 2024-10-01 NOTE — ANESTHESIA PREPROCEDURE EVALUATION
Anesthesia Evaluation                  Airway   Mallampati: I  TM distance: >3 FB  Neck ROM: full  No difficulty expected  Dental      Pulmonary    (+) asthma,  Cardiovascular     ECG reviewed        Neuro/Psych  GI/Hepatic/Renal/Endo    (+) GERD, thyroid problem     Musculoskeletal     Abdominal    Substance History      OB/GYN          Other      history of cancer                Anesthesia Plan    ASA 3     general     (taps)  intravenous induction     Anesthetic plan, risks, benefits, and alternatives have been provided, discussed and informed consent has been obtained with: patient.    Plan discussed with CRNA.    CODE STATUS:

## 2024-10-01 NOTE — OP NOTE
[facility] ([facility]    ABDOMINAL PERINEAL RESECTION  Partial thickness posterior vaginal resection.    Procedure Note    Valeria Tracy  10/1/2024    Pre-op Diagnosis:   History of synchronous right colon cancer and rectal cancer  Right colon cancer T3 N1 has been resected, persistent neoplasia in the posterior distal rectum after YUDELKA prompting plans for APR today.  Preoperative evaluations included multiple presentations at tumor board  History of COPD  BMI 22      Post-op Diagnosis:     Hard fibrotic change posterior to the distal rectum released from the coccyx.    Anesthesia: General with Block    Staff:   Circulator: Kitty Thao RN; Francie Bernal RN  Scrub Person: Roas Zendejas; Darren Seth; Naveen Browning  Assistant: Lakshmi Wolfe RNFA    Assistant: Lakshmi Wolfe RNFA was responsible for performing the following activities: Retraction, Suction, Irrigation, and Placing Dressing and their skilled assistance was necessary for the success of this case.    Synoptic portion:    The indication for total mesorectum excision was low rectal tumor with curative intent.       Estimated Blood Loss: 100 mL    Specimens: rectum sigmoid anus and posterior vagina        Drains: Pelvic JESUS drain    Findings:     Complications: None evident    Rectal Cancer Synoptic Operative Data      Case status:   Elective    Operation:  APR    Modality:  Open    Location of tumor within rectum:  Low    Height of lower edge of tumor from anal verge:  0 cm    Mobilization of splenic flexure:  No    Level of ligation of inferior mesenteric artery:  KARTHIK    Level of ligation of inferior mesenteric vein:  High    Level of rectal transection distal to distal edge of tumor (distal margin):  0 cm    Type of reconstruction:  none    Anastomotic testing method(s):  none    Creation of stoma:  yes colostomy    En bloc resection:  yes vagina    Metastectomy:  no    Completeness of  tumor resection:  R0    Intraoperative complications:  no    Blood transfusion:  No    ASA 3    TME photographed:  yes in pathology report    Synoptic portion:    The indication for total mesorectum excision was low rectal tumor with curative intent.       The procedure was reviewed in the preoperative area, the patient been counseled and marked by the stomal therapist, goals of postoperative care were reviewed.    With antibiotic and DVT prophylaxis, we [place] to the operating room where general anesthesia was achieved, block was performed, positioning was modified lithotomy.    Timeout protocol was utilized.    Lower abdominal midline exploration was undertaken demonstrating findings consistent with previous right colectomy.    The small bowel was run without evidence of pathology, there is no evidence of ileocolic lymphadenopathy, the hepatobiliary region was without evidence of distant disease.  The stomach was nicely decompressed with orogastric tube.  Harrington catheter was in place.  The sigmoid was redundant.    The left colon was mobilized away from the retroperitoneum and kidney.  The sigmoid was mobilized with identification of the ureter, both ureters were identified during the course of the procedure and left lateral to the dissection.    The distal left colon was divided with TERESA, the mesentery was released from the retroperitoneum and the IMV was divided.  There was brisk arterial inflow into the end colon on Doppler auscultation.    KARTHIK suture ligation was performed medial to the ureters.  The dissection continued medial to the ureters over the sacral promontory into the presacral plane.    The dissection continued posteriorly, posterior laterally, anterior laterally, and finally anteriorly.    There is boggy fibrotic change adherent to the sacrum and the distal pelvis/distal third of the rectum and.    This was dissected away from the sacrum with cautery hugging the sacrum with reasonable hemostasis in  an effort to optimize chances of clear margins.    Posterior lateral, anterior lateral and then the anterior fibrotic plane or lack of plane between the rectum and the vagina was taken down sharply.  Partial-thickness posterior vaginectomy was performed taking care to avoid injury or trauma to the rectum.    The dissection was then completed from the perineum first posteriorly, posterior laterally, and then finally anteriorly.    The specimen was passed off.    4 L of Betadine and then saline irrigation were used from the abdominal exposure across the perineum.    The puborectalis was then reapproximated with figure-of-eight 0 Vicryl.    The skin was reapproximated with vertical mattress 0 Vicryl    10 flat JESUS was placed in the deep pelvis and brought out the left lower quadrant.  A colostomy site was developed in the left abdomen at the preoperatively identified location through the rectus.    The midline fascia was reapproximated with internal retention of oh Vicryl No. 1 looped PDS    The incision was irrigated with dilute antiseptic solution and the skin was reapproximated skin clips.    The colostomy was matured with Chichi eversion with 3-0 Vicryl.    The initial and final lap counts and instrument counts were announced as correct.  The procedure appeared well-tolerated.      Angelito Ruiz MD     Date: 10/1/2024  Time: 15:36 EDT

## 2024-10-01 NOTE — H&P
Admission HP     Patient Name: Valeria Tracy  MRN: [id number]  : 1940  DOS: 10/1/2024    Attending: Maggi Lance MD    Primary Care Provider: Peyton Vázquez PA      Patient Care Team:  Peyton Vázquez PA as PCP - General (Physician Assistant)    Chief complaint: Rectal cancer    Subjective   Patient is a pleasant 84 y.o. female presented for scheduled surgery by Dr. Ruiz.    Patient has history of right colon cancer for which she had a colectomy, T3 N1 has been resected with persistent neoplasia in the posterior distal rectum after YUDELKA prompting plans for APR.    Today she underwent abdominal perineal resection with partial thickness posterior vaginal resection.  Surgery included placement of a JESUS drain into the deep pelvis and colostomy creation in the left abdomen.    Surgery was done under general anesthesia and a block, was tolerated well.  I saw her in PACU where she is still in a drowsy  Postop state.  Not in distress.       Allergies   Allergen Reactions    Milk-Related Compounds Anaphylaxis        Medications Prior to Admission   Medication Sig Dispense Refill Last Dose    albuterol sulfate  (90 Base) MCG/ACT inhaler Inhale 2 puffs Every 4 (Four) Hours As Needed for Wheezing.   Past Week    Calcium-Vitamin D-Vitamin K (VIACTIV CALCIUM PLUS D PO) Take 2 doses by mouth Daily.   Past Week    DOCUSATE SODIUM PO Take 100 mg by mouth Daily As Needed (constipation).   Past Week    famotidine (PEPCID) 10 MG tablet Take 1 tablet by mouth 2 (Two) Times a Day.   Past Month    FLUTICASONE PROPIONATE NA 2 sprays into the nostril(s) as directed by provider Daily.   Past Week    fluticasone-salmeterol (ADVAIR HFA) 115-21 MCG/ACT inhaler Inhale 2 puffs 2 (Two) Times a Day.   10/1/2024    levothyroxine (SYNTHROID, LEVOTHROID) 88 MCG tablet Take 1 tablet by mouth Daily.   2024    Loratadine 10 MG capsule Take 1 capsule by mouth Daily.   2024    Multiple Vitamins-Minerals (Womens Multi  "Gummies) chewable tablet Chew 1 tablet Daily.   Past Week    ibuprofen (ADVIL,MOTRIN) 200 MG tablet Take 1 tablet by mouth Every 6 (Six) Hours As Needed for Mild Pain.       sodium chloride 0.65 % nasal spray Administer 1 spray into the nostril(s) as directed by provider As Needed for Congestion.             Past Medical History:   Diagnosis Date    Acid reflux     Anemia     Asthma     Cancer     rectal    History of chemotherapy     History of radiation therapy     History of transfusion     No Reaction    Hypothyroid     Pneumonia     Wears dentures     upper    Wears glasses     Wears hearing aid in both ears      Past Surgical History:   Procedure Laterality Date    BREAST LUMPECTOMY Right     CATARACT EXTRACTION Bilateral     COLON RESECTION N/A 10/17/2023    Procedure: COLON RESECTION RIGHT LAPAROSCOPIC;  Surgeon: Angelito Ruiz MD;  [facility]: [facility];  Service: General;  Laterality: N/A;    COLONOSCOPY      PORTACATH PLACEMENT      SHOULDER SURGERY Right      History reviewed. No pertinent family history.  Social History     Tobacco Use    Smoking status: Former     Types: Cigarettes    Smokeless tobacco: Never   Vaping Use    Vaping status: Never Used   Substance Use Topics    Alcohol use: Never    Drug use: Never       Review of Systems  Review of systems could not be obtained due to   patient sedation status.    Vital Signs  /73 (BP Location: Right arm, Patient Position: Lying)   Pulse 67   Temp 97.6 °F (36.4 °C) (Axillary)   Resp 16   Ht 165.1 cm (65\")   Wt 56 kg (123 lb 8 oz)   SpO2 99%   BMI 20.55 kg/m²     Physical Exam:    General Appearance:  Drowsy postop in no acute distress   Head:    Normocephalic, without obvious abnormality, atraumatic   Eyes:            Lids and lashes normal, conjunctivae and sclerae normal, no   icterus    Ears:    Ears appear intact with no abnormalities noted   Throat:   No oral lesions, no thrush, oral mucosa moist   Neck:   No adenopathy, supple, trachea " "midline, no thyromegaly         Lungs:     Clear to auscultation,respirations regular, even and       unlabored. No wheezes or rales.    Heart:    Regular rhythm and normal rate, normal S1 and S2, no murmur    Abdomen:      Soft with midline incision with an intact dressing.  Left colostomy and low abdomen/pelvic JESUS drain.   Genitalia:    Deferred   Extremities:   No edema, no cyanosis, no              redness   Pulses:   Pulses palpable and equal bilaterally   Skin:   No bleeding, bruising or rash   Neurologic:   Cranial nerves 2 - 12 grossly intact,             Invalid input(s): \"NEUTOPHILPCT\"        Invalid input(s): \"LABALBU\", \"PROT\"  Lab Results   Component Value Date    HGBA1C 5.20 09/23/2024      Latest Reference Range & Units 09/23/24 13:17   Sodium 136 - 145 mmol/L 139   Potassium 3.5 - 5.2 mmol/L 4.2   Chloride 98 - 107 mmol/L 101   CO2 22.0 - 29.0 mmol/L 29.0   Anion Gap 5.0 - 15.0 mmol/L 9.0   BUN 8 - 23 mg/dL 8   Creatinine 0.57 - 1.00 mg/dL 0.65   BUN/Creatinine Ratio 7.0 - 25.0  12.3   eGFR >60.0 mL/min/1.73 87.5   Glucose 65 - 99 mg/dL 89   Calcium 8.6 - 10.5 mg/dL 9.6   Alkaline Phosphatase 39 - 117 U/L 119 (H)   Total Protein 6.0 - 8.5 g/dL 7.1   Albumin 3.5 - 5.2 g/dL 4.3   Globulin gm/dL 2.8   A/G Ratio g/dL 1.5   AST (SGOT) 1 - 32 U/L 22   ALT (SGPT) 1 - 33 U/L 8   Total Bilirubin 0.0 - 1.2 mg/dL 0.5   Hemoglobin A1C 4.80 - 5.60 % 5.20   WBC 3.40 - 10.80 10*3/mm3 5.08   RBC 3.77 - 5.28 10*6/mm3 3.91   Hemoglobin 12.0 - 15.9 g/dL 12.1   Hematocrit 34.0 - 46.6 % 38.6   Platelets 140 - 450 10*3/mm3 302   RDW 12.3 - 15.4 % 13.0   MCV 79.0 - 97.0 fL 98.7 (H)   MCH 26.6 - 33.0 pg 30.9   MCHC 31.5 - 35.7 g/dL 31.3 (L)   MPV 6.0 - 12.0 fL 9.1   RDW-SD 37.0 - 54.0 fl 47.0   (H): Data is abnormally high  (L): Data is abnormally low    Assessment and Plan:   S/p ABDOMINAL PERINEAL RESECTION  Partial thickness posterior vaginal resection.    Rectal cancer    GERD (gastroesophageal reflux disease)    " Asthma    Hypothyroidism      Plan  Postop management to include  1. Ambulation, several times daily  2. Pain control-PRNs, multimodal approach   3. IS-will encourage  4. DVT proph- Mechanical, subcutaneous heparin  5. Bowel regimen, alvimopan  6. Resume home medications as appropriate  7. Monitor post-op labs  8. Discharge planning   9. Diet, Clears, advance diet as tolerated.  IVF initially, monitor volume status.    -Asthma: Maintain home regimen with formulary substitution as indicated.  As needed bronchodilators.  Monitor oxygenation.    -Hypothyroidism: Resume replacement     -GERD:  Resume H2 blocker.     -New stoma:  Wound care stoma nurse consult for stoma care and education throughout patient's hospitalization.      Dragon disclaimer:  Part of this encounter note is an electronic transcription/translation of spoken language to printed text. The electronic translation of spoken language may permit erroneous, or at times, nonsensical words or phrases to be inadvertently transcribed; Although I have reviewed the note for such errors, some may still exist.    Maggi Lance MD  10/01/24  18:15 EDT

## 2024-10-01 NOTE — PLAN OF CARE
Goal Outcome Evaluation:                                   Problem: Adult Inpatient Plan of Care  Goal: Optimal Comfort and Wellbeing  Intervention: Monitor Pain and Promote Comfort  Recent Flowsheet Documentation  Taken 10/1/2024 1836 by Sadie Cunha, RN  Pain Management Interventions:   no interventions per patient request   quiet environment facilitated  Taken 10/1/2024 1722 by Sadie Cunha, RN  Pain Management Interventions:   no interventions per patient request   quiet environment facilitated

## 2024-10-01 NOTE — ANESTHESIA POSTPROCEDURE EVALUATION
Patient: Valeria Tracy    Procedure Summary       Date: 10/01/24 Room / [facility]:  [facility]    Anesthesia Start: 1218 Anesthesia Stop: 1555    Procedure: ABDOMINAL PERINEAL RESECTION, LYSIS OF ADHESIONS AND CREATION OF COLOSTOMY (Abdomen) Diagnosis:     Surgeons: Angelito Ruiz MD Provider: Aj Rajan MD    Anesthesia Type: general ASA Status: 3            Anesthesia Type: general    Vitals  Vitals Value Taken Time   /73 10/01/24 1555   Temp 97.2 °F (36.2 °C) 10/01/24 1555   Pulse 62 10/01/24 1555   Resp     SpO2 100 % 10/01/24 1555           Post Anesthesia Care and Evaluation    Patient location during evaluation: PACU  Patient participation: complete - patient participated  Level of consciousness: awake and alert  Pain management: adequate    Airway patency: patent  Anesthetic complications: No anesthetic complications  PONV Status: none  Cardiovascular status: hemodynamically stable and acceptable  Respiratory status: nonlabored ventilation, acceptable and nasal cannula  Hydration status: acceptable

## 2024-10-01 NOTE — ANESTHESIA PROCEDURE NOTES
Airway  Urgency: elective    Date/Time: 10/1/2024 12:26 PM  Airway not difficult    General Information and Staff    Patient location during procedure: OR  CRNA/CAA: Schirmer, Shelley P, CRNA  SRNA: Shonda Chavez SRNA  Indications and Patient Condition  Indications for airway management: airway protection    Preoxygenated: yes  MILS not maintained throughout  Mask difficulty assessment: 2 - vent by mask + OA or adjuvant +/- NMBA    Final Airway Details  Final airway type: endotracheal airway      Successful airway: ETT  Cuffed: yes   Successful intubation technique: direct laryngoscopy  Endotracheal tube insertion site: oral  Blade: La  Blade size: 3  ETT size (mm): 7.0  Cormack-Lehane Classification: grade IIa - partial view of glottis  Placement verified by: chest auscultation and capnometry   Measured from: lips  ETT/EBT  to lips (cm): 20  Number of attempts at approach: 1  Assessment: lips, teeth, and gum same as pre-op and atraumatic intubation    Additional Comments  Negative epigastric sounds, Breath sound equal bilaterally with symmetric chest rise and fall

## 2024-10-01 NOTE — INTERVAL H&P NOTE
"[facility]    Full history and physical note from office is attached.    /85 (BP Location: Right arm, Patient Position: Lying)   Pulse 85   Temp 98.7 °F (37.1 °C) (Temporal)   Resp 18   SpO2 100%     Immunizations:  Influenza:  No  Pneumococcal:  UTD  Tetanus:  UTD    Review of Systems:  Constitutional-- No fever, chills or sweats. No fatigue.  CV-- No chest pain, palpitation or syncope  Resp-- No SOB, cough, hemoptysis  Skin--No rashes or lesions    Physical Exam:  Heart:   Regular rate and rhythm, S1 and S2 normal  Lungs: Clear to auscultation bilaterally, respirations unlabored    LAB Results:  Lab Results   Component Value Date    WBC 5.08 09/23/2024    HGB 12.1 09/23/2024    HCT 38.6 09/23/2024    MCV 98.7 (H) 09/23/2024     09/23/2024    NEUTROABS 7.07 (H) 10/23/2023    GLUCOSE 89 09/23/2024    BUN 8 09/23/2024    CREATININE 0.65 09/23/2024     09/23/2024    K 4.2 09/23/2024     09/23/2024    CO2 29.0 09/23/2024    CALCIUM 9.6 09/23/2024    ALBUMIN 4.3 09/23/2024    AST 22 09/23/2024    ALT 8 09/23/2024    BILITOT 0.5 09/23/2024     Study Result    Narrative & Impression   MRI PELVIS WO CONTRAST     Date of Exam: 8/22/2024 6:43 PM EDT     Indication: C20.     Comparison: CT abdomen pelvis 10/20/2023.     Technique:  Routine multiplanar/multisequence images of the pelvis were obtained without contrast administration.     Per chart review: 10/25/2023 patient underwent colonoscopy which demonstrated \"large bulky mass lesion was seen in the rectum. This arose from the posterior wall of the rectum was greater than 5 cm in size, arising from a broad base with distal extent to   within a proximally 3 cm of the dentate line. Several biopsies were obtained, that showed moderately differentiated adenocarcinoma.\" Patient has subsequently undergone chemoradiation at an outside institution for rectal cancer. No pretreatment/baseline   rectal MRI performed.     Findings:   "   Note there is a large amount of gas in the rectum, which makes evaluation suboptimal. Along the posterior rectal wall there is T2 hypointense signal suggestive of scar/fibrosis, which spans around 3.5 cm in craniocaudal dimension (series 5 image 10-12)   which appears to be at the site of the rectal tumor seen as an enhancing mass on prior CT. The inferior margin of the scar extends to 2.2 cm above the anorectal junction and 4.2 cm above the anal verge. Within the posterior mesorectal fat at this   location at the 6 o'clock position, there are thin linear/radiating areas of T2 hypointensity suggestive of desmoplastic stranding. Otherwise, there is no convincing evidence of mesorectal involvement. No distinct intermediate T2 signal or discrete   restricted diffusion to suggest significant residual viable disease.     There is a 4 x 4 mm lymph node in the mesorectum at the 7 o'clock position, without prominent diffusion signal or suspicious morphologic features, unchanged in size from prior CT and of low suspicion. No mesorectal lymphadenopathy otherwise. No   extramesorectal lymphadenopathy within the field-of-view.     Small uterus and ovaries, typical of age. Questionable small fibroid present. No free fluid or organized fluid collection. No subcutaneous soft tissue abnormality. No acute or suspicious osseous lesions. Degenerative changes of the bilateral hips.     IMPRESSION:  Impression:     Within the confines of comparing to a prior CT without prior/baseline MRI, as well as a large amount of gas in the rectum which distorts diffusion-weighted sequences, there appears to be positive response to treatment with predominantly T2 hypointense   signal at the site of the treated rectal tumor suggestive of scar/fibrosis. No distinct intermediate T2 signal nor restricted diffusion to suggest measurable residual viable tumor. Mesorectum is generally clear without convincing evidence of tumor   involvement nor  suspicious lymphadenopathy, with note made of a tiny/nonsuspicious lymph node. Consider correlation with endoscopy and tissue sampling as clinically indicated.     Cancer Staging (if applicable)  Cancer Patient: __ yes __no __unknown__N/A; If yes, clinical stage T:__ N:__M:__, stage group or __N/A      Impression: History rectal cancer, Stage III      Plan: ABDOMINAL PERINEAL RESECTION, LYSIS OF ADHESIONS       RAINA Arana   10/1/2024   09:10 EDT

## 2024-10-02 PROBLEM — Z90.49 S/P COLON RESECTION: Status: ACTIVE | Noted: 2024-10-02

## 2024-10-02 LAB
ANION GAP SERPL CALCULATED.3IONS-SCNC: 11 MMOL/L (ref 5–15)
BASOPHILS # BLD AUTO: 0.02 10*3/MM3 (ref 0–0.2)
BASOPHILS NFR BLD AUTO: 0.2 % (ref 0–1.5)
BUN SERPL-MCNC: 9 MG/DL (ref 8–23)
BUN/CREAT SERPL: 16.4 (ref 7–25)
CALCIUM SPEC-SCNC: 8.4 MG/DL (ref 8.6–10.5)
CHLORIDE SERPL-SCNC: 101 MMOL/L (ref 98–107)
CO2 SERPL-SCNC: 22 MMOL/L (ref 22–29)
CREAT SERPL-MCNC: 0.55 MG/DL (ref 0.57–1)
DEPRECATED RDW RBC AUTO: 45.5 FL (ref 37–54)
EGFRCR SERPLBLD CKD-EPI 2021: 91.1 ML/MIN/1.73
EOSINOPHIL # BLD AUTO: 0 10*3/MM3 (ref 0–0.4)
EOSINOPHIL NFR BLD AUTO: 0 % (ref 0.3–6.2)
ERYTHROCYTE [DISTWIDTH] IN BLOOD BY AUTOMATED COUNT: 12.6 % (ref 12.3–15.4)
GLUCOSE SERPL-MCNC: 113 MG/DL (ref 65–99)
HCT VFR BLD AUTO: 36.6 % (ref 34–46.6)
HGB BLD-MCNC: 11.6 G/DL (ref 12–15.9)
IMM GRANULOCYTES # BLD AUTO: 0.04 10*3/MM3 (ref 0–0.05)
IMM GRANULOCYTES NFR BLD AUTO: 0.3 % (ref 0–0.5)
LYMPHOCYTES # BLD AUTO: 0.45 10*3/MM3 (ref 0.7–3.1)
LYMPHOCYTES NFR BLD AUTO: 3.9 % (ref 19.6–45.3)
MCH RBC QN AUTO: 30.7 PG (ref 26.6–33)
MCHC RBC AUTO-ENTMCNC: 31.7 G/DL (ref 31.5–35.7)
MCV RBC AUTO: 96.8 FL (ref 79–97)
MONOCYTES # BLD AUTO: 1.08 10*3/MM3 (ref 0.1–0.9)
MONOCYTES NFR BLD AUTO: 9.3 % (ref 5–12)
NEUTROPHILS NFR BLD AUTO: 86.3 % (ref 42.7–76)
NEUTROPHILS NFR BLD AUTO: 9.98 10*3/MM3 (ref 1.7–7)
NRBC BLD AUTO-RTO: 0 /100 WBC (ref 0–0.2)
PLATELET # BLD AUTO: 217 10*3/MM3 (ref 140–450)
PMV BLD AUTO: 9.7 FL (ref 6–12)
POTASSIUM SERPL-SCNC: 5.1 MMOL/L (ref 3.5–5.2)
RBC # BLD AUTO: 3.78 10*6/MM3 (ref 3.77–5.28)
SODIUM SERPL-SCNC: 134 MMOL/L (ref 136–145)
WBC NRBC COR # BLD AUTO: 11.57 10*3/MM3 (ref 3.4–10.8)

## 2024-10-02 PROCEDURE — 97530 THERAPEUTIC ACTIVITIES: CPT

## 2024-10-02 PROCEDURE — 94799 UNLISTED PULMONARY SVC/PX: CPT

## 2024-10-02 PROCEDURE — 97161 PT EVAL LOW COMPLEX 20 MIN: CPT

## 2024-10-02 PROCEDURE — 94664 DEMO&/EVAL PT USE INHALER: CPT

## 2024-10-02 PROCEDURE — 25010000002 SODIUM CHLORIDE 0.9 % WITH KCL 20 MEQ 20-0.9 MEQ/L-% SOLUTION: Performed by: COLON & RECTAL SURGERY

## 2024-10-02 PROCEDURE — 25010000002 HEPARIN (PORCINE) PER 1000 UNITS: Performed by: COLON & RECTAL SURGERY

## 2024-10-02 PROCEDURE — 25810000003 SODIUM CHLORIDE 0.9 % SOLUTION: Performed by: INTERNAL MEDICINE

## 2024-10-02 PROCEDURE — 85025 COMPLETE CBC W/AUTO DIFF WBC: CPT | Performed by: COLON & RECTAL SURGERY

## 2024-10-02 PROCEDURE — 80048 BASIC METABOLIC PNL TOTAL CA: CPT | Performed by: COLON & RECTAL SURGERY

## 2024-10-02 RX ORDER — SODIUM CHLORIDE 9 MG/ML
100 INJECTION, SOLUTION INTRAVENOUS CONTINUOUS
Status: DISCONTINUED | OUTPATIENT
Start: 2024-10-02 | End: 2024-10-03

## 2024-10-02 RX ADMIN — ACETAMINOPHEN 650 MG: 325 TABLET ORAL at 05:22

## 2024-10-02 RX ADMIN — HYDROCODONE BITARTRATE AND ACETAMINOPHEN 1 TABLET: 7.5; 325 TABLET ORAL at 17:27

## 2024-10-02 RX ADMIN — HEPARIN SODIUM 5000 UNITS: 5000 INJECTION INTRAVENOUS; SUBCUTANEOUS at 20:20

## 2024-10-02 RX ADMIN — CETIRIZINE HYDROCHLORIDE 5 MG: 10 TABLET, FILM COATED ORAL at 09:49

## 2024-10-02 RX ADMIN — HEPARIN SODIUM 5000 UNITS: 5000 INJECTION INTRAVENOUS; SUBCUTANEOUS at 13:13

## 2024-10-02 RX ADMIN — HYDROCODONE BITARTRATE AND ACETAMINOPHEN 1 TABLET: 7.5; 325 TABLET ORAL at 23:04

## 2024-10-02 RX ADMIN — HYDROCODONE BITARTRATE AND ACETAMINOPHEN 1 TABLET: 7.5; 325 TABLET ORAL at 07:51

## 2024-10-02 RX ADMIN — ACETAMINOPHEN 650 MG: 325 TABLET ORAL at 13:13

## 2024-10-02 RX ADMIN — HEPARIN SODIUM 5000 UNITS: 5000 INJECTION INTRAVENOUS; SUBCUTANEOUS at 05:22

## 2024-10-02 RX ADMIN — GABAPENTIN 100 MG: 100 CAPSULE ORAL at 09:49

## 2024-10-02 RX ADMIN — IBUPROFEN 400 MG: 400 TABLET, FILM COATED ORAL at 20:20

## 2024-10-02 RX ADMIN — FAMOTIDINE 10 MG: 20 TABLET, FILM COATED ORAL at 09:49

## 2024-10-02 RX ADMIN — LEVOTHYROXINE SODIUM 88 MCG: 0.09 TABLET ORAL at 05:22

## 2024-10-02 RX ADMIN — SODIUM CHLORIDE 100 ML/HR: 9 INJECTION, SOLUTION INTRAVENOUS at 13:12

## 2024-10-02 RX ADMIN — FAMOTIDINE 10 MG: 20 TABLET, FILM COATED ORAL at 20:20

## 2024-10-02 RX ADMIN — ACETAMINOPHEN 650 MG: 325 TABLET ORAL at 20:20

## 2024-10-02 RX ADMIN — BUDESONIDE AND FORMOTEROL FUMARATE DIHYDRATE 2 PUFF: 160; 4.5 AEROSOL RESPIRATORY (INHALATION) at 20:03

## 2024-10-02 RX ADMIN — POTASSIUM CHLORIDE AND SODIUM CHLORIDE 100 ML/HR: 900; 150 INJECTION, SOLUTION INTRAVENOUS at 05:23

## 2024-10-02 RX ADMIN — SODIUM CHLORIDE 100 ML/HR: 9 INJECTION, SOLUTION INTRAVENOUS at 23:04

## 2024-10-02 RX ADMIN — GABAPENTIN 100 MG: 100 CAPSULE ORAL at 20:20

## 2024-10-02 NOTE — THERAPY EVALUATION
Patient Name: Valeria Tracy  : 1940    MRN: [id number]                              Today's Date: 10/2/2024       Admit Date: 10/1/2024    Visit Dx:     ICD-10-CM ICD-9-CM   1. Rectal cancer  C20 154.1     Patient Active Problem List   Diagnosis    GERD (gastroesophageal reflux disease)    Asthma    Hypothyroidism    Colon cancer    S/P right colectomy    Acute postoperative pain    Anemia    Rectal cancer     Past Medical History:   Diagnosis Date    Acid reflux     Anemia     Asthma     Cancer     rectal    History of chemotherapy     History of radiation therapy     History of transfusion     No Reaction    Hypothyroid     Pneumonia     Wears dentures     upper    Wears glasses     Wears hearing aid in both ears      Past Surgical History:   Procedure Laterality Date    ABDOMINAL PERINEAL RESECTION N/A 10/1/2024    Procedure: ABDOMINAL PERINEAL RESECTION, LYSIS OF ADHESIONS AND CREATION OF COLOSTOMY;  Surgeon: Angelito Ruiz MD;  Location: [facility];  Service: General;  Laterality: N/A;    BREAST LUMPECTOMY Right     CATARACT EXTRACTION Bilateral     COLON RESECTION N/A 10/17/2023    Procedure: COLON RESECTION RIGHT LAPAROSCOPIC;  Surgeon: Angelito Ruiz MD;  Location: [facility];  Service: General;  Laterality: N/A;    COLONOSCOPY      PORTACATH PLACEMENT      SHOULDER SURGERY Right       General Information       Row Name 10/02/24 1114          Physical Therapy Time and Intention    Document Type evaluation (P)   -TM     Mode of Treatment physical therapy (P)   -TM       Row Name 10/02/24 1114          General Information    Patient Profile Reviewed yes (P)   -TM     Prior Level of Function independent:;all household mobility;community mobility;gait;transfer;ADL's (P)   no AD use; has walker but does not use it  -TM     Existing Precautions/Restrictions fall;other (see comments) (P)   s/p abdominal perineal resection (10/1); colostomy; JESUS drain  -TM     Barriers to Rehab medically complex;previous  functional deficit (P)   -TM       Row Name 10/02/24 1114          Living Environment    People in Home alone (P)   -TM       Row Name 10/02/24 1114          Home Main Entrance    Number of Stairs, Main Entrance two (P)   pt mainly uses back entrance w/ 2 stairs and B HR's  -TM     Stair Railings, Main Entrance railings on both sides of stairs (P)   -TM       Row Name 10/02/24 1114          Stairs Within Home, Primary    Number of Stairs, Within Home, Primary none (P)   -TM       Row Name 10/02/24 1114          Cognition    Orientation Status (Cognition) oriented to;person;situation;time;verbal cues/prompts needed for orientation;place;other (see comments) (P)   pt aware she was in hospital but was unsure of the name  -TM       Row Name 10/02/24 1114          Safety Issues, Functional Mobility    Safety Issues Affecting Function (Mobility) awareness of need for assistance;insight into deficits/self-awareness;safety precaution awareness;safety precautions follow-through/compliance;positioning of assistive device;sequencing abilities (P)   -TM     Impairments Affecting Function (Mobility) balance;endurance/activity tolerance;pain;postural/trunk control;strength (P)   -TM               User Key  (r) = Recorded By, (t) = Taken By, (c) = Cosigned By      Initials Name Provider Type    TM Britton Lipscomb, PT Student PT Student                   Mobility       Row Name 10/02/24 1119          Bed Mobility    Bed Mobility supine-sit;sit-supine (P)   -TM     Supine-Sit [place] (Bed Mobility) verbal cues;minimum assist (75% patient effort);2 person assist (P)   -TM     Sit-Supine [place] (Bed Mobility) verbal cues;minimum assist (75% patient effort);2 person assist (P)   -TM     Assistive Device (Bed Mobility) bed rails;head of bed elevated (P)   -TM     Comment, (Bed Mobility) VCs for sequencing and hand placement. Pt educated on log roll technique to prevent inc abdominal pain. (P)   -TM       Row Name 10/02/24  1119          Sit-Stand Transfer    Sit-Stand [place] (Transfers) verbal cues;contact guard;1 person assist (P)   -TM     Comment, (Sit-Stand Transfer) VC for sequencing and hand placement. R UE on FWW, L UE pushing from bed rail. (P)   -TM       Row Name 10/02/24 1119          Gait/Stairs (Locomotion)    [place] Level (Gait) verbal cues;contact guard;1 person assist (P)   -TM     Assistive Device (Gait) walker, front-wheeled (P)   -TM     Distance in Feet (Gait) 100 (P)   -TM     Deviations/Abnormal Patterns (Gait) bilateral deviations;tanya decreased;gait speed decreased;stride length decreased (P)   -TM     Bilateral Gait Deviations forward flexed posture;heel strike decreased (P)   -TM     Comment, (Gait/Stairs) Pt demo'd step through gait pattern w/ dec gait speed and forward flexed posture. Inc cueing required to maintain body w/in FWW. Further distance limited by fatigue. (P)   -TM               User Key  (r) = Recorded By, (t) = Taken By, (c) = Cosigned By      Initials Name Provider Type    TM Britton Lipscomb, PT Student PT Student                   Obj/Interventions       Row Name 10/02/24 1123          Range of Motion Comprehensive    General Range of Motion bilateral lower extremity ROM WFL (P)   -TM       Row Name 10/02/24 1123          Strength Comprehensive (MMT)    General Manual Muscle Testing (MMT) Assessment lower extremity strength deficits identified (P)   -TM     Comment, General Manual Muscle Testing (MMT) Assessment B LE 4-/5 observed through functional movement (P)   -TM       Row Name 10/02/24 1123          Balance    Balance Assessment sitting static balance;sitting dynamic balance;sit to stand dynamic balance;standing static balance;standing dynamic balance (P)   -TM     Static Sitting Balance standby assist;1-person assist (P)   -TM     Dynamic Sitting Balance standby assist;1-person assist (P)   -TM     Position, Sitting Balance unsupported;sitting edge of bed (P)   -TM      Sit to Stand Dynamic Balance verbal cues;contact guard;1-person assist (P)   -TM     Static Standing Balance verbal cues;contact guard;1-person assist (P)   -TM     Dynamic Standing Balance verbal cues;contact guard;1-person assist (P)   -TM     Position/Device Used, Standing Balance supported;walker, front-wheeled (P)   -TM               User Key  (r) = Recorded By, (t) = Taken By, (c) = Cosigned By      Initials Name Provider Type    TM Britton Lipscomb, PT Student PT Student                   Goals/Plan       Row Name 10/02/24 1127          Bed Mobility Goal 1 (PT)    Activity/Assistive Device (Bed Mobility Goal 1, PT) sit to supine/supine to sit (P)   -TM     [place] Level/Cues Needed (Bed Mobility Goal 1, PT) contact guard required (P)   -TM     Time Frame (Bed Mobility Goal 1, PT) short term goal (STG);5 days (P)   -TM     Progress/Outcomes (Bed Mobility Goal 1, PT) new goal (P)   -TM       Row Name 10/02/24 1127          Transfer Goal 1 (PT)    Activity/Assistive Device (Transfer Goal 1, PT) sit-to-stand/stand-to-sit (P)   -TM     [place] Level/Cues Needed (Transfer Goal 1, PT) standby assist (P)   -TM     Time Frame (Transfer Goal 1, PT) short term goal (STG);5 days (P)   -TM     Progress/Outcome (Transfer Goal 1, PT) new goal (P)   -TM       Row Name 10/02/24 1127          Gait Training Goal 1 (PT)    Activity/Assistive Device (Gait Training Goal 1, PT) gait (walking locomotion) (P)   -TM     [place] Level (Gait Training Goal 1, PT) standby assist (P)   -TM     Distance (Gait Training Goal 1, PT) 325 ft (P)   -TM     Time Frame (Gait Training Goal 1, PT) long term goal (LTG);10 days (P)   -TM     Progress/Outcome (Gait Training Goal 1, PT) new goal (P)   -TM       Row Name 10/02/24 1127          ROM Goal 1 (PT)    Progress/Outcome (ROM Goal 1, PT) new goal (P)   -TM       Row Name 10/02/24 1127          Stairs Goal 1 (PT)    Activity/Assistive Device (Stairs Goal 1, PT) stairs, all skills  (P)   -TM     [place] Level/Cues Needed (Stairs Goal 1, PT) contact guard required (P)   -TM     Number of Stairs (Stairs Goal 1, PT) 2 (P)   -TM     Time Frame (Stairs Goal 1, PT) long term goal (LTG);10 days (P)   -TM     Progress/Outcome (Stairs Goal 1, PT) new goal (P)   -TM       Row Name 10/02/24 1127          Therapy Assessment/Plan (PT)    Planned Therapy Interventions (PT) balance training;bed mobility training;gait training;home exercise program;patient/family education;postural re-education;ROM (range of motion);stair training;strengthening;stretching;transfer training (P)   -TM               User Key  (r) = Recorded By, (t) = Taken By, (c) = Cosigned By      Initials Name Provider Type    TM Britton Lipscomb, PT Student PT Student                   Clinical Impression       Row Name 10/02/24 1124          Pain    Pretreatment Pain Rating 3/10 (P)   -TM     Posttreatment Pain Rating 3/10 (P)   -TM     Pain Location incisional (P)   -TM     Pain Location - abdomen (P)   -TM     Pain Intervention(s) Ambulation/increased activity;Repositioned (P)   -TM       Row Name 10/02/24 1124          Plan of Care Review    Plan of Care Reviewed With patient (P)   -TM     Progress no change (P)   -TM     Outcome Evaluation Pt presents w/ dec functional mobility, activity tolerance, and strength compared to baseline. Pt ambulated 100 ft w/ CGA x1 using FWW. Pt requires skilled IPPT services to return to PLOF. Pending progress, rec d/c to IRF once medically stable. (P)   -TM       Row Name 10/02/24 1124          Therapy Assessment/Plan (PT)    Patient/Family Therapy Goals Statement (PT) go home (P)   -TM     Rehab Potential (PT) good, to achieve stated therapy goals (P)   -TM     Criteria for Skilled Interventions Met (PT) yes (P)   -TM     Therapy Frequency (PT) daily (P)   -TM     Predicted Duration of Therapy Intervention (PT) 2 weeks (P)   -TM       Row Name 10/02/24 1124          Vital Signs    Pre Systolic BP  Rehab 126 (P)   -TM     Pre Treatment Diastolic BP 80 (P)   -TM     Pretreatment Heart Rate (beats/min) 99 (P)   -TM     Posttreatment Heart Rate (beats/min) 89 (P)   -TM     Pre SpO2 (%) 99 (P)   -TM     O2 Delivery Pre Treatment room air (P)   -TM     Post SpO2 (%) 96 (P)   -TM     O2 Delivery Post Treatment room air (P)   -TM     Pre Patient Position Supine (P)   -TM     Post Patient Position Supine (P)   -TM       Row Name 10/02/24 1124          Positioning and Restraints    Pre-Treatment Position in bed (P)   -TM     Post Treatment Position bed (P)   -TM     In Bed notified nsg;fowlers;call light within reach;encouraged to call for assist;exit alarm on (P)   -TM               User Key  (r) = Recorded By, (t) = Taken By, (c) = Cosigned By      Initials Name Provider Type    Britton Bender, PT Student PT Student                   Outcome Measures       Row Name 10/02/24 1128 10/02/24 0800       How much help from another person do you currently need...    Turning from your back to your side while in flat bed without using bedrails? 4 (P)   -TM 4  -MELISSA    Moving from lying on back to sitting on the side of a flat bed without bedrails? 3 (P)   -TM 3  -MELISSA    Moving to and from a bed to a chair (including a wheelchair)? 3 (P)   -TM 3  -MELISSA    Standing up from a chair using your arms (e.g., wheelchair, bedside chair)? 4 (P)   -TM 3  -MELISSA    Climbing 3-5 steps with a railing? 2 (P)   -TM 3  -MELISSA    To walk in hospital room? 3 (P)   -TM 3  -MELISSA    AM-PAC 6 Clicks Score (PT) 19 (P)   -TM 19  -MELISSA    Highest Level of Mobility Goal 6 --> Walk 10 steps or more (P)   -TM 6 --> Walk 10 steps or more  -MELISSA      Row Name 10/02/24 1128          Functional Assessment    Outcome Measure Options AM-PAC 6 Clicks Basic Mobility (PT) (P)   -TM               User Key  (r) = Recorded By, (t) = Taken By, (c) = Cosigned By      Initials Name Provider Type    MELISSA Goncalves, Janeise R., RN Registered Nurse    TM Britton Lipscomb, PT Student PT  Student                                 Physical Therapy Education       Title: PT OT SLP Therapies (In Progress)       Topic: Physical Therapy (In Progress)       Point: Mobility training (In Progress)       Learning Progress Summary             Patient Acceptance, E, NR by TM at 10/2/2024 1129                         Point: Home exercise program (Not Started)       Learner Progress:  Not documented in this visit.              Point: Body mechanics (In Progress)       Learning Progress Summary             Patient Acceptance, E, NR by TM at 10/2/2024 1129                         Point: Precautions (In Progress)       Learning Progress Summary             Patient Acceptance, E, NR by TM at 10/2/2024 1129                                         User Key       Initials Effective Dates Name Provider Type Discipline     08/13/24 -  Britton Lipscomb, PT Student PT Student PT                  PT Recommendation and Plan  Planned Therapy Interventions (PT): (P) balance training, bed mobility training, gait training, home exercise program, patient/family education, postural re-education, ROM (range of motion), stair training, strengthening, stretching, transfer training  Plan of Care Reviewed With: (P) patient  Progress: (P) no change  Outcome Evaluation: (P) Pt presents w/ dec functional mobility, activity tolerance, and strength compared to baseline. Pt ambulated 100 ft w/ CGA x1 using FWW. Pt requires skilled IPPT services to return to PLOF. Pending progress, rec d/c to IRF once medically stable.     Time Calculation:   PT Evaluation Complexity  History, PT Evaluation Complexity: (P) 3 or more personal factors and/or comorbidities  Examination of Body Systems (PT Eval Complexity): (P) total of 3 or more elements  Clinical Presentation (PT Evaluation Complexity): (P) stable  Clinical Decision Making (PT Evaluation Complexity): (P) low complexity  Overall Complexity (PT Evaluation Complexity): (P) low complexity     PT  Charges       Row Name 10/02/24 1131             Time Calculation    Start Time 1009 (P)   -TM      PT Received On 10/02/24 (P)   -TM      PT Goal Re-Cert Due Date 10/12/24 (P)   -TM         Timed Charges    59697 - PT Therapeutic Activity Minutes 9 (P)   -TM         Untimed Charges    PT Eval/Re-eval Minutes 46 (P)   -TM         Total Minutes    Timed Charges Total Minutes 9 (P)   -TM      Untimed Charges Total Minutes 46 (P)   -TM       Total Minutes 55 (P)   -TM                User Key  (r) = Recorded By, (t) = Taken By, (c) = Cosigned By      Initials Name Provider Type    TM Britton Lipscomb, PT Student PT Student                  Therapy Charges for Today       Code Description Service Date Service Provider Modifiers Qty    58265948218 HC PT THERAPEUTIC ACT EA 15 MIN 10/2/2024 Britton Lipscomb, PT Student GP 1    01998077196 HC PT EVAL LOW COMPLEXITY 4 10/2/2024 Britton Lipscomb, PT Student GP 1            PT G-Codes  Outcome Measure Options: (P) AM-PAC 6 Clicks Basic Mobility (PT)  AM-PAC 6 Clicks Score (PT): (P) 19  PT Discharge Summary  Anticipated Discharge Disposition (PT): (P) inpatient rehabilitation facility    Britton Lipscomb PT Student  10/2/2024

## 2024-10-02 NOTE — PROGRESS NOTES
" progress note      Valeria Tracy  [id number]  1940     LOS: 1 day     Attending: Angelito Ruiz MD    Primary Care Provider: Peyton Vázquez PA      Chief Complaint/Reason for visit:  Abd pain    Subjective   Doing well. Good pain control. Tolerating full liquids. Ambulated with PT. Hurst present. No stoma output. Denies f/c/n/v/sob/cp.    Objective     Vital Signs    Visit Vitals  /67 (BP Location: Left arm, Patient Position: Lying)   Pulse 90   Temp 97.1 °F (36.2 °C) (Oral)   Resp 19   Ht 165.1 cm (65\")   Wt 56 kg (123 lb 8 oz)   SpO2 95%   BMI 20.55 kg/m²     Temp (24hrs), Av.5 °F (36.4 °C), Min:97.1 °F (36.2 °C), Max:98.1 °F (36.7 °C)      Nutrition: Full liquids    Respiratory: RA    Physical Exam:     General Appearance:    Alert, cooperative, in no acute distress   Head:    Normocephalic, without obvious abnormality, atraumatic    Lungs:     Normal effort, symmetric chest rise, no crepitus, clear to      auscultation bilaterally             Heart:    Regular rhythm and normal rate, normal S1 and S2   Abdomen:     Soft, expected tenderness. Midline dressing CDI. JESUS present.   : hurst- yellow UOP   Extremities:   No clubbing, cyanosis or edema.  No deformities.    Pulses:   Pulses palpable and equal bilaterally   Skin:   No bleeding, bruising or rash   Neurologic:   Moves all extremities with no obvious focal motor deficit.  Cranial nerves 2 - 12 grossly intact     Results Review:     I reviewed the patient's new clinical results.   Results from last 7 days   Lab Units 10/02/24  0927   WBC 10*3/mm3 11.57*   HEMOGLOBIN g/dL 11.6*   HEMATOCRIT % 36.6   PLATELETS 10*3/mm3 217     Results from last 7 days   Lab Units 10/02/24  0514   SODIUM mmol/L 134*   POTASSIUM mmol/L 5.1   CHLORIDE mmol/L 101   CO2 mmol/L 22.0   BUN mg/dL 9   CREATININE mg/dL 0.55*   CALCIUM mg/dL 8.4*   GLUCOSE mg/dL 113*     I reviewed the patient's new imaging including images and reports.    All medications reviewed. "   acetaminophen, 650 mg, Oral, Q8H  budesonide-formoterol, 2 puff, Inhalation, BID - RT  cetirizine, 5 mg, Oral, Daily  famotidine, 10 mg, Oral, BID  gabapentin, 100 mg, Oral, BID  heparin (porcine), 5,000 Units, Subcutaneous, Q8H  levothyroxine, 88 mcg, Oral, Q AM        Assessment & Plan     S/P abdominal perineal resection, partial thickness posterior vaginal resection    Rectal cancer    GERD (gastroesophageal reflux disease)    Asthma    Hypothyroidism    Acute postoperative pain      Plan  1. Ambulation  2. Pain control-prns   3. IS-encouraged  4. DVT proph- mechs/heparin  5. Bowel regimen  6. Full liquid diet. Changed IVF to NS  7. Monitor post-op labs  8. DC planning for home     -Asthma: Maintain home regimen with formulary substitution as indicated.  As needed bronchodilators.  Monitor oxygenation.     -Hypothyroidism: Resume replacement      -GERD:  Resume H2 blocker.      -New stoma:  Wound care stoma nurse consult for stoma care and education throughout patient's hospitalization.      RAINA Arana  10/02/24  13:13 EDT

## 2024-10-02 NOTE — PLAN OF CARE
Problem: Adult Inpatient Plan of Care  Goal: Plan of Care Review  Outcome: Progressing  Goal: Patient-Specific Goal (Individualized)  Outcome: Progressing  Goal: Absence of Hospital-Acquired Illness or Injury  Outcome: Progressing  Intervention: Identify and Manage Fall Risk  Recent Flowsheet Documentation  Taken 10/1/2024 1953 by Papito Samayoa RN  Safety Promotion/Fall Prevention:   assistive device/personal items within reach   clutter free environment maintained   fall prevention program maintained   lighting adjusted   nonskid shoes/slippers when out of bed   room organization consistent   safety round/check completed   toileting scheduled  Intervention: Prevent Skin Injury  Recent Flowsheet Documentation  Taken 10/1/2024 1953 by Papito Samayoa RN  Body Position: position changed independently  Skin Protection:   adhesive use limited   tubing/devices free from skin contact  Intervention: Prevent and Manage VTE (Venous Thromboembolism) Risk  Recent Flowsheet Documentation  Taken 10/1/2024 1953 by Papito Samayoa RN  Activity Management: activity encouraged  VTE Prevention/Management: (SQ heparin also)   bilateral   sequential compression devices on  Range of Motion: active ROM (range of motion) encouraged  Intervention: Prevent Infection  Recent Flowsheet Documentation  Taken 10/1/2024 1953 by Papito Samayoa RN  Infection Prevention:   environmental surveillance performed   equipment surfaces disinfected   hand hygiene promoted   rest/sleep promoted   single patient room provided  Goal: Optimal Comfort and Wellbeing  Outcome: Progressing  Intervention: Monitor Pain and Promote Comfort  Recent Flowsheet Documentation  Taken 10/1/2024 1953 by Papito Samayoa RN  Pain Management Interventions:   care clustered   pain management plan reviewed with patient/caregiver   pillow support provided   position adjusted   quiet environment facilitated   see MAR  Intervention:  Provide Person-Centered Care  Recent Flowsheet Documentation  Taken 10/1/2024 1953 by Papito Samayoa RN  Trust Relationship/Rapport:   care explained   choices provided   emotional support provided   empathic listening provided   questions answered   questions encouraged   reassurance provided   thoughts/feelings acknowledged  Goal: Readiness for Transition of Care  Outcome: Progressing     Problem: Fall Injury Risk  Goal: Absence of Fall and Fall-Related Injury  Outcome: Progressing  Intervention: Identify and Manage Contributors  Recent Flowsheet Documentation  Taken 10/1/2024 1953 by Papito Samayoa RN  Medication Review/Management: medications reviewed  Intervention: Promote Injury-Free Environment  Recent Flowsheet Documentation  Taken 10/1/2024 1953 by Papito Samayoa RN  Safety Promotion/Fall Prevention:   assistive device/personal items within reach   clutter free environment maintained   fall prevention program maintained   lighting adjusted   nonskid shoes/slippers when out of bed   room organization consistent   safety round/check completed   toileting scheduled     Problem: Skin Injury Risk Increased  Goal: Skin Health and Integrity  Outcome: Progressing  Intervention: Optimize Skin Protection  Recent Flowsheet Documentation  Taken 10/1/2024 1953 by Papito Samayoa RN  Pressure Reduction Techniques:   frequent weight shift encouraged   heels elevated off bed   weight shift assistance provided  Head of Bed (HOB) Positioning: HOB at 30-45 degrees  Pressure Reduction Devices:   positioning supports utilized   pressure-redistributing mattress utilized  Skin Protection:   adhesive use limited   tubing/devices free from skin contact     Problem: Infection  Goal: Absence of Infection Signs and Symptoms  Outcome: Progressing  Intervention: Prevent or Manage Infection  Recent Flowsheet Documentation  Taken 10/1/2024 1953 by Papito Samayoa RN  Infection Management: aseptic  technique maintained   Goal Outcome Evaluation:

## 2024-10-02 NOTE — PLAN OF CARE
Goal Outcome Evaluation:  Plan of Care Reviewed With: (P) patient        Progress: (P) no change  Outcome Evaluation: (P) Pt presents w/ dec functional mobility, activity tolerance, and strength compared to baseline. Pt ambulated 100 ft w/ CGA x1 using FWW. Pt requires skilled IPPT services to return to PLOF. Pending progress, rec d/c to IRF once medically stable.      Anticipated Discharge Disposition (PT): (P) inpatient rehabilitation facility

## 2024-10-02 NOTE — PROGRESS NOTES
Uneventful night and first day    No chest pain or other difficulties noting involved and intense intervention --given her history and age and function.    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    Diet and activity as tolerated.

## 2024-10-02 NOTE — PLAN OF CARE
Problem: Adjustment to Surgery (Colostomy)  Goal: Optimal Coping  Outcome: Progressing  Intervention: Support Psychosocial Response to Surgery  Description: Provide opportunity for expression of thoughts, feelings and concerns.  Utilize nonpharmacologic strategies to decrease surgery-related anxiety or stress and restore a sense of control.  Acknowledge, normalize and validate potential lifestyle adjustments.  Assess and monitor patient perception of body image, including appearance, attractiveness and sexuality.  Empathetically discuss feelings related to stigmatization of ostomy odor, leakage and appearance; strategize ways to maintain quality of life.  Flowsheets (Taken 10/2/2024 1734)  Supportive Measures: self-care encouraged     Problem: Bleeding (Colostomy)  Goal: Absence of Bleeding  Outcome: Progressing     Problem: Adult Inpatient Plan of Care  Goal: Absence of Hospital-Acquired Illness or Injury  Intervention: Prevent Skin Injury  Description: Perform a screening for skin injury risk, such as pressure or moisture associated skin damage on admission and at regular intervals throughout hospital stay.  Keep all areas of skin (especially folds) clean and dry.  Maintain adequate skin hydration.  Relieve and redistribute pressure and protect bony prominences; implement measures based on patient-specific risk factors.  Match turning and repositioning schedule to clinical condition.  Encourage weight shift frequently; assist with reposition if unable to complete independently.  Float heels off bed; avoid pressure on the Achilles tendon.  Keep skin free from extended contact with medical devices.  Encourage functional activity and mobility, as early as tolerated.  Use aids (e.g., slide boards, mechanical lift) during transfer.  Recent Flowsheet Documentation  Taken 10/2/2024 1734 by Catracho Goncalves RN  Skin Protection: incontinence pads utilized  Taken 10/2/2024 0800 by Catracho Goncalves RN  Body Position:  foot of bed elevated  Intervention: Prevent and Manage VTE (Venous Thromboembolism) Risk  Description: Assess for VTE (venous thromboembolism) risk.  Encourage and assist with early ambulation.  Initiate and maintain compression or other therapy, as indicated, based on identified risk in accordance with organizational protocol and provider order.  Encourage both active and passive leg exercises while in bed, if unable to ambulate.  Recent Flowsheet Documentation  Taken 10/2/2024 1000 by Catracho Goncalves RN  Activity Management: activity encouraged  Goal: Optimal Comfort and Wellbeing  Intervention: Monitor Pain and Promote Comfort  Description: Assess pain level, treatment efficacy and patient response at regular intervals using a consistent pain scale.  Consider the presence and impact of preexisting chronic pain.  Encourage patient and caregiver involvement in pain assessment, interventions and safety measures.  Recent Flowsheet Documentation  Taken 10/2/2024 1734 by Catracho Goncalves RN  Pain Management Interventions:   see MAR   pillow support provided   position adjusted     Problem: Skin Injury Risk Increased  Goal: Skin Health and Integrity  Intervention: Optimize Skin Protection  Description: Perform a full pressure injury risk assessment, as indicated by screening, upon admission to care unit.  Reassess skin (injury risk, full inspection) frequently (e.g., scheduled interval, with change in condition) to provide optimal early detection and prevention.  Maintain adequate tissue perfusion (e.g., encourage fluid balance; avoid crossing legs, constrictive clothing or devices) to promote tissue oxygenation.  Maintain head of bed at lowest degree of elevation tolerated, considering medical condition and other restrictions.  Avoid positioning onto an area that remains reddened.  Minimize incontinence and moisture (e.g., toileting schedule; moisture-wicking pad, diaper or incontinence collection device; skin  moisture barrier).  Cleanse skin promptly and gently when soiled utilizing a pH-balanced cleanser.  Relieve and redistribute pressure (e.g., scheduled position changes, weight shifts, use of support surface, medical device repositioning, protective dressing application, use of positioning device, microclimate control, use of pressure-injury-monitor  Encourage increased activity, such as sitting in a chair at the bedside or early mobilization, when able to tolerate.  Recent Flowsheet Documentation  Taken 10/2/2024 1734 by Catracho Goncalves RN  Skin Protection: incontinence pads utilized  Taken 10/2/2024 0800 by Catracho Goncalves RN  Head of Bed (HOB) Positioning: HOB at 30 degrees     Problem: Fluid, Electrolyte and Nutrition Imbalance (Colostomy)  Goal: Fluid, Electrolyte and Nutrition Balance  Intervention: Optimize Fluid, Electrolyte and Nutrition Balance  Description: Assess fluid, electrolyte and nutrition requirements.  Keep accurate intake, output and daily weight; monitor trends.  Monitor laboratory value trends and need for treatment adjustment.  Facilitate oral intake; determine ongoing need for supplementation.  Evaluate need for fluid restriction with high stoma output; monitor response.  Flowsheets (Taken 10/2/2024 1734)  Fluid/Electrolyte Management: fluids provided     Problem: Pain (Colostomy)  Goal: Acceptable Pain Control  Intervention: Prevent or Manage Pain  Description: Determine pain management plan with patient and caregiver/family; review plan regularly.  Evaluate risk for opioid use; individualize pharmacologic pain management plan and titrate medication to patient response.  Combine multimodal analgesia and nonpharmacologic strategies to help potentiate synergistic effects, enhance comfort and improve function (e.g., complementary therapy, diversional activity, mindfulness).  Provide around-the-clock dosing of pain medication to keep pain levels in control.  Manage medication-induced  effects, such as respiratory depression, constipation, nausea, vomiting.  Minimize pain stimuli; coordinate care and adjust environment (e.g., light, noise, unnecessary movement); promote sleep/rest for optimal healing.  Flowsheets (Taken 10/2/2024 1734)  Pain Management Interventions:   see MAR   pillow support provided   position adjusted  Diversional Activities: television     Problem: Postoperative Stoma Care (Colostomy)  Goal: Optimal Stoma Healing  Intervention: Provide Ostomy and Peristomal Skin Care  Description: Use pouching materials appropriate for the stoma, body contours and patient or caregiver skill level; reassess frequently as postoperative edema subsides.  Perform frequent and ongoing assessment of stoma site and function.  Minimize irritation and tension on the peristomal area to prevent skin damage.  Consider use of skin barrier products to maintain a secure seal between pouch and skin; change pouch immediately if signs of leakage.  Remove all appliances and adhesives gently to avoid shearing; work toward stoma from all sides.  Cleanse with water; gently pat dry.  Flowsheets (Taken 10/2/2024 1734)  Skin Protection: incontinence pads utilized     Problem: Respiratory Compromise (Colostomy)  Goal: Effective Oxygenation and Ventilation  Intervention: Optimize Oxygenation and Ventilation  Description: Recognize risk for obstructive sleep apnea; anticipate the need for continuous pulse oximetry and noninvasive positive pressure ventilation.  Maintain patent airway with positioning, airway adjuncts and secretion clearance.  Encourage pulmonary hygiene, such as cough-enhancement and airway-clearance techniques, that may include use of incentive spirometry, deep breathing and cough.  Anticipate the need for splinting with cough to minimize discomfort; assist if needed.  Provide oxygen therapy judiciously, if hypoxemia present.  Consider pharmacologic therapy that may improve mucus clearance and reduce  bronchospasm, laryngospasm, swelling or stridor.  Consider the need for intubation and invasive positive pressure ventilation for longer recovery needs; use lung protective measures.  Recent Flowsheet Documentation  Taken 10/2/2024 0800 by Catracho Goncalves RN  Head of Bed (HOB) Positioning: HOB at 30 degrees   Goal Outcome Evaluation:

## 2024-10-02 NOTE — NURSING NOTE
[facility] [place] visit for Stoma care and assessment    Surgery date: 10/1/24  Surgical Procedures Since Admission:  Procedure(s):  ABDOMINAL PERINEAL RESECTION, LYSIS OF ADHESIONS AND CREATION OF COLOSTOMY  Surgeon:  Angelito Ruiz MD  Status:  1 Day Post-Op  -------------------     Patient seen in bed, awake and alert. Patient seen Medical/Surgical Floor. No family at beside.    Stoma Type: End Colostomy  Diameter/shape: 38mm,   Location: LLQ  Protrusion: Budded  Stoma Characteristics: Edematous, Moist, and Pink  Aurelio: Not present  Tension: None  Peristomal skin: Clean and Intact  Character of output: Red, Bright, scant  Emptying frequency per day: per nursing flowsheet  Current pouching system: Kevin 8331, buckling  Accessory products, appliance placed: Skin barrier paste, [place] 2 3/4 two piece flat  Goal wear time:3-4 days  Last appliance change: 10/2    Surgical Incision: Midline abdominal incision  Degree of approximation: sam  Approximating Devices: Staples  Drainage Characteristics: serosanguineous  Method of Management: Gauze border dressing    Drain(s) type: JESUS drain  Effluent characteristics: serosanguineous    Education provided:   How to open/close the drainable end of the appliance, How to empty the appliance, Indications for changing the appliance, Stool characteristics for the type of stoma created, Ambulation promoted, How to measure the stoma and the importance of cutting the appliance to limit amout of peristomal skin exposed. , Lesson #1 - appliance change process completed by C RN., and Accessory products reviewed  Patient states she wants to go to rehab or have home health, states she is weak  Patient cooperative, assisted with cutting, placing paste, connecting bag to wafer  Plan for brief follow up tomorrow, another long lesson Friday  Referral placed to outpatient ostomy clinic    WO Nurse will continue to follow for ostomy education. Please contact #[phone] with questions or if ostomy leaks  occur.

## 2024-10-02 NOTE — CASE MANAGEMENT/SOCIAL WORK
Discharge Planning Assessment  [facility]     Patient Name: Valeria Tracy  MRN: [id number]  Today's Date: 10/2/2024    Admit Date: 10/1/2024    Plan: home   Discharge Needs Assessment       Row Name 10/02/24 1213       Living Environment    People in Home alone    Current Living Arrangements home    Potentially Unsafe Housing Conditions none    In the past 12 months has the electric, gas, oil, or water company threatened to shut off services in your home? No    Primary Care Provided by self    Provides Primary Care For no one    Family Caregiver if Needed grandchild(cl), adult    Quality of Family Relationships helpful;involved;supportive    Able to Return to Prior Arrangements yes       Resource/Environmental Concerns    Resource/Environmental Concerns none    Transportation Concerns none       Transportation Needs    In the past 12 months, has lack of transportation kept you from medical appointments or from getting medications? no    In the past 12 months, has lack of transportation kept you from meetings, work, or from getting things needed for daily living? No       Food Insecurity    Within the past 12 months, you worried that your food would run out before you got the money to buy more. Never true    Within the past 12 months, the food you bought just didn't last and you didn't have money to get more. Never true       Transition Planning    Patient/Family Anticipates Transition to home    Transportation Anticipated family or friend will provide       Discharge Needs Assessment    Readmission Within the Last 30 Days no previous admission in last 30 days    Equipment Currently Used at Home none  Has rolling walker from previous surgery.    Concerns to be Addressed discharge planning    Anticipated Changes Related to Illness none    Equipment Needed After Discharge none                   Discharge Plan       Row Name 10/02/24 1214       Plan    Plan home    Patient/Family in Agreement with Plan yes     Plan Comments Met with Ms. Tracy at the bedside to initiate discharge planning. She lives alone in [facility]. She is independent with activities of daily living and does not use any DME. She denies receipt of home health or outpatient services. Her primary care provider is [facility]. She denies obstacles to getting medical care or obtaining medications. She anticipates rehab at discharge and stated she would want rehab near [place]. PT is ordered and signed in today. Will await recommendations.  will continue to follow plan of care and assist with discharge planning needs as indicated.    Final Discharge Disposition Code 01 - home or self-care                  Continued Care and Services - Admitted Since 10/1/2024    No active coordination exists for this encounter.          Demographic Summary       Row Name 10/02/24 1210       General Information    Admission Type inpatient    Arrived From PACU/recovery room;operating room    Referral Source admission list    Reason for Consult discharge planning    Preferred Language English       Contact Information    Permission Granted to Share Info With     Contact Information Obtained for     Contact Information Comments [employer] [phone]                   Functional Status       Row Name 10/02/24 1211       Functional Status    Usual Activity Tolerance good    Current Activity Tolerance other (see comments)  PT is signed in. Awaiting notes.       Physical Activity    On average, how many days per week do you engage in moderate to strenuous exercise (like a brisk walk)? 0 days    On average, how many minutes do you engage in exercise at this level? 0 min    Number of minutes of exercise per week 0       Assessment of Health Literacy    How often do you have someone help you read hospital materials? Never    How often do you have problems learning about your medical condition because of difficulty  understanding written information? Never    How often do you have a problem understanding what is told to you about your medical condition? Never    How confident are you filling out medical forms by yourself? Quite a bit    Health Literacy Good       Functional Status, IADL    Medications independent    Meal Preparation independent    Housekeeping independent    Laundry independent    Shopping independent       Mental Status    General Appearance WDL WDL       Mental Status Summary    Recent Changes in Mental Status/Cognitive Functioning no changes                   Psychosocial    No documentation.                  Abuse/Neglect    No documentation.                  Legal    No documentation.                  Substance Abuse    No documentation.                  Patient Forms    No documentation.                     Rosa Schmidt RN

## 2024-10-03 PROCEDURE — 94799 UNLISTED PULMONARY SVC/PX: CPT

## 2024-10-03 PROCEDURE — 25010000002 ONDANSETRON PER 1 MG: Performed by: COLON & RECTAL SURGERY

## 2024-10-03 PROCEDURE — 25010000002 HEPARIN (PORCINE) PER 1000 UNITS: Performed by: COLON & RECTAL SURGERY

## 2024-10-03 PROCEDURE — 94664 DEMO&/EVAL PT USE INHALER: CPT

## 2024-10-03 PROCEDURE — 94761 N-INVAS EAR/PLS OXIMETRY MLT: CPT

## 2024-10-03 PROCEDURE — 25810000003 SODIUM CHLORIDE 0.9 % SOLUTION: Performed by: INTERNAL MEDICINE

## 2024-10-03 RX ADMIN — SODIUM CHLORIDE 100 ML/HR: 9 INJECTION, SOLUTION INTRAVENOUS at 07:31

## 2024-10-03 RX ADMIN — ACETAMINOPHEN 650 MG: 325 TABLET ORAL at 21:24

## 2024-10-03 RX ADMIN — HEPARIN SODIUM 5000 UNITS: 5000 INJECTION INTRAVENOUS; SUBCUTANEOUS at 15:00

## 2024-10-03 RX ADMIN — HEPARIN SODIUM 5000 UNITS: 5000 INJECTION INTRAVENOUS; SUBCUTANEOUS at 05:10

## 2024-10-03 RX ADMIN — FAMOTIDINE 10 MG: 20 TABLET, FILM COATED ORAL at 21:24

## 2024-10-03 RX ADMIN — ACETAMINOPHEN 650 MG: 325 TABLET ORAL at 05:10

## 2024-10-03 RX ADMIN — FAMOTIDINE 10 MG: 20 TABLET, FILM COATED ORAL at 10:04

## 2024-10-03 RX ADMIN — HYDROCODONE BITARTRATE AND ACETAMINOPHEN 1 TABLET: 7.5; 325 TABLET ORAL at 17:55

## 2024-10-03 RX ADMIN — GABAPENTIN 100 MG: 100 CAPSULE ORAL at 21:24

## 2024-10-03 RX ADMIN — HEPARIN SODIUM 5000 UNITS: 5000 INJECTION INTRAVENOUS; SUBCUTANEOUS at 21:24

## 2024-10-03 RX ADMIN — GABAPENTIN 100 MG: 100 CAPSULE ORAL at 10:04

## 2024-10-03 RX ADMIN — LEVOTHYROXINE SODIUM 88 MCG: 0.09 TABLET ORAL at 05:10

## 2024-10-03 RX ADMIN — CETIRIZINE HYDROCHLORIDE 5 MG: 10 TABLET, FILM COATED ORAL at 10:04

## 2024-10-03 RX ADMIN — BUDESONIDE AND FORMOTEROL FUMARATE DIHYDRATE 2 PUFF: 160; 4.5 AEROSOL RESPIRATORY (INHALATION) at 20:21

## 2024-10-03 RX ADMIN — HYDROCODONE BITARTRATE AND ACETAMINOPHEN 1 TABLET: 7.5; 325 TABLET ORAL at 10:08

## 2024-10-03 RX ADMIN — BUDESONIDE AND FORMOTEROL FUMARATE DIHYDRATE 2 PUFF: 160; 4.5 AEROSOL RESPIRATORY (INHALATION) at 09:53

## 2024-10-03 RX ADMIN — ACETAMINOPHEN 650 MG: 325 TABLET ORAL at 15:00

## 2024-10-03 RX ADMIN — ONDANSETRON 4 MG: 2 INJECTION INTRAMUSCULAR; INTRAVENOUS at 10:08

## 2024-10-03 NOTE — PLAN OF CARE
Problem: Adult Inpatient Plan of Care  Goal: Plan of Care Review  Outcome: Progressing  Goal: Patient-Specific Goal (Individualized)  Outcome: Progressing  Goal: Absence of Hospital-Acquired Illness or Injury  Outcome: Progressing  Intervention: Identify and Manage Fall Risk  Recent Flowsheet Documentation  Taken 10/3/2024 0400 by Papito Samayoa RN  Safety Promotion/Fall Prevention:   assistive device/personal items within reach   clutter free environment maintained   fall prevention program maintained   lighting adjusted   nonskid shoes/slippers when out of bed   room organization consistent   safety round/check completed   toileting scheduled  Taken 10/3/2024 0000 by Papito Samayoa RN  Safety Promotion/Fall Prevention:   assistive device/personal items within reach   clutter free environment maintained   fall prevention program maintained   lighting adjusted   nonskid shoes/slippers when out of bed   room organization consistent   safety round/check completed   toileting scheduled  Taken 10/2/2024 1940 by Papito Samayoa RN  Safety Promotion/Fall Prevention:   assistive device/personal items within reach   clutter free environment maintained   fall prevention program maintained   lighting adjusted   nonskid shoes/slippers when out of bed   room organization consistent   safety round/check completed   toileting scheduled  Intervention: Prevent Skin Injury  Recent Flowsheet Documentation  Taken 10/3/2024 0400 by Papito Samayoa RN  Body Position: position changed independently  Taken 10/3/2024 0000 by Papito Samayoa RN  Body Position: position changed independently  Taken 10/2/2024 1940 by Papito Samayoa RN  Body Position: position changed independently  Intervention: Prevent and Manage VTE (Venous Thromboembolism) Risk  Recent Flowsheet Documentation  Taken 10/3/2024 0400 by Papito Samayoa RN  Activity Management: activity encouraged  Taken 10/3/2024  0000 by Papito Samayoa RN  Activity Management: activity encouraged  Taken 10/2/2024 1940 by Papito Samayoa RN  Activity Management: activity encouraged  VTE Prevention/Management: (Also SQ Heparin)   bilateral   sequential compression devices on   other (see comments)  Range of Motion: active ROM (range of motion) encouraged  Intervention: Prevent Infection  Recent Flowsheet Documentation  Taken 10/3/2024 0400 by Papito Samayoa RN  Infection Prevention:   environmental surveillance performed   equipment surfaces disinfected   hand hygiene promoted   rest/sleep promoted   single patient room provided  Taken 10/3/2024 0000 by Papito Samayoa RN  Infection Prevention:   environmental surveillance performed   equipment surfaces disinfected   hand hygiene promoted   rest/sleep promoted   single patient room provided  Taken 10/2/2024 1940 by Papito Samayoa RN  Infection Prevention:   environmental surveillance performed   equipment surfaces disinfected   hand hygiene promoted   rest/sleep promoted   single patient room provided  Goal: Optimal Comfort and Wellbeing  Outcome: Progressing  Intervention: Monitor Pain and Promote Comfort  Recent Flowsheet Documentation  Taken 10/2/2024 1940 by Papito Samayoa RN  Pain Management Interventions:   care clustered   pain management plan reviewed with patient/caregiver   position adjusted   pillow support provided   quiet environment facilitated   see MAR  Intervention: Provide Person-Centered Care  Recent Flowsheet Documentation  Taken 10/2/2024 1940 by Papito Samayoa RN  Trust Relationship/Rapport:   care explained   choices provided   emotional support provided   empathic listening provided   questions answered   questions encouraged   reassurance provided   thoughts/feelings acknowledged  Goal: Readiness for Transition of Care  Outcome: Progressing     Problem: Fall Injury Risk  Goal: Absence of Fall and Fall-Related  Injury  Outcome: Progressing  Intervention: Identify and Manage Contributors  Recent Flowsheet Documentation  Taken 10/3/2024 0400 by Papito Samayoa RN  Medication Review/Management: medications reviewed  Taken 10/3/2024 0000 by Papito Samayoa RN  Medication Review/Management: medications reviewed  Taken 10/2/2024 1940 by Papito Samayoa RN  Medication Review/Management: medications reviewed  Intervention: Promote Injury-Free Environment  Recent Flowsheet Documentation  Taken 10/3/2024 0400 by Papito Samayoa RN  Safety Promotion/Fall Prevention:   assistive device/personal items within reach   clutter free environment maintained   fall prevention program maintained   lighting adjusted   nonskid shoes/slippers when out of bed   room organization consistent   safety round/check completed   toileting scheduled  Taken 10/3/2024 0000 by Papito Samayoa RN  Safety Promotion/Fall Prevention:   assistive device/personal items within reach   clutter free environment maintained   fall prevention program maintained   lighting adjusted   nonskid shoes/slippers when out of bed   room organization consistent   safety round/check completed   toileting scheduled  Taken 10/2/2024 1940 by Papito Samayoa RN  Safety Promotion/Fall Prevention:   assistive device/personal items within reach   clutter free environment maintained   fall prevention program maintained   lighting adjusted   nonskid shoes/slippers when out of bed   room organization consistent   safety round/check completed   toileting scheduled     Problem: Skin Injury Risk Increased  Goal: Skin Health and Integrity  Outcome: Progressing  Intervention: Optimize Skin Protection  Recent Flowsheet Documentation  Taken 10/3/2024 0400 by Papito Samayoa RN  Head of Bed (HOB) Positioning: HOB at 30-45 degrees  Taken 10/3/2024 0000 by Papito Samayoa RN  Head of Bed (HOB) Positioning: HOB at 30-45 degrees  Taken  10/2/2024 1940 by Papito Samayoa RN  Pressure Reduction Techniques:   heels elevated off bed   frequent weight shift encouraged   weight shift assistance provided  Head of Bed (HOB) Positioning: HOB at 30-45 degrees  Pressure Reduction Devices: (coccyx allevyn, bilat heel allevyn. WSM)   positioning supports utilized   pressure-redistributing mattress utilized     Problem: Infection  Goal: Absence of Infection Signs and Symptoms  Outcome: Progressing  Intervention: Prevent or Manage Infection  Recent Flowsheet Documentation  Taken 10/2/2024 1940 by Papito Samayoa RN  Infection Management: aseptic technique maintained     Problem: Adjustment to Surgery (Colostomy)  Goal: Optimal Coping  Outcome: Progressing  Intervention: Support Psychosocial Response to Surgery  Recent Flowsheet Documentation  Taken 10/2/2024 1940 by Papito Samayoa RN  Supportive Measures: active listening utilized     Problem: Bleeding (Colostomy)  Goal: Absence of Bleeding  Outcome: Progressing  Intervention: Monitor and Manage Bleeding  Recent Flowsheet Documentation  Taken 10/2/2024 1940 by Papito Samayoa RN  Bleeding Management: dressing monitored     Problem: Fluid, Electrolyte and Nutrition Imbalance (Colostomy)  Goal: Fluid, Electrolyte and Nutrition Balance  Outcome: Progressing     Problem: Infection (Colostomy)  Goal: Absence of Infection Signs and Symptoms  Outcome: Progressing  Intervention: Prevent or Manage Infection  Recent Flowsheet Documentation  Taken 10/2/2024 1940 by Papito Samayoa RN  Infection Management: aseptic technique maintained     Problem: Ongoing Anesthesia Effects (Colostomy)  Goal: Anesthesia/Sedation Recovery  Outcome: Progressing  Intervention: Optimize Anesthesia Recovery  Recent Flowsheet Documentation  Taken 10/3/2024 0400 by Papito Samayoa RN  Safety Promotion/Fall Prevention:   assistive device/personal items within reach   clutter free environment  maintained   fall prevention program maintained   lighting adjusted   nonskid shoes/slippers when out of bed   room organization consistent   safety round/check completed   toileting scheduled  Taken 10/3/2024 0000 by Papito Samayoa RN  Safety Promotion/Fall Prevention:   assistive device/personal items within reach   clutter free environment maintained   fall prevention program maintained   lighting adjusted   nonskid shoes/slippers when out of bed   room organization consistent   safety round/check completed   toileting scheduled  Taken 10/2/2024 1940 by Papito Samayoa RN  Safety Promotion/Fall Prevention:   assistive device/personal items within reach   clutter free environment maintained   fall prevention program maintained   lighting adjusted   nonskid shoes/slippers when out of bed   room organization consistent   safety round/check completed   toileting scheduled     Problem: Pain (Colostomy)  Goal: Acceptable Pain Control  Outcome: Progressing  Intervention: Prevent or Manage Pain  Recent Flowsheet Documentation  Taken 10/2/2024 1940 by Papito Samaoya RN  Pain Management Interventions:   care clustered   pain management plan reviewed with patient/caregiver   position adjusted   pillow support provided   quiet environment facilitated   see MAR  Diversional Activities:   television   smartphone     Problem: Postoperative Nausea and Vomiting (Colostomy)  Goal: Nausea and Vomiting Relief  Outcome: Progressing     Problem: Postoperative Stoma Care (Colostomy)  Goal: Optimal Stoma Healing  Outcome: Progressing     Problem: Postoperative Urinary Retention (Colostomy)  Goal: Effective Urinary Elimination  Outcome: Progressing     Problem: Respiratory Compromise (Colostomy)  Goal: Effective Oxygenation and Ventilation  Outcome: Progressing  Intervention: Optimize Oxygenation and Ventilation  Recent Flowsheet Documentation  Taken 10/3/2024 0400 by Papito Samayoa RN  Head of Bed  (HOB) Positioning: HOB at 30-45 degrees  Taken 10/3/2024 0000 by Papito Samayoa, RN  Head of Bed (HOB) Positioning: HOB at 30-45 degrees  Taken 10/2/2024 1940 by Papito Samayoa, RN  Head of Bed (HOB) Positioning: HOB at 30-45 degrees   Goal Outcome Evaluation:

## 2024-10-03 NOTE — NURSING NOTE
Visit for ostomy care, assessment.    Appliance placed yesterday intact, patient remembered some of our education lesson but does admit to forgetting and being tired today.    Patient states that she would prefer to visit rehab facility.  Spoke with granddaughter over phone to coordinate education lesson tomorrow for 9:45 in the morning.  At this point we will change appliance again, explain how to order supplies, answer any questions.    Will continue to follow.

## 2024-10-03 NOTE — PLAN OF CARE
Problem: Adjustment to Surgery (Colostomy)  Goal: Optimal Coping  Outcome: Progressing     Problem: Bleeding (Colostomy)  Goal: Absence of Bleeding  Outcome: Progressing     Problem: Fluid, Electrolyte and Nutrition Imbalance (Colostomy)  Goal: Fluid, Electrolyte and Nutrition Balance  Outcome: Progressing  Intervention: Optimize Fluid, Electrolyte and Nutrition Balance  Description: Assess fluid, electrolyte and nutrition requirements.  Keep accurate intake, output and daily weight; monitor trends.  Monitor laboratory value trends and need for treatment adjustment.  Facilitate oral intake; determine ongoing need for supplementation.  Evaluate need for fluid restriction with high stoma output; monitor response.  Flowsheets (Taken 10/3/2024 1829)  Oral Nutrition Promotion: medicated     Problem: Pain (Colostomy)  Goal: Acceptable Pain Control  Outcome: Progressing  Intervention: Prevent or Manage Pain  Description: Determine pain management plan with patient and caregiver/family; review plan regularly.  Evaluate risk for opioid use; individualize pharmacologic pain management plan and titrate medication to patient response.  Combine multimodal analgesia and nonpharmacologic strategies to help potentiate synergistic effects, enhance comfort and improve function (e.g., complementary therapy, diversional activity, mindfulness).  Provide around-the-clock dosing of pain medication to keep pain levels in control.  Manage medication-induced effects, such as respiratory depression, constipation, nausea, vomiting.  Minimize pain stimuli; coordinate care and adjust environment (e.g., light, noise, unnecessary movement); promote sleep/rest for optimal healing.  Flowsheets (Taken 10/3/2024 1829)  Pain Management Interventions:   care clustered   see MAR   Goal Outcome Evaluation:

## 2024-10-03 NOTE — PROGRESS NOTES
"IM progress note      Valeria Tracy  [id number]  1940     LOS: 2 days     Attending: Angelito Ruiz MD    Primary Care Provider: Peyton Vázquez PA      Chief Complaint/Reason for visit:  Abd pain    Subjective   Doing well.   Good pain control. Tolerating full liquids. Ambulated with PT.IPR is recommended.   Hurst present. Some stoma output.   Denies f/c/n/v/sob/cp.    Objective        Visit Vitals  /75 (BP Location: Right arm, Patient Position: Lying)   Pulse 100   Temp 98.2 °F (36.8 °C) (Oral)   Resp 16   Ht 165.1 cm (65\")   Wt 56 kg (123 lb 8 oz)   SpO2 92%   BMI 20.55 kg/m²     Temp (24hrs), Av.9 °F (36.6 °C), Min:97.5 °F (36.4 °C), Max:98.2 °F (36.8 °C)      Nutrition: Full liquids    Respiratory: RA    Physical Exam:     General Appearance:    Alert, cooperative, in no acute distress   Head:    Normocephalic, without obvious abnormality, atraumatic    Lungs:     Normal effort, symmetric chest rise, no crepitus, clear to      auscultation bilaterally             Heart:    Regular rhythm and normal rate, normal S1 and S2   Abdomen:     Soft, expected tenderness. Midline dressing CDI. JESUS present.   : hurst- yellow UOP   Extremities:   No clubbing, cyanosis or edema.  No deformities.    Pulses:   Pulses palpable and equal bilaterally   Skin:   No bleeding, bruising or rash   Neurologic:   Moves all extremities with no obvious focal motor deficit.  Cranial nerves 2 - 12 grossly intact     Results Review:     I reviewed the patient's new clinical results.   Results from last 7 days   Lab Units 10/02/24  0927   WBC 10*3/mm3 11.57*   HEMOGLOBIN g/dL 11.6*   HEMATOCRIT % 36.6   PLATELETS 10*3/mm3 217     Results from last 7 days   Lab Units 10/02/24  0514   SODIUM mmol/L 134*   POTASSIUM mmol/L 5.1   CHLORIDE mmol/L 101   CO2 mmol/L 22.0   BUN mg/dL 9   CREATININE mg/dL 0.55*   CALCIUM mg/dL 8.4*   GLUCOSE mg/dL 113*     I reviewed the patient's new imaging including images and reports.    All " medications reviewed.   acetaminophen, 650 mg, Oral, Q8H  budesonide-formoterol, 2 puff, Inhalation, BID - RT  cetirizine, 5 mg, Oral, Daily  famotidine, 10 mg, Oral, BID  gabapentin, 100 mg, Oral, BID  heparin (porcine), 5,000 Units, Subcutaneous, Q8H  levothyroxine, 88 mcg, Oral, Q AM        Assessment & Plan     S/P abdominal perineal resection, partial thickness posterior vaginal resection    GERD (gastroesophageal reflux disease)    Asthma    Hypothyroidism    Acute postoperative pain    Rectal cancer      Plan  1. Ambulation, encouraged, PT is following  2. Pain control-prns   3. IS-encouraged  4. DVT proph- mechs/heparin  5. Bowel regimen  6. Full liquid diet.  Advance diet as tolerated  7. Monitor post-op labs  8. DC planning, inpatient rehab is recommended at this point.    -Asthma: Maintain home regimen with formulary substitution as indicated.  As needed bronchodilators.  Monitor oxygenation.     -Hypothyroidism: Resume replacement      -GERD:  Resumed H2 blocker.      -New stoma:  Wound care stoma nurse consult for stoma care and education throughout patient's hospitalization.      Maggi Lance MD  10/03/24  15:58 EDT

## 2024-10-03 NOTE — PROGRESS NOTES
"[facility] ([facility]    Has not been out of bed,  Denies any difficulties    Vital Signs:  Blood pressure 161/75, pulse 100, temperature 98.2 °F (36.8 °C), temperature source Oral, resp. rate 16, height 165.1 cm (65\"), weight 56 kg (123 lb 8 oz), SpO2 92%.    Labs past 24 hours:  Lab Results (last 24 hours)       ** No results found for the last 24 hours. **            I/O last shift:  I/O this shift:  In: 1000 [I.V.:1000]  Out: 40 [Drains:40]     PHYSICAL EXAM-  Comfortable  Frequent ambulation encouraged  Not tachycardic  Abdomen soft  Pink stoma without significant output  JESUS drain without significant output today.    Pathology:  Order Name Source Comment Collection Info Order Time   PREPARE RBC Other   10/1/2024  8:28 AM     When to Transfuse?   Hold          Indication for Transfusion   Surgery          Surgery Date   10/1/2024          Release to patient   Routine Release        TYPE AND SCREEN   Collected By: Mirta Christianson RN 10/1/2024  8:31 AM     Release to patient   Routine Release        TISSUE PATHOLOGY EXAM Large Intestine, Rectum  Collected By: Angelito Ruiz MD 10/1/2024  2:45 PM     Release to patient   Routine Release        .    Assessment and Plan:  Diet as tolerated  Anticipate need for rehab type setting  Ambulation will facilitate recovery.  Emphasized in our discussion.      Angelito Ruiz MD  10/03/24  15:48 EDT  "

## 2024-10-04 PROBLEM — N99.89 POSTOPERATIVE URINARY RETENTION: Status: ACTIVE | Noted: 2024-10-04

## 2024-10-04 PROBLEM — E43 SEVERE MALNUTRITION: Status: ACTIVE | Noted: 2024-10-04

## 2024-10-04 PROBLEM — R33.8 POSTOPERATIVE URINARY RETENTION: Status: ACTIVE | Noted: 2024-10-04

## 2024-10-04 LAB
ANION GAP SERPL CALCULATED.3IONS-SCNC: 10 MMOL/L (ref 5–15)
BACTERIA UR QL AUTO: ABNORMAL /HPF
BILIRUB UR QL STRIP: NEGATIVE
BUN SERPL-MCNC: 4 MG/DL (ref 8–23)
BUN/CREAT SERPL: 8.9 (ref 7–25)
CALCIUM SPEC-SCNC: 8.3 MG/DL (ref 8.6–10.5)
CHLORIDE SERPL-SCNC: 107 MMOL/L (ref 98–107)
CLARITY UR: CLEAR
CO2 SERPL-SCNC: 24 MMOL/L (ref 22–29)
COLOR UR: YELLOW
CREAT SERPL-MCNC: 0.45 MG/DL (ref 0.57–1)
DEPRECATED RDW RBC AUTO: 45 FL (ref 37–54)
EGFRCR SERPLBLD CKD-EPI 2021: 95.6 ML/MIN/1.73
ERYTHROCYTE [DISTWIDTH] IN BLOOD BY AUTOMATED COUNT: 12.8 % (ref 12.3–15.4)
GLUCOSE SERPL-MCNC: 74 MG/DL (ref 65–99)
GLUCOSE UR STRIP-MCNC: NEGATIVE MG/DL
HCT VFR BLD AUTO: 29.8 % (ref 34–46.6)
HGB BLD-MCNC: 9.5 G/DL (ref 12–15.9)
HGB UR QL STRIP.AUTO: ABNORMAL
HYALINE CASTS UR QL AUTO: ABNORMAL /LPF
KETONES UR QL STRIP: NEGATIVE
LEUKOCYTE ESTERASE UR QL STRIP.AUTO: ABNORMAL
MCH RBC QN AUTO: 30.7 PG (ref 26.6–33)
MCHC RBC AUTO-ENTMCNC: 31.9 G/DL (ref 31.5–35.7)
MCV RBC AUTO: 96.4 FL (ref 79–97)
NITRITE UR QL STRIP: NEGATIVE
PH UR STRIP.AUTO: 7 [PH] (ref 5–8)
PLATELET # BLD AUTO: 189 10*3/MM3 (ref 140–450)
PMV BLD AUTO: 10 FL (ref 6–12)
POTASSIUM SERPL-SCNC: 3.8 MMOL/L (ref 3.5–5.2)
PROT UR QL STRIP: NEGATIVE
RBC # BLD AUTO: 3.09 10*6/MM3 (ref 3.77–5.28)
RBC # UR STRIP: ABNORMAL /HPF
REF LAB TEST METHOD: ABNORMAL
SODIUM SERPL-SCNC: 141 MMOL/L (ref 136–145)
SP GR UR STRIP: 1.01 (ref 1–1.03)
SQUAMOUS #/AREA URNS HPF: ABNORMAL /HPF
UROBILINOGEN UR QL STRIP: ABNORMAL
WBC # UR STRIP: ABNORMAL /HPF
WBC NRBC COR # BLD AUTO: 5.5 10*3/MM3 (ref 3.4–10.8)

## 2024-10-04 PROCEDURE — 80048 BASIC METABOLIC PNL TOTAL CA: CPT | Performed by: INTERNAL MEDICINE

## 2024-10-04 PROCEDURE — 81001 URINALYSIS AUTO W/SCOPE: CPT | Performed by: INTERNAL MEDICINE

## 2024-10-04 PROCEDURE — 94761 N-INVAS EAR/PLS OXIMETRY MLT: CPT

## 2024-10-04 PROCEDURE — 94799 UNLISTED PULMONARY SVC/PX: CPT

## 2024-10-04 PROCEDURE — 85027 COMPLETE CBC AUTOMATED: CPT | Performed by: INTERNAL MEDICINE

## 2024-10-04 PROCEDURE — 25010000002 HEPARIN (PORCINE) PER 1000 UNITS: Performed by: COLON & RECTAL SURGERY

## 2024-10-04 PROCEDURE — 97530 THERAPEUTIC ACTIVITIES: CPT

## 2024-10-04 PROCEDURE — 94664 DEMO&/EVAL PT USE INHALER: CPT

## 2024-10-04 RX ORDER — TAMSULOSIN HYDROCHLORIDE 0.4 MG/1
0.4 CAPSULE ORAL DAILY
Status: DISCONTINUED | OUTPATIENT
Start: 2024-10-04 | End: 2024-10-13

## 2024-10-04 RX ADMIN — BUDESONIDE AND FORMOTEROL FUMARATE DIHYDRATE 2 PUFF: 160; 4.5 AEROSOL RESPIRATORY (INHALATION) at 18:40

## 2024-10-04 RX ADMIN — HEPARIN SODIUM 5000 UNITS: 5000 INJECTION INTRAVENOUS; SUBCUTANEOUS at 22:47

## 2024-10-04 RX ADMIN — HEPARIN SODIUM 5000 UNITS: 5000 INJECTION INTRAVENOUS; SUBCUTANEOUS at 13:43

## 2024-10-04 RX ADMIN — LEVOTHYROXINE SODIUM 88 MCG: 0.09 TABLET ORAL at 04:36

## 2024-10-04 RX ADMIN — ACETAMINOPHEN 650 MG: 325 TABLET ORAL at 13:43

## 2024-10-04 RX ADMIN — TAMSULOSIN HYDROCHLORIDE 0.4 MG: 0.4 CAPSULE ORAL at 13:43

## 2024-10-04 RX ADMIN — BUDESONIDE AND FORMOTEROL FUMARATE DIHYDRATE 2 PUFF: 160; 4.5 AEROSOL RESPIRATORY (INHALATION) at 07:32

## 2024-10-04 RX ADMIN — CETIRIZINE HYDROCHLORIDE 5 MG: 10 TABLET, FILM COATED ORAL at 08:27

## 2024-10-04 RX ADMIN — FAMOTIDINE 10 MG: 20 TABLET, FILM COATED ORAL at 08:27

## 2024-10-04 RX ADMIN — FAMOTIDINE 10 MG: 20 TABLET, FILM COATED ORAL at 22:47

## 2024-10-04 RX ADMIN — ACETAMINOPHEN 650 MG: 325 TABLET ORAL at 04:36

## 2024-10-04 RX ADMIN — HEPARIN SODIUM 5000 UNITS: 5000 INJECTION INTRAVENOUS; SUBCUTANEOUS at 04:36

## 2024-10-04 RX ADMIN — GABAPENTIN 100 MG: 100 CAPSULE ORAL at 08:27

## 2024-10-04 RX ADMIN — ACETAMINOPHEN 650 MG: 325 TABLET ORAL at 22:47

## 2024-10-04 NOTE — PROGRESS NOTES
Doing well / excepts needing in/out cath to void.    More active.  Good bowel function    Plan for Rehab type setting when bed available. .. And voiding.

## 2024-10-04 NOTE — CASE MANAGEMENT/SOCIAL WORK
Continued Stay Note  [facility]     Patient Name: Valeria Tracy  MRN: [id number]  Today's Date: 10/4/2024    Admit Date: 10/1/2024    Plan: SNF   Discharge Plan       Row Name 10/04/24 0846       Plan    Plan SNF    Patient/Family in Agreement with Plan yes    Plan Comments Met with Ms. Tracy at the bedside to discuss discharge plans. She requested a referral to [facility].  faxed the referral. Ms. Tracy reported she plans to ask her daughter about other facilities near her home in case [facility] can't offer a bed.  will continue to follow plan of care and assist with discharge planning needs as indicated.    13:40 EDT   has made referrals to multiple SNF facilities including: [facility], [facility], and [facility] per patient and family request.     Final Discharge Disposition Code 03 - skilled nursing facility (SNF)                   Discharge Codes    No documentation.                       Rosa Schmidt RN

## 2024-10-04 NOTE — CONSULTS
"          Clinical Nutrition Assessment     Patient Name: Valeria Tracy  YOB: 1940  MRN: [id number]  Date of Encounter: 10/04/24 15:02 EDT  Admission date: 10/1/2024  Reason for Visit: Consult/Chronic Poor Intake    Assessment   Nutrition Assessment   Admission Diagnosis:  Rectal cancer [C20]    Problem List:    S/P abdominal perineal resection, partial thickness posterior vaginal resection    GERD (gastroesophageal reflux disease)    Asthma    Hypothyroidism    Acute postoperative pain    Rectal cancer      PMH:   She  has a past medical history of Acid reflux, Anemia, Asthma, Cancer, History of chemotherapy, History of radiation therapy, History of transfusion, Hypothyroid, Pneumonia, Wears dentures, Wears glasses, and Wears hearing aid in both ears.    PSH:  She  has a past surgical history that includes Shoulder surgery (Right); Breast lumpectomy (Right); Colonoscopy; Cataract extraction (Bilateral); Colectomy (N/A, 10/17/2023); Portacath placement; and Abdominal Perineal Resection (N/A, 10/1/2024).    Applicable Nutrition History:       Anthropometrics     Height: Height: 165.1 cm (65\")  Last Filed Weight: Weight: 56 kg (123 lb 8 oz) (10/01/24 1645)  Method: Weight Method: Bed scale  BMI: BMI (Calculated): 20.6    UBW:  140lbs per pt  Weight change: weight loss of 13 lbs (9.6%) over 1 month(s)    Significant?  Yes    Weight      Weight (kg) Weight (lbs) Weight Method   10/17/2023 61.236 kg  135 lb  Stated    9/23/2024 61.8 kg  136 lb 3.9 oz  Standing scale    10/1/2024 56.019 kg  123 lb 8 oz  Bed scale      Nutrition Focused Physical Exam    Date:     Patient meets criteria for malnutrition diagnosis, see MSA note.     Subjective   Reported/Observed/Food/Nutrition Related History:     Pt reports good appetite PTA however notes she has had poor intake in the past month 2/2 nerves about having surgery. Pt states she used to be good about eating regular meals and has not been lately. Pt states " her [id number], was unaware of amount of recent wt loss but states she does feel like she has lost wt. Pt reports allergy to milk, noted in EPIC. Pt states she drinks OWYN shakes at home. Pt denies chewing diffiuculties, states she sometimes has difficulty swallowing 2/2 reflux.     Current Nutrition Prescription   PO: Diet: Gastrointestinal; Fiber-Restricted; Texture: Soft to Chew (NDD 3); Soft to Chew: Whole Meat; Fluid Consistency: Thin (IDDSI 0)  Oral Nutrition Supplement:   Intake: Insufficient data    Assessment & Plan   Nutrition Diagnosis   Date:  10/4            Updated:    Problem Malnutrition severe acute   Etiology Energy in < energy out 2/2 nerves, stress pending sx   Signs/Symptoms Severe muscle wasting and moderate subcutaneous fat loss, po intake <75% EEN x >/=7 days   Status: New    Goal / Objectives:   Nutrition to support treatment and Establish PO, Tolerate PO      Nutrition Intervention      Follow treatment progress, Care plan reviewed, Encourage intake, Supplement provided    Encourage po intake 2/2 malnutrition (see MSA)  Pt declines ONS at this time 2/2 milk allergy  Requested standing wt to verify significant wt changes    Monitoring/Evaluation:   Per protocol, PO intake, Supplement intake, Weight, GI status    Danuta Colon, MS,RD,LD  Time Spent:30

## 2024-10-04 NOTE — NURSING NOTE
[facility] visit for Stoma care and assessment     Surgery date: 10/1/24  Surgical Procedures Since Admission:  Procedure(s):  ABDOMINAL PERINEAL RESECTION, LYSIS OF ADHESIONS AND CREATION OF COLOSTOMY  Surgeon:  Angelito Ruiz MD  Status:  3 Days Post-Op    -------------------     Patient seen in bed, awake and alert. Patient seen Medical/Surgical Floor. DTR at bedside awaiting education     Stoma Type: End Colostomy  Diameter/shape: 38mm,   Location: LLQ  Protrusion: Budded  Stoma Characteristics: Edematous, Moist, and Pink    Peristomal skin: Clean and Intact  Character of output: brown light  Emptying frequency per day: per nursing flowsheet  Current pouching system: [place] new image, flat, stoma paste  Accessory products, appliance placed: Skin barrier paste, [place] 2 3/4 two piece flat  Goal wear time:3-4 days  Last appliance change: 10/4     Surgical Incision: Midline abdominal incision  Degree of approximation: sam  Approximating Devices: Staples  Drainage Characteristics: serosanguineous  Method of Management: Gauze border dressing     Drain(s) type: JESUS drain  Effluent characteristics: serosanguineous     1.  Second ostomy appliance change lesson with patient and daughter.  Reviewed how to cut and apply appliance.  Discussed the use of paste and frequency of appliance changes.  2.  Importance of hydration, ambulation and fiber in diet discussed.  3.  Ostomy supplies provided and in anticipation for discharge.  Reviewed ordering from [employer] and [employer] catalog.  4.  Discussed outpatient ostomy clinic plan to see patient in the clinic once home from rehab.  Patient encouraged to contact ostomy nurse if needs arise.    Yassine Ascencio RN, BSN, CCRN, CWOCN  Wound, Ostomy and Continence (WOC) Department  [facility]       WO Nurse will continue to follow for ostomy education. Please contact #[phone] with questions or if ostomy leaks occur.

## 2024-10-04 NOTE — DISCHARGE PLACEMENT REQUEST
"To: [facility] ATTN: Admissions  From: Rosa Schmidt,  [phone]      Cris Leonard (84 y.o. Female)       Date of Birth   1940    Social Security Number       Address   [address] [place] [address]    Home Phone   [phone]    MRN   [id number]       Yazdanism   None    Marital Status   Single                            Admission Date   10/1/24    Admission Type   Elective    Admitting Provider   Angelito Ruiz MD    Attending Provider   Angelito Ruiz MD    Department, Room/Bed   [address], S572/1       Discharge Date       Discharge Disposition       Discharge Destination                                 Attending Provider: Angelito Ruiz MD    Allergies: Milk-related Compounds    Isolation: None   Infection: None   Code Status: CPR    Ht: 165.1 cm (65\")   Wt: 56 kg (123 lb 8 oz)    Admission Cmt: None   Principal Problem: S/P abdominal perineal resection, partial thickness posterior vaginal resection [Z90.49]                   Active Insurance as of 10/1/2024       Primary Coverage       Payor Plan Insurance Group Employer/Plan Group    HUMANA MEDICARE REPLACEMENT HUMANA MED ADV PPO [id number]       Payor Plan Address Payor Plan Phone Number Payor Plan Fax Number Effective Dates    [address] [phone]  3/1/2024 - None Entered    [facility] [id number]         Subscriber Name Subscriber Birth Date Member ID       CRIS LEONARD 1940 [id number]                     Emergency Contacts        (Rel.) Home Phone Work Phone Mobile Phone    ANNABELLA SALES (Grandchild) [phone] -- [phone]                 History & Physical        Maggi Lance MD at 10/01/24 1815          Admission HP     Patient Name: Cris Leonard  MRN: [id number]  : 1940  DOS: 10/1/2024    Attending: Maggi Lance MD    Primary Care Provider: Peyton Vázquez PA      Patient Care Team:  Peyton Vázquez, " PA as PCP - General (Physician Assistant)    Chief complaint: Rectal cancer    Subjective  Patient is a pleasant 84 y.o. female presented for scheduled surgery by Dr. Ruiz.    Patient has history of right colon cancer for which she had a colectomy, T3 N1 has been resected with persistent neoplasia in the posterior distal rectum after YUDELKA prompting plans for APR.    Today she underwent abdominal perineal resection with partial thickness posterior vaginal resection.  Surgery included placement of a JESUS drain into the deep pelvis and colostomy creation in the left abdomen.    Surgery was done under general anesthesia and a block, was tolerated well.  I saw her in PACU where she is still in a drowsy  Postop state.  Not in distress.       Allergies   Allergen Reactions    Milk-Related Compounds Anaphylaxis        Medications Prior to Admission   Medication Sig Dispense Refill Last Dose    albuterol sulfate  (90 Base) MCG/ACT inhaler Inhale 2 puffs Every 4 (Four) Hours As Needed for Wheezing.   Past Week    Calcium-Vitamin D-Vitamin K (VIACTIV CALCIUM PLUS D PO) Take 2 doses by mouth Daily.   Past Week    DOCUSATE SODIUM PO Take 100 mg by mouth Daily As Needed (constipation).   Past Week    famotidine (PEPCID) 10 MG tablet Take 1 tablet by mouth 2 (Two) Times a Day.   Past Month    FLUTICASONE PROPIONATE NA 2 sprays into the nostril(s) as directed by provider Daily.   Past Week    fluticasone-salmeterol (ADVAIR HFA) 115-21 MCG/ACT inhaler Inhale 2 puffs 2 (Two) Times a Day.   10/1/2024    levothyroxine (SYNTHROID, LEVOTHROID) 88 MCG tablet Take 1 tablet by mouth Daily.   9/30/2024    Loratadine 10 MG capsule Take 1 capsule by mouth Daily.   9/30/2024    Multiple Vitamins-Minerals (Womens Multi Gummies) chewable tablet Chew 1 tablet Daily.   Past Week    ibuprofen (ADVIL,MOTRIN) 200 MG tablet Take 1 tablet by mouth Every 6 (Six) Hours As Needed for Mild Pain.       sodium chloride 0.65 % nasal spray Administer 1  "spray into the nostril(s) as directed by provider As Needed for Congestion.             Past Medical History:   Diagnosis Date    Acid reflux     Anemia     Asthma     Cancer     rectal    History of chemotherapy     History of radiation therapy     History of transfusion     No Reaction    Hypothyroid     Pneumonia     Wears dentures     upper    Wears glasses     Wears hearing aid in both ears      Past Surgical History:   Procedure Laterality Date    BREAST LUMPECTOMY Right     CATARACT EXTRACTION Bilateral     COLON RESECTION N/A 10/17/2023    Procedure: COLON RESECTION RIGHT LAPAROSCOPIC;  Surgeon: Angelito Ruiz MD;  Location: [facility];  Service: General;  Laterality: N/A;    COLONOSCOPY      PORTACATH PLACEMENT      SHOULDER SURGERY Right      History reviewed. No pertinent family history.  Social History     Tobacco Use    Smoking status: Former     Types: Cigarettes    Smokeless tobacco: Never   Vaping Use    Vaping status: Never Used   Substance Use Topics    Alcohol use: Never    Drug use: Never       Review of Systems  Review of systems could not be obtained due to   patient sedation status.    Vital Signs  /73 (BP Location: Right arm, Patient Position: Lying)   Pulse 67   Temp 97.6 °F (36.4 °C) (Axillary)   Resp 16   Ht 165.1 cm (65\")   Wt 56 kg (123 lb 8 oz)   SpO2 99%   BMI 20.55 kg/m²     Physical Exam:    General Appearance:  Drowsy postop in no acute distress   Head:    Normocephalic, without obvious abnormality, atraumatic   Eyes:            Lids and lashes normal, conjunctivae and sclerae normal, no   icterus    Ears:    Ears appear intact with no abnormalities noted   Throat:   No oral lesions, no thrush, oral mucosa moist   Neck:   No adenopathy, supple, trachea midline, no thyromegaly         Lungs:     Clear to auscultation,respirations regular, even and       unlabored. No wheezes or rales.    Heart:    Regular rhythm and normal rate, normal S1 and S2, no murmur    Abdomen:    " "  Soft with midline incision with an intact dressing.  Left colostomy and low abdomen/pelvic JESUS drain.   Genitalia:    Deferred   Extremities:   No edema, no cyanosis, no              redness   Pulses:   Pulses palpable and equal bilaterally   Skin:   No bleeding, bruising or rash   Neurologic:   Cranial nerves 2 - 12 grossly intact,             Invalid input(s): \"NEUTOPHILPCT\"        Invalid input(s): \"LABALBU\", \"PROT\"  Lab Results   Component Value Date    HGBA1C 5.20 09/23/2024      Latest Reference Range & Units 09/23/24 13:17   Sodium 136 - 145 mmol/L 139   Potassium 3.5 - 5.2 mmol/L 4.2   Chloride 98 - 107 mmol/L 101   CO2 22.0 - 29.0 mmol/L 29.0   Anion Gap 5.0 - 15.0 mmol/L 9.0   BUN 8 - 23 mg/dL 8   Creatinine 0.57 - 1.00 mg/dL 0.65   BUN/Creatinine Ratio 7.0 - 25.0  12.3   eGFR >60.0 mL/min/1.73 87.5   Glucose 65 - 99 mg/dL 89   Calcium 8.6 - 10.5 mg/dL 9.6   Alkaline Phosphatase 39 - 117 U/L 119 (H)   Total Protein 6.0 - 8.5 g/dL 7.1   Albumin 3.5 - 5.2 g/dL 4.3   Globulin gm/dL 2.8   A/G Ratio g/dL 1.5   AST (SGOT) 1 - 32 U/L 22   ALT (SGPT) 1 - 33 U/L 8   Total Bilirubin 0.0 - 1.2 mg/dL 0.5   Hemoglobin A1C 4.80 - 5.60 % 5.20   WBC 3.40 - 10.80 10*3/mm3 5.08   RBC 3.77 - 5.28 10*6/mm3 3.91   Hemoglobin 12.0 - 15.9 g/dL 12.1   Hematocrit 34.0 - 46.6 % 38.6   Platelets 140 - 450 10*3/mm3 302   RDW 12.3 - 15.4 % 13.0   MCV 79.0 - 97.0 fL 98.7 (H)   MCH 26.6 - 33.0 pg 30.9   MCHC 31.5 - 35.7 g/dL 31.3 (L)   MPV 6.0 - 12.0 fL 9.1   RDW-SD 37.0 - 54.0 fl 47.0   (H): Data is abnormally high  (L): Data is abnormally low    Assessment and Plan:   S/p ABDOMINAL PERINEAL RESECTION  Partial thickness posterior vaginal resection.    Rectal cancer    GERD (gastroesophageal reflux disease)    Asthma    Hypothyroidism      Plan  Postop management to include  1. Ambulation, several times daily  2. Pain control-PRNs, multimodal approach   3. IS-will encourage  4. DVT proph- Mechanical, subcutaneous heparin  5. Bowel " regimen, alvimopan  6. Resume home medications as appropriate  7. Monitor post-op labs  8. Discharge planning   9. Diet, Clears, advance diet as tolerated.  IVF initially, monitor volume status.    -Asthma: Maintain home regimen with formulary substitution as indicated.  As needed bronchodilators.  Monitor oxygenation.    -Hypothyroidism: Resume replacement     -GERD:  Resume H2 blocker.     -New stoma:  Wound care stoma nurse consult for stoma care and education throughout patient's hospitalization.      Dragon disclaimer:  Part of this encounter note is an electronic transcription/translation of spoken language to printed text. The electronic translation of spoken language may permit erroneous, or at times, nonsensical words or phrases to be inadvertently transcribed; Although I have reviewed the note for such errors, some may still exist.    Maggi Lance MD  10/01/24  18:15 EDT              Electronically signed by Maggi Lance MD at 10/01/24 2202       Pina Norman APRN at 10/01/24 0910       Attestation signed by Angelito Ruiz MD at 10/01/24 1130    No interval change    Goals of postoperative care reviewed                [facility] Pre-op    Full history and physical note from office is attached.    /85 (BP Location: Right arm, Patient Position: Lying)   Pulse 85   Temp 98.7 °F (37.1 °C) (Temporal)   Resp 18   SpO2 100%     Immunizations:  Influenza:  No  Pneumococcal:  UTD  Tetanus:  UTD    Review of Systems:  Constitutional-- No fever, chills or sweats. No fatigue.  CV-- No chest pain, palpitation or syncope  Resp-- No SOB, cough, hemoptysis  Skin--No rashes or lesions    Physical Exam:  Heart:   Regular rate and rhythm, S1 and S2 normal  Lungs: Clear to auscultation bilaterally, respirations unlabored    LAB Results:  Lab Results   Component Value Date    WBC 5.08 09/23/2024    HGB 12.1 09/23/2024    HCT 38.6 09/23/2024    MCV 98.7 (H) 09/23/2024      "09/23/2024    NEUTROABS 7.07 (H) 10/23/2023    GLUCOSE 89 09/23/2024    BUN 8 09/23/2024    CREATININE 0.65 09/23/2024     09/23/2024    K 4.2 09/23/2024     09/23/2024    CO2 29.0 09/23/2024    CALCIUM 9.6 09/23/2024    ALBUMIN 4.3 09/23/2024    AST 22 09/23/2024    ALT 8 09/23/2024    BILITOT 0.5 09/23/2024     Study Result    Narrative & Impression   MRI PELVIS WO CONTRAST     Date of Exam: 8/22/2024 6:43 PM EDT     Indication: C20.     Comparison: CT abdomen pelvis 10/20/2023.     Technique:  Routine multiplanar/multisequence images of the pelvis were obtained without contrast administration.     Per chart review: 10/25/2023 patient underwent colonoscopy which demonstrated \"large bulky mass lesion was seen in the rectum. This arose from the posterior wall of the rectum was greater than 5 cm in size, arising from a broad base with distal extent to   within a proximally 3 cm of the dentate line. Several biopsies were obtained, that showed moderately differentiated adenocarcinoma.\" Patient has subsequently undergone chemoradiation at an outside institution for rectal cancer. No pretreatment/baseline   rectal MRI performed.     Findings:     Note there is a large amount of gas in the rectum, which makes evaluation suboptimal. Along the posterior rectal wall there is T2 hypointense signal suggestive of scar/fibrosis, which spans around 3.5 cm in craniocaudal dimension (series 5 image 10-12)   which appears to be at the site of the rectal tumor seen as an enhancing mass on prior CT. The inferior margin of the scar extends to 2.2 cm above the anorectal junction and 4.2 cm above the anal verge. Within the posterior mesorectal fat at this   location at the 6 o'clock position, there are thin linear/radiating areas of T2 hypointensity suggestive of desmoplastic stranding. Otherwise, there is no convincing evidence of mesorectal involvement. No distinct intermediate T2 signal or discrete   restricted diffusion " to suggest significant residual viable disease.     There is a 4 x 4 mm lymph node in the mesorectum at the 7 o'clock position, without prominent diffusion signal or suspicious morphologic features, unchanged in size from prior CT and of low suspicion. No mesorectal lymphadenopathy otherwise. No   extramesorectal lymphadenopathy within the field-of-view.     Small uterus and ovaries, typical of age. Questionable small fibroid present. No free fluid or organized fluid collection. No subcutaneous soft tissue abnormality. No acute or suspicious osseous lesions. Degenerative changes of the bilateral hips.     IMPRESSION:  Impression:     Within the confines of comparing to a prior CT without prior/baseline MRI, as well as a large amount of gas in the rectum which distorts diffusion-weighted sequences, there appears to be positive response to treatment with predominantly T2 hypointense   signal at the site of the treated rectal tumor suggestive of scar/fibrosis. No distinct intermediate T2 signal nor restricted diffusion to suggest measurable residual viable tumor. Mesorectum is generally clear without convincing evidence of tumor   involvement nor suspicious lymphadenopathy, with note made of a tiny/nonsuspicious lymph node. Consider correlation with endoscopy and tissue sampling as clinically indicated.     Cancer Staging (if applicable)  Cancer Patient: __ yes __no __unknown__N/A; If yes, clinical stage T:__ N:__M:__, stage group or __N/A      Impression: History rectal cancer, Stage III      Plan: ABDOMINAL PERINEAL RESECTION, LYSIS OF ADHESIONS       RAINA Arana   10/1/2024   09:10 EDT    Electronically signed by Angelito Ruiz MD at 10/01/24 1130   Source Note: H&P        [Media Unavailable] Scan on 9/9/2024 0913 by [employer], [place]: H&P, [facility], 09/06/2024          Electronically signed by [employer], [place] at 09/09/24 0916                 H&P signed by [employer], [place] at 09/09/24 0916          [Media Unavailable] Scan on 9/9/2024 0913 by [employer], [place]: H&P, [facility], 09/06/2024          Electronically signed by [employer], [place] at 09/09/24 0916       Current Facility-Administered Medications   Medication Dose Route Frequency Provider Last Rate Last Admin    acetaminophen (TYLENOL) tablet 650 mg  650 mg Oral Q8H Angelito Ruiz MD   650 mg at 10/04/24 0436    albuterol (PROVENTIL) nebulizer solution 0.083% 2.5 mg/3mL  2.5 mg Nebulization Q6H PRN Maggi Lance MD        budesonide-formoterol (SYMBICORT) 160-4.5 MCG/ACT inhaler 2 puff  2 puff Inhalation BID - RT Maggi Lance MD   2 puff at 10/04/24 0732    cetirizine (zyrTEC) tablet 5 mg  5 mg Oral Daily Maggi Lance MD   5 mg at 10/04/24 0827    diazePAM (VALIUM) tablet 5 mg  5 mg Oral Q6H PRN Angelito Ruiz MD        famotidine (PEPCID) tablet 10 mg  10 mg Oral BID Maggi Lance MD   10 mg at 10/04/24 0827    heparin (porcine) 5000 UNIT/ML injection 5,000 Units  5,000 Units Subcutaneous Q8H Angelito Ruiz MD   5,000 Units at 10/04/24 0436    HYDROcodone-acetaminophen (NORCO) 7.5-325 MG per tablet 1 tablet  1 tablet Oral Q4H PRN Angelito Ruiz MD   1 tablet at 10/03/24 1755    HYDROmorphone (DILAUDID) injection 1 mg  1 mg Intramuscular Q4H PRN Angelito Ruiz MD   1 mg at 10/01/24 1958    And    naloxone (NARCAN) injection 0.4 mg  0.4 mg Intravenous Q5 Min PRN Angelito Ruiz MD        ibuprofen (ADVIL,MOTRIN) tablet 400 mg  400 mg Oral Q6H PRN Angelito Ruiz MD   400 mg at 10/02/24 2020    labetalol (NORMODYNE,TRANDATE) injection 10 mg  10 mg Intravenous Q4H PRN Maggi Lance MD        levothyroxine (SYNTHROID, LEVOTHROID) tablet 88 mcg  88 mcg Oral Q AM Maggi Lance MD   88 mcg at 10/04/24 0436    nitroglycerin (NITROSTAT) SL tablet 0.4 mg  0.4 mg Sublingual Q5 Min PRN Angelito Ruiz MD        ondansetron ODT (ZOFRAN-ODT) disintegrating tablet 4 mg  4 mg Oral Q6H PRN Angelito Ruiz MD        Or     "ondansetron (ZOFRAN) injection 4 mg  4 mg Intravenous Q6H PRN Angelito Ruiz MD   4 mg at 10/03/24 1008    sodium chloride 0.9 % bolus 500 mL  500 mL Intravenous TID PRN Maggi Lance MD            Physician Progress Notes (most recent note)        Maggi Lance MD at 10/03/24 1558          IM progress note      Valeria Tracy  [id number]  1940     LOS: 2 days     Attending: Angelito Ruiz MD    Primary Care Provider: Peyton Vázquez PA      Chief Complaint/Reason for visit:  Abd pain    Subjective   Doing well.   Good pain control. Tolerating full liquids. Ambulated with PT.IPR is recommended.   Hurst present. Some stoma output.   Denies f/c/n/v/sob/cp.    Objective        Visit Vitals  /75 (BP Location: Right arm, Patient Position: Lying)   Pulse 100   Temp 98.2 °F (36.8 °C) (Oral)   Resp 16   Ht 165.1 cm (65\")   Wt 56 kg (123 lb 8 oz)   SpO2 92%   BMI 20.55 kg/m²     Temp (24hrs), Av.9 °F (36.6 °C), Min:97.5 °F (36.4 °C), Max:98.2 °F (36.8 °C)      Nutrition: Full liquids    Respiratory: RA    Physical Exam:     General Appearance:    Alert, cooperative, in no acute distress   Head:    Normocephalic, without obvious abnormality, atraumatic    Lungs:     Normal effort, symmetric chest rise, no crepitus, clear to      auscultation bilaterally             Heart:    Regular rhythm and normal rate, normal S1 and S2   Abdomen:     Soft, expected tenderness. Midline dressing CDI. JESUS present.   : hurst- yellow UOP   Extremities:   No clubbing, cyanosis or edema.  No deformities.    Pulses:   Pulses palpable and equal bilaterally   Skin:   No bleeding, bruising or rash   Neurologic:   Moves all extremities with no obvious focal motor deficit.  Cranial nerves 2 - 12 grossly intact     Results Review:     I reviewed the patient's new clinical results.   Results from last 7 days   Lab Units 10/02/24  0927   WBC 10*3/mm3 11.57*   HEMOGLOBIN g/dL 11.6*   HEMATOCRIT % 36.6   PLATELETS 10*3/mm3 " 217     Results from last 7 days   Lab Units 10/02/24  0514   SODIUM mmol/L 134*   POTASSIUM mmol/L 5.1   CHLORIDE mmol/L 101   CO2 mmol/L 22.0   BUN mg/dL 9   CREATININE mg/dL 0.55*   CALCIUM mg/dL 8.4*   GLUCOSE mg/dL 113*     I reviewed the patient's new imaging including images and reports.    All medications reviewed.   acetaminophen, 650 mg, Oral, Q8H  budesonide-formoterol, 2 puff, Inhalation, BID - RT  cetirizine, 5 mg, Oral, Daily  famotidine, 10 mg, Oral, BID  gabapentin, 100 mg, Oral, BID  heparin (porcine), 5,000 Units, Subcutaneous, Q8H  levothyroxine, 88 mcg, Oral, Q AM        Assessment & Plan     S/P abdominal perineal resection, partial thickness posterior vaginal resection    GERD (gastroesophageal reflux disease)    Asthma    Hypothyroidism    Acute postoperative pain    Rectal cancer      Plan  1. Ambulation, encouraged, PT is following  2. Pain control-prns   3. IS-encouraged  4. DVT proph- mechs/heparin  5. Bowel regimen  6. Full liquid diet.  Advance diet as tolerated  7. Monitor post-op labs  8. DC planning, inpatient rehab is recommended at this point.    -Asthma: Maintain home regimen with formulary substitution as indicated.  As needed bronchodilators.  Monitor oxygenation.     -Hypothyroidism: Resume replacement      -GERD:  Resumed H2 blocker.      -New stoma:  Wound care stoma nurse consult for stoma care and education throughout patient's hospitalization.      Maggi Lance MD  10/03/24  15:58 EDT    Electronically signed by Maggi Lance MD at 10/03/24 1600       Consult Notes (most recent note)    No notes of this type exist for this encounter.          Physical Therapy Notes (most recent note)        Britton Lipscomb, PT Student at 10/02/24 1009  Version 1 of 1      Attestation signed by Rebel Maradiaga, PT at 10/02/24 2292    I attest, as a qualified practitioner, that I was present for the entire session of this patient's care without engagement in other tasks and  that the student's participation was guided by my direction and skilled clinical judgement.    Rebel Ashwini, PT 10/2/2024 15:44 EDT                  Patient Name: Valeria Tracy  : 1940    MRN: [id number]                              Today's Date: 10/2/2024       Admit Date: 10/1/2024    Visit Dx:     ICD-10-CM ICD-9-CM   1. Rectal cancer  C20 154.1     Patient Active Problem List   Diagnosis    GERD (gastroesophageal reflux disease)    Asthma    Hypothyroidism    Colon cancer    S/P right colectomy    Acute postoperative pain    Anemia    Rectal cancer     Past Medical History:   Diagnosis Date    Acid reflux     Anemia     Asthma     Cancer     rectal    History of chemotherapy     History of radiation therapy     History of transfusion     No Reaction    Hypothyroid     Pneumonia     Wears dentures     upper    Wears glasses     Wears hearing aid in both ears      Past Surgical History:   Procedure Laterality Date    ABDOMINAL PERINEAL RESECTION N/A 10/1/2024    Procedure: ABDOMINAL PERINEAL RESECTION, LYSIS OF ADHESIONS AND CREATION OF COLOSTOMY;  Surgeon: Angelito Ruiz MD;  Location:  [facility];  Service: General;  Laterality: N/A;    BREAST LUMPECTOMY Right     CATARACT EXTRACTION Bilateral     COLON RESECTION N/A 10/17/2023    Procedure: COLON RESECTION RIGHT LAPAROSCOPIC;  Surgeon: Angelito Ruiz MD;  Location:  [facility];  Service: General;  Laterality: N/A;    COLONOSCOPY      PORTACATH PLACEMENT      SHOULDER SURGERY Right       General Information       Row Name 10/02/24 1114          Physical Therapy Time and Intention    Document Type evaluation (P)   -TM     Mode of Treatment physical therapy (P)   -TM       Row Name 10/02/24 1114          General Information    Patient Profile Reviewed yes (P)   -TM     Prior Level of Function independent:;all household mobility;community mobility;gait;transfer;ADL's (P)   no AD use; has walker but does not use it  -TM     Existing  Precautions/Restrictions fall;other (see comments) (P)   s/p abdominal perineal resection (10/1); colostomy; JESUS drain  -TM     Barriers to Rehab medically complex;previous functional deficit (P)   -TM       Row Name 10/02/24 1114          Living Environment    People in Home alone (P)   -TM       Row Name 10/02/24 1114          Home Main Entrance    Number of Stairs, Main Entrance two (P)   pt mainly uses back entrance w/ 2 stairs and B HR's  -TM     Stair Railings, Main Entrance railings on both sides of stairs (P)   -TM       Row Name 10/02/24 1114          Stairs Within Home, Primary    Number of Stairs, Within Home, Primary none (P)   -TM       Row Name 10/02/24 1114          Cognition    Orientation Status (Cognition) oriented to;person;situation;time;verbal cues/prompts needed for orientation;place;other (see comments) (P)   pt aware she was in hospital but was unsure of the name  -       Row Name 10/02/24 1114          Safety Issues, Functional Mobility    Safety Issues Affecting Function (Mobility) awareness of need for assistance;insight into deficits/self-awareness;safety precaution awareness;safety precautions follow-through/compliance;positioning of assistive device;sequencing abilities (P)   -TM     Impairments Affecting Function (Mobility) balance;endurance/activity tolerance;pain;postural/trunk control;strength (P)   -TM               User Key  (r) = Recorded By, (t) = Taken By, (c) = Cosigned By      Initials Name Provider Type    TM Britton Lipscomb, PT Student PT Student                   Mobility       Row Name 10/02/24 1119          Bed Mobility    Bed Mobility supine-sit;sit-supine (P)   -TM     Supine-Sit [place] (Bed Mobility) verbal cues;minimum assist (75% patient effort);2 person assist (P)   -TM     Sit-Supine [place] (Bed Mobility) verbal cues;minimum assist (75% patient effort);2 person assist (P)   -TM     Assistive Device (Bed Mobility) bed rails;head of bed elevated (P)    -TM     Comment, (Bed Mobility) VCs for sequencing and hand placement. Pt educated on log roll technique to prevent inc abdominal pain. (P)   -TM       Row Name 10/02/24 1119          Sit-Stand Transfer    Sit-Stand [place] (Transfers) verbal cues;contact guard;1 person assist (P)   -TM     Comment, (Sit-Stand Transfer) VC for sequencing and hand placement. R UE on FWW, L UE pushing from bed rail. (P)   -TM       Row Name 10/02/24 1119          Gait/Stairs (Locomotion)    [place] Level (Gait) verbal cues;contact guard;1 person assist (P)   -TM     Assistive Device (Gait) walker, front-wheeled (P)   -TM     Distance in Feet (Gait) 100 (P)   -TM     Deviations/Abnormal Patterns (Gait) bilateral deviations;tanya decreased;gait speed decreased;stride length decreased (P)   -TM     Bilateral Gait Deviations forward flexed posture;heel strike decreased (P)   -TM     Comment, (Gait/Stairs) Pt demo'd step through gait pattern w/ dec gait speed and forward flexed posture. Inc cueing required to maintain body w/in FWW. Further distance limited by fatigue. (P)   -TM               User Key  (r) = Recorded By, (t) = Taken By, (c) = Cosigned By      Initials Name Provider Type     Britton Lipscomb, PT Student PT Student                   Obj/Interventions       Row Name 10/02/24 1123          Range of Motion Comprehensive    General Range of Motion bilateral lower extremity ROM WFL (P)   -       Row Name 10/02/24 1123          Strength Comprehensive (MMT)    General Manual Muscle Testing (MMT) Assessment lower extremity strength deficits identified (P)   -TM     Comment, General Manual Muscle Testing (MMT) Assessment B LE 4-/5 observed through functional movement (P)   -TM       Row Name 10/02/24 1123          Balance    Balance Assessment sitting static balance;sitting dynamic balance;sit to stand dynamic balance;standing static balance;standing dynamic balance (P)   -TM     Static Sitting Balance standby  assist;1-person assist (P)   -TM     Dynamic Sitting Balance standby assist;1-person assist (P)   -TM     Position, Sitting Balance unsupported;sitting edge of bed (P)   -TM     Sit to Stand Dynamic Balance verbal cues;contact guard;1-person assist (P)   -TM     Static Standing Balance verbal cues;contact guard;1-person assist (P)   -TM     Dynamic Standing Balance verbal cues;contact guard;1-person assist (P)   -TM     Position/Device Used, Standing Balance supported;walker, front-wheeled (P)   -TM               User Key  (r) = Recorded By, (t) = Taken By, (c) = Cosigned By      Initials Name Provider Type    TM Britton Lipscomb, PT Student PT Student                   Goals/Plan       Row Name 10/02/24 1127          Bed Mobility Goal 1 (PT)    Activity/Assistive Device (Bed Mobility Goal 1, PT) sit to supine/supine to sit (P)   -TM     [place] Level/Cues Needed (Bed Mobility Goal 1, PT) contact guard required (P)   -TM     Time Frame (Bed Mobility Goal 1, PT) short term goal (STG);5 days (P)   -TM     Progress/Outcomes (Bed Mobility Goal 1, PT) new goal (P)   -TM       Row Name 10/02/24 1127          Transfer Goal 1 (PT)    Activity/Assistive Device (Transfer Goal 1, PT) sit-to-stand/stand-to-sit (P)   -TM     [place] Level/Cues Needed (Transfer Goal 1, PT) standby assist (P)   -TM     Time Frame (Transfer Goal 1, PT) short term goal (STG);5 days (P)   -TM     Progress/Outcome (Transfer Goal 1, PT) new goal (P)   -TM       Row Name 10/02/24 1127          Gait Training Goal 1 (PT)    Activity/Assistive Device (Gait Training Goal 1, PT) gait (walking locomotion) (P)   -TM     [place] Level (Gait Training Goal 1, PT) standby assist (P)   -TM     Distance (Gait Training Goal 1, PT) 325 ft (P)   -TM     Time Frame (Gait Training Goal 1, PT) long term goal (LTG);10 days (P)   -TM     Progress/Outcome (Gait Training Goal 1, PT) new goal (P)   -TM       Row Name 10/02/24 1127          ROM Goal 1 (PT)     Progress/Outcome (ROM Goal 1, PT) new goal (P)   -TM       Row Name 10/02/24 1127          Stairs Goal 1 (PT)    Activity/Assistive Device (Stairs Goal 1, PT) stairs, all skills (P)   -TM     [place] Level/Cues Needed (Stairs Goal 1, PT) contact guard required (P)   -TM     Number of Stairs (Stairs Goal 1, PT) 2 (P)   -TM     Time Frame (Stairs Goal 1, PT) long term goal (LTG);10 days (P)   -TM     Progress/Outcome (Stairs Goal 1, PT) new goal (P)   -TM       Row Name 10/02/24 1127          Therapy Assessment/Plan (PT)    Planned Therapy Interventions (PT) balance training;bed mobility training;gait training;home exercise program;patient/family education;postural re-education;ROM (range of motion);stair training;strengthening;stretching;transfer training (P)   -TM               User Key  (r) = Recorded By, (t) = Taken By, (c) = Cosigned By      Initials Name Provider Type    TM Britton Lipscomb, PT Student PT Student                   Clinical Impression       Row Name 10/02/24 1124          Pain    Pretreatment Pain Rating 3/10 (P)   -TM     Posttreatment Pain Rating 3/10 (P)   -TM     Pain Location incisional (P)   -TM     Pain Location - abdomen (P)   -TM     Pain Intervention(s) Ambulation/increased activity;Repositioned (P)   -TM       Row Name 10/02/24 1124          Plan of Care Review    Plan of Care Reviewed With patient (P)   -TM     Progress no change (P)   -TM     Outcome Evaluation Pt presents w/ dec functional mobility, activity tolerance, and strength compared to baseline. Pt ambulated 100 ft w/ CGA x1 using FWW. Pt requires skilled IPPT services to return to PLOF. Pending progress, rec d/c to IRF once medically stable. (P)   -TM       Row Name 10/02/24 1124          Therapy Assessment/Plan (PT)    Patient/Family Therapy Goals Statement (PT) go home (P)   -TM     Rehab Potential (PT) good, to achieve stated therapy goals (P)   -TM     Criteria for Skilled Interventions Met (PT) yes (P)   -TM      Therapy Frequency (PT) daily (P)   -TM     Predicted Duration of Therapy Intervention (PT) 2 weeks (P)   -TM       Row Name 10/02/24 1124          Vital Signs    Pre Systolic BP Rehab 126 (P)   -TM     Pre Treatment Diastolic BP 80 (P)   -TM     Pretreatment Heart Rate (beats/min) 99 (P)   -TM     Posttreatment Heart Rate (beats/min) 89 (P)   -TM     Pre SpO2 (%) 99 (P)   -TM     O2 Delivery Pre Treatment room air (P)   -TM     Post SpO2 (%) 96 (P)   -TM     O2 Delivery Post Treatment room air (P)   -TM     Pre Patient Position Supine (P)   -TM     Post Patient Position Supine (P)   -TM       Row Name 10/02/24 1124          Positioning and Restraints    Pre-Treatment Position in bed (P)   -TM     Post Treatment Position bed (P)   -TM     In Bed notified nsg;fowlers;call light within reach;encouraged to call for assist;exit alarm on (P)   -TM               User Key  (r) = Recorded By, (t) = Taken By, (c) = Cosigned By      Initials Name Provider Type    TM Britton Lipscomb, PT Student PT Student                   Outcome Measures       Row Name 10/02/24 1128 10/02/24 0800       How much help from another person do you currently need...    Turning from your back to your side while in flat bed without using bedrails? 4 (P)   -TM 4  -MELISSA    Moving from lying on back to sitting on the side of a flat bed without bedrails? 3 (P)   -TM 3  -MELISSA    Moving to and from a bed to a chair (including a wheelchair)? 3 (P)   -TM 3  -MELISSA    Standing up from a chair using your arms (e.g., wheelchair, bedside chair)? 4 (P)   -TM 3  -MELISSA    Climbing 3-5 steps with a railing? 2 (P)   -TM 3  -MELISSA    To walk in hospital room? 3 (P)   -TM 3  -MELISSA    AM-PAC 6 Clicks Score (PT) 19 (P)   -TM 19  -MELISSA    Highest Level of Mobility Goal 6 --> Walk 10 steps or more (P)   -TM 6 --> Walk 10 steps or more  -MELISSA      Row Name 10/02/24 1128          Functional Assessment    Outcome Measure Options AM-PAC 6 Clicks Basic Mobility (PT) (P)   -TM                User Key  (r) = Recorded By, (t) = Taken By, (c) = Cosigned By      Initials Name Provider Type    Catracho White RN Registered Nurse    TM Britton Lipscomb, PT Student PT Student                                 Physical Therapy Education       Title: PT OT SLP Therapies (In Progress)       Topic: Physical Therapy (In Progress)       Point: Mobility training (In Progress)       Learning Progress Summary             Patient Acceptance, E, NR by TM at 10/2/2024 1129                         Point: Home exercise program (Not Started)       Learner Progress:  Not documented in this visit.              Point: Body mechanics (In Progress)       Learning Progress Summary             Patient Acceptance, E, NR by TM at 10/2/2024 1129                         Point: Precautions (In Progress)       Learning Progress Summary             Patient Acceptance, E, NR by TM at 10/2/2024 1129                                         User Key       Initials Effective Dates Name Provider Type Discipline     08/13/24 -  Britton Lipscomb, PT Student PT Student PT                  PT Recommendation and Plan  Planned Therapy Interventions (PT): (P) balance training, bed mobility training, gait training, home exercise program, patient/family education, postural re-education, ROM (range of motion), stair training, strengthening, stretching, transfer training  Plan of Care Reviewed With: (P) patient  Progress: (P) no change  Outcome Evaluation: (P) Pt presents w/ dec functional mobility, activity tolerance, and strength compared to baseline. Pt ambulated 100 ft w/ CGA x1 using FWW. Pt requires skilled IPPT services to return to PLOF. Pending progress, rec d/c to IRF once medically stable.     Time Calculation:   PT Evaluation Complexity  History, PT Evaluation Complexity: (P) 3 or more personal factors and/or comorbidities  Examination of Body Systems (PT Eval Complexity): (P) total of 3 or more elements  Clinical Presentation (PT  Evaluation Complexity): (P) stable  Clinical Decision Making (PT Evaluation Complexity): (P) low complexity  Overall Complexity (PT Evaluation Complexity): (P) low complexity     PT Charges       Row Name 10/02/24 1131             Time Calculation    Start Time 1009 (P)   -TM      PT Received On 10/02/24 (P)   -TM      PT Goal Re-Cert Due Date 10/12/24 (P)   -TM         Timed Charges    60672 - PT Therapeutic Activity Minutes 9 (P)   -TM         Untimed Charges    PT Eval/Re-eval Minutes 46 (P)   -TM         Total Minutes    Timed Charges Total Minutes 9 (P)   -TM      Untimed Charges Total Minutes 46 (P)   -TM       Total Minutes 55 (P)   -TM                User Key  (r) = Recorded By, (t) = Taken By, (c) = Cosigned By      Initials Name Provider Type    TM Britton Lipscomb, PT Student PT Student                  Therapy Charges for Today       Code Description Service Date Service Provider Modifiers Qty    54892472898 HC PT THERAPEUTIC ACT EA 15 MIN 10/2/2024 Britton Lipscomb, PT Student GP 1    89655450870 HC PT EVAL LOW COMPLEXITY 4 10/2/2024 Britton Lipscomb, PT Student GP 1            PT G-Codes  Outcome Measure Options: (P) AM-PAC 6 Clicks Basic Mobility (PT)  AM-PAC 6 Clicks Score (PT): (P) 19  PT Discharge Summary  Anticipated Discharge Disposition (PT): (P) inpatient rehabilitation facility    Britton Lipscomb PT Student  10/2/2024      Electronically signed by Rebel Maradiaga, PT at 10/02/24 1544       Occupational Therapy Notes (most recent note)    No notes exist for this encounter.

## 2024-10-04 NOTE — THERAPY TREATMENT NOTE
Patient Name: Valeria Tracy  : 1940    MRN: [id number]                              Today's Date: 10/4/2024       Admit Date: 10/1/2024    Visit Dx:     ICD-10-CM ICD-9-CM   1. S/P right colectomy  Z90.49 V45.89   2. Rectal cancer  C20 154.1     Patient Active Problem List   Diagnosis    GERD (gastroesophageal reflux disease)    Asthma    Hypothyroidism    Colon cancer    S/P right colectomy    Acute postoperative pain    Anemia    Rectal cancer    S/P abdominal perineal resection, partial thickness posterior vaginal resection     Past Medical History:   Diagnosis Date    Acid reflux     Anemia     Asthma     Cancer     rectal    History of chemotherapy     History of radiation therapy     History of transfusion     No Reaction    Hypothyroid     Pneumonia     Wears dentures     upper    Wears glasses     Wears hearing aid in both ears      Past Surgical History:   Procedure Laterality Date    ABDOMINAL PERINEAL RESECTION N/A 10/1/2024    Procedure: ABDOMINAL PERINEAL RESECTION, LYSIS OF ADHESIONS AND CREATION OF COLOSTOMY;  Surgeon: Angelito Ruiz MD;  Location: [facility] OR;  Service: General;  Laterality: N/A;    BREAST LUMPECTOMY Right     CATARACT EXTRACTION Bilateral     COLON RESECTION N/A 10/17/2023    Procedure: COLON RESECTION RIGHT LAPAROSCOPIC;  Surgeon: Angelito Ruiz MD;  Location: [facility] OR;  Service: General;  Laterality: N/A;    COLONOSCOPY      PORTACATH PLACEMENT      SHOULDER SURGERY Right       General Information       Row Name 10/04/24 1302          Physical Therapy Time and Intention    Document Type therapy note (daily note) (P)   -TM     Mode of Treatment physical therapy (P)   -TM       Row Name 10/04/24 1302          General Information    Patient Profile Reviewed yes (P)   -TM     Existing Precautions/Restrictions fall;other (see comments) (P)   abdominal incison; colostomy  -TM     Barriers to Rehab medically complex;previous functional deficit (P)   -TM       Row Name  10/04/24 1302          Cognition    Orientation Status (Cognition) oriented x 3 (P)   -TM       Row Name 10/04/24 1302          Safety Issues, Functional Mobility    Safety Issues Affecting Function (Mobility) awareness of need for assistance;insight into deficits/self-awareness;safety precaution awareness;safety precautions follow-through/compliance (P)   -TM     Impairments Affecting Function (Mobility) balance;endurance/activity tolerance;pain;postural/trunk control;strength (P)   -TM               User Key  (r) = Recorded By, (t) = Taken By, (c) = Cosigned By      Initials Name Provider Type    TM Britton Lipscomb, PT Student PT Student                   Mobility       Row Name 10/04/24 1304          Bed Mobility    Bed Mobility supine-sit;sit-supine (P)   -TM     Supine-Sit Richardson (Bed Mobility) standby assist (P)   -TM     Sit-Supine Richardson (Bed Mobility) standby assist (P)   -TM     Assistive Device (Bed Mobility) bed rails;head of bed elevated (P)   -TM     Comment, (Bed Mobility) No VCs required for sequencing or hand placement. (P)   -TM       Row Name 10/04/24 1304          Sit-Stand Transfer    Sit-Stand Richardson (Transfers) contact guard;1 person assist (P)   -TM     Comment, (Sit-Stand Transfer) No VCs for sequencing or hand placement. (P)   -TM       Row Name 10/04/24 1304          Gait/Stairs (Locomotion)    Richardson Level (Gait) contact guard;verbal cues;1 person assist (P)   -TM     Assistive Device (Gait) walker, front-wheeled (P)   -TM     Distance in Feet (Gait) 100 (P)   50+50  -TM     Deviations/Abnormal Patterns (Gait) bilateral deviations;tanya decreased;gait speed decreased;stride length decreased;base of support, narrow (P)   -TM     Bilateral Gait Deviations forward flexed posture;heel strike decreased (P)   -TM     Comment, (Gait/Stairs) Pt demo'd step through gait pattern w/ dec gait speed and forward flexed posture. Pt required 1 standing rest break w/ inc time  for turns. Further distance limited by fatigue. (P)   -TM               User Key  (r) = Recorded By, (t) = Taken By, (c) = Cosigned By      Initials Name Provider Type    Britton Bender, PT Student PT Student                   Obj/Interventions       Row Name 10/04/24 1307          Balance    Balance Assessment sitting static balance;sitting dynamic balance;sit to stand dynamic balance;standing static balance;standing dynamic balance (P)   -TM     Static Sitting Balance standby assist (P)   -TM     Dynamic Sitting Balance standby assist (P)   -TM     Position, Sitting Balance unsupported;sitting edge of bed (P)   -TM     Sit to Stand Dynamic Balance verbal cues;contact guard;1-person assist (P)   -TM     Static Standing Balance contact guard;1-person assist (P)   -TM     Dynamic Standing Balance verbal cues;contact guard;1-person assist (P)   -TM     Position/Device Used, Standing Balance supported;walker, front-wheeled (P)   -TM               User Key  (r) = Recorded By, (t) = Taken By, (c) = Cosigned By      Initials Name Provider Type    Britton Bender, PT Student PT Student                   Goals/Plan    No documentation.                  Clinical Impression       Row Name 10/04/24 1309          Pain    Pain Intervention(s) Ambulation/increased activity;Repositioned (P)   -TM     Additional Documentation Pain Scale: FACES Pre/Post-Treatment (Group) (P)   -TM       Row Name 10/04/24 1305          Pain Scale: FACES Pre/Post-Treatment    Pain: FACES Scale, Pretreatment 2-->hurts little bit (P)   -TM     Posttreatment Pain Rating 2-->hurts little bit (P)   -TM     Pain Location incisional (P)   -TM     Pain Location - abdomen (P)   -TM       Row Name 10/04/24 1305          Plan of Care Review    Plan of Care Reviewed With patient (P)   -TM     Progress improving (P)   -TM     Outcome Evaluation Pt cont to present w/ dec functional mobility and activity tolerance compared to baseline. Pt ambulated 100 ft  w/ CGA x1 using FWW w/ 1 standing rest break. Further distance limited by fatigue. Pt cont to require skilled IPPT services to return to PLOF. Will progress per pt tolerance and POC. (P)   -TM       Row Name 10/04/24 1309          Vital Signs    Pre Systolic BP Rehab 135 (P)   -TM     Pre Treatment Diastolic BP 82 (P)   -TM     Pretreatment Heart Rate (beats/min) 104 (P)   -TM     Posttreatment Heart Rate (beats/min) 106 (P)   -TM     Pre SpO2 (%) 92 (P)   -TM     O2 Delivery Pre Treatment room air (P)   -TM     Post SpO2 (%) 91 (P)   -TM     O2 Delivery Post Treatment room air (P)   -TM     Pre Patient Position Supine (P)   -TM     Post Patient Position Supine (P)   -TM       Row Name 10/04/24 1309          Positioning and Restraints    Pre-Treatment Position in bed (P)   -TM     Post Treatment Position bed (P)   -TM     In Bed notified nsg;supine;call light within reach;encouraged to call for assist (P)   exit alarm unchanged  -TM               User Key  (r) = Recorded By, (t) = Taken By, (c) = Cosigned By      Initials Name Provider Type    TM Britton Lipscomb, PT Student PT Student                   Outcome Measures       Row Name 10/04/24 1312          How much help from another person do you currently need...    Turning from your back to your side while in flat bed without using bedrails? 4 (P)   -TM     Moving from lying on back to sitting on the side of a flat bed without bedrails? 3 (P)   -TM     Moving to and from a bed to a chair (including a wheelchair)? 3 (P)   -TM     Standing up from a chair using your arms (e.g., wheelchair, bedside chair)? 4 (P)   -TM     Climbing 3-5 steps with a railing? 2 (P)   -TM     To walk in hospital room? 3 (P)   -TM     AM-PAC 6 Clicks Score (PT) 19 (P)   -TM     Highest Level of Mobility Goal 6 --> Walk 10 steps or more (P)   -TM       Row Name 10/04/24 1312          Functional Assessment    Outcome Measure Options AM-PAC 6 Clicks Basic Mobility (PT) (P)   -TM                User Key  (r) = Recorded By, (t) = Taken By, (c) = Cosigned By      Initials Name Provider Type    TM Britton Lipscomb, PT Student PT Student                                 Physical Therapy Education       Title: PT OT SLP Therapies (In Progress)       Topic: Physical Therapy (In Progress)       Point: Mobility training (Done)       Learning Progress Summary             Patient Acceptance, E, VU by TM at 10/4/2024 1313    Acceptance, E, NR by TM at 10/2/2024 1129                         Point: Home exercise program (Not Started)       Learner Progress:  Not documented in this visit.              Point: Body mechanics (Done)       Learning Progress Summary             Patient Acceptance, E, VU by TM at 10/4/2024 1313    Acceptance, E, NR by TM at 10/2/2024 1129                         Point: Precautions (Done)       Learning Progress Summary             Patient Acceptance, E, VU by TM at 10/4/2024 1313    Acceptance, E, NR by TM at 10/2/2024 1129                                         User Key       Initials Effective Dates Name Provider Type [facility] 08/13/24 -  Britton Lipscomb, PT Student PT Student PT                  PT Recommendation and Plan  Planned Therapy Interventions (PT): balance training, bed mobility training, gait training, home exercise program, patient/family education, postural re-education, ROM (range of motion), stair training, strengthening, stretching, transfer training  Plan of Care Reviewed With: (P) patient  Progress: (P) improving  Outcome Evaluation: (P) Pt cont to present w/ dec functional mobility and activity tolerance compared to baseline. Pt ambulated 100 ft w/ CGA x1 using FWW w/ 1 standing rest break. Further distance limited by fatigue. Pt cont to require skilled IPPT services to return to PLOF. Will progress per pt tolerance and POC.     Time Calculation:   PT Evaluation Complexity  History, PT Evaluation Complexity: 3 or more personal factors and/or  comorbidities  Examination of Body Systems (PT Eval Complexity): total of 3 or more elements  Clinical Presentation (PT Evaluation Complexity): stable  Clinical Decision Making (PT Evaluation Complexity): low complexity  Overall Complexity (PT Evaluation Complexity): low complexity     PT Charges       Row Name 10/04/24 1314             Time Calculation    Start Time 1145 (P)   -TM      PT Received On 10/04/24 (P)   -TM      PT Goal Re-Cert Due Date 10/12/24 (P)   -TM         Timed Charges    59316 - PT Therapeutic Activity Minutes 13 (P)   -TM         Total Minutes    Timed Charges Total Minutes 13 (P)   -TM       Total Minutes 13 (P)   -TM                User Key  (r) = Recorded By, (t) = Taken By, (c) = Cosigned By      Initials Name Provider Type    TM Britton Lipscomb, PT Student PT Student                  Therapy Charges for Today       Code Description Service Date Service Provider Modifiers Qty    23418374253 HC PT THERAPEUTIC ACT EA 15 MIN 10/4/2024 Britton Lipscomb, PT Student GP 1            PT G-Codes  Outcome Measure Options: (P) AM-PAC 6 Clicks Basic Mobility (PT)  AM-PAC 6 Clicks Score (PT): (P) 19  PT Discharge Summary  Anticipated Discharge Disposition (PT): (P) inpatient rehabilitation facility    Britton Lipscomb PT Student  10/4/2024

## 2024-10-04 NOTE — PLAN OF CARE
Goal Outcome Evaluation:  Plan of Care Reviewed With: (P) patient        Progress: (P) improving  Outcome Evaluation: (P) Pt cont to present w/ dec functional mobility and activity tolerance compared to baseline. Pt ambulated 100 ft w/ CGA x1 using FWW w/ 1 standing rest break. Further distance limited by fatigue. Pt cont to require skilled IPPT services to return to PLOF. Will progress per pt tolerance and POC.      Anticipated Discharge Disposition (PT): (P) inpatient rehabilitation facility

## 2024-10-04 NOTE — PROGRESS NOTES
"IM progress note      Valeria Tracy  [id number]  1940     LOS: 3 days     Attending: Angelito Ruiz MD    Primary Care Provider: Peyton Vázquez PA      Chief Complaint/Reason for visit:  Abd pain    Subjective   Doing well.  Tolerated soft diet.  Stoma with output  Good pain control.  Has required In-N-Out catheterization since Harrington removal last night.      Denies f/c/n/v/sob/cp.    Objective        Visit Vitals  /82 (BP Location: Left arm, Patient Position: Lying)   Pulse 95   Temp 97.9 °F (36.6 °C) (Oral)   Resp 16   Ht 165.1 cm (65\")   Wt 56 kg (123 lb 8 oz)   SpO2 93%   BMI 20.55 kg/m²     Temp (24hrs), Av.9 °F (36.6 °C), Min:97.3 °F (36.3 °C), Max:98.6 °F (37 °C)         Respiratory: RA    Physical Exam:     General Appearance:    Alert, cooperative, in no acute distress   Head:    Normocephalic, without obvious abnormality, atraumatic    Lungs:     Normal effort, symmetric chest rise, no crepitus, clear to      auscultation bilaterally             Heart:    Regular rhythm and normal rate, normal S1 and S2   Abdomen:     Soft, expected tenderness. Midline dressing CDI. JESUS present.       Extremities:   No clubbing, cyanosis or edema.  No deformities.    Pulses:   Pulses palpable and equal bilaterally   Skin:   No bleeding, bruising or rash   Neurologic:   Moves all extremities with no obvious focal motor deficit.  Cranial nerves 2 - 12 grossly intact     Results Review:     I reviewed the patient's new clinical results.   Results from last 7 days   Lab Units 10/04/24  0434 10/02/24  0927   WBC 10*3/mm3 5.50 11.57*   HEMOGLOBIN g/dL 9.5* 11.6*   HEMATOCRIT % 29.8* 36.6   PLATELETS 10*3/mm3 189 217     Results from last 7 days   Lab Units 10/04/24  0434 10/02/24  0514   SODIUM mmol/L 141 134*   POTASSIUM mmol/L 3.8 5.1   CHLORIDE mmol/L 107 101   CO2 mmol/L 24.0 22.0   BUN mg/dL 4* 9   CREATININE mg/dL 0.45* 0.55*   CALCIUM mg/dL 8.3* 8.4*   GLUCOSE mg/dL 74 113*     I reviewed the patient's " new imaging including images and reports.    All medications reviewed.   acetaminophen, 650 mg, Oral, Q8H  budesonide-formoterol, 2 puff, Inhalation, BID - RT  cetirizine, 5 mg, Oral, Daily  famotidine, 10 mg, Oral, BID  heparin (porcine), 5,000 Units, Subcutaneous, Q8H  levothyroxine, 88 mcg, Oral, Q AM  tamsulosin, 0.4 mg, Oral, Daily        Assessment & Plan     S/P abdominal perineal resection, partial thickness posterior vaginal resection    GERD (gastroesophageal reflux disease)    Asthma    Hypothyroidism    Acute postoperative pain    Rectal cancer    Severe malnutrition    Postoperative urinary retention      Plan  1. Ambulation, encouraged, PT is following  2. Pain control-prns   3. IS-encouraged  4. DVT proph- mechs/heparin  5. Bowel regimen  6. DC planning, inpatient rehab, referrals has been made.  I discussed with     -Asthma: Maintain home regimen with formulary substitution as indicated.  As needed bronchodilators.  Monitor oxygenation.     -Hypothyroidism: Resume replacement      -GERD:  Resumed H2 blocker.      -New stoma:  Wound care stoma nurse consult for stoma care and education throughout patient's hospitalization.    -Urinary retention: Added tamsulosin, check UA.  Consider anchoring Harrington for couple days until mobility has improved.    Discussed with patient and RN.    Maggi Lance MD  10/04/24  19:45 EDT

## 2024-10-04 NOTE — DISCHARGE PLACEMENT REQUEST
"To: [facility] ATTN: Ynes and admissions  From: Rosa Vizcainocristina,  [phone]    Cris Leonard (84 y.o. Female)       Date of Birth   1940    Social Security Number       Address   [address] [place] [address]    Home Phone   [phone]    MRN   [id number]       Jain   None    Marital Status   Single                            Admission Date   10/1/24    Admission Type   Elective    Admitting Provider   Angelito Ruiz MD    Attending Provider   Angelito Ruiz MD    Department, Room/Bed   [address], S572/1       Discharge Date       Discharge Disposition       Discharge Destination                                 Attending Provider: Angelito Ruiz MD    Allergies: Milk-related Compounds    Isolation: None   Infection: None   Code Status: CPR    Ht: 165.1 cm (65\")   Wt: 56 kg (123 lb 8 oz)    Admission Cmt: None   Principal Problem: S/P abdominal perineal resection, partial thickness posterior vaginal resection [Z90.49]                   Active Insurance as of 10/1/2024       Primary Coverage       Payor Plan Insurance Group Employer/Plan Group    HUMANA MEDICARE REPLACEMENT HUMANA MED ADV PPO [id number]       Payor Plan Address Payor Plan Phone Number Payor Plan Fax Number Effective Dates    [address] [address] [phone]  3/1/2024 - None Entered    [facility] [id number]         Subscriber Name Subscriber Birth Date Member ID       CRIS LEONARD 1940 [id number]                     Emergency Contacts        (Rel.) Home Phone Work Phone Mobile Phone    ANNABELLA SALES (Grandchild) [phone] -- [phone]                 History & Physical        Maggi Lance MD at 10/01/24 1815          Admission HP     Patient Name: Cris Leonard  MRN: [id number]  : 1940  DOS: 10/1/2024    Attending: Maggi Lance MD    Primary Care Provider: Peyton Vázquez PA      Patient Care Team:  Peyton Vázquez PA " as PCP - General (Physician Assistant)    Chief complaint: Rectal cancer    Subjective  Patient is a pleasant 84 y.o. female presented for scheduled surgery by Dr. Ruiz.    Patient has history of right colon cancer for which she had a colectomy, T3 N1 has been resected with persistent neoplasia in the posterior distal rectum after YUDELKA prompting plans for APR.    Today she underwent abdominal perineal resection with partial thickness posterior vaginal resection.  Surgery included placement of a JESUS drain into the deep pelvis and colostomy creation in the left abdomen.    Surgery was done under general anesthesia and a block, was tolerated well.  I saw her in PACU where she is still in a drowsy  Postop state.  Not in distress.       Allergies   Allergen Reactions    Milk-Related Compounds Anaphylaxis        Medications Prior to Admission   Medication Sig Dispense Refill Last Dose    albuterol sulfate  (90 Base) MCG/ACT inhaler Inhale 2 puffs Every 4 (Four) Hours As Needed for Wheezing.   Past Week    Calcium-Vitamin D-Vitamin K (VIACTIV CALCIUM PLUS D PO) Take 2 doses by mouth Daily.   Past Week    DOCUSATE SODIUM PO Take 100 mg by mouth Daily As Needed (constipation).   Past Week    famotidine (PEPCID) 10 MG tablet Take 1 tablet by mouth 2 (Two) Times a Day.   Past Month    FLUTICASONE PROPIONATE NA 2 sprays into the nostril(s) as directed by provider Daily.   Past Week    fluticasone-salmeterol (ADVAIR HFA) 115-21 MCG/ACT inhaler Inhale 2 puffs 2 (Two) Times a Day.   10/1/2024    levothyroxine (SYNTHROID, LEVOTHROID) 88 MCG tablet Take 1 tablet by mouth Daily.   9/30/2024    Loratadine 10 MG capsule Take 1 capsule by mouth Daily.   9/30/2024    Multiple Vitamins-Minerals (Womens Multi Gummies) chewable tablet Chew 1 tablet Daily.   Past Week    ibuprofen (ADVIL,MOTRIN) 200 MG tablet Take 1 tablet by mouth Every 6 (Six) Hours As Needed for Mild Pain.       sodium chloride 0.65 % nasal spray Administer 1 spray  "into the nostril(s) as directed by provider As Needed for Congestion.             Past Medical History:   Diagnosis Date    Acid reflux     Anemia     Asthma     Cancer     rectal    History of chemotherapy     History of radiation therapy     History of transfusion     No Reaction    Hypothyroid     Pneumonia     Wears dentures     upper    Wears glasses     Wears hearing aid in both ears      Past Surgical History:   Procedure Laterality Date    BREAST LUMPECTOMY Right     CATARACT EXTRACTION Bilateral     COLON RESECTION N/A 10/17/2023    Procedure: COLON RESECTION RIGHT LAPAROSCOPIC;  Surgeon: Angelito Ruiz MD;  Location: [facility];  Service: General;  Laterality: N/A;    COLONOSCOPY      PORTACATH PLACEMENT      SHOULDER SURGERY Right      History reviewed. No pertinent family history.  Social History     Tobacco Use    Smoking status: Former     Types: Cigarettes    Smokeless tobacco: Never   Vaping Use    Vaping status: Never Used   Substance Use Topics    Alcohol use: Never    Drug use: Never       Review of Systems  Review of systems could not be obtained due to   patient sedation status.    Vital Signs  /73 (BP Location: Right arm, Patient Position: Lying)   Pulse 67   Temp 97.6 °F (36.4 °C) (Axillary)   Resp 16   Ht 165.1 cm (65\")   Wt 56 kg (123 lb 8 oz)   SpO2 99%   BMI 20.55 kg/m²     Physical Exam:    General Appearance:  Drowsy postop in no acute distress   Head:    Normocephalic, without obvious abnormality, atraumatic   Eyes:            Lids and lashes normal, conjunctivae and sclerae normal, no   icterus    Ears:    Ears appear intact with no abnormalities noted   Throat:   No oral lesions, no thrush, oral mucosa moist   Neck:   No adenopathy, supple, trachea midline, no thyromegaly         Lungs:     Clear to auscultation,respirations regular, even and       unlabored. No wheezes or rales.    Heart:    Regular rhythm and normal rate, normal S1 and S2, no murmur    Abdomen:      " "Soft with midline incision with an intact dressing.  Left colostomy and low abdomen/pelvic JESUS drain.   Genitalia:    Deferred   Extremities:   No edema, no cyanosis, no              redness   Pulses:   Pulses palpable and equal bilaterally   Skin:   No bleeding, bruising or rash   Neurologic:   Cranial nerves 2 - 12 grossly intact,             Invalid input(s): \"NEUTOPHILPCT\"        Invalid input(s): \"LABALBU\", \"PROT\"  Lab Results   Component Value Date    HGBA1C 5.20 09/23/2024      Latest Reference Range & Units 09/23/24 13:17   Sodium 136 - 145 mmol/L 139   Potassium 3.5 - 5.2 mmol/L 4.2   Chloride 98 - 107 mmol/L 101   CO2 22.0 - 29.0 mmol/L 29.0   Anion Gap 5.0 - 15.0 mmol/L 9.0   BUN 8 - 23 mg/dL 8   Creatinine 0.57 - 1.00 mg/dL 0.65   BUN/Creatinine Ratio 7.0 - 25.0  12.3   eGFR >60.0 mL/min/1.73 87.5   Glucose 65 - 99 mg/dL 89   Calcium 8.6 - 10.5 mg/dL 9.6   Alkaline Phosphatase 39 - 117 U/L 119 (H)   Total Protein 6.0 - 8.5 g/dL 7.1   Albumin 3.5 - 5.2 g/dL 4.3   Globulin gm/dL 2.8   A/G Ratio g/dL 1.5   AST (SGOT) 1 - 32 U/L 22   ALT (SGPT) 1 - 33 U/L 8   Total Bilirubin 0.0 - 1.2 mg/dL 0.5   Hemoglobin A1C 4.80 - 5.60 % 5.20   WBC 3.40 - 10.80 10*3/mm3 5.08   RBC 3.77 - 5.28 10*6/mm3 3.91   Hemoglobin 12.0 - 15.9 g/dL 12.1   Hematocrit 34.0 - 46.6 % 38.6   Platelets 140 - 450 10*3/mm3 302   RDW 12.3 - 15.4 % 13.0   MCV 79.0 - 97.0 fL 98.7 (H)   MCH 26.6 - 33.0 pg 30.9   MCHC 31.5 - 35.7 g/dL 31.3 (L)   MPV 6.0 - 12.0 fL 9.1   RDW-SD 37.0 - 54.0 fl 47.0   (H): Data is abnormally high  (L): Data is abnormally low    Assessment and Plan:   S/p ABDOMINAL PERINEAL RESECTION  Partial thickness posterior vaginal resection.    Rectal cancer    GERD (gastroesophageal reflux disease)    Asthma    Hypothyroidism      Plan  Postop management to include  1. Ambulation, several times daily  2. Pain control-PRNs, multimodal approach   3. IS-will encourage  4. DVT proph- Mechanical, subcutaneous heparin  5. Bowel " regimen, alvimopan  6. Resume home medications as appropriate  7. Monitor post-op labs  8. Discharge planning   9. Diet, Clears, advance diet as tolerated.  IVF initially, monitor volume status.    -Asthma: Maintain home regimen with formulary substitution as indicated.  As needed bronchodilators.  Monitor oxygenation.    -Hypothyroidism: Resume replacement     -GERD:  Resume H2 blocker.     -New stoma:  Wound care stoma nurse consult for stoma care and education throughout patient's hospitalization.      Dragon disclaimer:  Part of this encounter note is an electronic transcription/translation of spoken language to printed text. The electronic translation of spoken language may permit erroneous, or at times, nonsensical words or phrases to be inadvertently transcribed; Although I have reviewed the note for such errors, some may still exist.    Maggi Lance MD  10/01/24  18:15 EDT              Electronically signed by Maggi Lance MD at 10/01/24 2202       Pina Norman APRN at 10/01/24 0910       Attestation signed by Angelito Ruiz MD at 10/01/24 1130    No interval change    Goals of postoperative care reviewed                [facility] Pre-op    Full history and physical note from office is attached.    /85 (BP Location: Right arm, Patient Position: Lying)   Pulse 85   Temp 98.7 °F (37.1 °C) (Temporal)   Resp 18   SpO2 100%     Immunizations:  Influenza:  No  Pneumococcal:  UTD  Tetanus:  UTD    Review of Systems:  Constitutional-- No fever, chills or sweats. No fatigue.  CV-- No chest pain, palpitation or syncope  Resp-- No SOB, cough, hemoptysis  Skin--No rashes or lesions    Physical Exam:  Heart:   Regular rate and rhythm, S1 and S2 normal  Lungs: Clear to auscultation bilaterally, respirations unlabored    LAB Results:  Lab Results   Component Value Date    WBC 5.08 09/23/2024    HGB 12.1 09/23/2024    HCT 38.6 09/23/2024    MCV 98.7 (H) 09/23/2024      "09/23/2024    NEUTROABS 7.07 (H) 10/23/2023    GLUCOSE 89 09/23/2024    BUN 8 09/23/2024    CREATININE 0.65 09/23/2024     09/23/2024    K 4.2 09/23/2024     09/23/2024    CO2 29.0 09/23/2024    CALCIUM 9.6 09/23/2024    ALBUMIN 4.3 09/23/2024    AST 22 09/23/2024    ALT 8 09/23/2024    BILITOT 0.5 09/23/2024     Study Result    Narrative & Impression   MRI PELVIS WO CONTRAST     Date of Exam: 8/22/2024 6:43 PM EDT     Indication: C20.     Comparison: CT abdomen pelvis 10/20/2023.     Technique:  Routine multiplanar/multisequence images of the pelvis were obtained without contrast administration.     Per chart review: 10/25/2023 patient underwent colonoscopy which demonstrated \"large bulky mass lesion was seen in the rectum. This arose from the posterior wall of the rectum was greater than 5 cm in size, arising from a broad base with distal extent to   within a proximally 3 cm of the dentate line. Several biopsies were obtained, that showed moderately differentiated adenocarcinoma.\" Patient has subsequently undergone chemoradiation at an outside institution for rectal cancer. No pretreatment/baseline   rectal MRI performed.     Findings:     Note there is a large amount of gas in the rectum, which makes evaluation suboptimal. Along the posterior rectal wall there is T2 hypointense signal suggestive of scar/fibrosis, which spans around 3.5 cm in craniocaudal dimension (series 5 image 10-12)   which appears to be at the site of the rectal tumor seen as an enhancing mass on prior CT. The inferior margin of the scar extends to 2.2 cm above the anorectal junction and 4.2 cm above the anal verge. Within the posterior mesorectal fat at this   location at the 6 o'clock position, there are thin linear/radiating areas of T2 hypointensity suggestive of desmoplastic stranding. Otherwise, there is no convincing evidence of mesorectal involvement. No distinct intermediate T2 signal or discrete   restricted diffusion " to suggest significant residual viable disease.     There is a 4 x 4 mm lymph node in the mesorectum at the 7 o'clock position, without prominent diffusion signal or suspicious morphologic features, unchanged in size from prior CT and of low suspicion. No mesorectal lymphadenopathy otherwise. No   extramesorectal lymphadenopathy within the field-of-view.     Small uterus and ovaries, typical of age. Questionable small fibroid present. No free fluid or organized fluid collection. No subcutaneous soft tissue abnormality. No acute or suspicious osseous lesions. Degenerative changes of the bilateral hips.     IMPRESSION:  Impression:     Within the confines of comparing to a prior CT without prior/baseline MRI, as well as a large amount of gas in the rectum which distorts diffusion-weighted sequences, there appears to be positive response to treatment with predominantly T2 hypointense   signal at the site of the treated rectal tumor suggestive of scar/fibrosis. No distinct intermediate T2 signal nor restricted diffusion to suggest measurable residual viable tumor. Mesorectum is generally clear without convincing evidence of tumor   involvement nor suspicious lymphadenopathy, with note made of a tiny/nonsuspicious lymph node. Consider correlation with endoscopy and tissue sampling as clinically indicated.     Cancer Staging (if applicable)  Cancer Patient: __ yes __no __unknown__N/A; If yes, clinical stage T:__ N:__M:__, stage group or __N/A      Impression: History rectal cancer, Stage III      Plan: ABDOMINAL PERINEAL RESECTION, LYSIS OF ADHESIONS       RAINA Arana   10/1/2024   09:10 EDT    Electronically signed by Angelito Ruiz MD at 10/01/24 1130   Source Note: H&P        [Media Unavailable] Scan on 9/9/2024 0913 by [employer] Onbase, [place]: H&P, CSGA, 09/06/2024          Electronically signed by [employer] On[employer], [place] at 09/09/24 0916                 H&P signed by [employer] On[employer], [place] at 09/09/24 0916          [Media Unavailable] Scan on 9/9/2024 0913 by [employer] Onbase, [place]: H&P, CSGA, 09/06/2024          Electronically signed by [employer] On[employer], [place] at 09/09/24 0916       Current Facility-Administered Medications   Medication Dose Route Frequency Provider Last Rate Last Admin    acetaminophen (TYLENOL) tablet 650 mg  650 mg Oral Q8H Angelito Ruiz MD   650 mg at 10/04/24 0436    albuterol (PROVENTIL) nebulizer solution 0.083% 2.5 mg/3mL  2.5 mg Nebulization Q6H PRN Maggi Lance MD        budesonide-formoterol (SYMBICORT) 160-4.5 MCG/ACT inhaler 2 puff  2 puff Inhalation BID - RT Maggi Lance MD   2 puff at 10/04/24 0732    cetirizine (zyrTEC) tablet 5 mg  5 mg Oral Daily Maggi Lance MD   5 mg at 10/04/24 0827    diazePAM (VALIUM) tablet 5 mg  5 mg Oral Q6H PRN Angelito Ruiz MD        famotidine (PEPCID) tablet 10 mg  10 mg Oral BID Maggi Lance MD   10 mg at 10/04/24 0827    heparin (porcine) 5000 UNIT/ML injection 5,000 Units  5,000 Units Subcutaneous Q8H Angelito Ruiz MD   5,000 Units at 10/04/24 0436    HYDROcodone-acetaminophen (NORCO) 7.5-325 MG per tablet 1 tablet  1 tablet Oral Q4H PRN Angelito Ruiz MD   1 tablet at 10/03/24 1755    HYDROmorphone (DILAUDID) injection 1 mg  1 mg Intramuscular Q4H PRN Angelito Ruiz MD   1 mg at 10/01/24 1958    And    naloxone (NARCAN) injection 0.4 mg  0.4 mg Intravenous Q5 Min PRN Angelito Ruiz MD        ibuprofen (ADVIL,MOTRIN) tablet 400 mg  400 mg Oral Q6H PRN Angelito Ruiz MD   400 mg at 10/02/24 2020    labetalol (NORMODYNE,TRANDATE) injection 10 mg  10 mg Intravenous Q4H PRN Maggi Lance MD        levothyroxine (SYNTHROID, LEVOTHROID) tablet 88 mcg  88 mcg Oral Q AM Maggi Lance MD   88 mcg at 10/04/24 0436    nitroglycerin (NITROSTAT) SL tablet 0.4 mg  0.4 mg Sublingual Q5 Min PRN Angelito Ruiz MD        ondansetron ODT (ZOFRAN-ODT) disintegrating tablet 4 mg  4 mg Oral Q6H PRN Angelito Ruiz MD        Or     "ondansetron (ZOFRAN) injection 4 mg  4 mg Intravenous Q6H PRN Angelito Ruiz MD   4 mg at 10/03/24 1008    sodium chloride 0.9 % bolus 500 mL  500 mL Intravenous TID PRN Maggi Lance MD        tamsulosin (FLOMAX) 24 hr capsule 0.4 mg  0.4 mg Oral Daily Maggi Lance MD            Physician Progress Notes (most recent note)        Maggi Lance MD at 10/03/24 1558          IM progress note      Valeria Tracy  [id number]  1940     LOS: 2 days     Attending: Angelito Ruiz MD    Primary Care Provider: Peyton Vázquez PA      Chief Complaint/Reason for visit:  Abd pain    Subjective   Doing well.   Good pain control. Tolerating full liquids. Ambulated with PT.IPR is recommended.   Hurst present. Some stoma output.   Denies f/c/n/v/sob/cp.    Objective        Visit Vitals  /75 (BP Location: Right arm, Patient Position: Lying)   Pulse 100   Temp 98.2 °F (36.8 °C) (Oral)   Resp 16   Ht 165.1 cm (65\")   Wt 56 kg (123 lb 8 oz)   SpO2 92%   BMI 20.55 kg/m²     Temp (24hrs), Av.9 °F (36.6 °C), Min:97.5 °F (36.4 °C), Max:98.2 °F (36.8 °C)      Nutrition: Full liquids    Respiratory: RA    Physical Exam:     General Appearance:    Alert, cooperative, in no acute distress   Head:    Normocephalic, without obvious abnormality, atraumatic    Lungs:     Normal effort, symmetric chest rise, no crepitus, clear to      auscultation bilaterally             Heart:    Regular rhythm and normal rate, normal S1 and S2   Abdomen:     Soft, expected tenderness. Midline dressing CDI. JESUS present.   : hurst- yellow UOP   Extremities:   No clubbing, cyanosis or edema.  No deformities.    Pulses:   Pulses palpable and equal bilaterally   Skin:   No bleeding, bruising or rash   Neurologic:   Moves all extremities with no obvious focal motor deficit.  Cranial nerves 2 - 12 grossly intact     Results Review:     I reviewed the patient's new clinical results.   Results from last 7 days   Lab Units " 10/02/24  0927   WBC 10*3/mm3 11.57*   HEMOGLOBIN g/dL 11.6*   HEMATOCRIT % 36.6   PLATELETS 10*3/mm3 217     Results from last 7 days   Lab Units 10/02/24  0514   SODIUM mmol/L 134*   POTASSIUM mmol/L 5.1   CHLORIDE mmol/L 101   CO2 mmol/L 22.0   BUN mg/dL 9   CREATININE mg/dL 0.55*   CALCIUM mg/dL 8.4*   GLUCOSE mg/dL 113*     I reviewed the patient's new imaging including images and reports.    All medications reviewed.   acetaminophen, 650 mg, Oral, Q8H  budesonide-formoterol, 2 puff, Inhalation, BID - RT  cetirizine, 5 mg, Oral, Daily  famotidine, 10 mg, Oral, BID  gabapentin, 100 mg, Oral, BID  heparin (porcine), 5,000 Units, Subcutaneous, Q8H  levothyroxine, 88 mcg, Oral, Q AM        Assessment & Plan     S/P abdominal perineal resection, partial thickness posterior vaginal resection    GERD (gastroesophageal reflux disease)    Asthma    Hypothyroidism    Acute postoperative pain    Rectal cancer      Plan  1. Ambulation, encouraged, PT is following  2. Pain control-prns   3. IS-encouraged  4. DVT proph- mechs/heparin  5. Bowel regimen  6. Full liquid diet.  Advance diet as tolerated  7. Monitor post-op labs  8. DC planning, inpatient rehab is recommended at this point.    -Asthma: Maintain home regimen with formulary substitution as indicated.  As needed bronchodilators.  Monitor oxygenation.     -Hypothyroidism: Resume replacement      -GERD:  Resumed H2 blocker.      -New stoma:  Wound care stoma nurse consult for stoma care and education throughout patient's hospitalization.      Maggi Lance MD  10/03/24  15:58 EDT    Electronically signed by Maggi Lance MD at 10/03/24 1600       Consult Notes (most recent note)    No notes of this type exist for this encounter.          Physical Therapy Notes (most recent note)        Britton Lipscomb, PT Student at 10/04/24 1145  Version 1 of 1         Patient Name: Valeria Tracy  : 1940    MRN: [id number]                               Today's Date: 10/4/2024       Admit Date: 10/1/2024    Visit Dx:     ICD-10-CM ICD-9-CM   1. S/P right colectomy  Z90.49 V45.89   2. Rectal cancer  C20 154.1     Patient Active Problem List   Diagnosis    GERD (gastroesophageal reflux disease)    Asthma    Hypothyroidism    Colon cancer    S/P right colectomy    Acute postoperative pain    Anemia    Rectal cancer    S/P abdominal perineal resection, partial thickness posterior vaginal resection     Past Medical History:   Diagnosis Date    Acid reflux     Anemia     Asthma     Cancer     rectal    History of chemotherapy     History of radiation therapy     History of transfusion     No Reaction    Hypothyroid     Pneumonia     Wears dentures     upper    Wears glasses     Wears hearing aid in both ears      Past Surgical History:   Procedure Laterality Date    ABDOMINAL PERINEAL RESECTION N/A 10/1/2024    Procedure: ABDOMINAL PERINEAL RESECTION, LYSIS OF ADHESIONS AND CREATION OF COLOSTOMY;  Surgeon: Angelito Ruiz MD;  Location: [facility];  Service: General;  Laterality: N/A;    BREAST LUMPECTOMY Right     CATARACT EXTRACTION Bilateral     COLON RESECTION N/A 10/17/2023    Procedure: COLON RESECTION RIGHT LAPAROSCOPIC;  Surgeon: Angelito Ruiz MD;  Location: [facility];  Service: General;  Laterality: N/A;    COLONOSCOPY      PORTACATH PLACEMENT      SHOULDER SURGERY Right       General Information       Row Name 10/04/24 1302          Physical Therapy Time and Intention    Document Type therapy note (daily note) (P)   -TM     Mode of Treatment physical therapy (P)   -TM       Row Name 10/04/24 1302          General Information    Patient Profile Reviewed yes (P)   -TM     Existing Precautions/Restrictions fall;other (see comments) (P)   abdominal incison; colostomy  -TM     Barriers to Rehab medically complex;previous functional deficit (P)   -TM       Row Name 10/04/24 1302          Cognition    Orientation Status (Cognition) oriented x 3 (P)   -TM       Row  Name 10/04/24 1302          Safety Issues, Functional Mobility    Safety Issues Affecting Function (Mobility) awareness of need for assistance;insight into deficits/self-awareness;safety precaution awareness;safety precautions follow-through/compliance (P)   -TM     Impairments Affecting Function (Mobility) balance;endurance/activity tolerance;pain;postural/trunk control;strength (P)   -TM               User Key  (r) = Recorded By, (t) = Taken By, (c) = Cosigned By      Initials Name Provider Type    TM Britton Lipscomb, PT Student PT Student                   Mobility       Row Name 10/04/24 1304          Bed Mobility    Bed Mobility supine-sit;sit-supine (P)   -TM     Supine-Sit [place] (Bed Mobility) standby assist (P)   -TM     Sit-Supine [place] (Bed Mobility) standby assist (P)   -TM     Assistive Device (Bed Mobility) bed rails;head of bed elevated (P)   -TM     Comment, (Bed Mobility) No VCs required for sequencing or hand placement. (P)   -TM       Row Name 10/04/24 1304          Sit-Stand Transfer    Sit-Stand [place] (Transfers) contact guard;1 person assist (P)   -TM     Comment, (Sit-Stand Transfer) No VCs for sequencing or hand placement. (P)   -TM       Row Name 10/04/24 1304          Gait/Stairs (Locomotion)    [place] Level (Gait) contact guard;verbal cues;1 person assist (P)   -TM     Assistive Device (Gait) walker, front-wheeled (P)   -TM     Distance in Feet (Gait) 100 (P)   50+50  -TM     Deviations/Abnormal Patterns (Gait) bilateral deviations;tanya decreased;gait speed decreased;stride length decreased;base of support, narrow (P)   -TM     Bilateral Gait Deviations forward flexed posture;heel strike decreased (P)   -TM     Comment, (Gait/Stairs) Pt demo'd step through gait pattern w/ dec gait speed and forward flexed posture. Pt required 1 standing rest break w/ inc time for turns. Further distance limited by fatigue. (P)   -TM               User Key  (r) = Recorded By,  (t) = Taken By, (c) = Cosigned By      Initials Name Provider Type     Britton Lipscomb, PT Student PT Student                   Obj/Interventions       [facility] Name 10/04/24 1307          Balance    Balance Assessment sitting static balance;sitting dynamic balance;sit to stand dynamic balance;standing static balance;standing dynamic balance (P)   -TM     Static Sitting Balance standby assist (P)   -TM     Dynamic Sitting Balance standby assist (P)   -TM     Position, Sitting Balance unsupported;sitting edge of bed (P)   -TM     Sit to Stand Dynamic Balance verbal cues;contact guard;1-person assist (P)   -TM     Static Standing Balance contact guard;1-person assist (P)   -TM     Dynamic Standing Balance verbal cues;contact guard;1-person assist (P)   -TM     Position/Device Used, Standing Balance supported;walker, front-wheeled (P)   -TM               User Key  (r) = Recorded By, (t) = Taken By, (c) = Cosigned By      Initials Name Provider Type     Britton Lipscomb, PT Student PT Student                   Goals/Plan    No documentation.                  Clinical Impression       [facility] Name 10/04/24 1309          Pain    Pain Intervention(s) Ambulation/increased activity;Repositioned (P)   -TM     Additional Documentation Pain Scale: FACES Pre/Post-Treatment (Group) (P)   -TM       Row Name 10/04/24 1301          Pain Scale: FACES Pre/Post-Treatment    Pain: FACES Scale, Pretreatment 2-->hurts little bit (P)   -TM     Posttreatment Pain Rating 2-->hurts little bit (P)   -TM     Pain Location incisional (P)   -TM     Pain Location - abdomen (P)   -TM       [facility] Name 10/04/24 1308          Plan of Care Review    Plan of Care Reviewed With patient (P)   -TM     Progress improving (P)   -TM     Outcome Evaluation Pt cont to present w/ dec functional mobility and activity tolerance compared to baseline. Pt ambulated 100 ft w/ CGA x1 using FWW w/ 1 standing rest break. Further distance limited by fatigue. Pt cont to  require skilled IPPT services to return to PLOF. Will progress per pt tolerance and POC. (P)   -TM       Row Name 10/04/24 1309          Vital Signs    Pre Systolic BP Rehab 135 (P)   -TM     Pre Treatment Diastolic BP 82 (P)   -TM     Pretreatment Heart Rate (beats/min) 104 (P)   -TM     Posttreatment Heart Rate (beats/min) 106 (P)   -TM     Pre SpO2 (%) 92 (P)   -TM     O2 Delivery Pre Treatment room air (P)   -TM     Post SpO2 (%) 91 (P)   -TM     O2 Delivery Post Treatment room air (P)   -TM     Pre Patient Position Supine (P)   -TM     Post Patient Position Supine (P)   -TM       Row Name 10/04/24 1309          Positioning and Restraints    Pre-Treatment Position in bed (P)   -TM     Post Treatment Position bed (P)   -TM     In Bed notified nsg;supine;call light within reach;encouraged to call for assist (P)   exit alarm unchanged  -TM               User Key  (r) = Recorded By, (t) = Taken By, (c) = Cosigned By      Initials Name Provider Type    TM Britton Lipscomb, PT Student PT Student                   Outcome Measures       Row Name 10/04/24 1312          How much help from another person do you currently need...    Turning from your back to your side while in flat bed without using bedrails? 4 (P)   -TM     Moving from lying on back to sitting on the side of a flat bed without bedrails? 3 (P)   -TM     Moving to and from a bed to a chair (including a wheelchair)? 3 (P)   -TM     Standing up from a chair using your arms (e.g., wheelchair, bedside chair)? 4 (P)   -TM     Climbing 3-5 steps with a railing? 2 (P)   -TM     To walk in hospital room? 3 (P)   -TM     AM-PAC 6 Clicks Score (PT) 19 (P)   -TM     Highest Level of Mobility Goal 6 --> Walk 10 steps or more (P)   -TM       Row Name 10/04/24 1312          Functional Assessment    Outcome Measure Options AM-PAC 6 Clicks Basic Mobility (PT) (P)   -TM               User Key  (r) = Recorded By, (t) = Taken By, (c) = Cosigned By      Initials Name Provider  Type    TM Britton Lipscomb, PT Student PT Student                                 Physical Therapy Education       Title: PT OT SLP Therapies (In Progress)       Topic: Physical Therapy (In Progress)       Point: Mobility training (Done)       Learning Progress Summary             Patient Acceptance, E, VU by TM at 10/4/2024 1313    Acceptance, E, NR by TM at 10/2/2024 1129                         Point: Home exercise program (Not Started)       Learner Progress:  Not documented in this visit.              Point: Body mechanics (Done)       Learning Progress Summary             Patient Acceptance, E, VU by TM at 10/4/2024 1313    Acceptance, E, NR by TM at 10/2/2024 1129                         Point: Precautions (Done)       Learning Progress Summary             Patient Acceptance, E, VU by TM at 10/4/2024 1313    Acceptance, E, NR by TM at 10/2/2024 1129                                         User Key       Initials Effective Dates Name Provider Type Discipline     08/13/24 -  Britton Lipscomb, PT Student PT Student PT                  PT Recommendation and Plan  Planned Therapy Interventions (PT): balance training, bed mobility training, gait training, home exercise program, patient/family education, postural re-education, ROM (range of motion), stair training, strengthening, stretching, transfer training  Plan of Care Reviewed With: (P) patient  Progress: (P) improving  Outcome Evaluation: (P) Pt cont to present w/ dec functional mobility and activity tolerance compared to baseline. Pt ambulated 100 ft w/ CGA x1 using FWW w/ 1 standing rest break. Further distance limited by fatigue. Pt cont to require skilled IPPT services to return to PLOF. Will progress per pt tolerance and POC.     Time Calculation:   PT Evaluation Complexity  History, PT Evaluation Complexity: 3 or more personal factors and/or comorbidities  Examination of Body Systems (PT Eval Complexity): total of 3 or more elements  Clinical  Presentation (PT Evaluation Complexity): stable  Clinical Decision Making (PT Evaluation Complexity): low complexity  Overall Complexity (PT Evaluation Complexity): low complexity     PT Charges       Row Name 10/04/24 1314             Time Calculation    Start Time 1145 (P)   -TM      PT Received On 10/04/24 (P)   -TM      PT Goal Re-Cert Due Date 10/12/24 (P)   -TM         Timed Charges    64017 - PT Therapeutic Activity Minutes 13 (P)   -TM         Total Minutes    Timed Charges Total Minutes 13 (P)   -TM       Total Minutes 13 (P)   -TM                User Key  (r) = Recorded By, (t) = Taken By, (c) = Cosigned By      Initials Name Provider Type    TM Britton Lipscomb, PT Student PT Student                  Therapy Charges for Today       Code Description Service Date Service Provider Modifiers Qty    96672540698 HC PT THERAPEUTIC ACT EA 15 MIN 10/4/2024 Britton Lipscomb, PT Student GP 1            PT G-Codes  Outcome Measure Options: (P) AM-PAC 6 Clicks Basic Mobility (PT)  AM-PAC 6 Clicks Score (PT): (P) 19  PT Discharge Summary  Anticipated Discharge Disposition (PT): (P) inpatient rehabilitation facility    Britton Lipscomb PT Student  10/4/2024      Electronically signed by Britton Lipscomb PT Student at 10/04/24 1316       Occupational Therapy Notes (most recent note)    No notes exist for this encounter.

## 2024-10-04 NOTE — CONSULTS
Malnutrition Severity Assessment    Patient Name:  Valeria Tracy  YOB: 1940  MRN: [id number]  Admit Date:  10/1/2024    Patient meets criteria for : Severe Malnutrition    Comments:  Pt meets criteria for severe malnutrition in the context of acute illness indicated by severe muscle wasting and moderate subcutaneous fat loss, po intake <75% EEN x >/=7 days. Suspect wasting multi-factorial w/age-related sarcopenia and inadequate po intakes.     Malnutrition Severity Assessment  Malnutrition Type: Acute Disease or Injury - Related Malnutrition  Malnutrition Type (Last 8 Hours)       Malnutrition Severity Assessment       Row Name 10/04/24 1507       Malnutrition Severity Assessment    Malnutrition Type Acute Disease or Injury - Related Malnutrition      Row Name 10/04/24 1507       Insufficient Energy Intake     Insufficient Energy Intake Findings Moderate    Insufficient Energy Intake  <75% of est. energy requirement for >7d)      Row Name 10/04/24 1507       Muscle Loss    Loss of Muscle Mass Findings Severe    Acromion Bone Region Severe - squared shoulders, bones, and acromion process protrusion prominent    Dorsal Hand Region Severe - prominent depression    Patellar Region Moderate - patella more prominent, less muscle definition around patella    Anterior Thigh Region Moderate - mild depression on inner thigh    Posterior Calf Region Moderate - some roundness, slight firmness      Row Name 10/04/24 1507       Fat Loss    Subcutaneous Fat Loss Findings Moderate    Upper Arm Region Moderate - some fat tissue, not ample    Thoracic & Lumbar Region Moderate - ribs visible with mild depressions, iliac crest somewhat prominent      Row Name 10/04/24 1507       Criteria Met (Must meet criteria for severity in at least 2 of these categories: M Wasting, Fat Loss, Fluid, Secondary Signs, Wt. Status, Intake)    Patient meets criteria for  Severe Malnutrition                    Electronically signed  by:  Danuta Colon MS,RD,LD  10/04/24 15:08 EDT

## 2024-10-05 LAB
BH BB BLOOD EXPIRATION DATE: NORMAL
BH BB BLOOD EXPIRATION DATE: NORMAL
BH BB BLOOD TYPE BARCODE: 5100
BH BB BLOOD TYPE BARCODE: 5100
BH BB DISPENSE STATUS: NORMAL
BH BB DISPENSE STATUS: NORMAL
BH BB PRODUCT CODE: NORMAL
BH BB PRODUCT CODE: NORMAL
BH BB UNIT NUMBER: NORMAL
BH BB UNIT NUMBER: NORMAL
CROSSMATCH INTERPRETATION: NORMAL
CROSSMATCH INTERPRETATION: NORMAL
UNIT  ABO: NORMAL
UNIT  ABO: NORMAL
UNIT  RH: NORMAL
UNIT  RH: NORMAL

## 2024-10-05 PROCEDURE — 25010000002 HEPARIN (PORCINE) PER 1000 UNITS: Performed by: COLON & RECTAL SURGERY

## 2024-10-05 PROCEDURE — 94761 N-INVAS EAR/PLS OXIMETRY MLT: CPT

## 2024-10-05 PROCEDURE — 94664 DEMO&/EVAL PT USE INHALER: CPT

## 2024-10-05 PROCEDURE — 94799 UNLISTED PULMONARY SVC/PX: CPT

## 2024-10-05 RX ORDER — LIDOCAINE HYDROCHLORIDE 20 MG/ML
JELLY TOPICAL EVERY 4 HOURS PRN
Status: DISCONTINUED | OUTPATIENT
Start: 2024-10-05 | End: 2024-10-18 | Stop reason: HOSPADM

## 2024-10-05 RX ADMIN — ACETAMINOPHEN 650 MG: 325 TABLET ORAL at 20:48

## 2024-10-05 RX ADMIN — HEPARIN SODIUM 5000 UNITS: 5000 INJECTION INTRAVENOUS; SUBCUTANEOUS at 20:48

## 2024-10-05 RX ADMIN — ACETAMINOPHEN 650 MG: 325 TABLET ORAL at 13:25

## 2024-10-05 RX ADMIN — CETIRIZINE HYDROCHLORIDE 5 MG: 10 TABLET, FILM COATED ORAL at 08:26

## 2024-10-05 RX ADMIN — ACETAMINOPHEN 650 MG: 325 TABLET ORAL at 05:36

## 2024-10-05 RX ADMIN — DIAZEPAM 5 MG: 5 TABLET ORAL at 20:48

## 2024-10-05 RX ADMIN — BUDESONIDE AND FORMOTEROL FUMARATE DIHYDRATE 2 PUFF: 160; 4.5 AEROSOL RESPIRATORY (INHALATION) at 18:27

## 2024-10-05 RX ADMIN — HEPARIN SODIUM 5000 UNITS: 5000 INJECTION INTRAVENOUS; SUBCUTANEOUS at 13:25

## 2024-10-05 RX ADMIN — FAMOTIDINE 10 MG: 20 TABLET, FILM COATED ORAL at 08:26

## 2024-10-05 RX ADMIN — HEPARIN SODIUM 5000 UNITS: 5000 INJECTION INTRAVENOUS; SUBCUTANEOUS at 05:36

## 2024-10-05 RX ADMIN — TAMSULOSIN HYDROCHLORIDE 0.4 MG: 0.4 CAPSULE ORAL at 08:27

## 2024-10-05 RX ADMIN — FAMOTIDINE 10 MG: 20 TABLET, FILM COATED ORAL at 20:49

## 2024-10-05 RX ADMIN — BUDESONIDE AND FORMOTEROL FUMARATE DIHYDRATE 2 PUFF: 160; 4.5 AEROSOL RESPIRATORY (INHALATION) at 10:05

## 2024-10-05 RX ADMIN — LEVOTHYROXINE SODIUM 88 MCG: 0.09 TABLET ORAL at 05:36

## 2024-10-05 NOTE — PROGRESS NOTES
"IM progress note      Valeria Tracy  [id number]  1940     LOS: 4 days     Attending: Angelito Riuz MD    Primary Care Provider: Peyton Vázquez PA      Chief Complaint/Reason for visit:  Abd pain    Subjective   Patient feels fine, denies no fever or chills, no chest pain shortness of breath, still having difficulty with urinary retention requiring In-N-Out cath,    Objective        Visit Vitals  /73 (BP Location: Left arm, Patient Position: Lying)   Pulse 84   Temp 97.9 °F (36.6 °C) (Oral)   Resp 20   Ht 165.1 cm (65\")   Wt 56 kg (123 lb 8 oz)   SpO2 94%   BMI 20.55 kg/m²     Temp (24hrs), Av.9 °F (36.6 °C), Min:97.8 °F (36.6 °C), Max:97.9 °F (36.6 °C)         Respiratory: RA    Physical Exam:     General Appearance:    Alert, cooperative, in no acute distress   Head:    Normocephalic, without obvious abnormality, atraumatic    Lungs:     Normal effort, symmetric chest rise, no crepitus, clear to      auscultation bilaterally             Heart:    Regular rhythm and normal rate, normal S1 and S2   Abdomen:     Soft, expected tenderness. Midline dressing CDI. JESUS present.       Extremities:   No clubbing, cyanosis or edema.  No deformities.    Pulses:   Pulses palpable and equal bilaterally   Skin:   No bleeding, bruising or rash   Neurologic:   Moves all extremities with no obvious focal motor deficit.  Cranial nerves 2 - 12 grossly intact     Results Review:     I reviewed the patient's new clinical results.   Results from last 7 days   Lab Units 10/04/24  0434 10/02/24  0927   WBC 10*3/mm3 5.50 11.57*   HEMOGLOBIN g/dL 9.5* 11.6*   HEMATOCRIT % 29.8* 36.6   PLATELETS 10*3/mm3 189 217     Results from last 7 days   Lab Units 10/04/24  0434 10/02/24  0514   SODIUM mmol/L 141 134*   POTASSIUM mmol/L 3.8 5.1   CHLORIDE mmol/L 107 101   CO2 mmol/L 24.0 22.0   BUN mg/dL 4* 9   CREATININE mg/dL 0.45* 0.55*   CALCIUM mg/dL 8.3* 8.4*   GLUCOSE mg/dL 74 113*     I reviewed the patient's new imaging " including images and reports.    All medications reviewed.   acetaminophen, 650 mg, Oral, Q8H  budesonide-formoterol, 2 puff, Inhalation, BID - RT  cetirizine, 5 mg, Oral, Daily  famotidine, 10 mg, Oral, BID  heparin (porcine), 5,000 Units, Subcutaneous, Q8H  levothyroxine, 88 mcg, Oral, Q AM  tamsulosin, 0.4 mg, Oral, Daily        Assessment & Plan     S/P abdominal perineal resection, partial thickness posterior vaginal resection    GERD (gastroesophageal reflux disease)    Asthma    Hypothyroidism    Acute postoperative pain    Rectal cancer    Severe malnutrition    Postoperative urinary retention      Plan  1. Ambulation, encouraged, PT is following  2. Pain control-prns   3. IS-encouraged  4. DVT proph- mechs/heparin  5. Bowel regimen  6. DC planning, inpatient rehab, referrals has been made.  I discussed with   Waiting on rehab bed      -Asthma: Maintain home regimen with formulary substitution as indicated.  As needed bronchodilators.  Monitor oxygenation.     -Hypothyroidism: Resume replacement      -GERD:  Resumed H2 blocker.      -New stoma:  Wound care stoma nurse consult for stoma care and education throughout patient's hospitalization.    -Urinary retention:   Continue tamsulosin,   Urinalysis unremarkable.    Continue In-N-Out cath  May need to increase the dose of tamsulosin    Discussed with patient and RN.    Gonzalez Ambriz MD  10/05/24  11:29 EDT

## 2024-10-06 PROCEDURE — 94664 DEMO&/EVAL PT USE INHALER: CPT

## 2024-10-06 PROCEDURE — 25010000002 HEPARIN (PORCINE) PER 1000 UNITS: Performed by: COLON & RECTAL SURGERY

## 2024-10-06 PROCEDURE — 94761 N-INVAS EAR/PLS OXIMETRY MLT: CPT

## 2024-10-06 PROCEDURE — 94799 UNLISTED PULMONARY SVC/PX: CPT

## 2024-10-06 RX ADMIN — ACETAMINOPHEN 650 MG: 325 TABLET ORAL at 14:08

## 2024-10-06 RX ADMIN — ACETAMINOPHEN 650 MG: 325 TABLET ORAL at 20:34

## 2024-10-06 RX ADMIN — ACETAMINOPHEN 650 MG: 325 TABLET ORAL at 06:00

## 2024-10-06 RX ADMIN — FAMOTIDINE 10 MG: 20 TABLET, FILM COATED ORAL at 08:47

## 2024-10-06 RX ADMIN — HEPARIN SODIUM 5000 UNITS: 5000 INJECTION INTRAVENOUS; SUBCUTANEOUS at 06:00

## 2024-10-06 RX ADMIN — HEPARIN SODIUM 5000 UNITS: 5000 INJECTION INTRAVENOUS; SUBCUTANEOUS at 20:34

## 2024-10-06 RX ADMIN — LEVOTHYROXINE SODIUM 88 MCG: 0.09 TABLET ORAL at 06:00

## 2024-10-06 RX ADMIN — TAMSULOSIN HYDROCHLORIDE 0.4 MG: 0.4 CAPSULE ORAL at 08:48

## 2024-10-06 RX ADMIN — BUDESONIDE AND FORMOTEROL FUMARATE DIHYDRATE 2 PUFF: 160; 4.5 AEROSOL RESPIRATORY (INHALATION) at 19:12

## 2024-10-06 RX ADMIN — BUDESONIDE AND FORMOTEROL FUMARATE DIHYDRATE 2 PUFF: 160; 4.5 AEROSOL RESPIRATORY (INHALATION) at 13:18

## 2024-10-06 RX ADMIN — FAMOTIDINE 10 MG: 20 TABLET, FILM COATED ORAL at 20:34

## 2024-10-06 RX ADMIN — HEPARIN SODIUM 5000 UNITS: 5000 INJECTION INTRAVENOUS; SUBCUTANEOUS at 14:09

## 2024-10-06 RX ADMIN — CETIRIZINE HYDROCHLORIDE 5 MG: 10 TABLET, FILM COATED ORAL at 08:48

## 2024-10-06 NOTE — PROGRESS NOTES
Able to spontaneously empty her bladder.  Very pleased with that progress.  Vital signs look good as well as her exam.  She is eating dinner this evening.  Ambulating.  No colorectal surgical issues at this time.  Awaiting rehab placement.

## 2024-10-06 NOTE — PROGRESS NOTES
"IM progress note      Valeria Tracy  [id number]  1940     LOS: 5 days     Attending: Angelito Ruiz MD    Primary Care Provider: Peyton Vázquez PA      Chief Complaint/Reason for visit:  Abd pain    Subjective   Patient feels fine, excited today because she was able to urinate on her own, denies no fever or chills, no chest pain shortness of breath,    Objective        Visit Vitals  /94 (BP Location: Left arm, Patient Position: Sitting)   Pulse 86   Temp 97.9 °F (36.6 °C) (Oral)   Resp 18   Ht 165.1 cm (65\")   Wt 56 kg (123 lb 8 oz)   SpO2 95%   BMI 20.55 kg/m²     Temp (24hrs), Av.4 °F (36.9 °C), Min:97.9 °F (36.6 °C), Max:99.2 °F (37.3 °C)         Respiratory: RA    Physical Exam:     General Appearance:    Alert, cooperative, in no acute distress   Head:    Normocephalic, without obvious abnormality, atraumatic    Lungs:     Normal effort, symmetric chest rise, no crepitus, clear to      auscultation bilaterally             Heart:    Regular rhythm and normal rate, normal S1 and S2   Abdomen:     Soft, expected tenderness. Midline dressing CDI. JESUS present.       Extremities:   No clubbing, cyanosis or edema.  No deformities.    Pulses:   Pulses palpable and equal bilaterally   Skin:   No bleeding, bruising or rash   Neurologic:   Moves all extremities with no obvious focal motor deficit.  Cranial nerves 2 - 12 grossly intact     Results Review:     I reviewed the patient's new clinical results.   Results from last 7 days   Lab Units 10/04/24  0434 10/02/24  0927   WBC 10*3/mm3 5.50 11.57*   HEMOGLOBIN g/dL 9.5* 11.6*   HEMATOCRIT % 29.8* 36.6   PLATELETS 10*3/mm3 189 217     Results from last 7 days   Lab Units 10/04/24  0434 10/02/24  0514   SODIUM mmol/L 141 134*   POTASSIUM mmol/L 3.8 5.1   CHLORIDE mmol/L 107 101   CO2 mmol/L 24.0 22.0   BUN mg/dL 4* 9   CREATININE mg/dL 0.45* 0.55*   CALCIUM mg/dL 8.3* 8.4*   GLUCOSE mg/dL 74 113*     I reviewed the patient's new imaging including images " and reports.    All medications reviewed.   acetaminophen, 650 mg, Oral, Q8H  budesonide-formoterol, 2 puff, Inhalation, BID - RT  cetirizine, 5 mg, Oral, Daily  famotidine, 10 mg, Oral, BID  heparin (porcine), 5,000 Units, Subcutaneous, Q8H  levothyroxine, 88 mcg, Oral, Q AM  tamsulosin, 0.4 mg, Oral, Daily        Assessment & Plan     S/P abdominal perineal resection, partial thickness posterior vaginal resection    GERD (gastroesophageal reflux disease)    Asthma    Hypothyroidism    Acute postoperative pain    Rectal cancer    Severe malnutrition    Postoperative urinary retention      Plan  1. Ambulation, encouraged, PT is following  2. Pain control-prns   3. IS-encouraged  4. DVT proph- mechs/heparin  5. Bowel regimen  6. DC planning, inpatient rehab, referrals has been made.  I discussed with   Waiting on rehab bed      -Asthma: Maintain home regimen with formulary substitution as indicated.  As needed bronchodilators.  Monitor oxygenation.     -Hypothyroidism: Resume replacement      -GERD:  Resumed H2 blocker.      -New stoma:  Wound care stoma nurse consult for stoma care and education throughout patient's hospitalization.    -Urinary retention:   Improving  Continue tamsulosin,   Urinalysis unremarkable.    Continue In-N-Out cath as needed      Discussed with patient and RN.    Gonzalez Ambriz MD  10/06/24  11:40 EDT

## 2024-10-06 NOTE — PROGRESS NOTES
Seen and examined.  Chart data reviewed.  Eating and ostomy looks good.  Midline incision looks good.  Still unable to empty her bladder but straight catheterization is going well.  Awaiting rehab placement.  No surgical issues at this time.

## 2024-10-06 NOTE — PLAN OF CARE
Problem: Adult Inpatient Plan of Care  Goal: Absence of Hospital-Acquired Illness or Injury  Intervention: Identify and Manage Fall Risk  Recent Flowsheet Documentation  Taken 10/6/2024 0400 by Kierra Cha, RN  Safety Promotion/Fall Prevention:   activity supervised   assistive device/personal items within reach   clutter free environment maintained   fall prevention program maintained   lighting adjusted   nonskid shoes/slippers when out of bed   room organization consistent   safety round/check completed  Taken 10/6/2024 0200 by Kierra Cha, RN  Safety Promotion/Fall Prevention:   activity supervised   assistive device/personal items within reach   clutter free environment maintained   fall prevention program maintained   lighting adjusted   nonskid shoes/slippers when out of bed   room organization consistent   safety round/check completed  Taken 10/6/2024 0000 by Kierra Cha, RN  Safety Promotion/Fall Prevention:   activity supervised   assistive device/personal items within reach   clutter free environment maintained   fall prevention program maintained   lighting adjusted   nonskid shoes/slippers when out of bed   room organization consistent   safety round/check completed  Taken 10/5/2024 2200 by Kierra Cha, RN  Safety Promotion/Fall Prevention:   activity supervised   assistive device/personal items within reach   clutter free environment maintained   fall prevention program maintained   lighting adjusted   nonskid shoes/slippers when out of bed   room organization consistent   safety round/check completed  Taken 10/5/2024 2000 by Kierra Cha, RN  Safety Promotion/Fall Prevention:   activity supervised   assistive device/personal items within reach   clutter free environment maintained   fall prevention program maintained   lighting adjusted   nonskid shoes/slippers when out of bed   room organization consistent   safety round/check completed  Intervention: Prevent  Skin Injury  Recent Flowsheet Documentation  Taken 10/6/2024 0000 by Kierra Cha RN  Body Position: position changed independently  Taken 10/5/2024 2200 by Kierra Cha RN  Body Position: position changed independently  Taken 10/5/2024 2000 by Kierra Cha RN  Body Position: position changed independently  Intervention: Prevent and Manage VTE (Venous Thromboembolism) Risk  Recent Flowsheet Documentation  Taken 10/6/2024 0400 by Kierra Cha RN  Activity Management: activity encouraged  Taken 10/6/2024 0200 by Kierra Cha RN  Activity Management: activity encouraged  Taken 10/6/2024 0000 by Kierra Cha RN  Activity Management: activity encouraged  Taken 10/5/2024 2200 by Kierra Cha RN  Activity Management: activity encouraged  Taken 10/5/2024 2000 by Kierra Cha RN  Activity Management: activity encouraged  Range of Motion: active ROM (range of motion) encouraged  Intervention: Prevent Infection  Recent Flowsheet Documentation  Taken 10/6/2024 0400 by Kierra Cha RN  Infection Prevention:   environmental surveillance performed   hand hygiene promoted   rest/sleep promoted   single patient room provided  Taken 10/6/2024 0200 by Kierra Cha RN  Infection Prevention:   environmental surveillance performed   hand hygiene promoted   rest/sleep promoted   single patient room provided  Taken 10/6/2024 0000 by Kierra Cha RN  Infection Prevention:   environmental surveillance performed   hand hygiene promoted   rest/sleep promoted   single patient room provided  Taken 10/5/2024 2200 by Kierra Cha RN  Infection Prevention:   environmental surveillance performed   hand hygiene promoted   rest/sleep promoted   single patient room provided  Taken 10/5/2024 2000 by Kierra Cha RN  Infection Prevention:   environmental surveillance performed   hand hygiene promoted   rest/sleep promoted   single patient room  provided  Goal: Optimal Comfort and Wellbeing  Intervention: Provide Person-Centered Care  Recent Flowsheet Documentation  Taken 10/6/2024 0000 by Kierra Cha RN  Trust Relationship/Rapport:   care explained   choices provided   emotional support provided   empathic listening provided   questions answered   questions encouraged   reassurance provided   thoughts/feelings acknowledged  Taken 10/5/2024 2200 by Kierra Cha, RN  Trust Relationship/Rapport:   care explained   choices provided   emotional support provided   empathic listening provided   questions answered   questions encouraged   reassurance provided   thoughts/feelings acknowledged  Taken 10/5/2024 2000 by Kierra Cha, RN  Trust Relationship/Rapport:   care explained   choices provided   emotional support provided   empathic listening provided   questions answered   questions encouraged   reassurance provided   thoughts/feelings acknowledged     Problem: Fall Injury Risk  Goal: Absence of Fall and Fall-Related Injury  Intervention: Identify and Manage Contributors  Recent Flowsheet Documentation  Taken 10/6/2024 0000 by Kierra Cha RN  Medication Review/Management: medications reviewed  Taken 10/5/2024 2200 by Kierra Cha RN  Medication Review/Management: medications reviewed  Taken 10/5/2024 2000 by Kierra Cha RN  Medication Review/Management: medications reviewed  Intervention: Promote Injury-Free Environment  Recent Flowsheet Documentation  Taken 10/6/2024 0400 by Kierra Cha, RN  Safety Promotion/Fall Prevention:   activity supervised   assistive device/personal items within reach   clutter free environment maintained   fall prevention program maintained   lighting adjusted   nonskid shoes/slippers when out of bed   room organization consistent   safety round/check completed  Taken 10/6/2024 0200 by Kierra Cha, RN  Safety Promotion/Fall Prevention:   activity supervised   assistive  device/personal items within reach   clutter free environment maintained   fall prevention program maintained   lighting adjusted   nonskid shoes/slippers when out of bed   room organization consistent   safety round/check completed  Taken 10/6/2024 0000 by Cha, Kierra M, RN  Safety Promotion/Fall Prevention:   activity supervised   assistive device/personal items within reach   clutter free environment maintained   fall prevention program maintained   lighting adjusted   nonskid shoes/slippers when out of bed   room organization consistent   safety round/check completed  Taken 10/5/2024 2200 by Kierra Cha RN  Safety Promotion/Fall Prevention:   activity supervised   assistive device/personal items within reach   clutter free environment maintained   fall prevention program maintained   lighting adjusted   nonskid shoes/slippers when out of bed   room organization consistent   safety round/check completed  Taken 10/5/2024 2000 by Cha, Kierra M, RN  Safety Promotion/Fall Prevention:   activity supervised   assistive device/personal items within reach   clutter free environment maintained   fall prevention program maintained   lighting adjusted   nonskid shoes/slippers when out of bed   room organization consistent   safety round/check completed     Problem: Skin Injury Risk Increased  Goal: Skin Health and Integrity  Intervention: Optimize Skin Protection  Recent Flowsheet Documentation  Taken 10/6/2024 0000 by Kierra Cha, RN  Pressure Reduction Techniques:   frequent weight shift encouraged   weight shift assistance provided  Head of Bed (HOB) Positioning: HOB lowered  Pressure Reduction Devices:   positioning supports utilized   pressure-redistributing mattress utilized  Taken 10/5/2024 2200 by Kierra Cha, RN  Head of Bed (HOB) Positioning: HOB lowered  Taken 10/5/2024 2000 by Kierra Cha RN  Head of Bed (HOB) Positioning: HOB lowered     Problem: Adjustment to  Surgery (Colostomy)  Goal: Optimal Coping  Intervention: Support Psychosocial Response to Surgery  Recent Flowsheet Documentation  Taken 10/6/2024 0000 by Kierra Cha, RN  Supportive Measures:   active listening utilized   self-reflection promoted   self-responsibility promoted   self-care encouraged   decision-making supported   relaxation techniques promoted  Taken 10/5/2024 2000 by Kierra Cha RN  Supportive Measures:   active listening utilized   decision-making supported   positive reinforcement provided   relaxation techniques promoted     Problem: Ongoing Anesthesia Effects (Colostomy)  Goal: Anesthesia/Sedation Recovery  Intervention: Optimize Anesthesia Recovery  Recent Flowsheet Documentation  Taken 10/6/2024 0400 by Kierra Cha, RN  Safety Promotion/Fall Prevention:   activity supervised   assistive device/personal items within reach   clutter free environment maintained   fall prevention program maintained   lighting adjusted   nonskid shoes/slippers when out of bed   room organization consistent   safety round/check completed  Taken 10/6/2024 0200 by Kierra Cha RN  Safety Promotion/Fall Prevention:   activity supervised   assistive device/personal items within reach   clutter free environment maintained   fall prevention program maintained   lighting adjusted   nonskid shoes/slippers when out of bed   room organization consistent   safety round/check completed  Taken 10/6/2024 0000 by Kierra Cha, RN  Safety Promotion/Fall Prevention:   activity supervised   assistive device/personal items within reach   clutter free environment maintained   fall prevention program maintained   lighting adjusted   nonskid shoes/slippers when out of bed   room organization consistent   safety round/check completed  Taken 10/5/2024 2200 by Kierra Cha, RN  Safety Promotion/Fall Prevention:   activity supervised   assistive device/personal items within reach   clutter free  environment maintained   fall prevention program maintained   lighting adjusted   nonskid shoes/slippers when out of bed   room organization consistent   safety round/check completed  Taken 10/5/2024 2000 by Kierra Cha RN  Safety Promotion/Fall Prevention:   activity supervised   assistive device/personal items within reach   clutter free environment maintained   fall prevention program maintained   lighting adjusted   nonskid shoes/slippers when out of bed   room organization consistent   safety round/check completed     Problem: Pain (Colostomy)  Goal: Acceptable Pain Control  Intervention: Prevent or Manage Pain  Recent Flowsheet Documentation  Taken 10/5/2024 2000 by Kierra Cha, RN  Diversional Activities: television     Problem: Respiratory Compromise (Colostomy)  Goal: Effective Oxygenation and Ventilation  Intervention: Optimize Oxygenation and Ventilation  Recent Flowsheet Documentation  Taken 10/6/2024 0000 by Kierra Cha, RN  Head of Bed (HOB) Positioning: HOB lowered  Taken 10/5/2024 2200 by Kierra Cha RN  Head of Bed (HOB) Positioning: HOB lowered  Taken 10/5/2024 2000 by Kierra Cha RN  Head of Bed (HOB) Positioning: HOB lowered   Goal Outcome Evaluation:

## 2024-10-07 LAB
ANION GAP SERPL CALCULATED.3IONS-SCNC: 9 MMOL/L (ref 5–15)
BUN SERPL-MCNC: 9 MG/DL (ref 8–23)
BUN/CREAT SERPL: 15 (ref 7–25)
CALCIUM SPEC-SCNC: 8.7 MG/DL (ref 8.6–10.5)
CHLORIDE SERPL-SCNC: 104 MMOL/L (ref 98–107)
CO2 SERPL-SCNC: 26 MMOL/L (ref 22–29)
CREAT SERPL-MCNC: 0.6 MG/DL (ref 0.57–1)
DEPRECATED RDW RBC AUTO: 45.3 FL (ref 37–54)
EGFRCR SERPLBLD CKD-EPI 2021: 89.2 ML/MIN/1.73
ERYTHROCYTE [DISTWIDTH] IN BLOOD BY AUTOMATED COUNT: 13.2 % (ref 12.3–15.4)
GLUCOSE SERPL-MCNC: 111 MG/DL (ref 65–99)
HCT VFR BLD AUTO: 30.2 % (ref 34–46.6)
HGB BLD-MCNC: 9.8 G/DL (ref 12–15.9)
MCH RBC QN AUTO: 30.6 PG (ref 26.6–33)
MCHC RBC AUTO-ENTMCNC: 32.5 G/DL (ref 31.5–35.7)
MCV RBC AUTO: 94.4 FL (ref 79–97)
PLATELET # BLD AUTO: 240 10*3/MM3 (ref 140–450)
PMV BLD AUTO: 9.5 FL (ref 6–12)
POTASSIUM SERPL-SCNC: 3.5 MMOL/L (ref 3.5–5.2)
RBC # BLD AUTO: 3.2 10*6/MM3 (ref 3.77–5.28)
SODIUM SERPL-SCNC: 139 MMOL/L (ref 136–145)
WBC NRBC COR # BLD AUTO: 5.76 10*3/MM3 (ref 3.4–10.8)

## 2024-10-07 PROCEDURE — 94799 UNLISTED PULMONARY SVC/PX: CPT

## 2024-10-07 PROCEDURE — 94761 N-INVAS EAR/PLS OXIMETRY MLT: CPT

## 2024-10-07 PROCEDURE — 94664 DEMO&/EVAL PT USE INHALER: CPT

## 2024-10-07 PROCEDURE — 80048 BASIC METABOLIC PNL TOTAL CA: CPT | Performed by: INTERNAL MEDICINE

## 2024-10-07 PROCEDURE — 85027 COMPLETE CBC AUTOMATED: CPT | Performed by: INTERNAL MEDICINE

## 2024-10-07 PROCEDURE — 97530 THERAPEUTIC ACTIVITIES: CPT

## 2024-10-07 PROCEDURE — 25010000002 HEPARIN (PORCINE) PER 1000 UNITS: Performed by: COLON & RECTAL SURGERY

## 2024-10-07 RX ADMIN — HEPARIN SODIUM 5000 UNITS: 5000 INJECTION INTRAVENOUS; SUBCUTANEOUS at 05:59

## 2024-10-07 RX ADMIN — HEPARIN SODIUM 5000 UNITS: 5000 INJECTION INTRAVENOUS; SUBCUTANEOUS at 20:14

## 2024-10-07 RX ADMIN — ACETAMINOPHEN 650 MG: 325 TABLET ORAL at 20:13

## 2024-10-07 RX ADMIN — ACETAMINOPHEN 650 MG: 325 TABLET ORAL at 13:27

## 2024-10-07 RX ADMIN — BUDESONIDE AND FORMOTEROL FUMARATE DIHYDRATE 2 PUFF: 160; 4.5 AEROSOL RESPIRATORY (INHALATION) at 08:37

## 2024-10-07 RX ADMIN — CETIRIZINE HYDROCHLORIDE 5 MG: 10 TABLET, FILM COATED ORAL at 08:52

## 2024-10-07 RX ADMIN — ACETAMINOPHEN 650 MG: 325 TABLET ORAL at 05:59

## 2024-10-07 RX ADMIN — TAMSULOSIN HYDROCHLORIDE 0.4 MG: 0.4 CAPSULE ORAL at 08:52

## 2024-10-07 RX ADMIN — FAMOTIDINE 10 MG: 20 TABLET, FILM COATED ORAL at 20:13

## 2024-10-07 RX ADMIN — HEPARIN SODIUM 5000 UNITS: 5000 INJECTION INTRAVENOUS; SUBCUTANEOUS at 13:27

## 2024-10-07 RX ADMIN — LEVOTHYROXINE SODIUM 88 MCG: 0.09 TABLET ORAL at 05:59

## 2024-10-07 RX ADMIN — BUDESONIDE AND FORMOTEROL FUMARATE DIHYDRATE 2 PUFF: 160; 4.5 AEROSOL RESPIRATORY (INHALATION) at 19:31

## 2024-10-07 RX ADMIN — FAMOTIDINE 10 MG: 20 TABLET, FILM COATED ORAL at 08:52

## 2024-10-07 NOTE — PLAN OF CARE
Goal Outcome Evaluation:  Plan of Care Reviewed With: patient        Progress: improving  Outcome Evaluation: Pt continues to present with decreased functional mobility and decreased activity tolerance. Pt ambulated 150ft with CGA and RW for support. Pt demo increased fatigue with mobility and difficulty improving independence with increased distance. Continue to progress per pt tolerance.      Anticipated Discharge Disposition (PT): inpatient rehabilitation facility

## 2024-10-07 NOTE — PROGRESS NOTES
"IM progress note      Valeria Tracy  [id number]  1940     LOS: 6 days     Attending: Angelito Ruiz MD    Primary Care Provider: Peyton Vázquez PA      Chief Complaint/Reason for visit:  Abd pain    Subjective   Doing well. Tolerating PO. Voiding. Stoma with output.   Good pain control.   Participating with PT.    Objective        Visit Vitals  /88 (BP Location: Left arm, Patient Position: Lying)   Pulse 86   Temp 98.2 °F (36.8 °C) (Oral)   Resp 16   Ht 165.1 cm (65\")   Wt 56 kg (123 lb 8 oz)   SpO2 96%   BMI 20.55 kg/m²     Temp (24hrs), Av.1 °F (36.7 °C), Min:97.4 °F (36.3 °C), Max:98.7 °F (37.1 °C)         Respiratory: RA    Physical Exam:     General Appearance:    Alert, cooperative, in no acute distress   Head:    Normocephalic, without obvious abnormality, atraumatic    Lungs:     Normal effort, symmetric chest rise, no crepitus, clear to      auscultation bilaterally             Heart:    Regular rhythm and normal rate, normal S1 and S2   Abdomen:     Soft, expected tenderness. Midline dressing CDI. JESUS is out. Dressing intact.       Extremities:   No clubbing, cyanosis or edema.  No deformities.    Pulses:   Pulses palpable and equal bilaterally   Skin:   No bleeding, bruising or rash   Neurologic:   Moves all extremities with no obvious focal motor deficit.  Cranial nerves 2 - 12 grossly intact     Results Review:     I reviewed the patient's new clinical results.   Results from last 7 days   Lab Units 10/07/24  0516 10/04/24  0434 10/02/24  09   WBC 10*3/mm3 5.76 5.50 11.57*   HEMOGLOBIN g/dL 9.8* 9.5* 11.6*   HEMATOCRIT % 30.2* 29.8* 36.6   PLATELETS 10*3/mm3 240 189 217     Results from last 7 days   Lab Units 10/07/24  0516 10/04/24  0434 10/02/24  0514   SODIUM mmol/L 139 141 134*   POTASSIUM mmol/L 3.5 3.8 5.1   CHLORIDE mmol/L 104 107 101   CO2 mmol/L 26.0 24.0 22.0   BUN mg/dL 9 4* 9   CREATININE mg/dL 0.60 0.45* 0.55*   CALCIUM mg/dL 8.7 8.3* 8.4*   GLUCOSE mg/dL 111* 74 113* "     I reviewed the patient's new imaging including images and reports.    All medications reviewed.   acetaminophen, 650 mg, Oral, Q8H  budesonide-formoterol, 2 puff, Inhalation, BID - RT  cetirizine, 5 mg, Oral, Daily  famotidine, 10 mg, Oral, BID  heparin (porcine), 5,000 Units, Subcutaneous, Q8H  levothyroxine, 88 mcg, Oral, Q AM  tamsulosin, 0.4 mg, Oral, Daily        Assessment & Plan     S/P abdominal perineal resection, partial thickness posterior vaginal resection    GERD (gastroesophageal reflux disease)    Asthma    Hypothyroidism    Acute postoperative pain    Rectal cancer    Severe malnutrition    Postoperative urinary retention      Plan  1. Ambulation, encouraged, PT is following  2. Pain control-prns   3. IS-encouraged  4. DVT proph- mechs/heparin  5. Bowel regimen  6. DC planning, inpatient rehab, precert has been started for IPR.      -Asthma: Maintain home regimen with formulary substitution as indicated.  As needed bronchodilators.  Monitor oxygenation.     -Hypothyroidism: Resume replacement      -GERD:  Resumed H2 blocker.      -New stoma:  Wound care stoma nurse consult for stoma care and education throughout patient's hospitalization.    -Urinary retention:   Resolved.   Continue tamsulosin,   Urinalysis unremarkable.           Discussed with patient and RN.    Maggi Lance MD  10/07/24  12:23 EDT

## 2024-10-07 NOTE — CASE MANAGEMENT/SOCIAL WORK
Continued Stay Note  [facility]     Patient Name: Valeria Tracy  MRN: [id number]  Today's Date: 10/7/2024    Admit Date: 10/1/2024    Plan: SNF   Discharge Plan       Row Name 10/07/24 1200       Plan    Plan SNF    Patient/Family in Agreement with Plan yes    Plan Comments Met with Ms. Tracy at the bedside to discuss bed offer from [facility]. Ms. Tracy accepted the offer. Insurance precert will be started today. The facility requested that patient bring a few days worth of ostomy supplies to give them time to order.  will continue to follow plan of care and assist with discharge planning needs as indicated.    Final Discharge Disposition Code 03 - skilled nursing facility (SNF)                   Discharge Codes    No documentation.                       Rosa Schmidt RN

## 2024-10-07 NOTE — THERAPY TREATMENT NOTE
Patient Name: Valeria Tracy  : 1940    MRN: [id number]                              Today's Date: 10/7/2024       Admit Date: 10/1/2024    Visit Dx:     ICD-10-CM ICD-9-CM   1. S/P right colectomy  Z90.49 V45.89   2. Rectal cancer  C20 154.1     Patient Active Problem List   Diagnosis    GERD (gastroesophageal reflux disease)    Asthma    Hypothyroidism    Colon cancer    S/P right colectomy    Acute postoperative pain    Anemia    Rectal cancer    S/P abdominal perineal resection, partial thickness posterior vaginal resection    Severe malnutrition    Postoperative urinary retention     Past Medical History:   Diagnosis Date    Acid reflux     Anemia     Asthma     Cancer     rectal    History of chemotherapy     History of radiation therapy     History of transfusion     No Reaction    Hypothyroid     Pneumonia     Wears dentures     upper    Wears glasses     Wears hearing aid in both ears      Past Surgical History:   Procedure Laterality Date    ABDOMINAL PERINEAL RESECTION N/A 10/1/2024    Procedure: ABDOMINAL PERINEAL RESECTION, LYSIS OF ADHESIONS AND CREATION OF COLOSTOMY;  Surgeon: Angelito Ruiz MD;  Location: [facility];  Service: General;  Laterality: N/A;    BREAST LUMPECTOMY Right     CATARACT EXTRACTION Bilateral     COLON RESECTION N/A 10/17/2023    Procedure: COLON RESECTION RIGHT LAPAROSCOPIC;  Surgeon: Angelito Ruiz MD;  Location: [facility] OR;  Service: General;  Laterality: N/A;    COLONOSCOPY      PORTACATH PLACEMENT      SHOULDER SURGERY Right       General Information       Row Name 10/07/24 1057          Physical Therapy Time and Intention    Document Type therapy note (daily note)  -AE     Mode of Treatment physical therapy  -AE       Row Name 10/07/24 1053          General Information    Patient Profile Reviewed yes  -AE     Existing Precautions/Restrictions fall;other (see comments)  abdominal incison; colostomy  -AE     Barriers to Rehab medically complex;previous functional  deficit  -AE       Row Name 10/07/24 1057          Cognition    Orientation Status (Cognition) oriented x 3  -AE       Row Name 10/07/24 1057          Safety Issues, Functional Mobility    Safety Issues Affecting Function (Mobility) awareness of need for assistance;insight into deficits/self-awareness;safety precaution awareness;safety precautions follow-through/compliance;sequencing abilities  -AE     Impairments Affecting Function (Mobility) balance;endurance/activity tolerance;pain;postural/trunk control;strength  -AE               User Key  (r) = Recorded By, (t) = Taken By, (c) = Cosigned By      Initials Name Provider Type    AE Rebel Maradiaga, PATRICIA Physical Therapist                   Mobility       Row Name 10/07/24 1059          Bed Mobility    Bed Mobility supine-sit;sit-supine  -AE     Supine-Sit [place] (Bed Mobility) standby assist  -AE     Sit-Supine [place] (Bed Mobility) minimum assist (75% patient effort);1 person assist;verbal cues  -AE     Assistive Device (Bed Mobility) bed rails;head of bed elevated  -AE     Comment, (Bed Mobility) VCs for hand placement and sequencing. Pt required increased assist to advance BLE into bed.  -AE       Row Name 10/07/24 1059          Transfers    Comment, (Transfers) VCs for hand placement and sequencing.  -AE       Row Name 10/07/24 1059          Sit-Stand Transfer    Sit-Stand [place] (Transfers) contact guard;verbal cues  -AE       Row Name 10/07/24 1059          Gait/Stairs (Locomotion)    [place] Level (Gait) contact guard;verbal cues  -AE     Assistive Device (Gait) walker, front-wheeled  -AE     Distance in Feet (Gait) 150  -AE     Deviations/Abnormal Patterns (Gait) bilateral deviations;tanya decreased;gait speed decreased;stride length decreased;base of support, narrow  -AE     Bilateral Gait Deviations forward flexed posture;heel strike decreased  -AE     Comment, (Gait/Stairs) Pt demo step through gait pattern with slowed tanya  and forward flexed posture. Pt required increased cues to improve upright posture and take standing rest breaks as needed. Pt reported increased fatigue and weakness at the end of session. Further distance limited by fatigue.  -AE               User Key  (r) = Recorded By, (t) = Taken By, (c) = Cosigned By      Initials Name Provider Type    AE Reebl Maradiaga PT Physical Therapist                   Obj/Interventions       Row Name 10/07/24 1102          Balance    Balance Assessment sitting static balance;sitting dynamic balance;sit to stand dynamic balance;standing static balance;standing dynamic balance  -AE     Static Sitting Balance standby assist  -AE     Dynamic Sitting Balance standby assist  -AE     Position, Sitting Balance unsupported;sitting edge of bed  -AE     Sit to Stand Dynamic Balance contact guard;verbal cues  -AE     Static Standing Balance contact guard  -AE     Dynamic Standing Balance contact guard  -AE     Position/Device Used, Standing Balance supported;walker, front-wheeled  -AE               User Key  (r) = Recorded By, (t) = Taken By, (c) = Cosigned By      Initials Name Provider Type    AE Rebel Maradiaga PT Physical Therapist                   Goals/Plan    No documentation.                  Clinical Impression       Row Name 10/07/24 1104          Pain    Additional Documentation Pain Scale: FACES Pre/Post-Treatment (Group)  -AE       Row Name 10/07/24 1104          Pain Scale: FACES Pre/Post-Treatment    Pain: FACES Scale, Pretreatment 2-->hurts little bit  -AE     Posttreatment Pain Rating 2-->hurts little bit  -AE     Pain Location incisional  -AE     Pain Location - abdomen  -AE     Pre/Posttreatment Pain Comment tolerated  -AE       Row Name 10/07/24 1107          Plan of Care Review    Plan of Care Reviewed With patient  -AE     Progress improving  -AE     Outcome Evaluation Pt continues to present with decreased functional mobility and decreased activity tolerance. Pt  ambulated 150ft with CGA and RW for support. Pt demo increased fatigue with mobility and difficulty improving independence with increased distance. Continue to progress per pt tolerance.  -AE       Row Name 10/07/24 1104          Vital Signs    Pre Systolic BP Rehab 137  -AE     Pre Treatment Diastolic BP 78  -AE     Pretreatment Heart Rate (beats/min) 92  -AE     Posttreatment Heart Rate (beats/min) 90  -AE     Pre SpO2 (%) 92  -AE     O2 Delivery Pre Treatment room air  -AE     O2 Delivery Intra Treatment room air  -AE     Post SpO2 (%) 96  -AE     O2 Delivery Post Treatment room air  -AE     Pre Patient Position Supine  -AE     Intra Patient Position Standing  -AE     Post Patient Position Supine  -AE       Row Name 10/07/24 1104          Positioning and Restraints    Pre-Treatment Position in bed  -AE     Post Treatment Position bed  -AE     In Bed fowlers;call light within reach;encouraged to call for assist;side rails up x2;legs elevated  -AE               User Key  (r) = Recorded By, (t) = Taken By, (c) = Cosigned By      Initials Name Provider Type    Rebel Malnoey, PT Physical Therapist                   Outcome Measures       Row Name 10/07/24 1106 10/07/24 0921       How much help from another person do you currently need...    Turning from your back to your side while in flat bed without using bedrails? 4  -AE 4 (P)   -TR    Moving from lying on back to sitting on the side of a flat bed without bedrails? 3  -AE 4 (P)   -TR    Moving to and from a bed to a chair (including a wheelchair)? 3  -AE 4 (P)   -TR    Standing up from a chair using your arms (e.g., wheelchair, bedside chair)? 3  -AE 4 (P)   -TR    Climbing 3-5 steps with a railing? 3  -AE 3 (P)   -TR    To walk in hospital room? 3  -AE 4 (P)   -TR    AM-PAC 6 Clicks Score (PT) 19  -AE 23 (P)   -TR    Highest Level of Mobility Goal 6 --> Walk 10 steps or more  -AE 7 --> Walk 25 feet or more (P)   -TR      Row Name 10/07/24 0837          How  much help from another person do you currently need...    Turning from your back to your side while in flat bed without using bedrails? 4  -DF     Moving from lying on back to sitting on the side of a flat bed without bedrails? 4  -DF     Moving to and from a bed to a chair (including a wheelchair)? 4  -DF     Standing up from a chair using your arms (e.g., wheelchair, bedside chair)? 4  -DF     Climbing 3-5 steps with a railing? 3  -DF     To walk in hospital room? 4  -DF     AM-PAC 6 Clicks Score (PT) 23  -DF     Highest Level of Mobility Goal 7 --> Walk 25 feet or more  -DF       Row Name 10/07/24 1106          Functional Assessment    Outcome Measure Options AM-PAC 6 Clicks Basic Mobility (PT)  -AE               User Key  (r) = Recorded By, (t) = Taken By, (c) = Cosigned By      Initials Name Provider Type    DF Margarita Mccord, RN Registered Nurse    Rebel Maloney, PT Physical Therapist    Henrietta Adan, Nursing Student Nursing Student                                 Physical Therapy Education       Title: PT OT SLP Therapies (In Progress)       Topic: Physical Therapy (In Progress)       Point: Mobility training (Done)       Learning Progress Summary             Patient Acceptance, E, VU by AE at 10/7/2024 1006    Acceptance, E, VU by TM at 10/4/2024 1313    Acceptance, E, NR by TM at 10/2/2024 1129                         Point: Home exercise program (Not Started)       Learner Progress:  Not documented in this visit.              Point: Body mechanics (Done)       Learning Progress Summary             Patient Acceptance, E, VU by AE at 10/7/2024 1006    Acceptance, E, VU by TM at 10/4/2024 1313    Acceptance, E, NR by TM at 10/2/2024 1129                         Point: Precautions (Done)       Learning Progress Summary             Patient Acceptance, E, VU by AE at 10/7/2024 1006    Acceptance, E, VU by TM at 10/4/2024 1313    Acceptance, E, NR by TM at 10/2/2024 1129                                          User Key       Initials Effective Dates Name Provider Type Discipline    AE 09/21/21 -  Rebel Maradiaga PT Physical Therapist PT    TM 08/13/24 -  Britton Lipscomb PT Student PT Student PT                  PT Recommendation and Plan     Plan of Care Reviewed With: patient  Progress: improving  Outcome Evaluation: Pt continues to present with decreased functional mobility and decreased activity tolerance. Pt ambulated 150ft with CGA and RW for support. Pt demo increased fatigue with mobility and difficulty improving independence with increased distance. Continue to progress per pt tolerance.     Time Calculation:         PT Charges       Row Name 10/07/24 1107             Time Calculation    Start Time 1006  -AE      PT Received On 10/07/24  -AE      PT Goal Re-Cert Due Date 10/12/24  -AE         Timed Charges    08801 - PT Therapeutic Activity Minutes 10  -AE         Total Minutes    Timed Charges Total Minutes 10  -AE       Total Minutes 10  -AE                User Key  (r) = Recorded By, (t) = Taken By, (c) = Cosigned By      Initials Name Provider Type    AE Rebel Maradiaga, PT Physical Therapist                  Therapy Charges for Today       Code Description Service Date Service Provider Modifiers Qty    25468457258 HC PT THERAPEUTIC ACT EA 15 MIN 10/7/2024 Rebel Maradiaga, PT GP 1            PT G-Codes  Outcome Measure Options: AM-PAC 6 Clicks Basic Mobility (PT)  AM-PAC 6 Clicks Score (PT): 19  PT Discharge Summary  Anticipated Discharge Disposition (PT): inpatient rehabilitation facility    Rebel Maradiaga PT  10/7/2024

## 2024-10-08 PROCEDURE — 63710000001 ONDANSETRON ODT 4 MG TABLET DISPERSIBLE: Performed by: COLON & RECTAL SURGERY

## 2024-10-08 PROCEDURE — 97165 OT EVAL LOW COMPLEX 30 MIN: CPT

## 2024-10-08 PROCEDURE — 94664 DEMO&/EVAL PT USE INHALER: CPT

## 2024-10-08 PROCEDURE — 94799 UNLISTED PULMONARY SVC/PX: CPT

## 2024-10-08 PROCEDURE — 25010000002 ONDANSETRON PER 1 MG: Performed by: COLON & RECTAL SURGERY

## 2024-10-08 PROCEDURE — 25010000002 HEPARIN (PORCINE) PER 1000 UNITS: Performed by: COLON & RECTAL SURGERY

## 2024-10-08 PROCEDURE — 94761 N-INVAS EAR/PLS OXIMETRY MLT: CPT

## 2024-10-08 RX ORDER — TAMSULOSIN HYDROCHLORIDE 0.4 MG/1
0.4 CAPSULE ORAL DAILY
Status: CANCELLED
Start: 2024-10-09 | End: 2024-10-14

## 2024-10-08 RX ORDER — CALCIUM CARBONATE 500 MG/1
2 TABLET, CHEWABLE ORAL 3 TIMES DAILY PRN
Status: DISCONTINUED | OUTPATIENT
Start: 2024-10-08 | End: 2024-10-13

## 2024-10-08 RX ORDER — CALCIUM CARBONATE 500 MG/1
2 TABLET, CHEWABLE ORAL 3 TIMES DAILY PRN
Status: CANCELLED
Start: 2024-10-08

## 2024-10-08 RX ORDER — HYDROCODONE BITARTRATE AND ACETAMINOPHEN 7.5; 325 MG/1; MG/1
1 TABLET ORAL EVERY 4 HOURS PRN
Qty: 10 TABLET | Refills: 0 | Status: CANCELLED | OUTPATIENT
Start: 2024-10-08 | End: 2024-10-11

## 2024-10-08 RX ADMIN — ONDANSETRON 4 MG: 2 INJECTION INTRAMUSCULAR; INTRAVENOUS at 11:54

## 2024-10-08 RX ADMIN — ONDANSETRON 4 MG: 4 TABLET, ORALLY DISINTEGRATING ORAL at 17:50

## 2024-10-08 RX ADMIN — HEPARIN SODIUM 5000 UNITS: 5000 INJECTION INTRAVENOUS; SUBCUTANEOUS at 04:40

## 2024-10-08 RX ADMIN — LEVOTHYROXINE SODIUM 88 MCG: 0.09 TABLET ORAL at 04:39

## 2024-10-08 RX ADMIN — FAMOTIDINE 10 MG: 20 TABLET, FILM COATED ORAL at 09:18

## 2024-10-08 RX ADMIN — HEPARIN SODIUM 5000 UNITS: 5000 INJECTION INTRAVENOUS; SUBCUTANEOUS at 20:07

## 2024-10-08 RX ADMIN — CETIRIZINE HYDROCHLORIDE 5 MG: 10 TABLET, FILM COATED ORAL at 09:18

## 2024-10-08 RX ADMIN — FAMOTIDINE 10 MG: 20 TABLET, FILM COATED ORAL at 20:07

## 2024-10-08 RX ADMIN — DIAZEPAM 5 MG: 5 TABLET ORAL at 11:54

## 2024-10-08 RX ADMIN — HEPARIN SODIUM 5000 UNITS: 5000 INJECTION INTRAVENOUS; SUBCUTANEOUS at 14:55

## 2024-10-08 RX ADMIN — ONDANSETRON 4 MG: 2 INJECTION INTRAMUSCULAR; INTRAVENOUS at 04:40

## 2024-10-08 RX ADMIN — TAMSULOSIN HYDROCHLORIDE 0.4 MG: 0.4 CAPSULE ORAL at 09:19

## 2024-10-08 RX ADMIN — ACETAMINOPHEN 650 MG: 325 TABLET ORAL at 04:40

## 2024-10-08 RX ADMIN — CALCIUM CARBONATE (ANTACID) CHEW TAB 500 MG 2 TABLET: 500 CHEW TAB at 09:18

## 2024-10-08 RX ADMIN — ACETAMINOPHEN 650 MG: 325 TABLET ORAL at 20:07

## 2024-10-08 RX ADMIN — BUDESONIDE AND FORMOTEROL FUMARATE DIHYDRATE 2 PUFF: 160; 4.5 AEROSOL RESPIRATORY (INHALATION) at 20:25

## 2024-10-08 RX ADMIN — BUDESONIDE AND FORMOTEROL FUMARATE DIHYDRATE 2 PUFF: 160; 4.5 AEROSOL RESPIRATORY (INHALATION) at 08:17

## 2024-10-08 RX ADMIN — CALCIUM CARBONATE (ANTACID) CHEW TAB 500 MG 2 TABLET: 500 CHEW TAB at 20:07

## 2024-10-08 NOTE — PROGRESS NOTES
"IM progress note      Valeria Tracy  [id number]  1940     LOS: 7 days     Attending: Angelito Ruiz MD    Primary Care Provider: Peyton Vázquez PA      Chief Complaint/Reason for visit:  Abd pain    Subjective   Feels ok. Had some heartburn earlier. Better now. Awaits her chicken noodle soup.  No n/vom. No f/c. Stoma with output. Voiding spontaneously. .     Objective        Visit Vitals  /71 (BP Location: Left arm, Patient Position: Sitting)   Pulse 106   Temp 97.9 °F (36.6 °C) (Oral)   Resp 18   Ht 165.1 cm (65\")   Wt 56 kg (123 lb 8 oz)   SpO2 93%   BMI 20.55 kg/m²     Temp (24hrs), Av.9 °F (36.6 °C), Min:97.4 °F (36.3 °C), Max:98.4 °F (36.9 °C)         Respiratory: RA    Physical Exam:     General Appearance:    Alert, cooperative, in no acute distress   Head:    Normocephalic, without obvious abnormality, atraumatic    Lungs:     Normal effort, symmetric chest rise, no crepitus, clear to      auscultation bilaterally             Heart:    Regular rhythm and normal rate, normal S1 and S2   Abdomen:     Soft, expected tenderness. Midline dressing CDI. JESUS is out. Dressing intact.       Extremities:   No clubbing, cyanosis or edema.  No deformities.    Pulses:   Pulses palpable and equal bilaterally   Skin:   No bleeding, bruising or rash          Results Review:     I reviewed the patient's new clinical results.   Results from last 7 days   Lab Units 10/07/24  0516 10/04/24  0434 10/02/24  09   WBC 10*3/mm3 5.76 5.50 11.57*   HEMOGLOBIN g/dL 9.8* 9.5* 11.6*   HEMATOCRIT % 30.2* 29.8* 36.6   PLATELETS 10*3/mm3 240 189 217     Results from last 7 days   Lab Units 10/07/24  0516 10/04/24  0434 10/02/24  0514   SODIUM mmol/L 139 141 134*   POTASSIUM mmol/L 3.5 3.8 5.1   CHLORIDE mmol/L 104 107 101   CO2 mmol/L 26.0 24.0 22.0   BUN mg/dL 9 4* 9   CREATININE mg/dL 0.60 0.45* 0.55*   CALCIUM mg/dL 8.7 8.3* 8.4*   GLUCOSE mg/dL 111* 74 113*     I reviewed the patient's new imaging including images and " reports.    All medications reviewed.   acetaminophen, 650 mg, Oral, Q8H  budesonide-formoterol, 2 puff, Inhalation, BID - RT  cetirizine, 5 mg, Oral, Daily  famotidine, 10 mg, Oral, BID  heparin (porcine), 5,000 Units, Subcutaneous, Q8H  levothyroxine, 88 mcg, Oral, Q AM  tamsulosin, 0.4 mg, Oral, Daily        Assessment & Plan     S/P abdominal perineal resection, partial thickness posterior vaginal resection    GERD (gastroesophageal reflux disease)    Asthma    Hypothyroidism    Acute postoperative pain    Rectal cancer    Severe malnutrition    Postoperative urinary retention      Plan  1. Ambulation, encouraged, PT is following  2. Pain control-prns   3. IS-encouraged  4. DVT proph- mechs/heparin  5. Bowel regimen  6. DC planning, inpatient rehab, precert has been started for IPR.      -Asthma: Maintain home regimen with formulary substitution as indicated.  As needed bronchodilators.  Monitor oxygenation.     -Hypothyroidism: Resume replacement      -GERD:  Resumed H2 blocker.      -New stoma:  Wound care stoma nurse consult for stoma care and education throughout patient's hospitalization.    -Urinary retention:   Resolved.   Continue tamsulosin,   Urinalysis unremarkable.       Discussed with patient and .     Maggi Lance MD  10/08/24  14:57 EDT

## 2024-10-08 NOTE — THERAPY EVALUATION
Patient Name: Valeria Tracy  : 1940    MRN: [id number]                              Today's Date: 10/8/2024       Admit Date: 10/1/2024    Visit Dx:     ICD-10-CM ICD-9-CM   1. S/P right colectomy  Z90.49 V45.89   2. Rectal cancer  C20 154.1     Patient Active Problem List   Diagnosis    GERD (gastroesophageal reflux disease)    Asthma    Hypothyroidism    Colon cancer    S/P right colectomy    Acute postoperative pain    Anemia    Rectal cancer    S/P abdominal perineal resection, partial thickness posterior vaginal resection    Severe malnutrition    Postoperative urinary retention     Past Medical History:   Diagnosis Date    Acid reflux     Anemia     Asthma     Cancer     rectal    History of chemotherapy     History of radiation therapy     History of transfusion     No Reaction    Hypothyroid     Pneumonia     Wears dentures     upper    Wears glasses     Wears hearing aid in both ears      Past Surgical History:   Procedure Laterality Date    ABDOMINAL PERINEAL RESECTION N/A 10/1/2024    Procedure: ABDOMINAL PERINEAL RESECTION, LYSIS OF ADHESIONS AND CREATION OF COLOSTOMY;  Surgeon: Angelito Ruiz MD;  Location:  [facility];  Service: General;  Laterality: N/A;    BREAST LUMPECTOMY Right     CATARACT EXTRACTION Bilateral     COLON RESECTION N/A 10/17/2023    Procedure: COLON RESECTION RIGHT LAPAROSCOPIC;  Surgeon: Angelito Ruiz MD;  Location:  [facility];  Service: General;  Laterality: N/A;    COLONOSCOPY      PORTACATH PLACEMENT      SHOULDER SURGERY Right       General Information       Row Name 10/08/24 1154          OT Time and Intention    Document Type evaluation  -AC     Mode of Treatment occupational therapy  -AC       Row Name 10/08/24 1154          General Information    Patient Profile Reviewed yes  -AC     Prior Level of Function independent:;all household mobility;community mobility;ADL's  has RW, but was not using  -AC     Existing Precautions/Restrictions fall  abdominal incision ,  ostomy  -     Barriers to Rehab medically complex;previous functional deficit  -       Row Name 10/08/24 1154          Living Environment    People in Home alone  -       Row Name 10/08/24 1154          Home Main Entrance    Number of Stairs, Main Entrance two  -     Stair Railings, Main Entrance railings on both sides of stairs  -       Row Name 10/08/24 1154          Stairs Within Home, Primary    Number of Stairs, Within Home, Primary none  -       Row Name 10/08/24 1154          Cognition    Orientation Status (Cognition) oriented x 3  -       Row Name 10/08/24 1154          Safety Issues, Functional Mobility    Safety Issues Affecting Function (Mobility) insight into deficits/self-awareness;awareness of need for assistance;safety precaution awareness;safety precautions follow-through/compliance;sequencing abilities  -     Impairments Affecting Function (Mobility) balance;endurance/activity tolerance;pain;postural/trunk control;strength  -               User Key  (r) = Recorded By, (t) = Taken By, (c) = Cosigned By      Initials Name Provider Type     Tequila Nielson OT Occupational Therapist                     Mobility/ADL's       Row Name 10/08/24 1155          Bed Mobility    Bed Mobility supine-sit  -     Supine-Sit [place] (Bed Mobility) standby assist  -     Assistive Device (Bed Mobility) bed rails;head of bed elevated  -       Row Name 10/08/24 1155          Transfers    Transfers sit-stand transfer  -     Comment, (Transfers) VCs for hand placement  -       Row Name 10/08/24 1155          Sit-Stand Transfer    Sit-Stand [place] (Transfers) contact guard;verbal cues  -     Assistive Device (Sit-Stand Transfers) walker, front-wheeled  -       Row Name 10/08/24 1155          Functional Mobility    Functional Mobility- Ind. Level verbal cues required;contact guard assist  -     Functional Mobility- Device walker, front-wheeled  -     Functional  Mobility-Distance (Feet) 16  -       Row Name 10/08/24 1155          Activities of Daily Living    BADL Assessment/Intervention lower body dressing  -       Row Name 10/08/24 1155          Lower Body Dressing Assessment/Training    [place] Level (Lower Body Dressing) doff;don;socks;minimum assist (75% patient effort)  -     Position (Lower Body Dressing) unsupported sitting  -               User Key  (r) = Recorded By, (t) = Taken By, (c) = Cosigned By      Initials Name Provider Type    Tequila Zepeda, OT Occupational Therapist                   Obj/Interventions       Row Name 10/08/24 1156          Sensory Assessment (Somatosensory)    Sensory Assessment (Somatosensory) UE sensation intact  -       Row Name 10/08/24 1156          Vision Assessment/Intervention    Visual Impairment/Limitations corrective lenses full-time  -       Row Name 10/08/24 1156          Range of Motion Comprehensive    General Range of Motion bilateral upper extremity ROM WNL  -       Row Name 10/08/24 1156          Strength Comprehensive (MMT)    Comment, General Manual Muscle Testing (MMT) Assessment BUE grossly 4/5  -       Row Name 10/08/24 1156          Balance    Balance Assessment sitting static balance;sitting dynamic balance;standing static balance;standing dynamic balance  -AC     Static Sitting Balance standby assist  -AC     Dynamic Sitting Balance standby assist  -AC     Position, Sitting Balance sitting edge of bed  -AC     Static Standing Balance verbal cues;contact guard  -AC     Dynamic Standing Balance verbal cues;contact guard  -AC     Position/Device Used, Standing Balance supported;walker, front-wheeled  -               User Key  (r) = Recorded By, (t) = Taken By, (c) = Cosigned By      Initials Name Provider Type    Tequila Zepeda, SRIKANTH Occupational Therapist                   Goals/Plan       Row Name 10/08/24 1306          Transfer Goal 1 (OT)    Activity/Assistive Device (Transfer Goal 1,  OT) bed-to-chair/chair-to-bed;toilet;walker, rolling  -AC     [place] Level/Cues Needed (Transfer Goal 1, OT) standby assist  -AC     Time Frame (Transfer Goal 1, OT) long term goal (LTG);10 days  -AC     Progress/Outcome (Transfer Goal 1, OT) new goal;goal ongoing  -AC       Row Name 10/08/24 1306          Dressing Goal 1 (OT)    Activity/Device (Dressing Goal 1, OT) lower body dressing  -AC     [place]/Cues Needed (Dressing Goal 1, OT) set-up required;supervision required  -AC     Time Frame (Dressing Goal 1, OT) short term goal (STG);5 days  -AC     Progress/Outcome (Dressing Goal 1, OT) goal ongoing;new goal  -AC       Row Name 10/08/24 1306          Toileting Goal 1 (OT)    Activity/Device (Toileting Goal 1, OT) adjust/manage clothing;perform perineal hygiene  -AC     [place] Level/Cues Needed (Toileting Goal 1, OT) standby assist  -AC     Time Frame (Toileting Goal 1, OT) short term goal (STG);5 days  -AC     Progress/Outcome (Toileting Goal 1, OT) new goal;goal ongoing  -AC       Row Name 10/08/24 1306          Therapy Assessment/Plan (OT)    Planned Therapy Interventions (OT) activity tolerance training;BADL retraining;functional balance retraining;occupation/activity based interventions;patient/caregiver education/training;strengthening exercise;transfer/mobility retraining  -AC               User Key  (r) = Recorded By, (t) = Taken By, (c) = Cosigned By      Initials Name Provider Type    AC Tequila Nielson OT Occupational Therapist                   Clinical Impression       Row Name 10/08/24 1157          Pain Assessment    Pretreatment Pain Rating 4/10  -     Posttreatment Pain Rating 4/10  -     Pain Location - abdomen  -AC       Row Name 10/08/24 1157          Plan of Care Review    Plan of Care Reviewed With patient  -AC     Outcome Evaluation Pt presents below baseline with self care/mobility d/t weakness and decr activity tolerance.  Pt min A LBD, CGA to ambulate without RW.  OT  will follow to advance pt toward PLOF.  Recommend IP rehab upon d/c.  -AC       Row Name 10/08/24 1157          Therapy Assessment/Plan (OT)    Patient/Family Therapy Goal Statement (OT) P  -AC     Rehab Potential (OT) good, to achieve stated therapy goals  -AC     Criteria for Skilled Therapeutic Interventions Met (OT) yes;skilled treatment is necessary  -AC     Therapy Frequency (OT) daily  -AC       Row Name 10/08/24 1157          Therapy Plan Review/Discharge Plan (OT)    Anticipated Discharge Disposition (OT) inpatient rehabilitation facility  -AC       Row Name 10/08/24 1157          Positioning and Restraints    Pre-Treatment Position in bed  -AC     Post Treatment Position chair  -AC     In Chair notified nsg;reclined;call light within reach;encouraged to call for assist;waffle cushion  -AC               User Key  (r) = Recorded By, (t) = Taken By, (c) = Cosigned By      Initials Name Provider Type    Tequila Zepeda, OT Occupational Therapist                   Outcome Measures       Row Name 10/08/24 1307          How much help from another is currently needed...    Putting on and taking off regular lower body clothing? 3  -AC     Bathing (including washing, rinsing, and drying) 3  -AC     Toileting (which includes using toilet bed pan or urinal) 3  -AC     Putting on and taking off regular upper body clothing 4  -AC     Taking care of personal grooming (such as brushing teeth) 3  -AC     Eating meals 4  -AC     AM-PAC 6 Clicks Score (OT) 20  -AC       Row Name 10/08/24 0918          How much help from another person do you currently need...    Turning from your back to your side while in flat bed without using bedrails? 4  -DF     Moving from lying on back to sitting on the side of a flat bed without bedrails? 4  -DF     Moving to and from a bed to a chair (including a wheelchair)? 4  -DF     Standing up from a chair using your arms (e.g., wheelchair, bedside chair)? 4  -DF     Climbing 3-5 steps with a  railing? 3  -DF     To walk in hospital room? 3  -DF     AM-PAC 6 Clicks Score (PT) 22  -DF     Highest Level of Mobility Goal 7 --> Walk 25 feet or more  -DF       Row Name 10/08/24 1307          Functional Assessment    Outcome Measure Options AM-PAC 6 Clicks Daily Activity (OT)  -               User Key  (r) = Recorded By, (t) = Taken By, (c) = Cosigned By      Initials Name Provider Type     Tequila Nielson, OT Occupational Therapist    DF Margarita Mccord RN Registered Nurse                    Occupational Therapy Education       Title: PT OT SLP Therapies (In Progress)       Topic: Occupational Therapy (In Progress)       Point: ADL training (In Progress)       Description:   Instruct learner(s) on proper safety adaptation and remediation techniques during self care or transfers.   Instruct in proper use of assistive devices.                  Learning Progress Summary             Patient Acceptance, E, NR by  at 10/8/2024 1307                         Point: Home exercise program (Not Started)       Description:   Instruct learner(s) on appropriate technique for monitoring, assisting and/or progressing therapeutic exercises/activities.                  Learner Progress:  Not documented in this visit.              Point: Precautions (Not Started)       Description:   Instruct learner(s) on prescribed precautions during self-care and functional transfers.                  Learner Progress:  Not documented in this visit.              Point: Body mechanics (Not Started)       Description:   Instruct learner(s) on proper positioning and spine alignment during self-care, functional mobility activities and/or exercises.                  Learner Progress:  Not documented in this visit.                              User Key       Initials Effective Dates Name Provider Type Discipline     02/03/23 -  Tequila Nielson, OT Occupational Therapist OT                  OT Recommendation and Plan  Planned Therapy  Interventions (OT): activity tolerance training, BADL retraining, functional balance retraining, occupation/activity based interventions, patient/caregiver education/training, strengthening exercise, transfer/mobility retraining  Therapy Frequency (OT): daily  Plan of Care Review  Plan of Care Reviewed With: patient  Outcome Evaluation: Pt presents below baseline with self care/mobility d/t weakness and decr activity tolerance.  Pt min A LBD, CGA to ambulate without RW.  OT will follow to advance pt toward PLOF.  Recommend IP rehab upon d/c.     Time Calculation:   Evaluation Complexity (OT)  Review Occupational Profile/Medical/Therapy History Complexity: brief/low complexity  Assessment, Occupational Performance/Identification of Deficit Complexity: 1-3 performance deficits  Clinical Decision Making Complexity (OT): problem focused assessment/low complexity  Overall Complexity of Evaluation (OT): low complexity     Time Calculation- OT       Row Name 10/08/24 1130             Time Calculation- OT    OT Start Time 1130  -AC      OT Received On 10/08/24  -AC      OT Goal Re-Cert Due Date 10/18/24  -AC         Untimed Charges    OT Eval/Re-eval Minutes 48  -AC         Total Minutes    Untimed Charges Total Minutes 48  -AC       Total Minutes 48  -AC                User Key  (r) = Recorded By, (t) = Taken By, (c) = Cosigned By      Initials Name Provider Type    AC Tequila Nielson, OT Occupational Therapist                  Therapy Charges for Today       Code Description Service Date Service Provider Modifiers Qty    [id number]  OT EVAL LOW COMPLEXITY 4 10/8/2024 Tequila Nielson OT GO 1                 Tequila Nielson OT  10/8/2024

## 2024-10-08 NOTE — PLAN OF CARE
Goal Outcome Evaluation:  Plan of Care Reviewed With: patient           Outcome Evaluation: Pt presents below baseline with self care/mobility d/t weakness and decr activity tolerance.  Pt min A LBD, CGA to ambulate without RW.  OT will follow to advance pt toward PLOF.  Recommend IP rehab upon d/c.      Anticipated Discharge Disposition (OT): inpatient rehabilitation facility

## 2024-10-08 NOTE — CASE MANAGEMENT/SOCIAL WORK
Continued Stay Note  [facility]     Patient Name: Valeria Tracy  MRN: [id number]  Today's Date: 10/8/2024    Admit Date: 10/1/2024    Plan: SNF   Discharge Plan       Row Name 10/08/24 1523       Plan    Plan SNF    Patient/Family in Agreement with Plan yes    Plan Comments Met with Ms. Tracy at the bedside to discuss discharge plan. Patient has a bed offer [facility]. Insurance precert is still pending. Facility has asked that we send enough ostomy supplies for 3 days so they have time to order.  will continue to follow plan of care and assist with discharge planning needs as indicated.    Final Discharge Disposition Code 03 - skilled nursing facility (SNF)                   Discharge Codes    No documentation.                       Rosa Schmidt RN

## 2024-10-08 NOTE — DISCHARGE SUMMARY
Patient Name: Valeria Tracy  MRN: [id number]  : 1940  DOS: 10/17/2024    Attending: Angelito Ruiz MD    Primary Care Provider: Peyton Vázquez PA    Date of Admission:.10/1/2024  5:29 AM    Date of Discharge:  10/17/2024    Discharge Diagnosis:   S/P abdominal perineal resection, partial thickness posterior vaginal resection    GERD (gastroesophageal reflux disease)    Asthma    Hypothyroidism    Acute postoperative pain    Rectal cancer    Severe malnutrition    Postop ileus , resolved.     Postoperative urinary retention, resolved.      Hospital Course  At admit  Patient is a pleasant 84 y.o. female presented for scheduled surgery by Dr. Ruiz.     Patient has history of right colon cancer for which she had a colectomy, T3 N1 has been resected with persistent neoplasia in the posterior distal rectum after YUDELKA prompting plans for APR.     Today she underwent abdominal perineal resection with partial thickness posterior vaginal resection.  Surgery included placement of a JESUS drain into the deep pelvis and colostomy creation in the left abdomen.     Surgery was done under general anesthesia and a block, was tolerated well.  I saw her in PACU where she is still in a drowsy  Postop state.  Not in distress.      After admit    She was provided pain medication as needed for pain control.  Pt received DVT prophylaxis with subcutaneous heparin as well as mechanicals      Patient was started on clear liquid diet, this was advanced when she showed evidence of bowel function return.  She had difficulty with PO diet, and episodes of nausea, heartburn and emesis, was diagnosed with possible ileus, required NGT ( 2 different occasions) , she has since been able to have it removed, had po diet started and advanced and she tolerated diet without difficulty.    He was seen by [facility], received extensive education and instructions during his stay. All pt questions were addressed;  education folder, stomal care, fluids,  diet, activity, work, lifting restrictions for 6-8 weeks were reviewed.    She had transient urinary retention after [place] removal, required I/O cath a few times, UA was unremarkable.     She was placed on Flomax, she has since been able to void spontaneously without difficulty.  I recommend keeping Flomax on for a few more days.    During her stay patient used an IS for atelectasis prophylaxis.    She was seen by physical therapy and Occupational Therapy, was recommended for inpatient rehab. After staying in the hospital a long while , she achieved much better walking distance. She was denied IPR by her insureance. And she wishes to go home.     Home medications were resumed as appropriate, and labs were monitored and remained fairly stable.    With the progress she has made, pt is ready for DC home.     Discussed with patient regarding plan and she shows understanding and agreement.       Procedures Performed  Procedure(s):  ABDOMINAL PERINEAL RESECTION, LYSIS OF ADHESIONS AND CREATION OF COLOSTOMY       Pertinent Test Results:    I reviewed the patient's new clinical results.   Results from last 7 days   Lab Units 10/13/24  0441 10/12/24  0444   WBC 10*3/mm3 10.47 9.58   HEMOGLOBIN g/dL 11.1* 10.5*   HEMATOCRIT % 34.7 32.7*   PLATELETS 10*3/mm3 524* 461*     Results from last 7 days   Lab Units 10/13/24  0441 10/12/24  0444   SODIUM mmol/L 140 139   POTASSIUM mmol/L 3.4* 4.3   CHLORIDE mmol/L 102 100   CO2 mmol/L 27.0 24.0   BUN mg/dL 16 16   CREATININE mg/dL 0.74 0.64   CALCIUM mg/dL 8.8 8.9   BILIRUBIN mg/dL 0.2  --    ALK PHOS U/L 82  --    ALT (SGPT) U/L 9  --    AST (SGOT) U/L 11  --    GLUCOSE mg/dL 89 78     I reviewed the patient's new imaging including images and reports.      Date of Exam: 10/15/2024 8:30 AM EDT     Indication: interval bowel gas pattern     Comparison: 10/14/2024     Findings:  Left chest port is unchanged in position. Lung fields appear clear of acute infiltrates or effusions. The  "heart size is normal.     There is no free air. There are no significant air-fluid levels. There is a left abdominal ostomy. Previously seen small bowel dilatation has resolved. Large and small bowel are nondilated.     IMPRESSION:  Impression:  Resolution of small bowel distention.        Electronically Signed: Judy Billingsley MD    10/15/2024 8:46 AM EDT    Workstation ID: [id number]    Physical therapy    Date of Service: 10/07/24 1006  Creation Time: 10/07/24 110     Signed         Goal Outcome Evaluation:  Plan of Care Reviewed With: patient  Progress: improving  Outcome Evaluation: Pt continues to present with decreased functional mobility and decreased activity tolerance. Pt ambulated 150ft with CGA and RW for support. Pt demo increased fatigue with mobility and difficulty improving independence with increased distance. Continue to progress per pt tolerance.        Anticipated Discharge Disposition (PT): inpatient rehabilitation facility                 Discharge Assessment:       Visit Vitals  /63 (BP Location: Left arm, Patient Position: Lying)   Pulse 86   Temp 97.5 °F (36.4 °C) (Oral)   Resp 18   Ht 165.1 cm (65\")   Wt 56 kg (123 lb 8 oz)   SpO2 96%   BMI 20.55 kg/m²     Temp (24hrs), Av.9 °F (36.6 °C), Min:96.6 °F (35.9 °C), Max:99.4 °F (37.4 °C)      General Appearance:    Alert, cooperative, in no acute distress   Lungs:     Clear to auscultation,respirations regular, even and     unlabored    Heart:    Regular rhythm and normal rate, normal S1 and S2    Abdomen:   Soft and benign with clean incisions.  Pink stoma with output.   Extremities:   Moves all extremities well, no edema, no cyanosis, no              redness   Pulses:   Pulses palpable and equal bilaterally   Skin:   No bleeding, bruising or rash            Discharge Disposition:          Discharge Medications        New Medications        Instructions Start Date   megestrol 40 MG/ML suspension  Commonly known as: MEGACE   200 mg, " Oral, Daily   Start Date: October 18, 2024     pyridostigmine 60 MG tablet  Commonly known as: MESTINON   Take 0.5 tablet by mouth Every 8 (Eight) Hours for 7 days, THEN use as needed if stoma output is low             Continue These Medications        Instructions Start Date   albuterol sulfate  (90 Base) MCG/ACT inhaler  Commonly known as: PROVENTIL HFA;VENTOLIN HFA;PROAIR HFA   2 puffs, Inhalation, Every 4 Hours PRN      DOCUSATE SODIUM PO   100 mg, Oral, Daily PRN      famotidine 10 MG tablet  Commonly known as: PEPCID   10 mg, Oral, 2 Times Daily      FLUTICASONE PROPIONATE NA   2 sprays, Nasal, Daily      fluticasone-salmeterol 115-21 MCG/ACT inhaler  Commonly known as: ADVAIR HFA   2 puffs, Inhalation, 2 Times Daily - RT      ibuprofen 200 MG tablet  Commonly known as: ADVIL,MOTRIN   200 mg, Oral, Every 6 Hours PRN      levothyroxine 88 MCG tablet  Commonly known as: SYNTHROID, LEVOTHROID   88 mcg, Oral, Daily      Loratadine 10 MG capsule   10 mg, Oral, Daily      sodium chloride 0.65 % nasal spray   1 spray, Nasal, As Needed      VIACTIV CALCIUM PLUS D PO   2 doses, Oral, Daily      Womens Multi Gummies chewable tablet   1 tablet, Oral, Daily               Discharge Diet: Soft GI.  Low fiber diet    Activity at Discharge: Ambulate.  No lifting, pushing or pulling.    Follow-up Appointments:  Angelito Ruiz MD per his orders.    Dragon disclaimer:  Part of this encounter note is an electronic transcription/translation of spoken language to printed text. The electronic translation of spoken language may permit erroneous, or at times, nonsensical words or phrases to be inadvertently transcribed; Although I have reviewed the note for such errors, some may still exist.       Maggi Lance MD  10/17/24  23:46 EDT

## 2024-10-08 NOTE — DISCHARGE PLACEMENT REQUEST
"Cris Leonard (84 y.o. Female)       Date of Birth   1940    Social Security Number       Address   [address] [place] [address]    Home Phone   [phone]    MRN   [id number]       Roman Catholic   None    Marital Status   Single                            Admission Date   10/1/24    Admission Type   Elective    Admitting Provider   Angelito Ruiz MD    Attending Provider   Angelito Ruiz MD    Department, Room/Bed   [address], S572/1       Discharge Date       Discharge Disposition       Discharge Destination                                 Attending Provider: Angelito Ruiz MD    Allergies: Milk-related Compounds    Isolation: None   Infection: None   Code Status: CPR    Ht: 165.1 cm (65\")   Wt: 56 kg (123 lb 8 oz)    Admission Cmt: None   Principal Problem: S/P abdominal perineal resection, partial thickness posterior vaginal resection [Z90.49]                   Active Insurance as of 10/1/2024       Primary Coverage       Payor Plan Insurance Group Employer/Plan Group    HUMANA MEDICARE REPLACEMENT HUMANA MED ADV PPO [id number]       Payor Plan Address Payor Plan Phone Number Payor Plan Fax Number Effective Dates    [address] [address] [phone]  3/1/2024 - None Entered    [facility] [id number]         Subscriber Name Subscriber Birth Date Member ID       CRIS LEONARD 1940 [id number]                     Emergency Contacts        (Rel.) Home Phone Work Phone Mobile Phone    ANNABELLA SALES (Grandchild) [phone] -- [phone]                 History & Physical        Maggi Lance MD at 10/01/24 1815          Admission HP     Patient Name: Cris Leonard  MRN: [id number]  : 1940  DOS: 10/1/2024    Attending: Maggi Lance MD    Primary Care Provider: Peyton Vázquez PA      Patient Care Team:  Peyton Vázquez PA as PCP - General (Physician Assistant)    Chief complaint: Rectal cancer    Subjective  Patient is a pleasant " 84 y.o. female presented for scheduled surgery by Dr. Ruiz.    Patient has history of right colon cancer for which she had a colectomy, T3 N1 has been resected with persistent neoplasia in the posterior distal rectum after YUDELKA prompting plans for APR.    Today she underwent abdominal perineal resection with partial thickness posterior vaginal resection.  Surgery included placement of a JESSU drain into the deep pelvis and colostomy creation in the left abdomen.    Surgery was done under general anesthesia and a block, was tolerated well.  I saw her in PACU where she is still in a drowsy  Postop state.  Not in distress.       Allergies   Allergen Reactions    Milk-Related Compounds Anaphylaxis        Medications Prior to Admission   Medication Sig Dispense Refill Last Dose    albuterol sulfate  (90 Base) MCG/ACT inhaler Inhale 2 puffs Every 4 (Four) Hours As Needed for Wheezing.   Past Week    Calcium-Vitamin D-Vitamin K (VIACTIV CALCIUM PLUS D PO) Take 2 doses by mouth Daily.   Past Week    DOCUSATE SODIUM PO Take 100 mg by mouth Daily As Needed (constipation).   Past Week    famotidine (PEPCID) 10 MG tablet Take 1 tablet by mouth 2 (Two) Times a Day.   Past Month    FLUTICASONE PROPIONATE NA 2 sprays into the nostril(s) as directed by provider Daily.   Past Week    fluticasone-salmeterol (ADVAIR HFA) 115-21 MCG/ACT inhaler Inhale 2 puffs 2 (Two) Times a Day.   10/1/2024    levothyroxine (SYNTHROID, LEVOTHROID) 88 MCG tablet Take 1 tablet by mouth Daily.   9/30/2024    Loratadine 10 MG capsule Take 1 capsule by mouth Daily.   9/30/2024    Multiple Vitamins-Minerals (Womens Multi Gummies) chewable tablet Chew 1 tablet Daily.   Past Week    ibuprofen (ADVIL,MOTRIN) 200 MG tablet Take 1 tablet by mouth Every 6 (Six) Hours As Needed for Mild Pain.       sodium chloride 0.65 % nasal spray Administer 1 spray into the nostril(s) as directed by provider As Needed for Congestion.             Past Medical History:  "  Diagnosis Date    Acid reflux     Anemia     Asthma     Cancer     rectal    History of chemotherapy     History of radiation therapy     History of transfusion     No Reaction    Hypothyroid     Pneumonia     Wears dentures     upper    Wears glasses     Wears hearing aid in both ears      Past Surgical History:   Procedure Laterality Date    BREAST LUMPECTOMY Right     CATARACT EXTRACTION Bilateral     COLON RESECTION N/A 10/17/2023    Procedure: COLON RESECTION RIGHT LAPAROSCOPIC;  Surgeon: Angelito Ruiz MD;  Location: [facility];  Service: General;  Laterality: N/A;    COLONOSCOPY      PORTACATH PLACEMENT      SHOULDER SURGERY Right      History reviewed. No pertinent family history.  Social History     Tobacco Use    Smoking status: Former     Types: Cigarettes    Smokeless tobacco: Never   Vaping Use    Vaping status: Never Used   Substance Use Topics    Alcohol use: Never    Drug use: Never       Review of Systems  Review of systems could not be obtained due to   patient sedation status.    Vital Signs  /73 (BP Location: Right arm, Patient Position: Lying)   Pulse 67   Temp 97.6 °F (36.4 °C) (Axillary)   Resp 16   Ht 165.1 cm (65\")   Wt 56 kg (123 lb 8 oz)   SpO2 99%   BMI 20.55 kg/m²     Physical Exam:    General Appearance:  Drowsy postop in no acute distress   Head:    Normocephalic, without obvious abnormality, atraumatic   Eyes:            Lids and lashes normal, conjunctivae and sclerae normal, no   icterus    Ears:    Ears appear intact with no abnormalities noted   Throat:   No oral lesions, no thrush, oral mucosa moist   Neck:   No adenopathy, supple, trachea midline, no thyromegaly         Lungs:     Clear to auscultation,respirations regular, even and       unlabored. No wheezes or rales.    Heart:    Regular rhythm and normal rate, normal S1 and S2, no murmur    Abdomen:      Soft with midline incision with an intact dressing.  Left colostomy and low abdomen/pelvic JESUS drain. " "  Genitalia:    Deferred   Extremities:   No edema, no cyanosis, no              redness   Pulses:   Pulses palpable and equal bilaterally   Skin:   No bleeding, bruising or rash   Neurologic:   Cranial nerves 2 - 12 grossly intact,             Invalid input(s): \"NEUTOPHILPCT\"        Invalid input(s): \"LABALBU\", \"PROT\"  Lab Results   Component Value Date    HGBA1C 5.20 09/23/2024      Latest Reference Range & Units 09/23/24 13:17   Sodium 136 - 145 mmol/L 139   Potassium 3.5 - 5.2 mmol/L 4.2   Chloride 98 - 107 mmol/L 101   CO2 22.0 - 29.0 mmol/L 29.0   Anion Gap 5.0 - 15.0 mmol/L 9.0   BUN 8 - 23 mg/dL 8   Creatinine 0.57 - 1.00 mg/dL 0.65   BUN/Creatinine Ratio 7.0 - 25.0  12.3   eGFR >60.0 mL/min/1.73 87.5   Glucose 65 - 99 mg/dL 89   Calcium 8.6 - 10.5 mg/dL 9.6   Alkaline Phosphatase 39 - 117 U/L 119 (H)   Total Protein 6.0 - 8.5 g/dL 7.1   Albumin 3.5 - 5.2 g/dL 4.3   Globulin gm/dL 2.8   A/G Ratio g/dL 1.5   AST (SGOT) 1 - 32 U/L 22   ALT (SGPT) 1 - 33 U/L 8   Total Bilirubin 0.0 - 1.2 mg/dL 0.5   Hemoglobin A1C 4.80 - 5.60 % 5.20   WBC 3.40 - 10.80 10*3/mm3 5.08   RBC 3.77 - 5.28 10*6/mm3 3.91   Hemoglobin 12.0 - 15.9 g/dL 12.1   Hematocrit 34.0 - 46.6 % 38.6   Platelets 140 - 450 10*3/mm3 302   RDW 12.3 - 15.4 % 13.0   MCV 79.0 - 97.0 fL 98.7 (H)   MCH 26.6 - 33.0 pg 30.9   MCHC 31.5 - 35.7 g/dL 31.3 (L)   MPV 6.0 - 12.0 fL 9.1   RDW-SD 37.0 - 54.0 fl 47.0   (H): Data is abnormally high  (L): Data is abnormally low    Assessment and Plan:   S/p ABDOMINAL PERINEAL RESECTION  Partial thickness posterior vaginal resection.    Rectal cancer    GERD (gastroesophageal reflux disease)    Asthma    Hypothyroidism      Plan  Postop management to include  1. Ambulation, several times daily  2. Pain control-PRNs, multimodal approach   3. IS-will encourage  4. DVT proph- Mechanical, subcutaneous heparin  5. Bowel regimen, alvimopan  6. Resume home medications as appropriate  7. Monitor post-op labs  8. Discharge " planning   9. Diet, Clears, advance diet as tolerated.  IVF initially, monitor volume status.    -Asthma: Maintain home regimen with formulary substitution as indicated.  As needed bronchodilators.  Monitor oxygenation.    -Hypothyroidism: Resume replacement     -GERD:  Resume H2 blocker.     -New stoma:  Wound care stoma nurse consult for stoma care and education throughout patient's hospitalization.      Dragon disclaimer:  Part of this encounter note is an electronic transcription/translation of spoken language to printed text. The electronic translation of spoken language may permit erroneous, or at times, nonsensical words or phrases to be inadvertently transcribed; Although I have reviewed the note for such errors, some may still exist.    Maggi Lance MD  10/01/24  18:15 EDT              Electronically signed by Maggi Lance MD at 10/01/24 2202       Pina Norman APRN at 10/01/24 0910       Attestation signed by Angelito Ruiz MD at 10/01/24 1130    No interval change    Goals of postoperative care reviewed                [facility] Pre-op    Full history and physical note from office is attached.    /85 (BP Location: Right arm, Patient Position: Lying)   Pulse 85   Temp 98.7 °F (37.1 °C) (Temporal)   Resp 18   SpO2 100%     Immunizations:  Influenza:  No  Pneumococcal:  UTD  Tetanus:  UTD    Review of Systems:  Constitutional-- No fever, chills or sweats. No fatigue.  CV-- No chest pain, palpitation or syncope  Resp-- No SOB, cough, hemoptysis  Skin--No rashes or lesions    Physical Exam:  Heart:   Regular rate and rhythm, S1 and S2 normal  Lungs: Clear to auscultation bilaterally, respirations unlabored    LAB Results:  Lab Results   Component Value Date    WBC 5.08 09/23/2024    HGB 12.1 09/23/2024    HCT 38.6 09/23/2024    MCV 98.7 (H) 09/23/2024     09/23/2024    NEUTROABS 7.07 (H) 10/23/2023    GLUCOSE 89 09/23/2024    BUN 8 09/23/2024    CREATININE  "0.65 09/23/2024     09/23/2024    K 4.2 09/23/2024     09/23/2024    CO2 29.0 09/23/2024    CALCIUM 9.6 09/23/2024    ALBUMIN 4.3 09/23/2024    AST 22 09/23/2024    ALT 8 09/23/2024    BILITOT 0.5 09/23/2024     Study Result    Narrative & Impression   MRI PELVIS WO CONTRAST     Date of Exam: 8/22/2024 6:43 PM EDT     Indication: C20.     Comparison: CT abdomen pelvis 10/20/2023.     Technique:  Routine multiplanar/multisequence images of the pelvis were obtained without contrast administration.     Per chart review: 10/25/2023 patient underwent colonoscopy which demonstrated \"large bulky mass lesion was seen in the rectum. This arose from the posterior wall of the rectum was greater than 5 cm in size, arising from a broad base with distal extent to   within a proximally 3 cm of the dentate line. Several biopsies were obtained, that showed moderately differentiated adenocarcinoma.\" Patient has subsequently undergone chemoradiation at an outside institution for rectal cancer. No pretreatment/baseline   rectal MRI performed.     Findings:     Note there is a large amount of gas in the rectum, which makes evaluation suboptimal. Along the posterior rectal wall there is T2 hypointense signal suggestive of scar/fibrosis, which spans around 3.5 cm in craniocaudal dimension (series 5 image 10-12)   which appears to be at the site of the rectal tumor seen as an enhancing mass on prior CT. The inferior margin of the scar extends to 2.2 cm above the anorectal junction and 4.2 cm above the anal verge. Within the posterior mesorectal fat at this   location at the 6 o'clock position, there are thin linear/radiating areas of T2 hypointensity suggestive of desmoplastic stranding. Otherwise, there is no convincing evidence of mesorectal involvement. No distinct intermediate T2 signal or discrete   restricted diffusion to suggest significant residual viable disease.     There is a 4 x 4 mm lymph node in the mesorectum at " the 7 o'clock position, without prominent diffusion signal or suspicious morphologic features, unchanged in size from prior CT and of low suspicion. No mesorectal lymphadenopathy otherwise. No   extramesorectal lymphadenopathy within the field-of-view.     Small uterus and ovaries, typical of age. Questionable small fibroid present. No free fluid or organized fluid collection. No subcutaneous soft tissue abnormality. No acute or suspicious osseous lesions. Degenerative changes of the bilateral hips.     IMPRESSION:  Impression:     Within the confines of comparing to a prior CT without prior/baseline MRI, as well as a large amount of gas in the rectum which distorts diffusion-weighted sequences, there appears to be positive response to treatment with predominantly T2 hypointense   signal at the site of the treated rectal tumor suggestive of scar/fibrosis. No distinct intermediate T2 signal nor restricted diffusion to suggest measurable residual viable tumor. Mesorectum is generally clear without convincing evidence of tumor   involvement nor suspicious lymphadenopathy, with note made of a tiny/nonsuspicious lymph node. Consider correlation with endoscopy and tissue sampling as clinically indicated.     Cancer Staging (if applicable)  Cancer Patient: __ yes __no __unknown__N/A; If yes, clinical stage T:__ N:__M:__, stage group or __N/A      Impression: History rectal cancer, Stage III      Plan: ABDOMINAL PERINEAL RESECTION, LYSIS OF ADHESIONS       RAINA Arana   10/1/2024   09:10 EDT    Electronically signed by Angelito Ruiz MD at 10/01/24 1130   Source Note: H&P        [Media Unavailable] Scan on 9/9/2024 0913 by [employer], [place]: H&P, [facility], 09/06/2024          Electronically signed by [employer], [place] at 09/09/24 0916                 H&P signed by [employer], [place] at 09/09/24 0916         [Media Unavailable] Scan on 9/9/2024 0913 by [employer], [place]: H&P, [facility], 09/06/2024         "  Electronically signed by [employer], [place] at 24 0916          Physician Progress Notes (most recent note)        Maggi Lance MD at 10/07/24 1223          IM progress note      Valeria Tracy  [id number]  1940     LOS: 6 days     Attending: Angelito Ruiz MD    Primary Care Provider: Peyton Vázquez PA      Chief Complaint/Reason for visit:  Abd pain    Subjective   Doing well. Tolerating PO. Voiding. Stoma with output.   Good pain control.   Participating with PT.    Objective        Visit Vitals  /88 (BP Location: Left arm, Patient Position: Lying)   Pulse 86   Temp 98.2 °F (36.8 °C) (Oral)   Resp 16   Ht 165.1 cm (65\")   Wt 56 kg (123 lb 8 oz)   SpO2 96%   BMI 20.55 kg/m²     Temp (24hrs), Av.1 °F (36.7 °C), Min:97.4 °F (36.3 °C), Max:98.7 °F (37.1 °C)         Respiratory: RA    Physical Exam:     General Appearance:    Alert, cooperative, in no acute distress   Head:    Normocephalic, without obvious abnormality, atraumatic    Lungs:     Normal effort, symmetric chest rise, no crepitus, clear to      auscultation bilaterally             Heart:    Regular rhythm and normal rate, normal S1 and S2   Abdomen:     Soft, expected tenderness. Midline dressing CDI. JESUS is out. Dressing intact.       Extremities:   No clubbing, cyanosis or edema.  No deformities.    Pulses:   Pulses palpable and equal bilaterally   Skin:   No bleeding, bruising or rash   Neurologic:   Moves all extremities with no obvious focal motor deficit.  Cranial nerves 2 - 12 grossly intact     Results Review:     I reviewed the patient's new clinical results.   Results from last 7 days   Lab Units 10/07/24  0516 10/04/24  0434 10/02/24  09   WBC 10*3/mm3 5.76 5.50 11.57*   HEMOGLOBIN g/dL 9.8* 9.5* 11.6*   HEMATOCRIT % 30.2* 29.8* 36.6   PLATELETS 10*3/mm3 240 189 217     Results from last 7 days   Lab Units 10/07/24  0516 10/04/24  0434 10/02/24  0514   SODIUM mmol/L 139 141 134*   POTASSIUM mmol/L 3.5 3.8 5.1 "   CHLORIDE mmol/L 104 107 101   CO2 mmol/L 26.0 24.0 22.0   BUN mg/dL 9 4* 9   CREATININE mg/dL 0.60 0.45* 0.55*   CALCIUM mg/dL 8.7 8.3* 8.4*   GLUCOSE mg/dL 111* 74 113*     I reviewed the patient's new imaging including images and reports.    All medications reviewed.   acetaminophen, 650 mg, Oral, Q8H  budesonide-formoterol, 2 puff, Inhalation, BID - RT  cetirizine, 5 mg, Oral, Daily  famotidine, 10 mg, Oral, BID  heparin (porcine), 5,000 Units, Subcutaneous, Q8H  levothyroxine, 88 mcg, Oral, Q AM  tamsulosin, 0.4 mg, Oral, Daily        Assessment & Plan     S/P abdominal perineal resection, partial thickness posterior vaginal resection    GERD (gastroesophageal reflux disease)    Asthma    Hypothyroidism    Acute postoperative pain    Rectal cancer    Severe malnutrition    Postoperative urinary retention      Plan  1. Ambulation, encouraged, PT is following  2. Pain control-prns   3. IS-encouraged  4. DVT proph- mechs/heparin  5. Bowel regimen  6. DC planning, inpatient rehab, precert has been started for IPR.      -Asthma: Maintain home regimen with formulary substitution as indicated.  As needed bronchodilators.  Monitor oxygenation.     -Hypothyroidism: Resume replacement      -GERD:  Resumed H2 blocker.      -New stoma:  Wound care stoma nurse consult for stoma care and education throughout patient's hospitalization.    -Urinary retention:   Resolved.   Continue tamsulosin,   Urinalysis unremarkable.           Discussed with patient and RN.    Maggi Lance MD  10/07/24  12:23 EDT    Electronically signed by Maggi Lance MD at 10/07/24 1226          Physical Therapy Notes (most recent note)        Rebel Maradiaga, PT at 10/07/24 1006  Version 1 of 1         Patient Name: Valeria Tracy  : 1940    MRN: [id number]                              Today's Date: 10/7/2024       Admit Date: 10/1/2024    Visit Dx:     ICD-10-CM ICD-9-CM   1. S/P right colectomy  Z90.49 V45.89   2. Rectal  cancer  C20 154.1     Patient Active Problem List   Diagnosis    GERD (gastroesophageal reflux disease)    Asthma    Hypothyroidism    Colon cancer    S/P right colectomy    Acute postoperative pain    Anemia    Rectal cancer    S/P abdominal perineal resection, partial thickness posterior vaginal resection    Severe malnutrition    Postoperative urinary retention     Past Medical History:   Diagnosis Date    Acid reflux     Anemia     Asthma     Cancer     rectal    History of chemotherapy     History of radiation therapy     History of transfusion     No Reaction    Hypothyroid     Pneumonia     Wears dentures     upper    Wears glasses     Wears hearing aid in both ears      Past Surgical History:   Procedure Laterality Date    ABDOMINAL PERINEAL RESECTION N/A 10/1/2024    Procedure: ABDOMINAL PERINEAL RESECTION, LYSIS OF ADHESIONS AND CREATION OF COLOSTOMY;  Surgeon: Angelito Ruiz MD;  Location:  [facility];  Service: General;  Laterality: N/A;    BREAST LUMPECTOMY Right     CATARACT EXTRACTION Bilateral     COLON RESECTION N/A 10/17/2023    Procedure: COLON RESECTION RIGHT LAPAROSCOPIC;  Surgeon: Angelito Ruiz MD;  Location:  [facility];  Service: General;  Laterality: N/A;    COLONOSCOPY      PORTACATH PLACEMENT      SHOULDER SURGERY Right       General Information       Row Name 10/07/24 1057          Physical Therapy Time and Intention    Document Type therapy note (daily note)  -AE     Mode of Treatment physical therapy  -AE       Row Name 10/07/24 1057          General Information    Patient Profile Reviewed yes  -AE     Existing Precautions/Restrictions fall;other (see comments)  abdominal incison; colostomy  -AE     Barriers to Rehab medically complex;previous functional deficit  -AE       Row Name 10/07/24 1057          Cognition    Orientation Status (Cognition) oriented x 3  -AE       Row Name 10/07/24 1057          Safety Issues, Functional Mobility    Safety Issues Affecting Function (Mobility)  awareness of need for assistance;insight into deficits/self-awareness;safety precaution awareness;safety precautions follow-through/compliance;sequencing abilities  -AE     Impairments Affecting Function (Mobility) balance;endurance/activity tolerance;pain;postural/trunk control;strength  -AE               User Key  (r) = Recorded By, (t) = Taken By, (c) = Cosigned By      Initials Name Provider Type    AE Rebel Maradiaga, PT Physical Therapist                   Mobility       Row Name 10/07/24 1059          Bed Mobility    Bed Mobility supine-sit;sit-supine  -AE     Supine-Sit [place] (Bed Mobility) standby assist  -AE     Sit-Supine [place] (Bed Mobility) minimum assist (75% patient effort);1 person assist;verbal cues  -AE     Assistive Device (Bed Mobility) bed rails;head of bed elevated  -AE     Comment, (Bed Mobility) VCs for hand placement and sequencing. Pt required increased assist to advance BLE into bed.  -AE       Row Name 10/07/24 1059          Transfers    Comment, (Transfers) VCs for hand placement and sequencing.  -AE       Row Name 10/07/24 1059          Sit-Stand Transfer    Sit-Stand [place] (Transfers) contact guard;verbal cues  -AE       Row Name 10/07/24 1059          Gait/Stairs (Locomotion)    [place] Level (Gait) contact guard;verbal cues  -AE     Assistive Device (Gait) walker, front-wheeled  -AE     Distance in Feet (Gait) 150  -AE     Deviations/Abnormal Patterns (Gait) bilateral deviations;tanya decreased;gait speed decreased;stride length decreased;base of support, narrow  -AE     Bilateral Gait Deviations forward flexed posture;heel strike decreased  -AE     Comment, (Gait/Stairs) Pt demo step through gait pattern with slowed tanya and forward flexed posture. Pt required increased cues to improve upright posture and take standing rest breaks as needed. Pt reported increased fatigue and weakness at the end of session. Further distance limited by fatigue.  -AE                User Key  (r) = Recorded By, (t) = Taken By, (c) = Cosigned By      Initials Name Provider Type    AE Rebel Maradiaga PT Physical Therapist                   Obj/Interventions       Row Name 10/07/24 1102          Balance    Balance Assessment sitting static balance;sitting dynamic balance;sit to stand dynamic balance;standing static balance;standing dynamic balance  -AE     Static Sitting Balance standby assist  -AE     Dynamic Sitting Balance standby assist  -AE     Position, Sitting Balance unsupported;sitting edge of bed  -AE     Sit to Stand Dynamic Balance contact guard;verbal cues  -AE     Static Standing Balance contact guard  -AE     Dynamic Standing Balance contact guard  -AE     Position/Device Used, Standing Balance supported;walker, front-wheeled  -AE               User Key  (r) = Recorded By, (t) = Taken By, (c) = Cosigned By      Initials Name Provider Type    AE Rebel Maradiaga PT Physical Therapist                   Goals/Plan    No documentation.                  Clinical Impression       Row Name 10/07/24 1104          Pain    Additional Documentation Pain Scale: FACES Pre/Post-Treatment (Group)  -AE       Row Name 10/07/24 1104          Pain Scale: FACES Pre/Post-Treatment    Pain: FACES Scale, Pretreatment 2-->hurts little bit  -AE     Posttreatment Pain Rating 2-->hurts little bit  -AE     Pain Location incisional  -AE     Pain Location - abdomen  -AE     Pre/Posttreatment Pain Comment tolerated  -AE       Row Name 10/07/24 1103          Plan of Care Review    Plan of Care Reviewed With patient  -AE     Progress improving  -AE     Outcome Evaluation Pt continues to present with decreased functional mobility and decreased activity tolerance. Pt ambulated 150ft with CGA and RW for support. Pt demo increased fatigue with mobility and difficulty improving independence with increased distance. Continue to progress per pt tolerance.  -AE       Row Name 10/07/24 1102          Vital  Signs    Pre Systolic BP Rehab 137  -AE     Pre Treatment Diastolic BP 78  -AE     Pretreatment Heart Rate (beats/min) 92  -AE     Posttreatment Heart Rate (beats/min) 90  -AE     Pre SpO2 (%) 92  -AE     O2 Delivery Pre Treatment room air  -AE     O2 Delivery Intra Treatment room air  -AE     Post SpO2 (%) 96  -AE     O2 Delivery Post Treatment room air  -AE     Pre Patient Position Supine  -AE     Intra Patient Position Standing  -AE     Post Patient Position Supine  -AE       Row Name 10/07/24 1104          Positioning and Restraints    Pre-Treatment Position in bed  -AE     Post Treatment Position bed  -AE     In Bed fowlers;call light within reach;encouraged to call for assist;side rails up x2;legs elevated  -AE               User Key  (r) = Recorded By, (t) = Taken By, (c) = Cosigned By      Initials Name Provider Type    Rebel Maloney, PT Physical Therapist                   Outcome Measures       Row Name 10/07/24 1106 10/07/24 0921       How much help from another person do you currently need...    Turning from your back to your side while in flat bed without using bedrails? 4  -AE 4 (P)   -TR    Moving from lying on back to sitting on the side of a flat bed without bedrails? 3  -AE 4 (P)   -TR    Moving to and from a bed to a chair (including a wheelchair)? 3  -AE 4 (P)   -TR    Standing up from a chair using your arms (e.g., wheelchair, bedside chair)? 3  -AE 4 (P)   -TR    Climbing 3-5 steps with a railing? 3  -AE 3 (P)   -TR    To walk in hospital room? 3  -AE 4 (P)   -TR    AM-PAC 6 Clicks Score (PT) 19  -AE 23 (P)   -TR    Highest Level of Mobility Goal 6 --> Walk 10 steps or more  -AE 7 --> Walk 25 feet or more (P)   -TR      Row Name 10/07/24 0837          How much help from another person do you currently need...    Turning from your back to your side while in flat bed without using bedrails? 4  -DF     Moving from lying on back to sitting on the side of a flat bed without bedrails? 4  -DF      Moving to and from a bed to a chair (including a wheelchair)? 4  -DF     Standing up from a chair using your arms (e.g., wheelchair, bedside chair)? 4  -DF     Climbing 3-5 steps with a railing? 3  -DF     To walk in hospital room? 4  -DF     AM-PAC 6 Clicks Score (PT) 23  -DF     Highest Level of Mobility Goal 7 --> Walk 25 feet or more  -DF       Row Name 10/07/24 1106          Functional Assessment    Outcome Measure Options AM-PAC 6 Clicks Basic Mobility (PT)  -AE               User Key  (r) = Recorded By, (t) = Taken By, (c) = Cosigned By      Initials Name Provider Type    DF Margarita Mccord, RN Registered Nurse    Rebel Maloney, PT Physical Therapist    Henrietta Adan, Nursing Student Nursing Student                                 Physical Therapy Education       Title: PT OT SLP Therapies (In Progress)       Topic: Physical Therapy (In Progress)       Point: Mobility training (Done)       Learning Progress Summary             Patient Acceptance, E, VU by AE at 10/7/2024 1006    Acceptance, E, VU by TM at 10/4/2024 1313    Acceptance, E, NR by TM at 10/2/2024 1129                         Point: Home exercise program (Not Started)       Learner Progress:  Not documented in this visit.              Point: Body mechanics (Done)       Learning Progress Summary             Patient Acceptance, E, VU by AE at 10/7/2024 1006    Acceptance, E, VU by TM at 10/4/2024 1313    Acceptance, E, NR by TM at 10/2/2024 1129                         Point: Precautions (Done)       Learning Progress Summary             Patient Acceptance, E, VU by AE at 10/7/2024 1006    Acceptance, E, VU by TM at 10/4/2024 1313    Acceptance, E, NR by TM at 10/2/2024 1129                                         User Key       Initials Effective Dates Name Provider Type Discipline    AE 09/21/21 -  Rebel Maradiaga, PT Physical Therapist PT    TM 08/13/24 -  Britton Lipscomb, PT Student PT Student PT                  PT  Recommendation and Plan     Plan of Care Reviewed With: patient  Progress: improving  Outcome Evaluation: Pt continues to present with decreased functional mobility and decreased activity tolerance. Pt ambulated 150ft with CGA and RW for support. Pt demo increased fatigue with mobility and difficulty improving independence with increased distance. Continue to progress per pt tolerance.     Time Calculation:         PT Charges       Row Name 10/07/24 1107             Time Calculation    Start Time 1006  -AE      PT Received On 10/07/24  -AE      PT Goal Re-Cert Due Date 10/12/24  -AE         Timed Charges    03737 - PT Therapeutic Activity Minutes 10  -AE         Total Minutes    Timed Charges Total Minutes 10  -AE       Total Minutes 10  -AE                User Key  (r) = Recorded By, (t) = Taken By, (c) = Cosigned By      Initials Name Provider Type    AE Rebel Maradiaga PT Physical Therapist                  Therapy Charges for Today       Code Description Service Date Service Provider Modifiers Qty    36078185825 HC PT THERAPEUTIC ACT EA 15 MIN 10/7/2024 Rebel Maradiaga PT GP 1            PT G-Codes  Outcome Measure Options: AM-PAC 6 Clicks Basic Mobility (PT)  AM-PAC 6 Clicks Score (PT): 19  PT Discharge Summary  Anticipated Discharge Disposition (PT): inpatient rehabilitation facility    Rebel Maradiaga PT  10/7/2024      Electronically signed by Rebel Maradiaga PT at 10/07/24 1108       Occupational Therapy Notes (most recent note)    No notes exist for this encounter.

## 2024-10-08 NOTE — NURSING NOTE
[facility] visit for Stoma care and assessment     Surgery date: 10/1/24  Surgical Procedures Since Admission:  Procedure(s):  ABDOMINAL PERINEAL RESECTION, LYSIS OF ADHESIONS AND CREATION OF COLOSTOMY  Surgeon:  Angelito Ruiz MD  Status:  7 Days Post-Op      -------------------     Patient seen in bed, awake and alert. Patient seen Medical/Surgical Floor. family at bedside awaiting education     Stoma Type: End Colostomy  Diameter/shape: 38mm  Location: LLQ  Protrusion: Budded  Stoma Characteristics: Edematous, Moist, and Pink     Peristomal skin: Clean and Intact  Character of output: brown light, soft  Emptying frequency per day: per nursing flowsheet  Current pouching system: Mcintosh new image, flat, stoma paste  Accessory products, appliance placed: Skin barrier paste, Kevin 2 3/4 two piece flat  Goal wear time:3-4 days  Last appliance change: 10/4     Surgical Incision: Midline abdominal incision  Degree of approximation: sam  Approximating Devices: Staples  Drainage Characteristics: serosanguineous  Method of Management: Gauze border dressing     Drain(s) type: JESUS drain  Effluent characteristics: serosanguineous     1.  Received multiple ostomy educational visits during her hospital stay in regards to her ostomy. Stoma is viable, good stool output.   2.  Reviewed frequency of appliance changes, importance of hydration, fiber and ambulation.  3. Waiting for DC placement  4. Discharge supplies provided. Amena bhatt has been marked with reccommended supplies she will need to order.    Yassine Ascencio RN, BSN, CCRN, CWOCN  Wound, Ostomy and Continence (WOC) Department  [facility]        WO Nurse will continue to follow for ostomy education. Please contact #[phone] with questions or if ostomy leaks occur.

## 2024-10-09 ENCOUNTER — APPOINTMENT (OUTPATIENT)
Dept: GENERAL RADIOLOGY | Facility: HOSPITAL | Age: 84
End: 2024-10-09
Payer: MEDICARE

## 2024-10-09 LAB
CYTO UR: NORMAL
LAB AP CASE REPORT: NORMAL
LAB AP CLINICAL INFORMATION: NORMAL
LAB AP DIAGNOSIS COMMENT: NORMAL
PATH REPORT.FINAL DX SPEC: NORMAL
PATH REPORT.GROSS SPEC: NORMAL

## 2024-10-09 PROCEDURE — 25010000002 HEPARIN (PORCINE) PER 1000 UNITS: Performed by: COLON & RECTAL SURGERY

## 2024-10-09 PROCEDURE — 74018 RADEX ABDOMEN 1 VIEW: CPT

## 2024-10-09 PROCEDURE — 94799 UNLISTED PULMONARY SVC/PX: CPT

## 2024-10-09 PROCEDURE — 63710000001 ONDANSETRON ODT 4 MG TABLET DISPERSIBLE: Performed by: COLON & RECTAL SURGERY

## 2024-10-09 PROCEDURE — 94664 DEMO&/EVAL PT USE INHALER: CPT

## 2024-10-09 PROCEDURE — 97116 GAIT TRAINING THERAPY: CPT

## 2024-10-09 PROCEDURE — 97110 THERAPEUTIC EXERCISES: CPT

## 2024-10-09 RX ORDER — MEGESTROL ACETATE 40 MG/ML
200 SUSPENSION ORAL DAILY
Status: DISCONTINUED | OUTPATIENT
Start: 2024-10-09 | End: 2024-10-13

## 2024-10-09 RX ADMIN — LEVOTHYROXINE SODIUM 88 MCG: 0.09 TABLET ORAL at 05:46

## 2024-10-09 RX ADMIN — ONDANSETRON 4 MG: 4 TABLET, ORALLY DISINTEGRATING ORAL at 11:59

## 2024-10-09 RX ADMIN — BUDESONIDE AND FORMOTEROL FUMARATE DIHYDRATE 2 PUFF: 160; 4.5 AEROSOL RESPIRATORY (INHALATION) at 20:03

## 2024-10-09 RX ADMIN — FAMOTIDINE 10 MG: 20 TABLET, FILM COATED ORAL at 10:37

## 2024-10-09 RX ADMIN — TAMSULOSIN HYDROCHLORIDE 0.4 MG: 0.4 CAPSULE ORAL at 10:36

## 2024-10-09 RX ADMIN — HEPARIN SODIUM 5000 UNITS: 5000 INJECTION INTRAVENOUS; SUBCUTANEOUS at 20:38

## 2024-10-09 RX ADMIN — HEPARIN SODIUM 5000 UNITS: 5000 INJECTION INTRAVENOUS; SUBCUTANEOUS at 16:06

## 2024-10-09 RX ADMIN — ACETAMINOPHEN 650 MG: 325 TABLET ORAL at 16:05

## 2024-10-09 RX ADMIN — CETIRIZINE HYDROCHLORIDE 5 MG: 10 TABLET, FILM COATED ORAL at 10:37

## 2024-10-09 RX ADMIN — HEPARIN SODIUM 5000 UNITS: 5000 INJECTION INTRAVENOUS; SUBCUTANEOUS at 05:47

## 2024-10-09 RX ADMIN — FAMOTIDINE 10 MG: 20 TABLET, FILM COATED ORAL at 20:38

## 2024-10-09 RX ADMIN — BUDESONIDE AND FORMOTEROL FUMARATE DIHYDRATE 2 PUFF: 160; 4.5 AEROSOL RESPIRATORY (INHALATION) at 08:09

## 2024-10-09 NOTE — PROGRESS NOTES
Pathology with residual adenoma--this is great news particularly given the hostile character of fibrotic/hard/irregular tissue after chemoradiation.    Postoperative course has been prolonged but uncomplicated    Poor appetite, I think she enjoys her home setting, her own food, her own environment    Unfortunately her home setting is unsupervised.      Recent abdominal films reassuring, no evidence of underlying partial obstruction or other findings.    Will add Megace to stimulate appetite    Awaiting placement.

## 2024-10-09 NOTE — CASE MANAGEMENT/SOCIAL WORK
Continued Stay Note  [facility]     Patient Name: Valeria Tracy  MRN: [id number]  Today's Date: 10/9/2024    Admit Date: 10/1/2024    Plan: SNF   Discharge Plan       Row Name 10/09/24 4670       Plan    Plan SNF    Patient/Family in Agreement with Plan yes    Plan Comments Mrs. Tracy was discussed in MDR. NG tube placed yesterday is clamped. Insurance is still pending for SNF ([facility]).  will continue to follow plan of care and assist with discharge planning needs as indicated.    Final Discharge Disposition Code 03 - skilled nursing facility (SNF)                   Discharge Codes    No documentation.                       Rosa Schmidt RN

## 2024-10-09 NOTE — PLAN OF CARE
Goal Outcome Evaluation:  Plan of Care Reviewed With: patient        Progress: no change  Outcome Evaluation: patient ambulated 120' with CGA x1, rolling walker for support and tech assist for equipment. Slow pace with multiple verbal cues for walker placement, upright posture and to avoid gazing at floor. Distance limited by weakness and fatigue. Recommend IRF at D/C for best functional outcome.

## 2024-10-09 NOTE — THERAPY TREATMENT NOTE
Patient Name: Valeria Tracy  : 1940    MRN: [id number]                              Today's Date: 10/9/2024       Admit Date: 10/1/2024    Visit Dx:     ICD-10-CM ICD-9-CM   1. S/P right colectomy  Z90.49 V45.89   2. Rectal cancer  C20 154.1     Patient Active Problem List   Diagnosis    GERD (gastroesophageal reflux disease)    Asthma    Hypothyroidism    Colon cancer    S/P right colectomy    Acute postoperative pain    Anemia    Rectal cancer    S/P abdominal perineal resection, partial thickness posterior vaginal resection    Severe malnutrition    Postoperative urinary retention     Past Medical History:   Diagnosis Date    Acid reflux     Anemia     Asthma     Cancer     rectal    History of chemotherapy     History of radiation therapy     History of transfusion     No Reaction    Hypothyroid     Pneumonia     Wears dentures     upper    Wears glasses     Wears hearing aid in both ears      Past Surgical History:   Procedure Laterality Date    ABDOMINAL PERINEAL RESECTION N/A 10/1/2024    Procedure: ABDOMINAL PERINEAL RESECTION, LYSIS OF ADHESIONS AND CREATION OF COLOSTOMY;  Surgeon: Angelito Ruiz MD;  Location: [facility] OR;  Service: General;  Laterality: N/A;    BREAST LUMPECTOMY Right     CATARACT EXTRACTION Bilateral     COLON RESECTION N/A 10/17/2023    Procedure: COLON RESECTION RIGHT LAPAROSCOPIC;  Surgeon: Angelito Ruiz MD;  Location: [facility] OR;  Service: General;  Laterality: N/A;    COLONOSCOPY      PORTACATH PLACEMENT      SHOULDER SURGERY Right       General Information       Row Name 10/09/24 0953          Physical Therapy Time and Intention    Document Type therapy note (daily note)  -AS     Mode of Treatment physical therapy  -AS       Row Name 10/09/24 0953          General Information    Patient Profile Reviewed yes  -AS     Existing Precautions/Restrictions fall  abdominal incision, ostomy, NG tube  -AS     Barriers to Rehab medically complex;previous functional deficit  -AS        Row Name 10/09/24 0953          Cognition    Orientation Status (Cognition) oriented x 3  -AS       [facility] Name 10/09/24 0953          Safety Issues, Functional Mobility    Safety Issues Affecting Function (Mobility) awareness of need for assistance;safety precaution awareness;insight into deficits/self-awareness;positioning of assistive device  -AS     Impairments Affecting Function (Mobility) balance;endurance/activity tolerance;pain;postural/trunk control;strength  -AS     Comment, Safety Issues/Impairments (Mobility) alert and following commands, nursing clamped NG tube for mobility  -AS               User Key  (r) = Recorded By, (t) = Taken By, (c) = Cosigned By      Initials Name Provider Type    AS Viky Juarez PTA Physical Therapist Assistant                   Mobility       Row Name 10/09/24 0956          Bed Mobility    Supine-Sit [place] (Bed Mobility) verbal cues;contact guard;1 person assist  -AS     Assistive Device (Bed Mobility) head of bed elevated;bed rails  -AS     Comment, (Bed Mobility) cues for hand placement, assist at trunk to reach EOB  -AS       [facility] Name 10/09/24 0956          Transfers    Comment, (Transfers) verbal cues for hand placement  -AS       [facility] Name 10/09/24 0956          Bed-Chair Transfer    Bed-Chair [place] (Transfers) verbal cues;contact guard;1 person assist  -AS     Assistive Device (Bed-Chair Transfers) walker, front-wheeled  -AS       [facility] Name 10/09/24 0956          Sit-Stand Transfer    Sit-Stand [place] (Transfers) verbal cues;contact guard;1 person assist  -AS     Assistive Device (Sit-Stand Transfers) walker, front-wheeled  -AS     Comment, (Sit-Stand Transfer) cues for hand placement  -AS       Row Name 10/09/24 0956          Gait/Stairs (Locomotion)    [place] Level (Gait) verbal cues;contact guard;1 person assist;1 person to manage equipment  -AS     Assistive Device (Gait) walker, front-wheeled  -AS     Distance in Feet (Gait) 120   -AS     Deviations/Abnormal Patterns (Gait) bilateral deviations;tanya decreased;gait speed decreased;stride length decreased;base of support, narrow  -AS     Bilateral Gait Deviations forward flexed posture;heel strike decreased  -AS     Comment, (Gait/Stairs) patient ambulated 120' with CGA x1, rolling walker for support and tech assist for equipment. Slow pace with multiple verbal cues for walker placement, upright posture and to avoid gazing at floor. Distance limited by weakness and fatigue.  -AS               User Key  (r) = Recorded By, (t) = Taken By, (c) = Cosigned By      Initials Name Provider Type    AS Viky Juarez PTA Physical Therapist Assistant                   Obj/Interventions       Row Name 10/09/24 0959          Motor Skills    Therapeutic Exercise knee;ankle  -AS       Row Name 10/09/24 0959          Knee (Therapeutic Exercise)    Knee (Therapeutic Exercise) strengthening exercise  -AS     Knee Strengthening (Therapeutic Exercise) bilateral;marching while seated;LAQ (long arc quad);sitting;10 repetitions  -AS       Row Name 10/09/24 0959          Ankle (Therapeutic Exercise)    Ankle (Therapeutic Exercise) AROM (active range of motion)  -AS     Ankle AROM (Therapeutic Exercise) bilateral;dorsiflexion;plantarflexion;sitting;10 repetitions  -AS       Row Name 10/09/24 0959          Balance    Dynamic Standing Balance verbal cues;contact guard;1-person assist  -AS     Position/Device Used, Standing Balance supported;walker, front-wheeled  -AS     Comment, Balance CGA for safety  -AS               User Key  (r) = Recorded By, (t) = Taken By, (c) = Cosigned By      Initials Name Provider Type    AS Viky Juarez PTA Physical Therapist Assistant                   Goals/Plan    No documentation.                  Clinical Impression       Row Name 10/09/24 0959          Pain    Pretreatment Pain Rating 4/10  -AS     Posttreatment Pain Rating 4/10  -AS     Pain Location - abdomen  -AS      Pain Intervention(s) Repositioned;Ambulation/increased activity  -AS       Row Name 10/09/24 0959          Plan of Care Review    Plan of Care Reviewed With patient  -AS     Progress no change  -AS     Outcome Evaluation patient ambulated 120' with CGA x1, rolling walker for support and tech assist for equipment. Slow pace with multiple verbal cues for walker placement, upright posture and to avoid gazing at floor. Distance limited by weakness and fatigue. Recommend IRF at D/C for best functional outcome.  -AS       Row Name 10/09/24 0959          Positioning and Restraints    Pre-Treatment Position in bed  -AS     Post Treatment Position chair  -AS     In Chair reclined;call light within reach;encouraged to call for assist;waffle cushion;legs elevated  -AS               User Key  (r) = Recorded By, (t) = Taken By, (c) = Cosigned By      Initials Name Provider Type    AS Viky Juarez PTA Physical Therapist Assistant                   Outcome Measures       Row Name 10/09/24 1000          How much help from another person do you currently need...    Turning from your back to your side while in flat bed without using bedrails? 3  -AS     Moving from lying on back to sitting on the side of a flat bed without bedrails? 3  -AS     Moving to and from a bed to a chair (including a wheelchair)? 3  -AS     Standing up from a chair using your arms (e.g., wheelchair, bedside chair)? 3  -AS     Climbing 3-5 steps with a railing? 3  -AS     To walk in hospital room? 3  -AS     AM-PAC 6 Clicks Score (PT) 18  -AS     Highest Level of Mobility Goal 6 --> Walk 10 steps or more  -AS       Row Name 10/09/24 1000          Functional Assessment    Outcome Measure Options AM-PAC 6 Clicks Basic Mobility (PT)  -AS               User Key  (r) = Recorded By, (t) = Taken By, (c) = Cosigned By      Initials Name Provider Type    AS Viky Juarez PTA Physical Therapist Assistant                                 Physical Therapy  Education       Title: PT OT SLP Therapies (In Progress)       Topic: Physical Therapy (In Progress)       Point: Mobility training (In Progress)       Learning Progress Summary             Patient Acceptance, E, NR by AS at 10/9/2024 1000    Acceptance, E, VU by AE at 10/7/2024 1006    Acceptance, E, VU by TM at 10/4/2024 1313    Acceptance, E, NR by TM at 10/2/2024 1129                         Point: Home exercise program (In Progress)       Learning Progress Summary             Patient Acceptance, E, NR by AS at 10/9/2024 1000                         Point: Body mechanics (In Progress)       Learning Progress Summary             Patient Acceptance, E, NR by AS at 10/9/2024 1000    Acceptance, E, VU by AE at 10/7/2024 1006    Acceptance, E, VU by TM at 10/4/2024 1313    Acceptance, E, NR by TM at 10/2/2024 1129                         Point: Precautions (In Progress)       Learning Progress Summary             Patient Acceptance, E, NR by AS at 10/9/2024 1000    Acceptance, E, VU by AE at 10/7/2024 1006    Acceptance, E, VU by TM at 10/4/2024 1313    Acceptance, E, NR by TM at 10/2/2024 1129                                         User Key       Initials Effective Dates Name Provider Type Discipline    AS 04/28/23 -  Viky Juarez, PTA Physical Therapist Assistant PT    AE 09/21/21 -  Rebel Maradiaga, PT Physical Therapist PT    TM 08/13/24 -  Britton Lipscomb, PT Student PT Student PT                  PT Recommendation and Plan     Plan of Care Reviewed With: patient  Progress: no change  Outcome Evaluation: patient ambulated 120' with CGA x1, rolling walker for support and tech assist for equipment. Slow pace with multiple verbal cues for walker placement, upright posture and to avoid gazing at floor. Distance limited by weakness and fatigue. Recommend IRF at D/C for best functional outcome.     Time Calculation:         PT Charges       Row Name 10/09/24 1000             Time Calculation    Start Time  0923  -AS      PT Received On 10/09/24  -AS      PT Goal Re-Cert Due Date 10/12/24  -AS         Timed Charges    33158 - PT Therapeutic Exercise Minutes 9  -AS      14396 - Gait Training Minutes  15  -AS         Total Minutes    Timed Charges Total Minutes 24  -AS       Total Minutes 24  -AS                User Key  (r) = Recorded By, (t) = Taken By, (c) = Cosigned By      Initials Name Provider Type    AS Viky Juarez PTA Physical Therapist Assistant                  Therapy Charges for Today       Code Description Service Date Service Provider Modifiers Qty    80871501537 HC PT THER PROC EA 15 MIN 10/9/2024 Viky Juarez, NOAH GP 1    22026637433 HC GAIT TRAINING EA 15 MIN 10/9/2024 iVky Juarez, NOAH GP 1    94186802353 HC PT THER SUPP EA 15 MIN 10/9/2024 Viky Juarez, NOAH GP 2            PT G-Codes  Outcome Measure Options: AM-PAC 6 Clicks Basic Mobility (PT)  AM-PAC 6 Clicks Score (PT): 18  AM-PAC 6 Clicks Score (OT): 20       Viky Juarez PTA  10/9/2024

## 2024-10-09 NOTE — PLAN OF CARE
Goal Outcome Evaluation:        Problem: Adult Inpatient Plan of Care  Goal: Plan of Care Review  Outcome: Progressing  Goal: Patient-Specific Goal (Individualized)  Outcome: Progressing  Goal: Absence of Hospital-Acquired Illness or Injury  Outcome: Progressing  Goal: Optimal Comfort and Wellbeing  Outcome: Progressing  Goal: Readiness for Transition of Care  Outcome: Progressing     Problem: Fall Injury Risk  Goal: Absence of Fall and Fall-Related Injury  Outcome: Progressing     Problem: Skin Injury Risk Increased  Goal: Skin Health and Integrity  Outcome: Progressing     Problem: Infection  Goal: Absence of Infection Signs and Symptoms  Outcome: Progressing

## 2024-10-10 PROCEDURE — 25010000002 ONDANSETRON PER 1 MG: Performed by: COLON & RECTAL SURGERY

## 2024-10-10 PROCEDURE — 25010000002 HEPARIN (PORCINE) PER 1000 UNITS: Performed by: COLON & RECTAL SURGERY

## 2024-10-10 PROCEDURE — 94799 UNLISTED PULMONARY SVC/PX: CPT

## 2024-10-10 RX ORDER — FAMOTIDINE 20 MG/1
20 TABLET, FILM COATED ORAL NIGHTLY
Status: DISCONTINUED | OUTPATIENT
Start: 2024-10-10 | End: 2024-10-13

## 2024-10-10 RX ORDER — PANTOPRAZOLE SODIUM 40 MG/1
40 TABLET, DELAYED RELEASE ORAL
Status: DISCONTINUED | OUTPATIENT
Start: 2024-10-10 | End: 2024-10-13

## 2024-10-10 RX ADMIN — PANTOPRAZOLE SODIUM 40 MG: 40 TABLET, DELAYED RELEASE ORAL at 14:32

## 2024-10-10 RX ADMIN — FAMOTIDINE 20 MG: 20 TABLET, FILM COATED ORAL at 21:26

## 2024-10-10 RX ADMIN — HEPARIN SODIUM 5000 UNITS: 5000 INJECTION INTRAVENOUS; SUBCUTANEOUS at 04:47

## 2024-10-10 RX ADMIN — FAMOTIDINE 10 MG: 20 TABLET, FILM COATED ORAL at 08:45

## 2024-10-10 RX ADMIN — BUDESONIDE AND FORMOTEROL FUMARATE DIHYDRATE 2 PUFF: 160; 4.5 AEROSOL RESPIRATORY (INHALATION) at 08:58

## 2024-10-10 RX ADMIN — LEVOTHYROXINE SODIUM 88 MCG: 0.09 TABLET ORAL at 04:47

## 2024-10-10 RX ADMIN — MEGESTROL ACETATE 200 MG: 40 SUSPENSION ORAL at 08:46

## 2024-10-10 RX ADMIN — TAMSULOSIN HYDROCHLORIDE 0.4 MG: 0.4 CAPSULE ORAL at 08:46

## 2024-10-10 RX ADMIN — ONDANSETRON 4 MG: 2 INJECTION INTRAMUSCULAR; INTRAVENOUS at 06:27

## 2024-10-10 RX ADMIN — ONDANSETRON 4 MG: 2 INJECTION INTRAMUSCULAR; INTRAVENOUS at 16:37

## 2024-10-10 RX ADMIN — HEPARIN SODIUM 5000 UNITS: 5000 INJECTION INTRAVENOUS; SUBCUTANEOUS at 13:04

## 2024-10-10 RX ADMIN — BUDESONIDE AND FORMOTEROL FUMARATE DIHYDRATE 2 PUFF: 160; 4.5 AEROSOL RESPIRATORY (INHALATION) at 19:45

## 2024-10-10 RX ADMIN — HEPARIN SODIUM 5000 UNITS: 5000 INJECTION INTRAVENOUS; SUBCUTANEOUS at 21:26

## 2024-10-10 NOTE — CASE MANAGEMENT/SOCIAL WORK
Discharge Planning Assessment  [facility]     Patient Name: Valeria Tracy  MRN: [id number]  Today's Date: 10/10/2024    Admit Date: 10/1/2024    Plan: Insurance denid Skilled rehab   Discharge Needs Assessment    No documentation.                  Discharge Plan       Row Name 10/10/24 1439       Plan    Plan Insurance denid Skilled rehab    Plan Comments Rec'd a message this am and patient's insurance wanted a P2P to be done today by 11am. I reached out to PA Dr.K Duffy and , too. NG d/c 10-9 but patient has poor oral intake and MD's felt patient wasn't reqady for d/c. P2P wasn't done and I just rec'd a message insurance denied skilled rehab. CM will f/u for MR and can resubmit to insurance. I called Isabel [facility] H/R to update on patient discharge plan. Isabel feels she will still have a bed so give her a call once more MR and fax her  updated clinicals.                   Continued Care and Services - Admitted Since 10/1/2024       Destination       Service Provider Request Status Selected Services Address Phone Fax Patient Preferred    [facility] Considering  Need clinical review N/A [address] [address] [phone] -- --    [facility] Pending - Request Sent N/A [address] [facility] [address] [phone] --    [facility] Pending - Request Sent N/A [address] [facility] [address] [phone] --    [place] Pending - Request Sent N/A [address] [facility] [address] [phone] --                  Expected Discharge Date and Time       Expected Discharge Date Expected Discharge Time    Oct 14, 2024            Demographic Summary    No documentation.                  Functional Status    No documentation.                  Psychosocial    No documentation.                  Abuse/Neglect    No  documentation.                  Legal    No documentation.                  Substance Abuse    No documentation.                  Patient Forms    No documentation.                     Laya Monzon RN

## 2024-10-10 NOTE — PROGRESS NOTES
"IM progress note      Valeria Tracy  [id number]  1940     LOS: 8 days     Attending: Angelito Ruiz MD    Primary Care Provider: Peyton Vázquez PA      Chief Complaint/Reason for visit:  Abd pain    Subjective   Was doing fairly well when seen earlier today.  Taking some clear liquids with NG tube clamped.  A total of about 400 mL in NG tube canister since insertion overnight.  No further nausea or emesis.  Stoma has output .  KUB with no suggestion of bowel dilation or obstruction..    Objective        Visit Vitals  /70 (BP Location: Left arm, Patient Position: Lying)   Pulse 116   Temp 98.1 °F (36.7 °C) (Oral)   Resp 16   Ht 165.1 cm (65\")   Wt 56 kg (123 lb 8 oz)   SpO2 92%   BMI 20.55 kg/m²     Temp (24hrs), Av.1 °F (36.7 °C), Min:97.9 °F (36.6 °C), Max:98.5 °F (36.9 °C)         Respiratory: RA    Physical Exam:     General Appearance:    Alert, cooperative, in no acute distress   Head:    Normocephalic, without obvious abnormality, atraumatic    Lungs:     Normal effort, symmetric chest rise, no crepitus, clear to      auscultation bilaterally             Heart:    Regular rhythm and normal rate, normal S1 and S2   Abdomen:     Soft, expected tenderness. Midline dressing CDI. JESUS is out. Dressing intact.       Extremities:   No clubbing, cyanosis or edema.  No deformities.    Pulses:   Pulses palpable and equal bilaterally   Skin:   No bleeding, bruising or rash          Results Review:     I reviewed the patient's new clinical results.   Results from last 7 days   Lab Units 10/07/24  0516 10/04/24  0434   WBC 10*3/mm3 5.76 5.50   HEMOGLOBIN g/dL 9.8* 9.5*   HEMATOCRIT % 30.2* 29.8*   PLATELETS 10*3/mm3 240 189     Results from last 7 days   Lab Units 10/07/24  0516 10/04/24  0434   SODIUM mmol/L 139 141   POTASSIUM mmol/L 3.5 3.8   CHLORIDE mmol/L 104 107   CO2 mmol/L 26.0 24.0   BUN mg/dL 9 4*   CREATININE mg/dL 0.60 0.45*   CALCIUM mg/dL 8.7 8.3*   GLUCOSE mg/dL 111* 74     I reviewed the " patient's new imaging including images and reports.    All medications reviewed.   acetaminophen, 650 mg, Oral, Q8H  budesonide-formoterol, 2 puff, Inhalation, BID - RT  cetirizine, 5 mg, Oral, Daily  famotidine, 10 mg, Oral, BID  heparin (porcine), 5,000 Units, Subcutaneous, Q8H  levothyroxine, 88 mcg, Oral, Q AM  megestrol, 200 mg, Oral, Daily  tamsulosin, 0.4 mg, Oral, Daily        Assessment & Plan     S/P abdominal perineal resection, partial thickness posterior vaginal resection    GERD (gastroesophageal reflux disease)    Asthma    Hypothyroidism    Acute postoperative pain    Rectal cancer    Severe malnutrition    Postoperative urinary retention  Postop nausea and vomiting    Plan  1. Ambulation, encouraged, PT is following  2. Pain control-prns   3. IS-encouraged  4. DVT proph- mechs/heparin  5. Bowel regimen  6. DC planning, inpatient rehab, precert has been started for IPR.      -Asthma: Maintain home regimen with formulary substitution as indicated.  As needed bronchodilators.  Monitor oxygenation.     -Hypothyroidism: Resume replacement      -GERD:  Resumed H2 blocker.      -New stoma:  Wound care stoma nurse consult for stoma care and education throughout patient's hospitalization.    -Urinary retention:   Resolved.   Continue tamsulosin,   Urinalysis unremarkable.      -NG tube clamping and removal if no high return or nausea after clamping.  Advancing diet again as tolerated    Discussed with patient and .     Maggi Lance MD  10/09/24  20:06 EDT

## 2024-10-10 NOTE — PROGRESS NOTES
"IM progress note      Valeria Tracy  [id number]  1940     LOS: 9 days     Attending: Angelito Ruiz MD    Primary Care Provider: Peyton Vázquez PA      Chief Complaint/Reason for visit:  Abd pain    Subjective   Tolerating clear liquids but still has occasional heartburn.  Concerned with advancing diet but she does not like liquid diet either.  She does not like oat.    Objective        Visit Vitals  /68 (BP Location: Left arm, Patient Position: Lying)   Pulse 105   Temp 98 °F (36.7 °C) (Oral)   Resp 18   Ht 165.1 cm (65\")   Wt 56 kg (123 lb 8 oz)   SpO2 91%   BMI 20.55 kg/m²     Temp (24hrs), Av °F (36.7 °C), Min:97.8 °F (36.6 °C), Max:98.1 °F (36.7 °C)         Respiratory: RA    Physical Exam:     General Appearance:    Alert, cooperative, in no acute distress   Head:    Normocephalic, without obvious abnormality, atraumatic    Lungs:     Normal effort, symmetric chest rise, no crepitus, clear to      auscultation bilaterally             Heart:    Regular rhythm and normal rate, normal S1 and S2   Abdomen:     Soft, expected tenderness. Midline dressing CDI. JESUS is out. Dressing intact.  Stoma with output      Extremities:   No clubbing, cyanosis or edema.  No deformities.    Pulses:   Pulses palpable and equal bilaterally   Skin:   No bleeding, bruising or rash          Results Review:     I reviewed the patient's new clinical results.   Results from last 7 days   Lab Units 10/07/24  0516 10/04/24  0434   WBC 10*3/mm3 5.76 5.50   HEMOGLOBIN g/dL 9.8* 9.5*   HEMATOCRIT % 30.2* 29.8*   PLATELETS 10*3/mm3 240 189     Results from last 7 days   Lab Units 10/07/24  0516 10/04/24  0434   SODIUM mmol/L 139 141   POTASSIUM mmol/L 3.5 3.8   CHLORIDE mmol/L 104 107   CO2 mmol/L 26.0 24.0   BUN mg/dL 9 4*   CREATININE mg/dL 0.60 0.45*   CALCIUM mg/dL 8.7 8.3*   GLUCOSE mg/dL 111* 74     I reviewed the patient's new imaging including images and reports.    All medications reviewed.   acetaminophen, 650 mg, " Oral, Q8H  budesonide-formoterol, 2 puff, Inhalation, BID - RT  cetirizine, 5 mg, Oral, Daily  famotidine, 10 mg, Oral, BID  heparin (porcine), 5,000 Units, Subcutaneous, Q8H  levothyroxine, 88 mcg, Oral, Q AM  megestrol, 200 mg, Oral, Daily  tamsulosin, 0.4 mg, Oral, Daily        Assessment & Plan     S/P abdominal perineal resection, partial thickness posterior vaginal resection    GERD (gastroesophageal reflux disease)    Asthma    Hypothyroidism    Acute postoperative pain    Rectal cancer    Severe malnutrition    Postoperative urinary retention  Postop nausea and vomiting    Plan  1. Ambulation, encouraged, PT is following  2. Pain control-prns   3. IS-encouraged  4. DVT proph- mechs/heparin  5. Bowel regimen  6. DC planning, inpatient rehab, precert has been started for IPR.      -Asthma: Maintain home regimen with formulary substitution as indicated.  As needed bronchodilators.  Monitor oxygenation.     -Hypothyroidism: Resume replacement      -GERD:  Resumed H2 blocker.  Changed to nightly and added PPI.  As needed Tums available     -New stoma:  Wound care stoma nurse consult for stoma care and education throughout patient's hospitalization.    -Urinary retention:   Resolved.   Continue tamsulosin,   Urinalysis unremarkable.         Advancing diet again as tolerated    Discussed with patient and .     Maggi Lance MD  10/10/24  13:42 EDT

## 2024-10-11 ENCOUNTER — APPOINTMENT (OUTPATIENT)
Dept: CT IMAGING | Facility: HOSPITAL | Age: 84
End: 2024-10-11
Payer: MEDICARE

## 2024-10-11 PROCEDURE — 25010000002 ONDANSETRON PER 1 MG: Performed by: COLON & RECTAL SURGERY

## 2024-10-11 PROCEDURE — 74178 CT ABD&PLV WO CNTR FLWD CNTR: CPT

## 2024-10-11 PROCEDURE — 97535 SELF CARE MNGMENT TRAINING: CPT

## 2024-10-11 PROCEDURE — 94799 UNLISTED PULMONARY SVC/PX: CPT

## 2024-10-11 PROCEDURE — 97530 THERAPEUTIC ACTIVITIES: CPT

## 2024-10-11 PROCEDURE — 94664 DEMO&/EVAL PT USE INHALER: CPT

## 2024-10-11 PROCEDURE — 25510000001 IOPAMIDOL 61 % SOLUTION: Performed by: COLON & RECTAL SURGERY

## 2024-10-11 PROCEDURE — 25010000002 HEPARIN (PORCINE) PER 1000 UNITS: Performed by: COLON & RECTAL SURGERY

## 2024-10-11 RX ORDER — IOPAMIDOL 612 MG/ML
80 INJECTION, SOLUTION INTRAVASCULAR
Status: COMPLETED | OUTPATIENT
Start: 2024-10-11 | End: 2024-10-11

## 2024-10-11 RX ORDER — PYRIDOSTIGMINE BROMIDE 60 MG/1
30 TABLET ORAL EVERY 8 HOURS SCHEDULED
Status: DISCONTINUED | OUTPATIENT
Start: 2024-10-11 | End: 2024-10-13

## 2024-10-11 RX ADMIN — HEPARIN SODIUM 5000 UNITS: 5000 INJECTION INTRAVENOUS; SUBCUTANEOUS at 14:09

## 2024-10-11 RX ADMIN — BUDESONIDE AND FORMOTEROL FUMARATE DIHYDRATE 2 PUFF: 160; 4.5 AEROSOL RESPIRATORY (INHALATION) at 21:36

## 2024-10-11 RX ADMIN — ONDANSETRON 4 MG: 2 INJECTION INTRAMUSCULAR; INTRAVENOUS at 20:00

## 2024-10-11 RX ADMIN — PANTOPRAZOLE SODIUM 40 MG: 40 TABLET, DELAYED RELEASE ORAL at 09:14

## 2024-10-11 RX ADMIN — HEPARIN SODIUM 5000 UNITS: 5000 INJECTION INTRAVENOUS; SUBCUTANEOUS at 05:25

## 2024-10-11 RX ADMIN — CETIRIZINE HYDROCHLORIDE 5 MG: 10 TABLET, FILM COATED ORAL at 09:14

## 2024-10-11 RX ADMIN — MEGESTROL ACETATE 200 MG: 40 SUSPENSION ORAL at 09:14

## 2024-10-11 RX ADMIN — HEPARIN SODIUM 5000 UNITS: 5000 INJECTION INTRAVENOUS; SUBCUTANEOUS at 20:41

## 2024-10-11 RX ADMIN — PYRIDOSTIGMINE BROMIDE 30 MG: 60 TABLET ORAL at 17:23

## 2024-10-11 RX ADMIN — FAMOTIDINE 20 MG: 20 TABLET, FILM COATED ORAL at 20:39

## 2024-10-11 RX ADMIN — LEVOTHYROXINE SODIUM 88 MCG: 0.09 TABLET ORAL at 05:25

## 2024-10-11 RX ADMIN — ONDANSETRON 4 MG: 2 INJECTION INTRAMUSCULAR; INTRAVENOUS at 14:08

## 2024-10-11 RX ADMIN — BUDESONIDE AND FORMOTEROL FUMARATE DIHYDRATE 2 PUFF: 160; 4.5 AEROSOL RESPIRATORY (INHALATION) at 08:42

## 2024-10-11 RX ADMIN — TAMSULOSIN HYDROCHLORIDE 0.4 MG: 0.4 CAPSULE ORAL at 09:14

## 2024-10-11 RX ADMIN — IOPAMIDOL 80 ML: 612 INJECTION, SOLUTION INTRAVENOUS at 20:31

## 2024-10-11 NOTE — PROGRESS NOTES
"IM progress note      Valeria Tracy  [id number]  1940     LOS: 10 days     Attending: Angelito Ruiz MD    Primary Care Provider: Peyton Vázquez PA      Chief Complaint/Reason for visit:  Abd pain    Subjective   Tolerating advancing her diet. Intake somewhat low, but fair. No n/vom. Less heartburn complaints.     Objective        Visit Vitals  /71 (BP Location: Left arm, Patient Position: Sitting)   Pulse 105   Temp 98.3 °F (36.8 °C) (Oral)   Resp 18   Ht 165.1 cm (65\")   Wt 56 kg (123 lb 8 oz)   SpO2 95%   BMI 20.55 kg/m²     Temp (24hrs), Av.2 °F (36.8 °C), Min:98 °F (36.7 °C), Max:98.3 °F (36.8 °C)         Respiratory: RA    Physical Exam:     General Appearance:    Alert, cooperative, in no acute distress   Head:    Normocephalic, without obvious abnormality, atraumatic    Lungs:     Normal effort, symmetric chest rise, no crepitus, clear to      auscultation bilaterally             Heart:    Regular rhythm and normal rate, normal S1 and S2   Abdomen:     Soft, benign   Stoma with output      Extremities:   No clubbing, cyanosis or edema.  No deformities.    Pulses:   Pulses palpable and equal bilaterally   Skin:   No bleeding, bruising or rash          Results Review:     I reviewed the patient's new clinical results.   Results from last 7 days   Lab Units 10/07/24  0516   WBC 10*3/mm3 5.76   HEMOGLOBIN g/dL 9.8*   HEMATOCRIT % 30.2*   PLATELETS 10*3/mm3 240     Results from last 7 days   Lab Units 10/07/24  0516   SODIUM mmol/L 139   POTASSIUM mmol/L 3.5   CHLORIDE mmol/L 104   CO2 mmol/L 26.0   BUN mg/dL 9   CREATININE mg/dL 0.60   CALCIUM mg/dL 8.7   GLUCOSE mg/dL 111*     I reviewed the patient's new imaging including images and reports.    All medications reviewed.   acetaminophen, 650 mg, Oral, Q8H  budesonide-formoterol, 2 puff, Inhalation, BID - RT  cetirizine, 5 mg, Oral, Daily  famotidine, 20 mg, Oral, Nightly  heparin (porcine), 5,000 Units, Subcutaneous, Q8H  levothyroxine, 88 " mcg, Oral, Q AM  megestrol, 200 mg, Oral, Daily  pantoprazole, 40 mg, Oral, Q AM  tamsulosin, 0.4 mg, Oral, Daily        Assessment & Plan     S/P abdominal perineal resection, partial thickness posterior vaginal resection    GERD (gastroesophageal reflux disease)    Asthma    Hypothyroidism    Acute postoperative pain    Rectal cancer    Severe malnutrition    Postoperative urinary retention  Postop nausea and vomiting    Plan  1. Ambulation, encouraged, PT is following  2. Pain control-prns   3. IS-encouraged  4. DVT proph- mechs/heparin  5. Bowel regimen  6. DC planning, inpatient rehab. Await P2P vs appeal.      -Asthma: Maintain home regimen with formulary substitution as indicated.  As needed bronchodilators.  Monitor oxygenation.     -Hypothyroidism: Resume replacement      -GERD:  Resumed H2 blocke, now nightly along with PPI.  Tums available prn     -New stoma:  Wound care stoma nurse consult for stoma care and education throughout patient's hospitalization.    -Urinary retention:   Resolved.   Continue tamsulosin,   Urinalysis unremarkable.         Advancing diet again as tolerated    Discussed with patient and RN.     Maggi Lance MD  10/11/24  15:17 EDT

## 2024-10-11 NOTE — THERAPY TREATMENT NOTE
Patient Name: Valeria Tracy  : 1940    MRN: [id number]                              Today's Date: 10/11/2024       Admit Date: 10/1/2024    Visit Dx:     ICD-10-CM ICD-9-CM   1. S/P right colectomy  Z90.49 V45.89   2. Rectal cancer  C20 154.1     Patient Active Problem List   Diagnosis    GERD (gastroesophageal reflux disease)    Asthma    Hypothyroidism    Colon cancer    S/P right colectomy    Acute postoperative pain    Anemia    Rectal cancer    S/P abdominal perineal resection, partial thickness posterior vaginal resection    Severe malnutrition    Postoperative urinary retention     Past Medical History:   Diagnosis Date    Acid reflux     Anemia     Asthma     Cancer     rectal    History of chemotherapy     History of radiation therapy     History of transfusion     No Reaction    Hypothyroid     Pneumonia     Wears dentures     upper    Wears glasses     Wears hearing aid in both ears      Past Surgical History:   Procedure Laterality Date    ABDOMINAL PERINEAL RESECTION N/A 10/1/2024    Procedure: ABDOMINAL PERINEAL RESECTION, LYSIS OF ADHESIONS AND CREATION OF COLOSTOMY;  Surgeon: Angelito Ruiz MD;  Location: [facility] OR;  Service: General;  Laterality: N/A;    BREAST LUMPECTOMY Right     CATARACT EXTRACTION Bilateral     COLON RESECTION N/A 10/17/2023    Procedure: COLON RESECTION RIGHT LAPAROSCOPIC;  Surgeon: Angelito Ruiz MD;  Location:  [facility] OR;  Service: General;  Laterality: N/A;    COLONOSCOPY      PORTACATH PLACEMENT      SHOULDER SURGERY Right       General Information       Row Name 10/11/24 0958          OT Time and Intention    Document Type therapy note (daily note)  -AC     Mode of Treatment occupational therapy  -AC       Row Name 10/11/24 0958          General Information    Patient Profile Reviewed yes  -AC     Existing Precautions/Restrictions fall  abdominal incision, ostomy  -AC     Barriers to Rehab medically complex;previous functional deficit  -AC       Row Name  10/11/24 0958          Cognition    Orientation Status (Cognition) oriented x 3  -       Row Name 10/11/24 0958          Safety Issues, Functional Mobility    Safety Issues Affecting Function (Mobility) awareness of need for assistance;insight into deficits/self-awareness;safety precaution awareness  -     Impairments Affecting Function (Mobility) balance;endurance/activity tolerance;strength  -               User Key  (r) = Recorded By, (t) = Taken By, (c) = Cosigned By      Initials Name Provider Type     Tequila Nielson OT Occupational Therapist                     Mobility/ADL's       Row Name 10/11/24 0958          Bed Mobility    Bed Mobility sit-supine  -     Sit-Supine [place] (Bed Mobility) standby assist  -     Comment, (Bed Mobility) bed flat  -       Row Name 10/11/24 0958          Transfers    Transfers sit-stand transfer;toilet transfer  -       Row Name 10/11/24 0958          Sit-Stand Transfer    Sit-Stand [place] (Transfers) verbal cues;contact guard;1 person assist  -     Assistive Device (Sit-Stand Transfers) walker, front-wheeled  -       Row Name 10/11/24 0958          Toilet Transfer    [place] Level (Toilet Transfer) standby assist  -     Assistive Device (Toilet Transfer) walker, front-wheeled  -       Row Name 10/11/24 0958          Functional Mobility    Functional Mobility- Ind. Level verbal cues required;contact guard assist  -     Functional Mobility- Device walker, front-wheeled  -AC     Functional Mobility-Distance (Feet) --  25 ft + 25 Ft  -     Functional Mobility- Safety Issues step length decreased  -       Row Name 10/11/24 0958          Activities of Daily Living    BADL Assessment/Intervention lower body dressing;toileting  -       Row Name 10/11/24 0958          Lower Body Dressing Assessment/Training    [place] Level (Lower Body Dressing) doff;don;socks;minimum assist (75% patient effort)  -     Position (Lower Body  Dressing) supported sitting  -[facility] Name 10/11/24 0958          Toileting Assessment/Training    [place] Level (Toileting) adjust/manage clothing;perform perineal hygiene  -     Assistive Devices (Toileting) commode  -     Position (Toileting) unsupported sitting;supported standing  -               User Key  (r) = Recorded By, (t) = Taken By, (c) = Cosigned By      Initials Name Provider Type    Tequila Zepeda, OT Occupational Therapist                   Obj/Interventions       Row Name 10/11/24 0958          Balance    Balance Assessment sitting static balance;sitting dynamic balance;standing static balance;standing dynamic balance  -     Static Sitting Balance supervision  -     Dynamic Sitting Balance standby assist  -     Position, Sitting Balance sitting edge of bed  -     Static Standing Balance verbal cues;standby assist  -     Dynamic Standing Balance verbal cues;contact guard  -     Position/Device Used, Standing Balance supported;walker, front-wheeled  -               User Key  (r) = Recorded By, (t) = Taken By, (c) = Cosigned By      Initials Name Provider Type    Tequila Zepeda, OT Occupational Therapist                   Goals/Plan    No documentation.                  Clinical Impression       [facility] Name 10/11/24 1014          Pain Assessment    Pretreatment Pain Rating 0/10 - no pain  -     Posttreatment Pain Rating 0/10 - no pain  -[facility] Name 10/11/24 1014          Plan of Care Review    Progress no change  -     Outcome Evaluation Pt min A LBD,  SBA toilet transfer,  CGA to ambulate with RW.  Pt continues below baseline with self care/mobility d/t weakness and decr activity tolerance.  Recommend IP rehab upon d/c.  -       Row Name 10/11/24 1014          Therapy Plan Review/Discharge Plan (OT)    Anticipated Discharge Disposition (OT) inpatient rehabilitation facility  -[facility] Name 10/11/24 1014          Vital Signs    Pre Systolic BP Rehab 126   -AC     Pre Treatment Diastolic BP 74  -AC     Post SpO2 (%) 93  -AC     O2 Delivery Post Treatment room air  -AC     Pre Patient Position Sitting  -AC     Post Patient Position Supine  -AC       Row Name 10/11/24 1014          Positioning and Restraints    Pre-Treatment Position sitting in chair/recliner  -AC     Post Treatment Position bed  -AC     In Bed notified nsg;fowlers;call light within reach;encouraged to call for assist;exit alarm on  -AC               User Key  (r) = Recorded By, (t) = Taken By, (c) = Cosigned By      Initials Name Provider Type    Tequila Zepeda, OT Occupational Therapist                   Outcome Measures       Row Name 10/11/24 1016          How much help from another is currently needed...    Putting on and taking off regular lower body clothing? 3  -AC     Bathing (including washing, rinsing, and drying) 3  -AC     Toileting (which includes using toilet bed pan or urinal) 3  -AC     Putting on and taking off regular upper body clothing 4  -AC     Taking care of personal grooming (such as brushing teeth) 3  -AC     Eating meals 4  -AC     AM-PAC 6 Clicks Score (OT) 20  -AC       Row Name 10/11/24 0957          How much help from another person do you currently need...    Turning from your back to your side while in flat bed without using bedrails? 4  -LS     Moving from lying on back to sitting on the side of a flat bed without bedrails? 3  -LS     Moving to and from a bed to a chair (including a wheelchair)? 3  -LS     Standing up from a chair using your arms (e.g., wheelchair, bedside chair)? 3  -LS     Climbing 3-5 steps with a railing? 3  -LS     To walk in hospital room? 3  -LS     AM-PAC 6 Clicks Score (PT) 19  -LS     Highest Level of Mobility Goal 6 --> Walk 10 steps or more  -LS       Row Name 10/11/24 1016 10/11/24 0957       Functional Assessment    Outcome Measure Options AM-PAC 6 Clicks Daily Activity (OT)  -AC AM-PAC 6 Clicks Basic Mobility (PT)  -LS               User Key  (r) = Recorded By, (t) = Taken By, (c) = Cosigned By      Initials Name Provider Type     Tequila Nielson, OT Occupational Therapist    Jeni Reese, PT Physical Therapist                    Occupational Therapy Education       Title: PT OT SLP Therapies (In Progress)       Topic: Occupational Therapy (In Progress)       Point: ADL training (In Progress)       Description:   Instruct learner(s) on proper safety adaptation and remediation techniques during self care or transfers.   Instruct in proper use of assistive devices.                  Learning Progress Summary             Patient Acceptance, E, NR by  at 10/11/2024 1016    Acceptance, E, NR by  at 10/8/2024 1307                         Point: Home exercise program (Not Started)       Description:   Instruct learner(s) on appropriate technique for monitoring, assisting and/or progressing therapeutic exercises/activities.                  Learner Progress:  Not documented in this visit.              Point: Precautions (Not Started)       Description:   Instruct learner(s) on prescribed precautions during self-care and functional transfers.                  Learner Progress:  Not documented in this visit.              Point: Body mechanics (Not Started)       Description:   Instruct learner(s) on proper positioning and spine alignment during self-care, functional mobility activities and/or exercises.                  Learner Progress:  Not documented in this visit.                              User Key       Initials Effective Dates Name Provider Type Discipline     02/03/23 -  Tequila Nielson, OT Occupational Therapist OT                  OT Recommendation and Plan  Planned Therapy Interventions (OT): activity tolerance training, BADL retraining, functional balance retraining, occupation/activity based interventions, patient/caregiver education/training, strengthening exercise, transfer/mobility retraining  Therapy Frequency (OT):  daily  Plan of Care Review  Plan of Care Reviewed With: patient  Progress: no change  Outcome Evaluation: Pt min A LBD,  SBA toilet transfer,  CGA to ambulate with RW.  Pt continues below baseline with self care/mobility d/t weakness and decr activity tolerance.  Recommend IP rehab upon d/c.     Time Calculation:   Evaluation Complexity (OT)  Review Occupational Profile/Medical/Therapy History Complexity: brief/low complexity  Assessment, Occupational Performance/Identification of Deficit Complexity: 1-3 performance deficits  Clinical Decision Making Complexity (OT): problem focused assessment/low complexity  Overall Complexity of Evaluation (OT): low complexity     Time Calculation- OT       Row Name 10/11/24 0958             Time Calculation- OT    OT Start Time 0958  -AC      OT Received On 10/11/24  -AC      OT Goal Re-Cert Due Date 10/18/24  -AC         Timed Charges    99396 - OT Self Care/Mgmt Minutes 15  -AC         Total Minutes    Timed Charges Total Minutes 15  -AC       Total Minutes 15  -AC                User Key  (r) = Recorded By, (t) = Taken By, (c) = Cosigned By      Initials Name Provider Type    AC Tequila Nielson OT Occupational Therapist                  Therapy Charges for Today       Code Description Service Date Service Provider Modifiers Qty    [id number] HC OT SELF CARE/MGMT/TRAIN EA 15 MIN 10/11/2024 Tequila Nielson OT GO 1                 Tequila Nielson OT  10/11/2024

## 2024-10-11 NOTE — PLAN OF CARE
Problem: Adult Inpatient Plan of Care  Goal: Plan of Care Review  Outcome: Progressing  Goal: Patient-Specific Goal (Individualized)  Outcome: Progressing  Goal: Absence of Hospital-Acquired Illness or Injury  Outcome: Progressing  Intervention: Identify and Manage Fall Risk  Recent Flowsheet Documentation  Taken 10/11/2024 0400 by Ana Mcnamara RN  Safety Promotion/Fall Prevention:   assistive device/personal items within reach   clutter free environment maintained   fall prevention program maintained   nonskid shoes/slippers when out of bed   room organization consistent   safety round/check completed  Taken 10/11/2024 0000 by Ana Mcnamara RN  Safety Promotion/Fall Prevention:   assistive device/personal items within reach   clutter free environment maintained   fall prevention program maintained   nonskid shoes/slippers when out of bed   room organization consistent   safety round/check completed  Taken 10/10/2024 2000 by Ana Mcnamara RN  Safety Promotion/Fall Prevention:   assistive device/personal items within reach   clutter free environment maintained   fall prevention program maintained   nonskid shoes/slippers when out of bed   room organization consistent   safety round/check completed  Intervention: Prevent Skin Injury  Recent Flowsheet Documentation  Taken 10/11/2024 0400 by Ana Mcnamara RN  Body Position: position changed independently  Skin Protection:   tubing/devices free from skin contact   transparent dressing maintained   skin-to-device areas padded  Taken 10/11/2024 0000 by Ana Mcnamara RN  Body Position: position changed independently  Skin Protection:   tubing/devices free from skin contact   transparent dressing maintained   skin-to-device areas padded  Taken 10/10/2024 2000 by Ana Mcnamara RN  Body Position: position changed independently  Skin Protection:   tubing/devices free from skin contact   transparent dressing maintained   skin-to-device areas  padded  Intervention: Prevent and Manage VTE (Venous Thromboembolism) Risk  Recent Flowsheet Documentation  Taken 10/11/2024 0400 by Ana Mcnamara RN  Activity Management: activity encouraged  Taken 10/11/2024 0000 by Ana Mcnamara RN  Activity Management: activity encouraged  Taken 10/10/2024 2000 by Ana Mcnamara RN  Activity Management: activity encouraged  Intervention: Prevent Infection  Recent Flowsheet Documentation  Taken 10/11/2024 0400 by Ana Mcnamara RN  Infection Prevention:   environmental surveillance performed   hand hygiene promoted   personal protective equipment utilized   single patient room provided  Taken 10/11/2024 0000 by Ana Mcnamara RN  Infection Prevention:   environmental surveillance performed   hand hygiene promoted   personal protective equipment utilized   single patient room provided  Taken 10/10/2024 2000 by Ana Mcnamara RN  Infection Prevention:   environmental surveillance performed   hand hygiene promoted   personal protective equipment utilized   single patient room provided  Goal: Optimal Comfort and Wellbeing  Outcome: Progressing  Intervention: Provide Person-Centered Care  Recent Flowsheet Documentation  Taken 10/10/2024 2000 by Ana Mcnamara RN  Trust Relationship/Rapport:   care explained   choices provided   questions answered   questions encouraged   reassurance provided   thoughts/feelings acknowledged  Goal: Readiness for Transition of Care  Outcome: Progressing     Problem: Fall Injury Risk  Goal: Absence of Fall and Fall-Related Injury  Outcome: Progressing  Intervention: Identify and Manage Contributors  Recent Flowsheet Documentation  Taken 10/10/2024 2000 by Ana Mcnamara RN  Medication Review/Management: medications reviewed  Intervention: Promote Injury-Free Environment  Recent Flowsheet Documentation  Taken 10/11/2024 0400 by Ana Mcnamara RN  Safety Promotion/Fall Prevention:   assistive device/personal items within  reach   clutter free environment maintained   fall prevention program maintained   nonskid shoes/slippers when out of bed   room organization consistent   safety round/check completed  Taken 10/11/2024 0000 by Ana Mcnamara RN  Safety Promotion/Fall Prevention:   assistive device/personal items within reach   clutter free environment maintained   fall prevention program maintained   nonskid shoes/slippers when out of bed   room organization consistent   safety round/check completed  Taken 10/10/2024 2000 by Ana Mcnamara RN  Safety Promotion/Fall Prevention:   assistive device/personal items within reach   clutter free environment maintained   fall prevention program maintained   nonskid shoes/slippers when out of bed   room organization consistent   safety round/check completed     Problem: Skin Injury Risk Increased  Goal: Skin Health and Integrity  Outcome: Progressing  Intervention: Optimize Skin Protection  Recent Flowsheet Documentation  Taken 10/11/2024 0400 by Ana Mcnamara RN  Pressure Reduction Techniques:   frequent weight shift encouraged   weight shift assistance provided  Head of Bed (HOB) Positioning: HOB lowered  Pressure Reduction Devices:   pressure-redistributing mattress utilized   positioning supports utilized   heel offloading device utilized  Skin Protection:   tubing/devices free from skin contact   transparent dressing maintained   skin-to-device areas padded  Taken 10/11/2024 0000 by Ana Mcnamara RN  Pressure Reduction Techniques: frequent weight shift encouraged  Head of Bed (HOB) Positioning: HOB lowered  Pressure Reduction Devices:   pressure-redistributing mattress utilized   positioning supports utilized   heel offloading device utilized  Skin Protection:   tubing/devices free from skin contact   transparent dressing maintained   skin-to-device areas padded  Taken 10/10/2024 2000 by Ana Mcnamara RN  Pressure Reduction Techniques:   frequent weight shift  encouraged   positioned off wounds   pressure points protected  Head of Bed (HOB) Positioning: HOB lowered  Pressure Reduction Devices:   pressure-redistributing mattress utilized   positioning supports utilized   heel offloading device utilized  Skin Protection:   tubing/devices free from skin contact   transparent dressing maintained   skin-to-device areas padded     Problem: Infection  Goal: Absence of Infection Signs and Symptoms  Outcome: Progressing     Problem: Adjustment to Surgery (Colostomy)  Goal: Optimal Coping  Outcome: Progressing     Problem: Bleeding (Colostomy)  Goal: Absence of Bleeding  Outcome: Progressing     Problem: Fluid, Electrolyte and Nutrition Imbalance (Colostomy)  Goal: Fluid, Electrolyte and Nutrition Balance  Outcome: Progressing     Problem: Infection (Colostomy)  Goal: Absence of Infection Signs and Symptoms  Outcome: Progressing     Problem: Ongoing Anesthesia Effects (Colostomy)  Goal: Anesthesia/Sedation Recovery  Outcome: Progressing  Intervention: Optimize Anesthesia Recovery  Recent Flowsheet Documentation  Taken 10/11/2024 0400 by Ana Mcnamara RN  Safety Promotion/Fall Prevention:   assistive device/personal items within reach   clutter free environment maintained   fall prevention program maintained   nonskid shoes/slippers when out of bed   room organization consistent   safety round/check completed  Taken 10/11/2024 0000 by Ana Mcnamara, RN  Safety Promotion/Fall Prevention:   assistive device/personal items within reach   clutter free environment maintained   fall prevention program maintained   nonskid shoes/slippers when out of bed   room organization consistent   safety round/check completed  Taken 10/10/2024 2000 by Ana Mcnamara RN  Safety Promotion/Fall Prevention:   assistive device/personal items within reach   clutter free environment maintained   fall prevention program maintained   nonskid shoes/slippers when out of bed   room organization  consistent   safety round/check completed     Problem: Pain (Colostomy)  Goal: Acceptable Pain Control  Outcome: Progressing  Intervention: Prevent or Manage Pain  Recent Flowsheet Documentation  Taken 10/11/2024 0400 by Ana Mcnamara RN  Diversional Activities:   television   smartphone  Taken 10/11/2024 0000 by Ana Mcnamara RN  Diversional Activities:   television   smartphone  Taken 10/10/2024 2000 by Ana Mcnamara RN  Diversional Activities:   television   smartphone     Problem: Postoperative Nausea and Vomiting (Colostomy)  Goal: Nausea and Vomiting Relief  Outcome: Progressing  Intervention: Prevent or Manage Nausea and Vomiting  Recent Flowsheet Documentation  Taken 10/10/2024 2000 by Ana Mcnamara RN  Nausea/Vomiting Interventions:   nausea triggers minimized   sips clear liquids given     Problem: Postoperative Stoma Care (Colostomy)  Goal: Optimal Stoma Healing  Outcome: Progressing  Intervention: Provide Ostomy and Peristomal Skin Care  Recent Flowsheet Documentation  Taken 10/11/2024 0400 by Ana Mcnamara RN  Skin Protection:   tubing/devices free from skin contact   transparent dressing maintained   skin-to-device areas padded  Taken 10/11/2024 0000 by Ana Mcnamara RN  Skin Protection:   tubing/devices free from skin contact   transparent dressing maintained   skin-to-device areas padded  Taken 10/10/2024 2000 by Ana Mcnamara RN  Skin Protection:   tubing/devices free from skin contact   transparent dressing maintained   skin-to-device areas padded     Problem: Postoperative Urinary Retention (Colostomy)  Goal: Effective Urinary Elimination  Outcome: Progressing     Problem: Respiratory Compromise (Colostomy)  Goal: Effective Oxygenation and Ventilation  Outcome: Progressing  Intervention: Optimize Oxygenation and Ventilation  Recent Flowsheet Documentation  Taken 10/11/2024 0400 by Ana Mcnamara RN  Head of Bed (HOB) Positioning: HOB lowered  Taken 10/11/2024 0000 by  Ana Mcnamara, RN  Head of Bed (HOB) Positioning: HOB lowered  Taken 10/10/2024 2000 by Ana Mcnamara RN  Head of Bed (HOB) Positioning: HOB lowered   Goal Outcome Evaluation:

## 2024-10-11 NOTE — THERAPY TREATMENT NOTE
Patient Name: Valeria Tracy  : 1940    MRN: [id number]                              Today's Date: 10/11/2024       Admit Date: 10/1/2024    Visit Dx:     ICD-10-CM ICD-9-CM   1. S/P right colectomy  Z90.49 V45.89   2. Rectal cancer  C20 154.1     Patient Active Problem List   Diagnosis    GERD (gastroesophageal reflux disease)    Asthma    Hypothyroidism    Colon cancer    S/P right colectomy    Acute postoperative pain    Anemia    Rectal cancer    S/P abdominal perineal resection, partial thickness posterior vaginal resection    Severe malnutrition    Postoperative urinary retention     Past Medical History:   Diagnosis Date    Acid reflux     Anemia     Asthma     Cancer     rectal    History of chemotherapy     History of radiation therapy     History of transfusion     No Reaction    Hypothyroid     Pneumonia     Wears dentures     upper    Wears glasses     Wears hearing aid in both ears      Past Surgical History:   Procedure Laterality Date    ABDOMINAL PERINEAL RESECTION N/A 10/1/2024    Procedure: ABDOMINAL PERINEAL RESECTION, LYSIS OF ADHESIONS AND CREATION OF COLOSTOMY;  Surgeon: Angelito Ruiz MD;  Location: [facility] OR;  Service: General;  Laterality: N/A;    BREAST LUMPECTOMY Right     CATARACT EXTRACTION Bilateral     COLON RESECTION N/A 10/17/2023    Procedure: COLON RESECTION RIGHT LAPAROSCOPIC;  Surgeon: Angelito Ruiz MD;  Location:  [facility] OR;  Service: General;  Laterality: N/A;    COLONOSCOPY      PORTACATH PLACEMENT      SHOULDER SURGERY Right       General Information       Row Name 10/11/24 0957          Physical Therapy Time and Intention    Document Type therapy note (daily note)  -LS     Mode of Treatment physical therapy  -LS       Row Name 10/11/24 0957          General Information    Patient Profile Reviewed yes  -LS     Existing Precautions/Restrictions fall  abdominal incision, ostomy  -LS       Row Name 10/11/24 0957          Cognition    Orientation Status (Cognition)  oriented x 4  -LS               User Key  (r) = Recorded By, (t) = Taken By, (c) = Cosigned By      Initials Name Provider Type    Jeni Reese, PATRICIA Physical Therapist                   Mobility       Row Name 10/11/24 0957          Bed Mobility    Bed Mobility sit-supine  -LS     Sit-Supine Mozier (Bed Mobility) independent  -LS     Comment, (Bed Mobility) indep on flat bed surface without bedrail.  -LS       Row Name 10/11/24 0957          Sit-Stand Transfer    Sit-Stand Mozier (Transfers) contact guard  -LS     Assistive Device (Sit-Stand Transfers) walker, front-wheeled  -LS       Row Name 10/11/24 0957          Gait/Stairs (Locomotion)    Mozier Level (Gait) contact guard  -LS     Assistive Device (Gait) walker, front-wheeled  -LS     Patient was able to Ambulate yes  -LS     Distance in Feet (Gait) 25  25' X 2  -LS     Deviations/Abnormal Patterns (Gait) gait speed decreased;bilateral deviations;stride length decreased  -LS     Bilateral Gait Deviations forward flexed posture  -LS     Comment, (Gait/Stairs) step-through gait pattern at a slow pace. pt walked several feet without a RW but felt unstable; touching wall for balance; returned to using RW.  -LS               User Key  (r) = Recorded By, (t) = Taken By, (c) = Cosigned By      Initials Name Provider Type    Jeni Reese, PATRICIA Physical Therapist                   Obj/Interventions       Row Name 10/11/24 0957          Balance    Static Standing Balance contact guard  -LS     Dynamic Standing Balance contact guard  -LS     Position/Device Used, Standing Balance walker, front-wheeled  -LS     Balance Interventions standing;sit to stand;supported;weight shifting activity  -LS               User Key  (r) = Recorded By, (t) = Taken By, (c) = Cosigned By      Initials Name Provider Type    Jeni Reese, PT Physical Therapist                   Goals/Plan    No documentation.                  Clinical Impression        Row Name 10/11/24 0957          Pain    Pretreatment Pain Rating 0/10 - no pain  -LS     Posttreatment Pain Rating 0/10 - no pain  -LS       Row Name 10/11/24 0957          Plan of Care Review    Plan of Care Reviewed With patient  -LS     Outcome Evaluation Pt felt more weak today. She did not walk as far as she did last session. Unable to walk without AD and walks without AD at baseline. Recommend inpt rehab upon discharge.  -       Row Name 10/11/24 0957          Positioning and Restraints    Pre-Treatment Position sitting in chair/recliner  -LS     Post Treatment Position bed  -LS     In Bed notified nsg;fowlers;call light within reach;encouraged to call for assist;exit alarm on  -LS               User Key  (r) = Recorded By, (t) = Taken By, (c) = Cosigned By      Initials Name Provider Type    Jeni Reese, PT Physical Therapist                   Outcome Measures       Row Name 10/11/24 0957          How much help from another person do you currently need...    Turning from your back to your side while in flat bed without using bedrails? 4  -LS     Moving from lying on back to sitting on the side of a flat bed without bedrails? 3  -LS     Moving to and from a bed to a chair (including a wheelchair)? 3  -LS     Standing up from a chair using your arms (e.g., wheelchair, bedside chair)? 3  -LS     Climbing 3-5 steps with a railing? 3  -LS     To walk in hospital room? 3  -LS     AM-PAC 6 Clicks Score (PT) 19  -LS     Highest Level of Mobility Goal 6 --> Walk 10 steps or more  -       Row Name 10/11/24 1016 10/11/24 0957       Functional Assessment    Outcome Measure Options AM-PAC 6 Clicks Daily Activity (OT)  -AC AM-PAC 6 Clicks Basic Mobility (PT)  -LS              User Key  (r) = Recorded By, (t) = Taken By, (c) = Cosigned By      Initials Name Provider Type    Tequila Zepeda, OT Occupational Therapist    Jeni Reese, PT Physical Therapist                                  Physical Therapy Education       Title: PT OT SLP Therapies (In Progress)       Topic: Physical Therapy (In Progress)       Point: Mobility training (In Progress)       Learning Progress Summary             Patient Acceptance, E, NR by AS at 10/9/2024 1000    Acceptance, E, VU by AE at 10/7/2024 1006    Acceptance, E, VU by TM at 10/4/2024 1313    Acceptance, E, NR by TM at 10/2/2024 1129                         Point: Home exercise program (In Progress)       Learning Progress Summary             Patient Acceptance, E, NR by AS at 10/9/2024 1000                         Point: Body mechanics (In Progress)       Learning Progress Summary             Patient Acceptance, E, NR by AS at 10/9/2024 1000    Acceptance, E, VU by AE at 10/7/2024 1006    Acceptance, E, VU by TM at 10/4/2024 1313    Acceptance, E, NR by TM at 10/2/2024 1129                         Point: Precautions (Done)       Learning Progress Summary             Patient Acceptance, E, VU by LS at 10/11/2024 0957    Comment: Benefits of activity    Acceptance, E, NR by AS at 10/9/2024 1000    Acceptance, E, VU by AE at 10/7/2024 1006    Acceptance, E, VU by TM at 10/4/2024 1313    Acceptance, E, NR by TM at 10/2/2024 1129                                         User Key       Initials Effective Dates Name Provider Type Discipline    AS 04/28/23 -  Viky Juarez, PTA Physical Therapist Assistant PT    LS 07/11/23 -  Jeni Alexander, PT Physical Therapist PT    AE 09/21/21 -  Rebel Maradiaga, PT Physical Therapist PT    TM 08/13/24 -  Britton Lipscomb, PT Student PT Student PT                  PT Recommendation and Plan     Plan of Care Reviewed With: patient  Outcome Evaluation: Pt felt more weak today. She did not walk as far as she did last session. Unable to walk without AD and walks without AD at baseline. Recommend inpt rehab upon discharge.     Time Calculation:         PT Charges       Row Name 10/11/24 0957             Time Calculation     Start Time 0957  -LS      PT Received On 10/11/24  -LS      PT Goal Re-Cert Due Date 10/12/24  -LS         Timed Charges    86118 - PT Therapeutic Activity Minutes 10  -LS         Total Minutes    Timed Charges Total Minutes 10  -LS       Total Minutes 10  -LS                User Key  (r) = Recorded By, (t) = Taken By, (c) = Cosigned By      Initials Name Provider Type    Jeni Reese, PT Physical Therapist                  Therapy Charges for Today       Code Description Service Date Service Provider Modifiers Qty    [id number]  PT THERAPEUTIC ACT EA 15 MIN 10/11/2024 Jeni Alexander, PT GP 1            PT G-Codes  Outcome Measure Options: AM-PAC 6 Clicks Daily Activity (OT)  AM-PAC 6 Clicks Score (PT): 19  AM-PAC 6 Clicks Score (OT): 20  PT Discharge Summary  Anticipated Discharge Disposition (PT): inpatient rehabilitation facility    Jeni Alexander, PT  10/11/2024

## 2024-10-11 NOTE — CASE MANAGEMENT/SOCIAL WORK
Continued Stay Note  [facility]     Patient Name: Valeria Tracy  MRN: [id number]  Today's Date: 10/11/2024    Admit Date: 10/1/2024    Plan: [facility]   Discharge Plan       Row Name 10/11/24 1125       Plan    Plan [facility]    Patient/Family in Agreement with Plan yes    Plan Comments  spoke with Ms. Tracy, at the bedside. Ms. Tracy states that she would still like to go to rehab at [facility]. CM attempted to confirm with [facility] that they had be available for skilled rehab. Left voicemail for [facility]. PT/OT worked with pt today, notes in EPIC. CM spoke with Fabiola/ and asked her to resubmit to insurance today. Pt states that her daughter will transport her at discharge. Per previous case management note, facility requests 3 days worth of colostomy supplies.  will continue to follow.    [facility] called . She confirmed that they have a bed for the pt. Awaiting insurance approval. [facility] states they cannot accept pt, over the weekend. CM will follow up on Monday.     Final Discharge Disposition Code 03 - skilled nursing facility (SNF)                   Discharge Codes    No documentation.                 Expected Discharge Date and Time       Expected Discharge Date Expected Discharge Time    Oct 14, 2024               Chel Gonzalez RN

## 2024-10-11 NOTE — PLAN OF CARE
Goal Outcome Evaluation:  Plan of Care Reviewed With: patient        Progress: no change  Outcome Evaluation: Pt min A LBD,  SBA toilet transfer,  CGA to ambulate with RW.  Pt continues below baseline with self care/mobility d/t weakness and decr activity tolerance.  Recommend IP rehab upon d/c.      Anticipated Discharge Disposition (OT): inpatient rehabilitation facility

## 2024-10-11 NOTE — NURSING NOTE
WOC follow-up for ostomy care and education.    Colostomy appliance changed.  No leakage noted today.  Good stool output.  Stoma viable.    Patient has plenty of discharge supplies for rehab and once home until her first order can be placed from [facility].    Will schedule outpatient ostomy clinic appointment patient once she is home from rehab if necessary.    WOC sign off.    Yassine Ascencio RN, BSN, CCRN, CWOCN  Wound, Ostomy and Continence (WOC) Department  [facility]

## 2024-10-11 NOTE — PLAN OF CARE
Goal Outcome Evaluation:  Plan of Care Reviewed With: patient           Outcome Evaluation: Pt felt more weak today. She did not walk as far as she did last session. Unable to walk without AD and walks without AD at baseline. Recommend inpt rehab upon discharge.      Anticipated Discharge Disposition (PT): inpatient rehabilitation facility

## 2024-10-11 NOTE — PROGRESS NOTES
"[facility] ([facility]    Nausea and emesis  Ongoing stoma output  No distress  Walked once    Vital Signs:  Blood pressure 122/66, pulse 103, temperature 98.6 °F (37 °C), temperature source Oral, resp. rate 18, height 165.1 cm (65\"), weight 56 kg (123 lb 8 oz), SpO2 96%.    Labs past 24 hours:  Lab Results (last 24 hours)       ** No results found for the last 24 hours. **            I/O last shift:  No intake/output data recorded.     PHYSICAL EXAM-  Comfortable  cv- rsr  Chest- clears with cough  Abd- soft, mild distention, stool per stoma    Pathology:  Order Name Source Comment Collection Info Order Time   PREPARE RBC Other   10/1/2024  8:28 AM     When to Transfuse?   Hold          Indication for Transfusion   Surgery          Surgery Date   10/1/2024          Release to patient   Routine Release        TYPE AND SCREEN   Collected By: Mirta Christianson RN 10/1/2024  8:31 AM     Release to patient   Routine Release        TISSUE PATHOLOGY EXAM Large Intestine, Rectum  Collected By: Angelito Ruiz MD 10/1/2024  2:45 PM     Release to patient   Routine Release        .    Assessment and Plan:  Nausea and emesis, continued stoma output  10days postop    Will check CT    Angelito Ruiz MD  10/11/24  19:42 EDT  "

## 2024-10-11 NOTE — DISCHARGE PLACEMENT REQUEST
"Cris Leonard (84 y.o. Female)       Date of Birth   1940    Social Security Number       Address   [address] [place] [address]    Home Phone   [phone]    MRN   [id number]       Gnosticist   None    Marital Status   Single                            Admission Date   10/1/24    Admission Type   Elective    Admitting Provider   Angelito Ruiz MD    Attending Provider   Angelito Ruiz MD    Department, Room/Bed   [address], S572/1       Discharge Date       Discharge Disposition       Discharge Destination                                 Attending Provider: Angelito Ruiz MD    Allergies: Milk-related Compounds    Isolation: None   Infection: None   Code Status: CPR    Ht: 165.1 cm (65\")   Wt: 56 kg (123 lb 8 oz)    Admission Cmt: None   Principal Problem: S/P abdominal perineal resection, partial thickness posterior vaginal resection [Z90.49]                   Active Insurance as of 10/1/2024       Primary Coverage       Payor Plan Insurance Group Employer/Plan Group    HUMANA MEDICARE REPLACEMENT HUMANA MED ADV PPO [id number]       Payor Plan Address Payor Plan Phone Number Payor Plan Fax Number Effective Dates    [address] [address] [phone]  3/1/2024 - None Entered    [facility] [id number]         Subscriber Name Subscriber Birth Date Member ID       CRIS LEONARD 1940 [id number]                     Emergency Contacts        (Rel.) Home Phone Work Phone Mobile Phone    ANNABELLA SALSE (Grandchild) [phone] -- [phone]                 History & Physical        Maggi Lance MD at 10/01/24 1815          Admission HP     Patient Name: Cris Leonard  MRN: [id number]  : 1940  DOS: 10/1/2024    Attending: Maggi Lance MD    Primary Care Provider: Peyton Vázquez PA      Patient Care Team:  Peyton Vázquez PA as PCP - General (Physician Assistant)    Chief complaint: Rectal cancer    Subjective  Patient is a pleasant " 84 y.o. female presented for scheduled surgery by Dr. Ruiz.    Patient has history of right colon cancer for which she had a colectomy, T3 N1 has been resected with persistent neoplasia in the posterior distal rectum after YUDELKA prompting plans for APR.    Today she underwent abdominal perineal resection with partial thickness posterior vaginal resection.  Surgery included placement of a JESUS drain into the deep pelvis and colostomy creation in the left abdomen.    Surgery was done under general anesthesia and a block, was tolerated well.  I saw her in PACU where she is still in a drowsy  Postop state.  Not in distress.       Allergies   Allergen Reactions    Milk-Related Compounds Anaphylaxis        Medications Prior to Admission   Medication Sig Dispense Refill Last Dose    albuterol sulfate  (90 Base) MCG/ACT inhaler Inhale 2 puffs Every 4 (Four) Hours As Needed for Wheezing.   Past Week    Calcium-Vitamin D-Vitamin K (VIACTIV CALCIUM PLUS D PO) Take 2 doses by mouth Daily.   Past Week    DOCUSATE SODIUM PO Take 100 mg by mouth Daily As Needed (constipation).   Past Week    famotidine (PEPCID) 10 MG tablet Take 1 tablet by mouth 2 (Two) Times a Day.   Past Month    FLUTICASONE PROPIONATE NA 2 sprays into the nostril(s) as directed by provider Daily.   Past Week    fluticasone-salmeterol (ADVAIR HFA) 115-21 MCG/ACT inhaler Inhale 2 puffs 2 (Two) Times a Day.   10/1/2024    levothyroxine (SYNTHROID, LEVOTHROID) 88 MCG tablet Take 1 tablet by mouth Daily.   9/30/2024    Loratadine 10 MG capsule Take 1 capsule by mouth Daily.   9/30/2024    Multiple Vitamins-Minerals (Womens Multi Gummies) chewable tablet Chew 1 tablet Daily.   Past Week    ibuprofen (ADVIL,MOTRIN) 200 MG tablet Take 1 tablet by mouth Every 6 (Six) Hours As Needed for Mild Pain.       sodium chloride 0.65 % nasal spray Administer 1 spray into the nostril(s) as directed by provider As Needed for Congestion.             Past Medical History:  "  Diagnosis Date    Acid reflux     Anemia     Asthma     Cancer     rectal    History of chemotherapy     History of radiation therapy     History of transfusion     No Reaction    Hypothyroid     Pneumonia     Wears dentures     upper    Wears glasses     Wears hearing aid in both ears      Past Surgical History:   Procedure Laterality Date    BREAST LUMPECTOMY Right     CATARACT EXTRACTION Bilateral     COLON RESECTION N/A 10/17/2023    Procedure: COLON RESECTION RIGHT LAPAROSCOPIC;  Surgeon: Angelito Ruiz MD;  [facility];  Service: General;  Laterality: N/A;    COLONOSCOPY      PORTACATH PLACEMENT      SHOULDER SURGERY Right      History reviewed. No pertinent family history.  Social History     Tobacco Use    Smoking status: Former     Types: Cigarettes    Smokeless tobacco: Never   Vaping Use    Vaping status: Never Used   Substance Use Topics    Alcohol use: Never    Drug use: Never       Review of Systems  Review of systems could not be obtained due to   patient sedation status.    Vital Signs  /73 (BP Location: Right arm, Patient Position: Lying)   Pulse 67   Temp 97.6 °F (36.4 °C) (Axillary)   Resp 16   Ht 165.1 cm (65\")   Wt 56 kg (123 lb 8 oz)   SpO2 99%   BMI 20.55 kg/m²     Physical Exam:    General Appearance:  Drowsy postop in no acute distress   Head:    Normocephalic, without obvious abnormality, atraumatic   Eyes:            Lids and lashes normal, conjunctivae and sclerae normal, no   icterus    Ears:    Ears appear intact with no abnormalities noted   Throat:   No oral lesions, no thrush, oral mucosa moist   Neck:   No adenopathy, supple, trachea midline, no thyromegaly         Lungs:     Clear to auscultation,respirations regular, even and       unlabored. No wheezes or rales.    Heart:    Regular rhythm and normal rate, normal S1 and S2, no murmur    Abdomen:      Soft with midline incision with an intact dressing.  Left colostomy and low abdomen/pelvic JESUS drain. " "  Genitalia:    Deferred   Extremities:   No edema, no cyanosis, no              redness   Pulses:   Pulses palpable and equal bilaterally   Skin:   No bleeding, bruising or rash   Neurologic:   Cranial nerves 2 - 12 grossly intact,             Invalid input(s): \"NEUTOPHILPCT\"        Invalid input(s): \"LABALBU\", \"PROT\"  Lab Results   Component Value Date    HGBA1C 5.20 09/23/2024      Latest Reference Range & Units 09/23/24 13:17   Sodium 136 - 145 mmol/L 139   Potassium 3.5 - 5.2 mmol/L 4.2   Chloride 98 - 107 mmol/L 101   CO2 22.0 - 29.0 mmol/L 29.0   Anion Gap 5.0 - 15.0 mmol/L 9.0   BUN 8 - 23 mg/dL 8   Creatinine 0.57 - 1.00 mg/dL 0.65   BUN/Creatinine Ratio 7.0 - 25.0  12.3   eGFR >60.0 mL/min/1.73 87.5   Glucose 65 - 99 mg/dL 89   Calcium 8.6 - 10.5 mg/dL 9.6   Alkaline Phosphatase 39 - 117 U/L 119 (H)   Total Protein 6.0 - 8.5 g/dL 7.1   Albumin 3.5 - 5.2 g/dL 4.3   Globulin gm/dL 2.8   A/G Ratio g/dL 1.5   AST (SGOT) 1 - 32 U/L 22   ALT (SGPT) 1 - 33 U/L 8   Total Bilirubin 0.0 - 1.2 mg/dL 0.5   Hemoglobin A1C 4.80 - 5.60 % 5.20   WBC 3.40 - 10.80 10*3/mm3 5.08   RBC 3.77 - 5.28 10*6/mm3 3.91   Hemoglobin 12.0 - 15.9 g/dL 12.1   Hematocrit 34.0 - 46.6 % 38.6   Platelets 140 - 450 10*3/mm3 302   RDW 12.3 - 15.4 % 13.0   MCV 79.0 - 97.0 fL 98.7 (H)   MCH 26.6 - 33.0 pg 30.9   MCHC 31.5 - 35.7 g/dL 31.3 (L)   MPV 6.0 - 12.0 fL 9.1   RDW-SD 37.0 - 54.0 fl 47.0   (H): Data is abnormally high  (L): Data is abnormally low    Assessment and Plan:   S/p ABDOMINAL PERINEAL RESECTION  Partial thickness posterior vaginal resection.    Rectal cancer    GERD (gastroesophageal reflux disease)    Asthma    Hypothyroidism      Plan  Postop management to include  1. Ambulation, several times daily  2. Pain control-PRNs, multimodal approach   3. IS-will encourage  4. DVT proph- Mechanical, subcutaneous heparin  5. Bowel regimen, alvimopan  6. Resume home medications as appropriate  7. Monitor post-op labs  8. Discharge " planning   9. Diet, Clears, advance diet as tolerated.  IVF initially, monitor volume status.    -Asthma: Maintain home regimen with formulary substitution as indicated.  As needed bronchodilators.  Monitor oxygenation.    -Hypothyroidism: Resume replacement     -GERD:  Resume H2 blocker.     -New stoma:  Wound care stoma nurse consult for stoma care and education throughout patient's hospitalization.      Dragon disclaimer:  Part of this encounter note is an electronic transcription/translation of spoken language to printed text. The electronic translation of spoken language may permit erroneous, or at times, nonsensical words or phrases to be inadvertently transcribed; Although I have reviewed the note for such errors, some may still exist.    Maggi Lance MD  10/01/24  18:15 EDT              Electronically signed by Maggi Lance MD at 10/01/24 2202       Pina Norman APRN at 10/01/24 0910       Attestation signed by Angelito Ruiz MD at 10/01/24 1130    No interval change    Goals of postoperative care reviewed                [facility] Pre-op    Full history and physical note from office is attached.    /85 (BP Location: Right arm, Patient Position: Lying)   Pulse 85   Temp 98.7 °F (37.1 °C) (Temporal)   Resp 18   SpO2 100%     Immunizations:  Influenza:  No  Pneumococcal:  UTD  Tetanus:  UTD    Review of Systems:  Constitutional-- No fever, chills or sweats. No fatigue.  CV-- No chest pain, palpitation or syncope  Resp-- No SOB, cough, hemoptysis  Skin--No rashes or lesions    Physical Exam:  Heart:   Regular rate and rhythm, S1 and S2 normal  Lungs: Clear to auscultation bilaterally, respirations unlabored    LAB Results:  Lab Results   Component Value Date    WBC 5.08 09/23/2024    HGB 12.1 09/23/2024    HCT 38.6 09/23/2024    MCV 98.7 (H) 09/23/2024     09/23/2024    NEUTROABS 7.07 (H) 10/23/2023    GLUCOSE 89 09/23/2024    BUN 8 09/23/2024    CREATININE  "0.65 09/23/2024     09/23/2024    K 4.2 09/23/2024     09/23/2024    CO2 29.0 09/23/2024    CALCIUM 9.6 09/23/2024    ALBUMIN 4.3 09/23/2024    AST 22 09/23/2024    ALT 8 09/23/2024    BILITOT 0.5 09/23/2024     Study Result    Narrative & Impression   MRI PELVIS WO CONTRAST     Date of Exam: 8/22/2024 6:43 PM EDT     Indication: C20.     Comparison: CT abdomen pelvis 10/20/2023.     Technique:  Routine multiplanar/multisequence images of the pelvis were obtained without contrast administration.     Per chart review: 10/25/2023 patient underwent colonoscopy which demonstrated \"large bulky mass lesion was seen in the rectum. This arose from the posterior wall of the rectum was greater than 5 cm in size, arising from a broad base with distal extent to   within a proximally 3 cm of the dentate line. Several biopsies were obtained, that showed moderately differentiated adenocarcinoma.\" Patient has subsequently undergone chemoradiation at an outside institution for rectal cancer. No pretreatment/baseline   rectal MRI performed.     Findings:     Note there is a large amount of gas in the rectum, which makes evaluation suboptimal. Along the posterior rectal wall there is T2 hypointense signal suggestive of scar/fibrosis, which spans around 3.5 cm in craniocaudal dimension (series 5 image 10-12)   which appears to be at the site of the rectal tumor seen as an enhancing mass on prior CT. The inferior margin of the scar extends to 2.2 cm above the anorectal junction and 4.2 cm above the anal verge. Within the posterior mesorectal fat at this   location at the 6 o'clock position, there are thin linear/radiating areas of T2 hypointensity suggestive of desmoplastic stranding. Otherwise, there is no convincing evidence of mesorectal involvement. No distinct intermediate T2 signal or discrete   restricted diffusion to suggest significant residual viable disease.     There is a 4 x 4 mm lymph node in the mesorectum at " the 7 o'clock position, without prominent diffusion signal or suspicious morphologic features, unchanged in size from prior CT and of low suspicion. No mesorectal lymphadenopathy otherwise. No   extramesorectal lymphadenopathy within the field-of-view.     Small uterus and ovaries, typical of age. Questionable small fibroid present. No free fluid or organized fluid collection. No subcutaneous soft tissue abnormality. No acute or suspicious osseous lesions. Degenerative changes of the bilateral hips.     IMPRESSION:  Impression:     Within the confines of comparing to a prior CT without prior/baseline MRI, as well as a large amount of gas in the rectum which distorts diffusion-weighted sequences, there appears to be positive response to treatment with predominantly T2 hypointense   signal at the site of the treated rectal tumor suggestive of scar/fibrosis. No distinct intermediate T2 signal nor restricted diffusion to suggest measurable residual viable tumor. Mesorectum is generally clear without convincing evidence of tumor   involvement nor suspicious lymphadenopathy, with note made of a tiny/nonsuspicious lymph node. Consider correlation with endoscopy and tissue sampling as clinically indicated.     Cancer Staging (if applicable)  Cancer Patient: __ yes __no __unknown__N/A; If yes, clinical stage T:__ N:__M:__, stage group or __N/A      Impression: History rectal cancer, Stage III      Plan: ABDOMINAL PERINEAL RESECTION, LYSIS OF ADHESIONS       RAINA Arana   10/1/2024   09:10 EDT    Electronically signed by Angelito Ruiz MD at 10/01/24 1130   Source Note: H&P        [Media Unavailable] Scan on 9/9/2024 0913 by [employer], [place]: H&P, CSGA, 09/06/2024          Electronically signed by [employer], [employer] at 09/09/24 0916                 H&P signed by [employer], [employer] at 09/09/24 0916         [Media Unavailable] Scan on 9/9/2024 0913 by [employer], [place]: H&P, CSGA, 09/06/2024         "  Electronically signed by [employer], [place] at 24 0916          Physician Progress Notes (most recent note)        Maggi Lance MD at 10/10/24 1342          IM progress note      Valeria Tracy  [id number]  1940     LOS: 9 days     Attending: Angelito Ruiz MD    Primary Care Provider: Peyton Vázquez PA      Chief Complaint/Reason for visit:  Abd pain    Subjective   Tolerating clear liquids but still has occasional heartburn.  Concerned with advancing diet but she does not like liquid diet either.  She does not like oat.    Objective        Visit Vitals  /68 (BP Location: Left arm, Patient Position: Lying)   Pulse 105   Temp 98 °F (36.7 °C) (Oral)   Resp 18   Ht 165.1 cm (65\")   Wt 56 kg (123 lb 8 oz)   SpO2 91%   BMI 20.55 kg/m²     Temp (24hrs), Av °F (36.7 °C), Min:97.8 °F (36.6 °C), Max:98.1 °F (36.7 °C)         Respiratory: RA    Physical Exam:     General Appearance:    Alert, cooperative, in no acute distress   Head:    Normocephalic, without obvious abnormality, atraumatic    Lungs:     Normal effort, symmetric chest rise, no crepitus, clear to      auscultation bilaterally             Heart:    Regular rhythm and normal rate, normal S1 and S2   Abdomen:     Soft, expected tenderness. Midline dressing CDI. JESUS is out. Dressing intact.  Stoma with output      Extremities:   No clubbing, cyanosis or edema.  No deformities.    Pulses:   Pulses palpable and equal bilaterally   Skin:   No bleeding, bruising or rash          Results Review:     I reviewed the patient's new clinical results.   Results from last 7 days   Lab Units 10/07/24  0516 10/04/24  0434   WBC 10*3/mm3 5.76 5.50   HEMOGLOBIN g/dL 9.8* 9.5*   HEMATOCRIT % 30.2* 29.8*   PLATELETS 10*3/mm3 240 189     Results from last 7 days   Lab Units 10/07/24  0516 10/04/24  0434   SODIUM mmol/L 139 141   POTASSIUM mmol/L 3.5 3.8   CHLORIDE mmol/L 104 107   CO2 mmol/L 26.0 24.0   BUN mg/dL 9 4*   CREATININE mg/dL 0.60 0.45* "   CALCIUM mg/dL 8.7 8.3*   GLUCOSE mg/dL 111* 74     I reviewed the patient's new imaging including images and reports.    All medications reviewed.   acetaminophen, 650 mg, Oral, Q8H  budesonide-formoterol, 2 puff, Inhalation, BID - RT  cetirizine, 5 mg, Oral, Daily  famotidine, 10 mg, Oral, BID  heparin (porcine), 5,000 Units, Subcutaneous, Q8H  levothyroxine, 88 mcg, Oral, Q AM  megestrol, 200 mg, Oral, Daily  tamsulosin, 0.4 mg, Oral, Daily        Assessment & Plan     S/P abdominal perineal resection, partial thickness posterior vaginal resection    GERD (gastroesophageal reflux disease)    Asthma    Hypothyroidism    Acute postoperative pain    Rectal cancer    Severe malnutrition    Postoperative urinary retention  Postop nausea and vomiting    Plan  1. Ambulation, encouraged, PT is following  2. Pain control-prns   3. IS-encouraged  4. DVT proph- mechs/heparin  5. Bowel regimen  6. DC planning, inpatient rehab, precert has been started for IPR.      -Asthma: Maintain home regimen with formulary substitution as indicated.  As needed bronchodilators.  Monitor oxygenation.     -Hypothyroidism: Resume replacement      -GERD:  Resumed H2 blocker.  Changed to nightly and added PPI.  As needed Tums available     -New stoma:  Wound care stoma nurse consult for stoma care and education throughout patient's hospitalization.    -Urinary retention:   Resolved.   Continue tamsulosin,   Urinalysis unremarkable.         Advancing diet again as tolerated    Discussed with patient and .     Maggi Lance MD  10/10/24  13:42 EDT    Electronically signed by Maggi Lance MD at 10/10/24 5725          Physical Therapy Notes (most recent note)        Jeni Alexander, PT at 10/11/24 0957  Version 1 of 1         Patient Name: Valeria Tracy  : 1940    MRN: [id number]                              Today's Date: 10/11/2024       Admit Date: 10/1/2024    Visit Dx:     ICD-10-CM ICD-9-CM   1. S/P  right colectomy  Z90.49 V45.89   2. Rectal cancer  C20 154.1     Patient Active Problem List   Diagnosis    GERD (gastroesophageal reflux disease)    Asthma    Hypothyroidism    Colon cancer    S/P right colectomy    Acute postoperative pain    Anemia    Rectal cancer    S/P abdominal perineal resection, partial thickness posterior vaginal resection    Severe malnutrition    Postoperative urinary retention     Past Medical History:   Diagnosis Date    Acid reflux     Anemia     Asthma     Cancer     rectal    History of chemotherapy     History of radiation therapy     History of transfusion     No Reaction    Hypothyroid     Pneumonia     Wears dentures     upper    Wears glasses     Wears hearing aid in both ears      Past Surgical History:   Procedure Laterality Date    ABDOMINAL PERINEAL RESECTION N/A 10/1/2024    Procedure: ABDOMINAL PERINEAL RESECTION, LYSIS OF ADHESIONS AND CREATION OF COLOSTOMY;  Surgeon: Angelito Ruiz MD;  Location: [facility] OR;  Service: General;  Laterality: N/A;    BREAST LUMPECTOMY Right     CATARACT EXTRACTION Bilateral     COLON RESECTION N/A 10/17/2023    Procedure: COLON RESECTION RIGHT LAPAROSCOPIC;  Surgeon: nAgelito Ruiz MD;  Location: [facility] OR;  Service: General;  Laterality: N/A;    COLONOSCOPY      PORTACATH PLACEMENT      SHOULDER SURGERY Right       General Information       Row Name 10/11/24 0957          Physical Therapy Time and Intention    Document Type therapy note (daily note)  -LS     Mode of Treatment physical therapy  -LS       Row Name 10/11/24 0957          General Information    Patient Profile Reviewed yes  -LS     Existing Precautions/Restrictions fall  abdominal incision, ostomy  -LS       Row Name 10/11/24 0957          Cognition    Orientation Status (Cognition) oriented x 4  -LS               User Key  (r) = Recorded By, (t) = Taken By, (c) = Cosigned By      Initials Name Provider Type    Jeni Reese, PT Physical Therapist                    Mobility       Row Name 10/11/24 0957          Bed Mobility    Bed Mobility sit-supine  -LS     Sit-Supine [place] (Bed Mobility) independent  -LS     Comment, (Bed Mobility) indep on flat bed surface without bedrail.  -LS       Row Name 10/11/24 0957          Sit-Stand Transfer    Sit-Stand [place] (Transfers) contact guard  -LS     Assistive Device (Sit-Stand Transfers) walker, front-wheeled  -LS       Row Name 10/11/24 0957          Gait/Stairs (Locomotion)    [place] Level (Gait) contact guard  -LS     Assistive Device (Gait) walker, front-wheeled  -LS     Patient was able to Ambulate yes  -LS     Distance in Feet (Gait) 25  25' X 2  -LS     Deviations/Abnormal Patterns (Gait) gait speed decreased;bilateral deviations;stride length decreased  -LS     Bilateral Gait Deviations forward flexed posture  -LS     Comment, (Gait/Stairs) step-through gait pattern at a slow pace. pt walked several feet without a RW but felt unstable; touching wall for balance; returned to using RW.  -LS               User Key  (r) = Recorded By, (t) = Taken By, (c) = Cosigned By      Initials Name Provider Type    Jeni Reese, PATRICIA Physical Therapist                   Obj/Interventions       Row Name 10/11/24 0957          Balance    Static Standing Balance contact guard  -LS     Dynamic Standing Balance contact guard  -LS     Position/Device Used, Standing Balance walker, front-wheeled  -LS     Balance Interventions standing;sit to stand;supported;weight shifting activity  -LS               User Key  (r) = Recorded By, (t) = Taken By, (c) = Cosigned By      Initials Name Provider Type    Jeni Reese PT Physical Therapist                   Goals/Plan    No documentation.                  Clinical Impression       Row Name 10/11/24 0957          Pain    Pretreatment Pain Rating 0/10 - no pain  -LS     Posttreatment Pain Rating 0/10 - no pain  -LS       Row Name 10/11/24 0957          Plan of Care  Review    Plan of Care Reviewed With patient  -LS     Outcome Evaluation Pt felt more weak today. She did not walk as far as she did last session. Unable to walk without AD and walks without AD at baseline. Recommend inpt rehab upon discharge.  -       Row Name 10/11/24 0957          Positioning and Restraints    Pre-Treatment Position sitting in chair/recliner  -LS     Post Treatment Position bed  -LS     In Bed notified nsg;fowlers;call light within reach;encouraged to call for assist;exit alarm on  -LS               User Key  (r) = Recorded By, (t) = Taken By, (c) = Cosigned By      Initials Name Provider Type    Jeni Reese, PT Physical Therapist                   Outcome Measures       Row Name 10/11/24 0957          How much help from another person do you currently need...    Turning from your back to your side while in flat bed without using bedrails? 4  -LS     Moving from lying on back to sitting on the side of a flat bed without bedrails? 3  -LS     Moving to and from a bed to a chair (including a wheelchair)? 3  -LS     Standing up from a chair using your arms (e.g., wheelchair, bedside chair)? 3  -LS     Climbing 3-5 steps with a railing? 3  -LS     To walk in hospital room? 3  -LS     AM-PAC 6 Clicks Score (PT) 19  -LS     Highest Level of Mobility Goal 6 --> Walk 10 steps or more  -       Row Name 10/11/24 1016 10/11/24 0957       Functional Assessment    Outcome Measure Options AM-PAC 6 Clicks Daily Activity (OT)  -AC AM-PAC 6 Clicks Basic Mobility (PT)  -              User Key  (r) = Recorded By, (t) = Taken By, (c) = Cosigned By      Initials Name Provider Type    Tequila Zepeda, OT Occupational Therapist    Jeni Reese, PT Physical Therapist                                 Physical Therapy Education       Title: PT OT SLP Therapies (In Progress)       Topic: Physical Therapy (In Progress)       Point: Mobility training (In Progress)       Learning Progress Summary              Patient Acceptance, E, NR by AS at 10/9/2024 1000    Acceptance, E, VU by AE at 10/7/2024 1006    Acceptance, E, VU by TM at 10/4/2024 1313    Acceptance, E, NR by TM at 10/2/2024 1129                         Point: Home exercise program (In Progress)       Learning Progress Summary             Patient Acceptance, E, NR by AS at 10/9/2024 1000                         Point: Body mechanics (In Progress)       Learning Progress Summary             Patient Acceptance, E, NR by AS at 10/9/2024 1000    Acceptance, E, VU by AE at 10/7/2024 1006    Acceptance, E, VU by TM at 10/4/2024 1313    Acceptance, E, NR by TM at 10/2/2024 1129                         Point: Precautions (Done)       Learning Progress Summary             Patient Acceptance, E, VU by LS at 10/11/2024 0957    Comment: Benefits of activity    Acceptance, E, NR by AS at 10/9/2024 1000    Acceptance, E, VU by AE at 10/7/2024 1006    Acceptance, E, VU by TM at 10/4/2024 1313    Acceptance, E, NR by TM at 10/2/2024 1129                                         User Key       Initials Effective Dates Name Provider Type Discipline    AS 04/28/23 -  Viky Juarez, PTA Physical Therapist Assistant PT    LS 07/11/23 -  Jeni Alexander, PT Physical Therapist PT    AE 09/21/21 -  Rebel Maradiaga, PT Physical Therapist PT    TM 08/13/24 -  Britton Lipscomb, PT Student PT Student PT                  PT Recommendation and Plan     Plan of Care Reviewed With: patient  Outcome Evaluation: Pt felt more weak today. She did not walk as far as she did last session. Unable to walk without AD and walks without AD at baseline. Recommend inpt rehab upon discharge.     Time Calculation:         PT Charges       Row Name 10/11/24 0957             Time Calculation    Start Time 0957  -      PT Received On 10/11/24  -      PT Goal Re-Cert Due Date 10/12/24  -         Timed Charges    92208 - PT Therapeutic Activity Minutes 10  -LS         Total Minutes     Timed Charges Total Minutes 10  -LS       Total Minutes 10  -LS                User Key  (r) = Recorded By, (t) = Taken By, (c) = Cosigned By      Initials Name Provider Type    Jeni Reese, PT Physical Therapist                  Therapy Charges for Today       Code Description Service Date Service Provider Modifiers Qty    66766078533  PT THERAPEUTIC ACT EA 15 MIN 10/11/2024 Jeni Alexander, PT GP 1            PT G-Codes  Outcome Measure Options: AM-PAC 6 Clicks Daily Activity (OT)  AM-PAC 6 Clicks Score (PT): 19  AM-PAC 6 Clicks Score (OT): 20  PT Discharge Summary  Anticipated Discharge Disposition (PT): inpatient rehabilitation facility    Jeni Alexander, PT  10/11/2024      Electronically signed by Jeni Alexander, PT at 10/11/24 1017          Occupational Therapy Notes (most recent note)        Tequila Nielson, OT at 10/11/24 0958          Patient Name: Valeria Tracy  : 1940    MRN: [id number]                              Today's Date: 10/11/2024       Admit Date: 10/1/2024    Visit Dx:     ICD-10-CM ICD-9-CM   1. S/P right colectomy  Z90.49 V45.89   2. Rectal cancer  C20 154.1     Patient Active Problem List   Diagnosis    GERD (gastroesophageal reflux disease)    Asthma    Hypothyroidism    Colon cancer    S/P right colectomy    Acute postoperative pain    Anemia    Rectal cancer    S/P abdominal perineal resection, partial thickness posterior vaginal resection    Severe malnutrition    Postoperative urinary retention     Past Medical History:   Diagnosis Date    Acid reflux     Anemia     Asthma     Cancer     rectal    History of chemotherapy     History of radiation therapy     History of transfusion     No Reaction    Hypothyroid     Pneumonia     Wears dentures     upper    Wears glasses     Wears hearing aid in both ears      Past Surgical History:   Procedure Laterality Date    ABDOMINAL PERINEAL RESECTION N/A 10/1/2024    Procedure: ABDOMINAL PERINEAL  RESECTION, LYSIS OF ADHESIONS AND CREATION OF COLOSTOMY;  Surgeon: Angelito Ruiz MD;  Location:  [facility];  Service: General;  Laterality: N/A;    BREAST LUMPECTOMY Right     CATARACT EXTRACTION Bilateral     COLON RESECTION N/A 10/17/2023    Procedure: COLON RESECTION RIGHT LAPAROSCOPIC;  Surgeon: Angelito Ruiz MD;  Location:  [facility];  Service: General;  Laterality: N/A;    COLONOSCOPY      PORTACATH PLACEMENT      SHOULDER SURGERY Right       General Information       Row Name 10/11/24 0958          OT Time and Intention    Document Type therapy note (daily note)  -     Mode of Treatment occupational therapy  -       Row Name 10/11/24 0958          General Information    Patient Profile Reviewed yes  -     Existing Precautions/Restrictions fall  abdominal incision, ostomy  -     Barriers to Rehab medically complex;previous functional deficit  -       Row Name 10/11/24 0958          Cognition    Orientation Status (Cognition) oriented x 3  -       Row Name 10/11/24 0958          Safety Issues, Functional Mobility    Safety Issues Affecting Function (Mobility) awareness of need for assistance;insight into deficits/self-awareness;safety precaution awareness  -     Impairments Affecting Function (Mobility) balance;endurance/activity tolerance;strength  -               User Key  (r) = Recorded By, (t) = Taken By, (c) = Cosigned By      Initials Name Provider Type    AC Tequila Nielson OT Occupational Therapist                     Mobility/ADL's       Row Name 10/11/24 0958          Bed Mobility    Bed Mobility sit-supine  -     Sit-Supine [place] (Bed Mobility) standby assist  -AC     Comment, (Bed Mobility) bed flat  -       Row Name 10/11/24 0958          Transfers    Transfers sit-stand transfer;toilet transfer  -       Row Name 10/11/24 0958          Sit-Stand Transfer    Sit-Stand [place] (Transfers) verbal cues;contact guard;1 person assist  -AC     Assistive Device  (Sit-Stand Transfers) walker, front-wheeled  -AC       Row Name 10/11/24 0958          Toilet Transfer    Gregg Level (Toilet Transfer) standby assist  -     Assistive Device (Toilet Transfer) walker, front-wheeled  -AC       Row Name 10/11/24 0958          Functional Mobility    Functional Mobility- Ind. Level verbal cues required;contact guard assist  -     Functional Mobility- Device walker, front-wheeled  -AC     Functional Mobility-Distance (Feet) --  25 ft + 25 Ft  -     Functional Mobility- Safety Issues step length decreased  -       Row Name 10/11/24 0958          Activities of Daily Living    BADL Assessment/Intervention lower body dressing;toileting  -       Row Name 10/11/24 0958          Lower Body Dressing Assessment/Training    Gregg Level (Lower Body Dressing) doff;don;socks;minimum assist (75% patient effort)  -     Position (Lower Body Dressing) supported sitting  -       Row Name 10/11/24 0958          Toileting Assessment/Training    [place]gg Level (Toileting) adjust/manage clothing;perform perineal hygiene  -     Assistive Devices (Toileting) commode  -     Position (Toileting) unsupported sitting;supported standing  -               User Key  (r) = Recorded By, (t) = Taken By, (c) = Cosigned By      Initials Name Provider Type    Tequila Zepeda, OT Occupational Therapist                   Obj/Interventions       Row Name 10/11/24 0958          Balance    Balance Assessment sitting static balance;sitting dynamic balance;standing static balance;standing dynamic balance  -     Static Sitting Balance supervision  -     Dynamic Sitting Balance standby assist  -     Position, Sitting Balance sitting edge of bed  -     Static Standing Balance verbal cues;standby assist  -AC     Dynamic Standing Balance verbal cues;contact guard  -     Position/Device Used, Standing Balance supported;walker, front-wheeled  -AC               User Key  (r) = Recorded By,  (t) = Taken By, (c) = Cosigned By      Initials Name Provider Type    Tequila Zepeda, OT Occupational Therapist                   Goals/Plan    No documentation.                  Clinical Impression       Row Name 10/11/24 1014          Pain Assessment    Pretreatment Pain Rating 0/10 - no pain  -AC     Posttreatment Pain Rating 0/10 - no pain  -AC       Row Name 10/11/24 1014          Plan of Care Review    Progress no change  -     Outcome Evaluation Pt min A LBD,  SBA toilet transfer,  CGA to ambulate with RW.  Pt continues below baseline with self care/mobility d/t weakness and decr activity tolerance.  Recommend IP rehab upon d/c.  -       Row Name 10/11/24 1014          Therapy Plan Review/Discharge Plan (OT)    Anticipated Discharge Disposition (OT) inpatient rehabilitation facility  -       Row Name 10/11/24 1014          Vital Signs    Pre Systolic BP Rehab 126  -AC     Pre Treatment Diastolic BP 74  -AC     Post SpO2 (%) 93  -AC     O2 Delivery Post Treatment room air  -AC     Pre Patient Position Sitting  -AC     Post Patient Position Supine  -AC       Row Name 10/11/24 1014          Positioning and Restraints    Pre-Treatment Position sitting in chair/recliner  -AC     Post Treatment Position bed  -AC     In Bed notified nsg;fowlers;call light within reach;encouraged to call for assist;exit alarm on  -AC               User Key  (r) = Recorded By, (t) = Taken By, (c) = Cosigned By      Initials Name Provider Type    Tequila Zepeda, OT Occupational Therapist                   Outcome Measures       Row Name 10/11/24 1016          How much help from another is currently needed...    Putting on and taking off regular lower body clothing? 3  -AC     Bathing (including washing, rinsing, and drying) 3  -AC     Toileting (which includes using toilet bed pan or urinal) 3  -AC     Putting on and taking off regular upper body clothing 4  -AC     Taking care of personal grooming (such as brushing teeth)  3  -AC     Eating meals 4  -AC     AM-PAC 6 Clicks Score (OT) 20  -AC       Row Name 10/11/24 0957          How much help from another person do you currently need...    Turning from your back to your side while in flat bed without using bedrails? 4  -LS     Moving from lying on back to sitting on the side of a flat bed without bedrails? 3  -LS     Moving to and from a bed to a chair (including a wheelchair)? 3  -LS     Standing up from a chair using your arms (e.g., wheelchair, bedside chair)? 3  -LS     Climbing 3-5 steps with a railing? 3  -LS     To walk in hospital room? 3  -LS     AM-PAC 6 Clicks Score (PT) 19  -LS     Highest Level of Mobility Goal 6 --> Walk 10 steps or more  -LS       Row Name 10/11/24 1016 10/11/24 0957       Functional Assessment    Outcome Measure Options AM-PAC 6 Clicks Daily Activity (OT)  -AC AM-PAC 6 Clicks Basic Mobility (PT)  -              User Key  (r) = Recorded By, (t) = Taken By, (c) = Cosigned By      Initials Name Provider Type    AC Tequila Nielson, OT Occupational Therapist    Jeni Reese, PT Physical Therapist                    Occupational Therapy Education       Title: PT OT SLP Therapies (In Progress)       Topic: Occupational Therapy (In Progress)       Point: ADL training (In Progress)       Description:   Instruct learner(s) on proper safety adaptation and remediation techniques during self care or transfers.   Instruct in proper use of assistive devices.                  Learning Progress Summary             Patient Acceptance, E, NR by  at 10/11/2024 1016    Acceptance, E, NR by  at 10/8/2024 1307                         Point: Home exercise program (Not Started)       Description:   Instruct learner(s) on appropriate technique for monitoring, assisting and/or progressing therapeutic exercises/activities.                  Learner Progress:  Not documented in this visit.              Point: Precautions (Not Started)       Description:   Instruct  learner(s) on prescribed precautions during self-care and functional transfers.                  Learner Progress:  Not documented in this visit.              Point: Body mechanics (Not Started)       Description:   Instruct learner(s) on proper positioning and spine alignment during self-care, functional mobility activities and/or exercises.                  Learner Progress:  Not documented in this visit.                              User Key       Initials Effective Dates Name Provider Type Discipline     02/03/23 -  Tequila Nielson, OT Occupational Therapist OT                  OT Recommendation and Plan  Planned Therapy Interventions (OT): activity tolerance training, BADL retraining, functional balance retraining, occupation/activity based interventions, patient/caregiver education/training, strengthening exercise, transfer/mobility retraining  Therapy Frequency (OT): daily  Plan of Care Review  Plan of Care Reviewed With: patient  Progress: no change  Outcome Evaluation: Pt min A LBD,  SBA toilet transfer,  CGA to ambulate with RW.  Pt continues below baseline with self care/mobility d/t weakness and decr activity tolerance.  Recommend IP rehab upon d/c.     Time Calculation:   Evaluation Complexity (OT)  Review Occupational Profile/Medical/Therapy History Complexity: brief/low complexity  Assessment, Occupational Performance/Identification of Deficit Complexity: 1-3 performance deficits  Clinical Decision Making Complexity (OT): problem focused assessment/low complexity  Overall Complexity of Evaluation (OT): low complexity     Time Calculation- OT       Row Name 10/11/24 0958             Time Calculation- OT    OT Start Time 0958  -AC      OT Received On 10/11/24  -AC      OT Goal Re-Cert Due Date 10/18/24  -AC         Timed Charges    70200 - OT Self Care/Mgmt Minutes 15  -AC         Total Minutes    Timed Charges Total Minutes 15  -AC       Total Minutes 15  -AC                User Key  (r) = Recorded  By, (t) = Taken By, (c) = Cosigned By      Initials Name Provider Type    AC Tequila Nielson, OT Occupational Therapist                  Therapy Charges for Today       Code Description Service Date Service Provider Modifiers Qty    11576824629 HC OT SELF CARE/MGMT/TRAIN EA 15 MIN 10/11/2024 Tequila Nielson OT GO 1                 Tequila Nielson OT  10/11/2024    Electronically signed by Tequila Nielson OT at 10/11/24 1016

## 2024-10-12 ENCOUNTER — APPOINTMENT (OUTPATIENT)
Dept: GENERAL RADIOLOGY | Facility: HOSPITAL | Age: 84
End: 2024-10-12
Payer: MEDICARE

## 2024-10-12 LAB
ANION GAP SERPL CALCULATED.3IONS-SCNC: 15 MMOL/L (ref 5–15)
BASOPHILS # BLD AUTO: 0.03 10*3/MM3 (ref 0–0.2)
BASOPHILS NFR BLD AUTO: 0.3 % (ref 0–1.5)
BUN SERPL-MCNC: 16 MG/DL (ref 8–23)
BUN/CREAT SERPL: 25 (ref 7–25)
CALCIUM SPEC-SCNC: 8.9 MG/DL (ref 8.6–10.5)
CHLORIDE SERPL-SCNC: 100 MMOL/L (ref 98–107)
CO2 SERPL-SCNC: 24 MMOL/L (ref 22–29)
CREAT SERPL-MCNC: 0.64 MG/DL (ref 0.57–1)
DEPRECATED RDW RBC AUTO: 47.2 FL (ref 37–54)
EGFRCR SERPLBLD CKD-EPI 2021: 87.8 ML/MIN/1.73
EOSINOPHIL # BLD AUTO: 0.21 10*3/MM3 (ref 0–0.4)
EOSINOPHIL NFR BLD AUTO: 2.2 % (ref 0.3–6.2)
ERYTHROCYTE [DISTWIDTH] IN BLOOD BY AUTOMATED COUNT: 13.6 % (ref 12.3–15.4)
GLUCOSE SERPL-MCNC: 78 MG/DL (ref 65–99)
HCT VFR BLD AUTO: 32.7 % (ref 34–46.6)
HGB BLD-MCNC: 10.5 G/DL (ref 12–15.9)
IMM GRANULOCYTES # BLD AUTO: 0.05 10*3/MM3 (ref 0–0.05)
IMM GRANULOCYTES NFR BLD AUTO: 0.5 % (ref 0–0.5)
LYMPHOCYTES # BLD AUTO: 0.31 10*3/MM3 (ref 0.7–3.1)
LYMPHOCYTES NFR BLD AUTO: 3.2 % (ref 19.6–45.3)
MCH RBC QN AUTO: 30.6 PG (ref 26.6–33)
MCHC RBC AUTO-ENTMCNC: 32.1 G/DL (ref 31.5–35.7)
MCV RBC AUTO: 95.3 FL (ref 79–97)
MONOCYTES # BLD AUTO: 0.67 10*3/MM3 (ref 0.1–0.9)
MONOCYTES NFR BLD AUTO: 7 % (ref 5–12)
NEUTROPHILS NFR BLD AUTO: 8.31 10*3/MM3 (ref 1.7–7)
NEUTROPHILS NFR BLD AUTO: 86.8 % (ref 42.7–76)
NRBC BLD AUTO-RTO: 0 /100 WBC (ref 0–0.2)
PLATELET # BLD AUTO: 461 10*3/MM3 (ref 140–450)
PMV BLD AUTO: 9.5 FL (ref 6–12)
POTASSIUM SERPL-SCNC: 4.3 MMOL/L (ref 3.5–5.2)
QT INTERVAL: 320 MS
QTC INTERVAL: 427 MS
RBC # BLD AUTO: 3.43 10*6/MM3 (ref 3.77–5.28)
SODIUM SERPL-SCNC: 139 MMOL/L (ref 136–145)
WBC NRBC COR # BLD AUTO: 9.58 10*3/MM3 (ref 3.4–10.8)

## 2024-10-12 PROCEDURE — 93005 ELECTROCARDIOGRAM TRACING: CPT | Performed by: INTERNAL MEDICINE

## 2024-10-12 PROCEDURE — 25810000003 SODIUM CHLORIDE 0.9 % SOLUTION: Performed by: INTERNAL MEDICINE

## 2024-10-12 PROCEDURE — 80048 BASIC METABOLIC PNL TOTAL CA: CPT | Performed by: COLON & RECTAL SURGERY

## 2024-10-12 PROCEDURE — 74250 X-RAY XM SM INT 1CNTRST STD: CPT

## 2024-10-12 PROCEDURE — 74018 RADEX ABDOMEN 1 VIEW: CPT

## 2024-10-12 PROCEDURE — 93010 ELECTROCARDIOGRAM REPORT: CPT | Performed by: INTERNAL MEDICINE

## 2024-10-12 PROCEDURE — 0 DIATRIZOATE MEGLUMINE & SODIUM PER 1 ML: Performed by: COLON & RECTAL SURGERY

## 2024-10-12 PROCEDURE — 94799 UNLISTED PULMONARY SVC/PX: CPT

## 2024-10-12 PROCEDURE — 25010000002 HEPARIN (PORCINE) PER 1000 UNITS: Performed by: COLON & RECTAL SURGERY

## 2024-10-12 PROCEDURE — 85025 COMPLETE CBC W/AUTO DIFF WBC: CPT | Performed by: COLON & RECTAL SURGERY

## 2024-10-12 PROCEDURE — 25010000002 ONDANSETRON PER 1 MG: Performed by: COLON & RECTAL SURGERY

## 2024-10-12 RX ORDER — SODIUM CHLORIDE 9 MG/ML
75 INJECTION, SOLUTION INTRAVENOUS ONCE
Status: CANCELLED | OUTPATIENT
Start: 2024-10-12

## 2024-10-12 RX ORDER — SODIUM CHLORIDE 9 MG/ML
75 INJECTION, SOLUTION INTRAVENOUS CONTINUOUS
Status: ACTIVE | OUTPATIENT
Start: 2024-10-12 | End: 2024-10-12

## 2024-10-12 RX ORDER — DIATRIZOATE MEGLUMINE AND DIATRIZOATE SODIUM 660; 100 MG/ML; MG/ML
240 SOLUTION ORAL; RECTAL
Status: COMPLETED | OUTPATIENT
Start: 2024-10-12 | End: 2024-10-12

## 2024-10-12 RX ADMIN — PYRIDOSTIGMINE BROMIDE 30 MG: 60 TABLET ORAL at 05:13

## 2024-10-12 RX ADMIN — LEVOTHYROXINE SODIUM 88 MCG: 0.09 TABLET ORAL at 05:13

## 2024-10-12 RX ADMIN — HEPARIN SODIUM 5000 UNITS: 5000 INJECTION INTRAVENOUS; SUBCUTANEOUS at 14:44

## 2024-10-12 RX ADMIN — ACETAMINOPHEN 650 MG: 325 TABLET ORAL at 14:45

## 2024-10-12 RX ADMIN — DIATRIZOATE MEGLUMINE AND DIATRIZOATE SODIUM 240 ML: 660; 100 LIQUID ORAL; RECTAL at 12:12

## 2024-10-12 RX ADMIN — BUDESONIDE AND FORMOTEROL FUMARATE DIHYDRATE 2 PUFF: 160; 4.5 AEROSOL RESPIRATORY (INHALATION) at 18:57

## 2024-10-12 RX ADMIN — PYRIDOSTIGMINE BROMIDE 30 MG: 60 TABLET ORAL at 14:45

## 2024-10-12 RX ADMIN — HEPARIN SODIUM 5000 UNITS: 5000 INJECTION INTRAVENOUS; SUBCUTANEOUS at 05:14

## 2024-10-12 RX ADMIN — SODIUM CHLORIDE 75 ML/HR: 9 INJECTION, SOLUTION INTRAVENOUS at 09:49

## 2024-10-12 RX ADMIN — PYRIDOSTIGMINE BROMIDE 30 MG: 60 TABLET ORAL at 20:22

## 2024-10-12 RX ADMIN — TAMSULOSIN HYDROCHLORIDE 0.4 MG: 0.4 CAPSULE ORAL at 14:45

## 2024-10-12 RX ADMIN — FAMOTIDINE 20 MG: 20 TABLET, FILM COATED ORAL at 20:22

## 2024-10-12 RX ADMIN — ONDANSETRON 4 MG: 2 INJECTION INTRAMUSCULAR; INTRAVENOUS at 08:38

## 2024-10-12 RX ADMIN — HEPARIN SODIUM 5000 UNITS: 5000 INJECTION INTRAVENOUS; SUBCUTANEOUS at 20:23

## 2024-10-12 NOTE — PROGRESS NOTES
"IM progress note      Valeria Tracy  [id number]  1940     LOS: 11 days     Attending: Angelito Ruiz MD    Primary Care Provider: Peyton Vázquez PA      Chief Complaint/Reason for visit:  Abd pain    Subjective   Emesis episode yesterday, and 2 more overnight. CT with suspected area of obstruction, but stoma still with output.   NGT was placed this am per , SBFT ordered.   D/w pt and RN.     Objective        Visit Vitals  /65 (BP Location: Left arm, Patient Position: Lying)   Pulse 103   Temp 98.1 °F (36.7 °C) (Oral)   Resp 16   Ht 165.1 cm (65\")   Wt 56 kg (123 lb 8 oz)   SpO2 93%   BMI 20.55 kg/m²     Temp (24hrs), Av.3 °F (36.8 °C), Min:98.1 °F (36.7 °C), Max:98.6 °F (37 °C)         Respiratory: RA    Physical Exam:     General Appearance:    Alert, cooperative, in no acute distress   Head:    Normocephalic, without obvious abnormality, atraumatic    Lungs:     Normal effort, symmetric chest rise, no crepitus, clear to      auscultation bilaterally             Heart:    Regular rhythm and normal rate, normal S1 and S2   Abdomen:     Soft, benign   mild distension. Stoma with output      Extremities:   No clubbing, cyanosis or edema.  No deformities.    Pulses:   Pulses palpable and equal bilaterally   Skin:   No bleeding, bruising or rash          Results Review:     I reviewed the patient's new clinical results.   Results from last 7 days   Lab Units 10/12/24  0444 10/07/24  0516   WBC 10*3/mm3 9.58 5.76   HEMOGLOBIN g/dL 10.5* 9.8*   HEMATOCRIT % 32.7* 30.2*   PLATELETS 10*3/mm3 461* 240     Results from last 7 days   Lab Units 10/12/24  0444 10/07/24  0516   SODIUM mmol/L 139 139   POTASSIUM mmol/L 4.3 3.5   CHLORIDE mmol/L 100 104   CO2 mmol/L 24.0 26.0   BUN mg/dL 16 9   CREATININE mg/dL 0.64 0.60   CALCIUM mg/dL 8.9 8.7   GLUCOSE mg/dL 78 111*     I reviewed the patient's new imaging including images and reports.    All medications reviewed.   acetaminophen, 650 mg, Oral, " Physical Therapy Evaluation    Patient Name: Alan Ornelas    [id number]'E Date: 11/29/2022     Problem List  Principal Problem:    Adrenal insufficiency   Active Problems:    Insulin dependent type 1 diabetes mellitus     Paroxysmal atrial fibrillation     Anemia of chronic disease    Psychiatric disorder    Orthostatic hypotension    Obesity, morbid (HCC)    CVA (cerebral vascular accident) ([facility] [address] )    Unintentional weight loss       Past Medical History  Past Medical History:   Diagnosis Date    Diabetes mellitus ([facility] [address] )         Past Surgical History  History reviewed  No pertinent surgical history  11/29/22 0936   PT Last Visit   PT Visit Date 11/29/22   Note Type   Note type Evaluation   Pain Assessment   Pain Assessment Tool 0-10   Pain Score No Pain   Restrictions/Precautions   Weight Bearing Precautions Per Order No   Other Precautions Telemetry; Fall Risk  (hypotension)   Home Living   Type of [address] [place] [address] Two level;1/2 bath on main level;Bed/bath upstairs; Able to live on main level with bedroom/bathroom;Stairs to enter with rails  (5 RADHA; full flight to bedroom on 2nd floor; able to live on main floor and sponge bathes)   Bathroom Shower/Tub Walk-in shower   Bathroom Toilet Standard   Bathroom Equipment Grab bars in shower   Bathroom Accessibility Accessible   Prior Function   Level of [address] with functional mobility; Independent with ADLs   Lives With Spouse  (lives with spouse who has dementia; pt states being spouse's caretaker PTA)   Receives Help From Family  (Daughter visits 4x a day; grandkids local also able to assist if need be)   IADLs Family/Friend/Other provides meals; Independent with meal prep; Independent with medication management   Falls in the last 6 months 0   Vocational Retired   Comments Pt denies use of AD PTA   General   Additional Pertinent History Orthostatics taken: Supine 131/59, seated 126/58;  No reports Q8H  budesonide-formoterol, 2 puff, Inhalation, BID - RT  cetirizine, 5 mg, Oral, Daily  famotidine, 20 mg, Oral, Nightly  heparin (porcine), 5,000 Units, Subcutaneous, Q8H  levothyroxine, 88 mcg, Oral, Q AM  megestrol, 200 mg, Oral, Daily  pantoprazole, 40 mg, Oral, Q AM  pyridostigmine, 30 mg, Oral, Q8H  tamsulosin, 0.4 mg, Oral, Daily        Assessment & Plan     S/P abdominal perineal resection, partial thickness posterior vaginal resection    GERD (gastroesophageal reflux disease)    Asthma    Hypothyroidism    Acute postoperative pain    Rectal cancer    Severe malnutrition    Postoperative urinary retention  Postop nausea and vomiting    Plan  NGT again.   IVF.  NPO.  SBFT.      1. Ambulation, encouraged, PT is following  2. Pain control-prns   3. IS-encouraged  4. DVT proph- mechs/heparin       -Asthma: Maintain home regimen with formulary substitution as indicated.  As needed bronchodilators.  Monitor oxygenation.     -Hypothyroidism: Resume replacement      -GERD:  Resumed H2 blocke, now nightly along with PPI.  Tums available prn     -New stoma:  Wound care stoma nurse consult for stoma care and education throughout patient's hospitalization.    -Urinary retention:   Resolved.   Continue tamsulosin,   Urinalysis unremarkable.           Maggi Lance MD  10/12/24  09:42 EDT   of dizziness throughout session, vitals stable throughout   Family/Caregiver Present No   Cognition   Overall Cognitive Status WFL   Arousal/Participation Cooperative   Orientation Level Oriented X4   Memory Within functional limits   Following Commands Follows one step commands with increased time or repetition   Comments Pt oriented to all questions with slow responses   Subjective   Subjective Pt pleasant to work with and agreeable to participate in PT   RLE Assessment   RLE Assessment X   Strength RLE   R Knee Flexion 4+/5   R Knee Extension 4+/5   R Ankle Dorsiflexion 4+/5   LLE Assessment   LLE Assessment X   Strength LLE   L Knee Flexion 4+/5   L Knee Extension 4+/5   L Ankle Dorsiflexion 4+/5   Light Touch   RLE Light Touch Grossly intact   LLE Light Touch Grossly intact   Bed Mobility   Rolling R Unable to assess   Rolling L Unable to assess   Supine to Sit 5  Supervision   Additional items Increased time required;HOB elevated   Sit to Supine 5  Supervision   Additional items Increased time required   Additional Comments Pt presented supine on bed; left supine on bed with bed alarm on, call bell and all personal needs within functional reach   Transfers   Sit to Stand 5  Supervision  (1x)   Additional items Increased time required   Stand to Sit 5  Supervision  (1x)   Additional items Increased time required   Additional Comments No AD used   Ambulation/Elevation   Gait pattern Improper Weight shift; Short stride   Gait Assistance 5  Supervision  (Close S)   Assistive Device None   Distance 50'x2   Stair Management Assistance 4  Minimal assist   Additional items Assist x 1  (CGA d/t R knee buckling upon descending 1 step-able to recover without A)   Stair Management Technique Alternating pattern; One rail R;Foreward;Reciprocal   Number of Stairs 7   Balance   Static Sitting Fair +   Dynamic Sitting Fair +   Static Standing Fair   Dynamic Standing [address] Deficit   Endurance Deficit No   Activity Tolerance   Activity Tolerance Patient tolerated treatment well   Medical Staff Made Aware Liyah, OT; Jannette Simental DPT   Nurse Made Aware Yes, cleared to see   Assessment   Prognosis Good   Problem List Decreased strength; Impaired balance   Assessment Pt is a 74 y/o male admitted to [facility] on 11/28/22 with a primary diagnosis of adrenal insufficiency  Pt presented to ED with symptoms of tiredness, LBP, and headaches  Patient has a pmhx of diabetes, CVA, and Afib, Obesity, psychiatric disorder, and anemia all of which affect PT treatment  PT was consulted to evaluate pt's functional mobility and discharge needs  Upon evaluation, pt presents as S for bed mobility, transfers, and ambulation, and MinAx1 (CGA) for stairs  Pt presents with the following impairments: decreased strength and impaired balance  The patient is considered a moderate complexity case d/t Ongoing medical management for primary dx, Decreased activity tolerance compared to baseline, Fall risk, Increased assistance needed from caregiver at current time, Ongoing telemetry monitoring, Trending lab values, Diagnostic imaging pending  The patient's AM-PAC Basic Mobility Inpatient Short Form Raw Score is 18  A Raw score of greater than 16 suggests the patient may benefit from discharge to home  Please also refer to the recommendation of the Physical Therapist for safe discharge planning  At the end of eval, the patient was left supine on bed with bed alarm on, call bell and all other personal needs within functional reach  The patient reports functioning at baseline, reports no concerns with going home at this time, and has a supportive family at home  No acute PT needs necessary at this time, will d/c pt from caseload  D/c recommendations Home with increased support of family     Barriers to Discharge None   Goals   Patient Goals To improve overall and return to hobbies   PT Treatment Day 0   Recommendation   PT Discharge Recommendation   (Home with increased support of family)   Additional Comments Spoke with CM regarding ensuring increased family support is available   AM-PAC Basic Mobility Inpatient   Turning in Bed Without Bedrails 3   Lying on Back to Sitting on Edge of Flat Bed 3   Moving Bed to Chair 3   Standing Up From Chair 3   Walk in Room 3   Climb 3-5 Stairs 3   Basic Mobility Inpatient Raw Score 18   Basic Mobility Standardized Score 41 05   Highest Level Of Mobility   JH-HLM Goal 6: Walk 10 steps or more   JH-HLM Achieved 7: Walk 25 feet or more   Modified [place] Scale   Modified [place] Scale 3   Raven Balderas, SPT

## 2024-10-12 NOTE — PROGRESS NOTES
"[facility] ([facility]    I was particularly concerned with nausea and vomiting of progressive character over the last 48 hours and less stoma output yesterday after previous good stoma output    Now about 10 days out from surgery    This prompted CT last night, transition in the presacral region which is sticky from recent resection/APR.    I followed up on this this morning with discussion with nurse, Gastrografin small bowel follow-through    Initially moderate distention of small bowel, then it seems like the Gastrografin shot through the small bowel and into the colon and out the stoma, stoma completely full and very distended upon my bedside evaluation after review of images and discussion with radiologist in radiology prior.    Sputtering bowel function that the Gastrografin seems to have opened, correlating with this there is been relief of abdominal distention and clinical discomfort.    I had NG placed which facilitated the small bowel follow-through study and decompression prior to the study, clinical course will determine how long NG is needed.    BUN to creatinine ratio of about 30-1 suggestive of volume contraction which will probably be exacerbated by Gastrografin.  Patient appears well at this juncture, relieved    , Relief of abdominal discomfort after Gastrografin small bowel throughout through and resolution to cramping experienced during early, first hour of study.    Vital Signs:  Blood pressure 139/77, pulse 111, temperature 98.1 °F (36.7 °C), temperature source Oral, resp. rate 16, height 165.1 cm (65\"), weight 56 kg (123 lb 8 oz), SpO2 93%.    Labs past 24 hours:  Lab Results (last 24 hours)       Procedure Component Value Units Date/Time    Basic Metabolic Panel [[id number]]  (Normal) Collected: 10/12/24 0444    Specimen: Blood Updated: 10/12/24 0575     Glucose 78 mg/dL      BUN 16 mg/dL      Creatinine 0.64 mg/dL      Sodium 139 mmol/L      Potassium 4.3 " mmol/L      Chloride 100 mmol/L      CO2 24.0 mmol/L      Calcium 8.9 mg/dL      BUN/Creatinine Ratio 25.0     Anion Gap 15.0 mmol/L      eGFR 87.8 mL/min/1.73     Narrative:      GFR Normal >60  Chronic Kidney Disease <60  Kidney Failure <15    The GFR formula is only valid for adults with stable renal function between ages 18 and 70.    CBC & Differential [[id number]]  (Abnormal) Collected: 10/12/24 0444    Specimen: Blood Updated: 10/12/24 0529    Narrative:      The following orders were created for panel order CBC & Differential.  Procedure                               Abnormality         Status                     ---------                               -----------         ------                     CBC Auto Differential[[id number]]        Abnormal            Final result                 Please view results for these tests on the individual orders.    CBC Auto Differential [[id number]]  (Abnormal) Collected: 10/12/24 0444    Specimen: Blood Updated: 10/12/24 0529     WBC 9.58 10*3/mm3      RBC 3.43 10*6/mm3      Hemoglobin 10.5 g/dL      Hematocrit 32.7 %      MCV 95.3 fL      MCH 30.6 pg      MCHC 32.1 g/dL      RDW 13.6 %      RDW-SD 47.2 fl      MPV 9.5 fL      Platelets 461 10*3/mm3      Neutrophil % 86.8 %      Lymphocyte % 3.2 %      Monocyte % 7.0 %      Eosinophil % 2.2 %      Basophil % 0.3 %      Immature Grans % 0.5 %      Neutrophils, Absolute 8.31 10*3/mm3      Lymphocytes, Absolute 0.31 10*3/mm3      Monocytes, Absolute 0.67 10*3/mm3      Eosinophils, Absolute 0.21 10*3/mm3      Basophils, Absolute 0.03 10*3/mm3      Immature Grans, Absolute 0.05 10*3/mm3      nRBC 0.0 /100 WBC             I/O last shift:  I/O this shift:  In: -   Out: 1100 [Emesis/NG output:300; Stool:800]     PHYSICAL EXAM-  Comfortable  Appears nontoxic  Stoma appliance completely full, reviewed with nursing and this will be emptied  Risk stoma output now with minimal abdominal distention.      Pathology:  Order Name Source  Comment Collection Info Order Time   PREPARE RBC Other   10/1/2024  8:28 AM     When to Transfuse?   Hold          Indication for Transfusion   Surgery          Surgery Date   10/1/2024          Release to patient   Routine Release        TYPE AND SCREEN   Collected By: Mirta Christianson RN 10/1/2024  8:31 AM     Release to patient   Routine Release        TISSUE PATHOLOGY EXAM Large Intestine, Rectum  Collected By: Angelito Ruiz MD 10/1/2024  2:45 PM     Release to patient   Routine Release        .    Assessment and Plan:  Constellation of imaging and clinical course suggestive of improved bowel function after Gastrografin small bowel follow-through    Without evidence of complete mechanical obstruction requiring surgical intervention.    Now 10 days out from surgery, will check nutritional parameters, may need PICC line and TPN, depending on clinical course over the next 24 to 48 hours acknowledging limited saline supply.    Angelito Ruiz MD  10/12/24  13:55 EDT

## 2024-10-12 NOTE — PLAN OF CARE
Problem: Adult Inpatient Plan of Care  Goal: Plan of Care Review  Outcome: Progressing  Goal: Patient-Specific Goal (Individualized)  Outcome: Progressing  Goal: Absence of Hospital-Acquired Illness or Injury  Outcome: Progressing  Intervention: Identify and Manage Fall Risk  Recent Flowsheet Documentation  Taken 10/12/2024 0400 by Ana Mcnamara RN  Safety Promotion/Fall Prevention:   assistive device/personal items within reach   clutter free environment maintained   fall prevention program maintained   nonskid shoes/slippers when out of bed   room organization consistent   safety round/check completed  Taken 10/12/2024 0000 by Ana Mcnamara RN  Safety Promotion/Fall Prevention:   assistive device/personal items within reach   clutter free environment maintained   fall prevention program maintained   nonskid shoes/slippers when out of bed   room organization consistent   safety round/check completed  Taken 10/11/2024 2000 by Ana Mcnamara RN  Safety Promotion/Fall Prevention:   assistive device/personal items within reach   clutter free environment maintained   fall prevention program maintained   nonskid shoes/slippers when out of bed   room organization consistent   safety round/check completed  Intervention: Prevent Skin Injury  Recent Flowsheet Documentation  Taken 10/12/2024 0400 by Ana Mcnamara RN  Body Position: position changed independently  Taken 10/12/2024 0000 by Ana Mcnamara RN  Body Position: position changed independently  Skin Protection:   tubing/devices free from skin contact   transparent dressing maintained   skin-to-device areas padded  Taken 10/11/2024 2000 by Ana Mcnamara RN  Body Position: position changed independently  Skin Protection:   tubing/devices free from skin contact   transparent dressing maintained   skin-to-device areas padded  Intervention: Prevent Infection  Recent Flowsheet Documentation  Taken 10/12/2024 0400 by Ana Mcnamara RN  Infection  Prevention:   environmental surveillance performed   hand hygiene promoted   personal protective equipment utilized   single patient room provided  Taken 10/12/2024 0000 by Ana Mcnamara RN  Infection Prevention:   environmental surveillance performed   hand hygiene promoted   personal protective equipment utilized   single patient room provided  Taken 10/11/2024 2000 by Ana Mcnamara RN  Infection Prevention:   environmental surveillance performed   hand hygiene promoted   personal protective equipment utilized   single patient room provided  Goal: Optimal Comfort and Wellbeing  Outcome: Progressing  Intervention: Provide Person-Centered Care  Recent Flowsheet Documentation  Taken 10/11/2024 2000 by Ana Mcnamara RN  Trust Relationship/Rapport:   care explained   choices provided   questions answered   questions encouraged  Goal: Readiness for Transition of Care  Outcome: Progressing     Problem: Fall Injury Risk  Goal: Absence of Fall and Fall-Related Injury  Outcome: Progressing  Intervention: Identify and Manage Contributors  Recent Flowsheet Documentation  Taken 10/11/2024 2000 by Ana Mcnamara RN  Medication Review/Management: medications reviewed  Intervention: Promote Injury-Free Environment  Recent Flowsheet Documentation  Taken 10/12/2024 0400 by Ana Mcnamara RN  Safety Promotion/Fall Prevention:   assistive device/personal items within reach   clutter free environment maintained   fall prevention program maintained   nonskid shoes/slippers when out of bed   room organization consistent   safety round/check completed  Taken 10/12/2024 0000 by Ana Mcnamara RN  Safety Promotion/Fall Prevention:   assistive device/personal items within reach   clutter free environment maintained   fall prevention program maintained   nonskid shoes/slippers when out of bed   room organization consistent   safety round/check completed  Taken 10/11/2024 2000 by Ana Mcnamara RN  Safety  Promotion/Fall Prevention:   assistive device/personal items within reach   clutter free environment maintained   fall prevention program maintained   nonskid shoes/slippers when out of bed   room organization consistent   safety round/check completed     Problem: Skin Injury Risk Increased  Goal: Skin Health and Integrity  Outcome: Progressing  Intervention: Optimize Skin Protection  Recent Flowsheet Documentation  Taken 10/12/2024 0400 by Ana Mcnamara RN  Activity Management: activity encouraged  Head of Bed (HOB) Positioning: HOB lowered  Taken 10/12/2024 0000 by Ana Mcnamara RN  Activity Management: activity encouraged  Pressure Reduction Techniques: frequent weight shift encouraged  Head of Bed (HOB) Positioning: HOB lowered  Pressure Reduction Devices:   pressure-redistributing mattress utilized   positioning supports utilized   heel offloading device utilized  Skin Protection:   tubing/devices free from skin contact   transparent dressing maintained   skin-to-device areas padded  Taken 10/11/2024 2000 by Ana Mcnamara RN  Activity Management: activity encouraged  Pressure Reduction Techniques:   frequent weight shift encouraged   weight shift assistance provided  Head of Bed (HOB) Positioning: HOB lowered  Pressure Reduction Devices:   pressure-redistributing mattress utilized   positioning supports utilized   heel offloading device utilized  Skin Protection:   tubing/devices free from skin contact   transparent dressing maintained   skin-to-device areas padded     Problem: Infection  Goal: Absence of Infection Signs and Symptoms  Outcome: Progressing     Problem: Adjustment to Surgery (Colostomy)  Goal: Optimal Coping  Outcome: Progressing     Problem: Bleeding (Colostomy)  Goal: Absence of Bleeding  Outcome: Progressing     Problem: Fluid, Electrolyte and Nutrition Imbalance (Colostomy)  Goal: Fluid, Electrolyte and Nutrition Balance  Outcome: Progressing     Problem: Infection  (Colostomy)  Goal: Absence of Infection Signs and Symptoms  Outcome: Progressing     Problem: Ongoing Anesthesia Effects (Colostomy)  Goal: Anesthesia/Sedation Recovery  Outcome: Progressing  Intervention: Optimize Anesthesia Recovery  Recent Flowsheet Documentation  Taken 10/12/2024 0400 by Ana Mcnamara RN  Safety Promotion/Fall Prevention:   assistive device/personal items within reach   clutter free environment maintained   fall prevention program maintained   nonskid shoes/slippers when out of bed   room organization consistent   safety round/check completed  Taken 10/12/2024 0000 by Ana Mcnamara RN  Safety Promotion/Fall Prevention:   assistive device/personal items within reach   clutter free environment maintained   fall prevention program maintained   nonskid shoes/slippers when out of bed   room organization consistent   safety round/check completed  Taken 10/11/2024 2000 by Ana Mcnamara RN  Safety Promotion/Fall Prevention:   assistive device/personal items within reach   clutter free environment maintained   fall prevention program maintained   nonskid shoes/slippers when out of bed   room organization consistent   safety round/check completed     Problem: Pain (Colostomy)  Goal: Acceptable Pain Control  Outcome: Progressing  Intervention: Prevent or Manage Pain  Recent Flowsheet Documentation  Taken 10/12/2024 0400 by Ana Mcnamara RN  Diversional Activities:   television   smartphone  Taken 10/12/2024 0000 by Ana Mcnamara RN  Diversional Activities:   television   smartphone  Taken 10/11/2024 2000 by Ana Mcnamara RN  Diversional Activities:   television   smartphone     Problem: Postoperative Nausea and Vomiting (Colostomy)  Goal: Nausea and Vomiting Relief  Outcome: Progressing  Intervention: Prevent or Manage Nausea and Vomiting  Recent Flowsheet Documentation  Taken 10/11/2024 2000 by Ana Mcanmara RN  Nausea/Vomiting Interventions:   nausea triggers minimized   see  MAR     Problem: Postoperative Stoma Care (Colostomy)  Goal: Optimal Stoma Healing  Outcome: Progressing  Intervention: Provide Ostomy and Peristomal Skin Care  Recent Flowsheet Documentation  Taken 10/12/2024 0000 by Ana Mcnamara RN  Skin Protection:   tubing/devices free from skin contact   transparent dressing maintained   skin-to-device areas padded  Taken 10/11/2024 2000 by Ana Mcnamara RN  Skin Protection:   tubing/devices free from skin contact   transparent dressing maintained   skin-to-device areas padded     Problem: Postoperative Urinary Retention (Colostomy)  Goal: Effective Urinary Elimination  Outcome: Progressing     Problem: Respiratory Compromise (Colostomy)  Goal: Effective Oxygenation and Ventilation  Outcome: Progressing  Intervention: Optimize Oxygenation and Ventilation  Recent Flowsheet Documentation  Taken 10/12/2024 0400 by Ana Mcnamara RN  Head of Bed (HOB) Positioning: HOB lowered  Taken 10/12/2024 0000 by Ana Mcnamara RN  Head of Bed (HOB) Positioning: HOB lowered  Taken 10/11/2024 2000 by Ana Mcnamara RN  Head of Bed (HOB) Positioning: HOB lowered   Goal Outcome Evaluation:

## 2024-10-13 LAB
ALBUMIN SERPL-MCNC: 3.4 G/DL (ref 3.5–5.2)
ALBUMIN/GLOB SERPL: 1.5 G/DL
ALP SERPL-CCNC: 82 U/L (ref 39–117)
ALT SERPL W P-5'-P-CCNC: 9 U/L (ref 1–33)
ANION GAP SERPL CALCULATED.3IONS-SCNC: 11 MMOL/L (ref 5–15)
AST SERPL-CCNC: 11 U/L (ref 1–32)
BASOPHILS # BLD AUTO: 0.04 10*3/MM3 (ref 0–0.2)
BASOPHILS NFR BLD AUTO: 0.4 % (ref 0–1.5)
BILIRUB SERPL-MCNC: 0.2 MG/DL (ref 0–1.2)
BUN SERPL-MCNC: 16 MG/DL (ref 8–23)
BUN/CREAT SERPL: 21.6 (ref 7–25)
CALCIUM SPEC-SCNC: 8.8 MG/DL (ref 8.6–10.5)
CHLORIDE SERPL-SCNC: 102 MMOL/L (ref 98–107)
CO2 SERPL-SCNC: 27 MMOL/L (ref 22–29)
CREAT SERPL-MCNC: 0.74 MG/DL (ref 0.57–1)
DEPRECATED RDW RBC AUTO: 49 FL (ref 37–54)
EGFRCR SERPLBLD CKD-EPI 2021: 80.4 ML/MIN/1.73
EOSINOPHIL # BLD AUTO: 0.34 10*3/MM3 (ref 0–0.4)
EOSINOPHIL NFR BLD AUTO: 3.2 % (ref 0.3–6.2)
ERYTHROCYTE [DISTWIDTH] IN BLOOD BY AUTOMATED COUNT: 13.8 % (ref 12.3–15.4)
GLOBULIN UR ELPH-MCNC: 2.3 GM/DL
GLUCOSE SERPL-MCNC: 89 MG/DL (ref 65–99)
HCT VFR BLD AUTO: 34.7 % (ref 34–46.6)
HGB BLD-MCNC: 11.1 G/DL (ref 12–15.9)
IMM GRANULOCYTES # BLD AUTO: 0.09 10*3/MM3 (ref 0–0.05)
IMM GRANULOCYTES NFR BLD AUTO: 0.9 % (ref 0–0.5)
LYMPHOCYTES # BLD AUTO: 0.35 10*3/MM3 (ref 0.7–3.1)
LYMPHOCYTES NFR BLD AUTO: 3.3 % (ref 19.6–45.3)
MCH RBC QN AUTO: 31 PG (ref 26.6–33)
MCHC RBC AUTO-ENTMCNC: 32 G/DL (ref 31.5–35.7)
MCV RBC AUTO: 96.9 FL (ref 79–97)
MONOCYTES # BLD AUTO: 0.65 10*3/MM3 (ref 0.1–0.9)
MONOCYTES NFR BLD AUTO: 6.2 % (ref 5–12)
NEUTROPHILS NFR BLD AUTO: 86 % (ref 42.7–76)
NEUTROPHILS NFR BLD AUTO: 9 10*3/MM3 (ref 1.7–7)
NRBC BLD AUTO-RTO: 0 /100 WBC (ref 0–0.2)
PLATELET # BLD AUTO: 524 10*3/MM3 (ref 140–450)
PMV BLD AUTO: 9.7 FL (ref 6–12)
POTASSIUM SERPL-SCNC: 3.4 MMOL/L (ref 3.5–5.2)
PREALB SERPL-MCNC: 11 MG/DL (ref 20–40)
PROT SERPL-MCNC: 5.7 G/DL (ref 6–8.5)
RBC # BLD AUTO: 3.58 10*6/MM3 (ref 3.77–5.28)
SODIUM SERPL-SCNC: 140 MMOL/L (ref 136–145)
WBC NRBC COR # BLD AUTO: 10.47 10*3/MM3 (ref 3.4–10.8)

## 2024-10-13 PROCEDURE — 84134 ASSAY OF PREALBUMIN: CPT | Performed by: COLON & RECTAL SURGERY

## 2024-10-13 PROCEDURE — 80053 COMPREHEN METABOLIC PANEL: CPT | Performed by: COLON & RECTAL SURGERY

## 2024-10-13 PROCEDURE — 94799 UNLISTED PULMONARY SVC/PX: CPT

## 2024-10-13 PROCEDURE — 85025 COMPLETE CBC W/AUTO DIFF WBC: CPT | Performed by: COLON & RECTAL SURGERY

## 2024-10-13 PROCEDURE — 94664 DEMO&/EVAL PT USE INHALER: CPT

## 2024-10-13 PROCEDURE — 94761 N-INVAS EAR/PLS OXIMETRY MLT: CPT

## 2024-10-13 PROCEDURE — 25010000002 HEPARIN (PORCINE) PER 1000 UNITS: Performed by: COLON & RECTAL SURGERY

## 2024-10-13 RX ORDER — FAMOTIDINE 20 MG/1
20 TABLET, FILM COATED ORAL NIGHTLY
Status: DISCONTINUED | OUTPATIENT
Start: 2024-10-13 | End: 2024-10-14

## 2024-10-13 RX ORDER — ONDANSETRON 4 MG/1
4 TABLET, ORALLY DISINTEGRATING ORAL EVERY 6 HOURS PRN
Status: DISCONTINUED | OUTPATIENT
Start: 2024-10-13 | End: 2024-10-14

## 2024-10-13 RX ORDER — PYRIDOSTIGMINE BROMIDE 60 MG/1
30 TABLET ORAL EVERY 8 HOURS SCHEDULED
Status: DISCONTINUED | OUTPATIENT
Start: 2024-10-13 | End: 2024-10-14

## 2024-10-13 RX ORDER — MEGESTROL ACETATE 40 MG/ML
200 SUSPENSION ORAL DAILY
Status: DISCONTINUED | OUTPATIENT
Start: 2024-10-14 | End: 2024-10-14

## 2024-10-13 RX ORDER — IBUPROFEN 400 MG/1
400 TABLET, FILM COATED ORAL EVERY 6 HOURS PRN
Status: DISCONTINUED | OUTPATIENT
Start: 2024-10-13 | End: 2024-10-14

## 2024-10-13 RX ORDER — ACETAMINOPHEN 325 MG/1
650 TABLET ORAL EVERY 8 HOURS SCHEDULED
Status: DISCONTINUED | OUTPATIENT
Start: 2024-10-13 | End: 2024-10-14

## 2024-10-13 RX ORDER — ONDANSETRON 2 MG/ML
4 INJECTION INTRAMUSCULAR; INTRAVENOUS EVERY 6 HOURS PRN
Status: DISCONTINUED | OUTPATIENT
Start: 2024-10-13 | End: 2024-10-14

## 2024-10-13 RX ORDER — CALCIUM CARBONATE 500 MG/1
2 TABLET, CHEWABLE ORAL 3 TIMES DAILY PRN
Status: DISCONTINUED | OUTPATIENT
Start: 2024-10-13 | End: 2024-10-14

## 2024-10-13 RX ORDER — LEVOTHYROXINE SODIUM 88 UG/1
88 TABLET ORAL
Status: DISCONTINUED | OUTPATIENT
Start: 2024-10-14 | End: 2024-10-14

## 2024-10-13 RX ORDER — CETIRIZINE HYDROCHLORIDE 10 MG/1
5 TABLET ORAL DAILY
Status: DISCONTINUED | OUTPATIENT
Start: 2024-10-14 | End: 2024-10-14

## 2024-10-13 RX ADMIN — LANSOPRAZOLE 15 MG: 15 TABLET, ORALLY DISINTEGRATING, DELAYED RELEASE ORAL at 14:00

## 2024-10-13 RX ADMIN — PYRIDOSTIGMINE BROMIDE 30 MG: 60 TABLET ORAL at 13:58

## 2024-10-13 RX ADMIN — CETIRIZINE HYDROCHLORIDE 5 MG: 10 TABLET, FILM COATED ORAL at 09:39

## 2024-10-13 RX ADMIN — ACETAMINOPHEN 650 MG: 325 TABLET ORAL at 21:28

## 2024-10-13 RX ADMIN — MEGESTROL ACETATE 200 MG: 40 SUSPENSION ORAL at 09:40

## 2024-10-13 RX ADMIN — PYRIDOSTIGMINE BROMIDE 30 MG: 60 TABLET ORAL at 21:29

## 2024-10-13 RX ADMIN — HEPARIN SODIUM 5000 UNITS: 5000 INJECTION INTRAVENOUS; SUBCUTANEOUS at 05:24

## 2024-10-13 RX ADMIN — BUDESONIDE AND FORMOTEROL FUMARATE DIHYDRATE 2 PUFF: 160; 4.5 AEROSOL RESPIRATORY (INHALATION) at 19:44

## 2024-10-13 RX ADMIN — HEPARIN SODIUM 5000 UNITS: 5000 INJECTION INTRAVENOUS; SUBCUTANEOUS at 13:57

## 2024-10-13 RX ADMIN — PANTOPRAZOLE SODIUM 40 MG: 40 TABLET, DELAYED RELEASE ORAL at 09:39

## 2024-10-13 RX ADMIN — LEVOTHYROXINE SODIUM 88 MCG: 0.09 TABLET ORAL at 05:24

## 2024-10-13 RX ADMIN — FAMOTIDINE 20 MG: 20 TABLET, FILM COATED ORAL at 21:29

## 2024-10-13 RX ADMIN — HEPARIN SODIUM 5000 UNITS: 5000 INJECTION INTRAVENOUS; SUBCUTANEOUS at 21:29

## 2024-10-13 RX ADMIN — BUDESONIDE AND FORMOTEROL FUMARATE DIHYDRATE 2 PUFF: 160; 4.5 AEROSOL RESPIRATORY (INHALATION) at 08:29

## 2024-10-13 RX ADMIN — PYRIDOSTIGMINE BROMIDE 30 MG: 60 TABLET ORAL at 05:24

## 2024-10-13 NOTE — PROGRESS NOTES
"IM progress note      Valeria Tracy  [id number]  1940     LOS: 12 days     Attending: Angelito Ruiz MD    Primary Care Provider: Peyton Vázquez PA      Chief Complaint/Reason for visit:  Abd pain    Subjective   NGT in place when seen.  Stoma with output.   Nausea and distension better.     Objective        Visit Vitals  /72 (BP Location: Left arm, Patient Position: Lying)   Pulse 97   Temp 97.5 °F (36.4 °C) (Oral)   Resp 16   Ht 165.1 cm (65\")   Wt 56 kg (123 lb 8 oz)   SpO2 97%   BMI 20.55 kg/m²     Temp (24hrs), Av.1 °F (36.7 °C), Min:97.5 °F (36.4 °C), Max:98.6 °F (37 °C)         Respiratory: RA    Physical Exam:     General Appearance:    Alert, cooperative, in no acute distress   Head:    Normocephalic, without obvious abnormality, atraumatic    Lungs:     Normal effort, symmetric chest rise, no crepitus, clear to      auscultation bilaterally             Heart:    Regular rhythm and normal rate, normal S1 and S2   Abdomen:     Soft, benign   improved distension. Stoma with output      Extremities:   No clubbing, cyanosis or edema.  No deformities.    Pulses:   Pulses palpable and equal bilaterally   Skin:   No bleeding, bruising or rash          Results Review:     I reviewed the patient's new clinical results.   Results from last 7 days   Lab Units 10/13/24  0441 10/12/24  0444 10/07/24  0516   WBC 10*3/mm3 10.47 9.58 5.76   HEMOGLOBIN g/dL 11.1* 10.5* 9.8*   HEMATOCRIT % 34.7 32.7* 30.2*   PLATELETS 10*3/mm3 524* 461* 240     Results from last 7 days   Lab Units 10/13/24  0441 10/12/24  0444 10/07/24  0516   SODIUM mmol/L 140 139 139   POTASSIUM mmol/L 3.4* 4.3 3.5   CHLORIDE mmol/L 102 100 104   CO2 mmol/L 27.0 24.0 26.0   BUN mg/dL 16 16 9   CREATININE mg/dL 0.74 0.64 0.60   CALCIUM mg/dL 8.8 8.9 8.7   BILIRUBIN mg/dL 0.2  --   --    ALK PHOS U/L 82  --   --    ALT (SGPT) U/L 9  --   --    AST (SGOT) U/L 11  --   --    GLUCOSE mg/dL 89 78 111*     I reviewed the patient's new imaging " including images and reports.    All medications reviewed.   acetaminophen, 650 mg, Nasogastric, Q8H  budesonide-formoterol, 2 puff, Inhalation, BID - RT  [START ON 10/14/2024] cetirizine, 5 mg, Nasogastric, Daily  famotidine, 20 mg, Nasogastric, Nightly  heparin (porcine), 5,000 Units, Subcutaneous, Q8H  lansoprazole, 15 mg, Nasogastric, Q AM  [START ON 10/14/2024] levothyroxine, 88 mcg, Nasogastric, Q AM  [START ON 10/14/2024] megestrol, 200 mg, Nasogastric, Daily  pyridostigmine, 30 mg, Nasogastric, Q8H        Assessment & Plan     S/P abdominal perineal resection, partial thickness posterior vaginal resection    GERD (gastroesophageal reflux disease)    Asthma    Hypothyroidism    Acute postoperative pain    Rectal cancer    Severe malnutrition    Postoperative urinary retention    Postop nausea and vomiting    Plan  NGT per . slowing down  IVF.  Po as tolerated.   S/p SBFT.      1. Ambulation, encouraged, PT is following  2. Pain control-prns   3. IS-encouraged  4. DVT proph- mechs/heparin       -Asthma: Maintain home regimen with formulary substitution as indicated.  As needed bronchodilators.  Monitor oxygenation.     -Hypothyroidism: Resume replacement      -GERD:  Resumed H2 blocker, now nightly along with PPI.  Tums available prn     -New stoma:  Wound care stoma nurse consult for stoma care and education throughout patient's hospitalization.    -Urinary retention:   Resolved.   Urinalysis unremarkable.           Maggi Lance MD  10/13/24  14:28 EDT

## 2024-10-13 NOTE — PLAN OF CARE
Problem: Adult Inpatient Plan of Care  Goal: Absence of Hospital-Acquired Illness or Injury  Intervention: Prevent Skin Injury  Recent Flowsheet Documentation  Taken 10/13/2024 1814 by Zainab Forbes RN  Body Position: sitting up in bed  Taken 10/13/2024 1400 by Zainab Forbes RN  Body Position: supine  Taken 10/13/2024 1143 by Zainab Forbes RN  Body Position:   turned   position changed independently  Skin Protection:   transparent dressing maintained   pulse oximeter probe site changed   protective footwear used   silicone foam dressing in place  Taken 10/13/2024 0858 by Zainab Forbes RN  Body Position:   turned   position changed independently   supine  Skin Protection:   protective footwear used   pulse oximeter probe site changed   transparent dressing maintained   silicone foam dressing in place     Problem: Skin Injury Risk Increased  Goal: Skin Health and Integrity  Intervention: Optimize Skin Protection  Recent Flowsheet Documentation  Taken 10/13/2024 1814 by Zainab Forbes RN  Activity Management:   activity encouraged   ambulated in room   ambulated to bathroom   up in chair  Pressure Reduction Techniques:   frequent weight shift encouraged   heels elevated off bed   positioned off wounds   pressure points protected  Head of Bed (HOB) Positioning: HOB elevated  Pressure Reduction Devices:   specialty bed utilized   pressure-redistributing mattress utilized   positioning supports utilized   heel offloading device utilized   foam padding utilized  Taken 10/13/2024 1702 by Zainab Forbes RN  Activity Management:   ambulated outside room   up in chair  Taken 10/13/2024 1400 by Zainab Forbes RN  Activity Management: activity encouraged  Pressure Reduction Techniques:   frequent weight shift encouraged   heels elevated off bed   positioned off wounds   pressure points protected  Pressure Reduction Devices:   specialty bed utilized   pressure-redistributing mattress utilized   positioning  supports utilized   heel offloading device utilized   foam padding utilized  Taken 10/13/2024 1143 by Zainab Forbes, RN  Activity Management: ambulated outside room  Pressure Reduction Techniques:   frequent weight shift encouraged   heels elevated off bed   positioned off wounds   pressure points protected  Head of Bed (HOB) Positioning: HOB elevated  Pressure Reduction Devices:   specialty bed utilized   pressure-redistributing mattress utilized   positioning supports utilized   heel offloading device utilized   foam padding utilized  Skin Protection:   transparent dressing maintained   pulse oximeter probe site changed   protective footwear used   silicone foam dressing in place  Taken 10/13/2024 0858 by Zainab Forbes, RN  Activity Management: activity encouraged  Pressure Reduction Techniques:   frequent weight shift encouraged   pressure points protected  Head of Bed (HOB) Positioning: HOB at 20-30 degrees  Pressure Reduction Devices:   pressure-redistributing mattress utilized   positioning supports utilized  Skin Protection:   protective footwear used   pulse oximeter probe site changed   transparent dressing maintained   silicone foam dressing in place   Goal Outcome Evaluation:   Patient denies any nausea. Patient ambulated in room and in hallway. VSS. Will continue to monitor.

## 2024-10-13 NOTE — PROGRESS NOTES
"[facility]    Not distended  Stool per stoma    Vital Signs:  Blood pressure 131/72, pulse 97, temperature 97.5 °F (36.4 °C), temperature source Oral, resp. rate 16, height 165.1 cm (65\"), weight 56 kg (123 lb 8 oz), SpO2 97%.    Labs past 24 hours:  Lab Results (last 24 hours)       Procedure Component Value Units Date/Time    Comprehensive Metabolic Panel [[id number]]  (Abnormal) Collected: 10/13/24 0441    Specimen: Blood Updated: 10/13/24 0721     Glucose 89 mg/dL      BUN 16 mg/dL      Creatinine 0.74 mg/dL      Sodium 140 mmol/L      Potassium 3.4 mmol/L      Chloride 102 mmol/L      CO2 27.0 mmol/L      Calcium 8.8 mg/dL      Total Protein 5.7 g/dL      Albumin 3.4 g/dL      ALT (SGPT) 9 U/L      AST (SGOT) 11 U/L      Alkaline Phosphatase 82 U/L      Total Bilirubin 0.2 mg/dL      Globulin 2.3 gm/dL      Comment: Calculated Result        A/G Ratio 1.5 g/dL      BUN/Creatinine Ratio 21.6     Anion Gap 11.0 mmol/L      eGFR 80.4 mL/min/1.73     Narrative:      GFR Normal >60  Chronic Kidney Disease <60  Kidney Failure <15    The GFR formula is only valid for adults with stable renal function between ages 18 and 70.    Prealbumin [[id number]] Collected: 10/13/24 0441    Specimen: Blood Updated: 10/13/24 0559    CBC & Differential [[id number]]  (Abnormal) Collected: 10/13/24 0441    Specimen: Blood Updated: 10/13/24 0534    Narrative:      The following orders were created for panel order CBC & Differential.  Procedure                               Abnormality         Status                     ---------                               -----------         ------                     CBC Auto Differential[[id number]]        Abnormal            Final result                 Please view results for these tests on the individual orders.    CBC Auto Differential [[id number]]  (Abnormal) Collected: 10/13/24 0441    Specimen: Blood Updated: 10/13/24 0534     WBC 10.47 10*3/mm3      " RBC 3.58 10*6/mm3      Hemoglobin 11.1 g/dL      Hematocrit 34.7 %      MCV 96.9 fL      MCH 31.0 pg      MCHC 32.0 g/dL      RDW 13.8 %      RDW-SD 49.0 fl      MPV 9.7 fL      Platelets 524 10*3/mm3      Neutrophil % 86.0 %      Lymphocyte % 3.3 %      Monocyte % 6.2 %      Eosinophil % 3.2 %      Basophil % 0.4 %      Immature Grans % 0.9 %      Neutrophils, Absolute 9.00 10*3/mm3      Lymphocytes, Absolute 0.35 10*3/mm3      Monocytes, Absolute 0.65 10*3/mm3      Eosinophils, Absolute 0.34 10*3/mm3      Basophils, Absolute 0.04 10*3/mm3      Immature Grans, Absolute 0.09 10*3/mm3      nRBC 0.0 /100 WBC             I/O last shift:  I/O this shift:  In: 40 [Other:40]  Out: 45 [Emesis/NG output:45]     PHYSICAL EXAM-  Comfortable  Abd- soft, mild distention, stool per stoma    Pathology:  Order Name Source Comment Collection Info Order Time   PREPARE RBC Other   10/1/2024  8:28 AM     When to Transfuse?   Hold          Indication for Transfusion   Surgery          Surgery Date   10/1/2024          Release to patient   Routine Release        TYPE AND SCREEN   Collected By: Mirta Christianson RN 10/1/2024  8:31 AM     Release to patient   Routine Release        TISSUE PATHOLOGY EXAM Large Intestine, Rectum  Collected By: Angelito Ruiz MD 10/1/2024  2:45 PM     Release to patient   Routine Release        .    Assessment and Plan:  Low fiber diet with non-milk supplements  Has not walked... importance discussed.    Angelito Ruiz MD  10/13/24  11:35 EDT

## 2024-10-13 NOTE — PLAN OF CARE
Problem: Adult Inpatient Plan of Care  Goal: Plan of Care Review  Outcome: Progressing  Goal: Patient-Specific Goal (Individualized)  Outcome: Progressing  Goal: Absence of Hospital-Acquired Illness or Injury  Outcome: Progressing  Intervention: Identify and Manage Fall Risk  Recent Flowsheet Documentation  Taken 10/13/2024 0400 by Ana Mcnamara RN  Safety Promotion/Fall Prevention:   assistive device/personal items within reach   clutter free environment maintained   fall prevention program maintained   nonskid shoes/slippers when out of bed   room organization consistent   safety round/check completed  Taken 10/13/2024 0000 by Ana Mcnamara RN  Safety Promotion/Fall Prevention:   assistive device/personal items within reach   clutter free environment maintained   fall prevention program maintained   nonskid shoes/slippers when out of bed   room organization consistent   safety round/check completed  Taken 10/12/2024 2000 by Ana Mcnamara RN  Safety Promotion/Fall Prevention:   assistive device/personal items within reach   clutter free environment maintained   fall prevention program maintained   nonskid shoes/slippers when out of bed   room organization consistent   safety round/check completed  Intervention: Prevent Skin Injury  Recent Flowsheet Documentation  Taken 10/13/2024 0400 by Ana Mcnamara RN  Body Position: position changed independently  Taken 10/13/2024 0000 by Ana Mcnamara RN  Body Position: position changed independently  Taken 10/12/2024 2000 by Ana Mcnamara RN  Body Position: position changed independently  Skin Protection:   incontinence pads utilized   silicone foam dressing in place  Intervention: Prevent Infection  Recent Flowsheet Documentation  Taken 10/13/2024 0400 by Ana Mcnamara RN  Infection Prevention:   environmental surveillance performed   hand hygiene promoted   personal protective equipment utilized   single patient room provided  Taken 10/13/2024  0000 by Ana Mcnamara RN  Infection Prevention:   environmental surveillance performed   hand hygiene promoted   personal protective equipment utilized   single patient room provided  Taken 10/12/2024 2000 by Ana Mcnamara RN  Infection Prevention:   environmental surveillance performed   hand hygiene promoted   personal protective equipment utilized   single patient room provided  Goal: Optimal Comfort and Wellbeing  Outcome: Progressing  Intervention: Monitor Pain and Promote Comfort  Recent Flowsheet Documentation  Taken 10/13/2024 0400 by Ana Mcnamara RN  Pain Management Interventions:   pain management plan reviewed with patient/caregiver   medication offered but refused  Intervention: Provide Person-Centered Care  Recent Flowsheet Documentation  Taken 10/12/2024 2000 by Ana Mcnamara RN  Trust Relationship/Rapport:   choices provided   care explained   questions encouraged   questions answered  Goal: Readiness for Transition of Care  Outcome: Progressing     Problem: Fall Injury Risk  Goal: Absence of Fall and Fall-Related Injury  Outcome: Progressing  Intervention: Identify and Manage Contributors  Recent Flowsheet Documentation  Taken 10/13/2024 0400 by Ana Mcnamara RN  Medication Review/Management: medications reviewed  Taken 10/12/2024 2000 by Ana Mcnamara RN  Medication Review/Management: medications reviewed  Intervention: Promote Injury-Free Environment  Recent Flowsheet Documentation  Taken 10/13/2024 0400 by Ana Mcnamara RN  Safety Promotion/Fall Prevention:   assistive device/personal items within reach   clutter free environment maintained   fall prevention program maintained   nonskid shoes/slippers when out of bed   room organization consistent   safety round/check completed  Taken 10/13/2024 0000 by Ana Mcnamara RN  Safety Promotion/Fall Prevention:   assistive device/personal items within reach   clutter free environment maintained   fall prevention program  maintained   nonskid shoes/slippers when out of bed   room organization consistent   safety round/check completed  Taken 10/12/2024 2000 by Ana Mcnamara RN  Safety Promotion/Fall Prevention:   assistive device/personal items within reach   clutter free environment maintained   fall prevention program maintained   nonskid shoes/slippers when out of bed   room organization consistent   safety round/check completed     Problem: Skin Injury Risk Increased  Goal: Skin Health and Integrity  Outcome: Progressing  Intervention: Optimize Skin Protection  Recent Flowsheet Documentation  Taken 10/13/2024 0400 by Ana Mcnamara, RN  Activity Management: activity encouraged  Head of Bed (HOB) Positioning: HOB lowered  Taken 10/13/2024 0000 by Ana Mcnamara, RN  Activity Management: activity encouraged  Head of Bed (HOB) Positioning: HOB lowered  Taken 10/12/2024 2000 by Ana Mcnamara RN  Activity Management: activity encouraged  Pressure Reduction Techniques:   frequent weight shift encouraged   weight shift assistance provided  Head of Bed (HOB) Positioning: HOB lowered  Pressure Reduction Devices:   pressure-redistributing mattress utilized   positioning supports utilized   heel offloading device utilized   foam padding utilized  Skin Protection:   incontinence pads utilized   silicone foam dressing in place     Problem: Infection  Goal: Absence of Infection Signs and Symptoms  Outcome: Progressing     Problem: Adjustment to Surgery (Colostomy)  Goal: Optimal Coping  Outcome: Progressing     Problem: Bleeding (Colostomy)  Goal: Absence of Bleeding  Outcome: Progressing     Problem: Fluid, Electrolyte and Nutrition Imbalance (Colostomy)  Goal: Fluid, Electrolyte and Nutrition Balance  Outcome: Progressing     Problem: Infection (Colostomy)  Goal: Absence of Infection Signs and Symptoms  Outcome: Progressing     Problem: Ongoing Anesthesia Effects (Colostomy)  Goal: Anesthesia/Sedation Recovery  Outcome:  Progressing  Intervention: Optimize Anesthesia Recovery  Recent Flowsheet Documentation  Taken 10/13/2024 0400 by Ana Mcnamara RN  Safety Promotion/Fall Prevention:   assistive device/personal items within reach   clutter free environment maintained   fall prevention program maintained   nonskid shoes/slippers when out of bed   room organization consistent   safety round/check completed  Taken 10/13/2024 0000 by nAa Mcnamara RN  Safety Promotion/Fall Prevention:   assistive device/personal items within reach   clutter free environment maintained   fall prevention program maintained   nonskid shoes/slippers when out of bed   room organization consistent   safety round/check completed  Taken 10/12/2024 2000 by Ana Mcnamara RN  Safety Promotion/Fall Prevention:   assistive device/personal items within reach   clutter free environment maintained   fall prevention program maintained   nonskid shoes/slippers when out of bed   room organization consistent   safety round/check completed     Problem: Pain (Colostomy)  Goal: Acceptable Pain Control  Outcome: Progressing  Intervention: Prevent or Manage Pain  Recent Flowsheet Documentation  Taken 10/13/2024 0400 by Ana Mcnamara RN  Pain Management Interventions:   pain management plan reviewed with patient/caregiver   medication offered but refused  Diversional Activities:   television   smartphone  Taken 10/13/2024 0000 by Ana Mcnamara RN  Diversional Activities:   television   smartphone  Taken 10/12/2024 2000 by Ana Mcnamara RN  Diversional Activities:   television   smartphone     Problem: Postoperative Nausea and Vomiting (Colostomy)  Goal: Nausea and Vomiting Relief  Outcome: Progressing     Problem: Postoperative Stoma Care (Colostomy)  Goal: Optimal Stoma Healing  Outcome: Progressing  Intervention: Provide Ostomy and Peristomal Skin Care  Recent Flowsheet Documentation  Taken 10/12/2024 2000 by Ana Mcnamara RN  Skin Protection:    incontinence pads utilized   silicone foam dressing in place     Problem: Postoperative Urinary Retention (Colostomy)  Goal: Effective Urinary Elimination  Outcome: Progressing     Problem: Respiratory Compromise (Colostomy)  Goal: Effective Oxygenation and Ventilation  Outcome: Progressing  Intervention: Optimize Oxygenation and Ventilation  Recent Flowsheet Documentation  Taken 10/13/2024 0400 by Ana Mcnamara RN  Head of Bed ([facility] [place]) Positioning: HOB lowered  Taken 10/13/2024 0000 by Ana Mcnamara RN  Head of Bed ([facility] [place]) Positioning: HOB lowered  Taken 10/12/2024 2000 by Ana Mcnamara RN  Head of Bed ([facility] [place]) Positioning: HOB lowered   Goal Outcome Evaluation:      Ostomy outputting brown, creamy stool and flatus. NG clamped, some nausea with irrigation w/o emesis. VSS.

## 2024-10-14 ENCOUNTER — APPOINTMENT (OUTPATIENT)
Dept: GENERAL RADIOLOGY | Facility: HOSPITAL | Age: 84
End: 2024-10-14
Payer: MEDICARE

## 2024-10-14 PROCEDURE — 25010000002 HEPARIN (PORCINE) PER 1000 UNITS: Performed by: COLON & RECTAL SURGERY

## 2024-10-14 PROCEDURE — 74022 RADEX COMPL AQT ABD SERIES: CPT

## 2024-10-14 PROCEDURE — 94799 UNLISTED PULMONARY SVC/PX: CPT

## 2024-10-14 PROCEDURE — 94664 DEMO&/EVAL PT USE INHALER: CPT

## 2024-10-14 PROCEDURE — 97530 THERAPEUTIC ACTIVITIES: CPT

## 2024-10-14 RX ORDER — MEGESTROL ACETATE 40 MG/ML
200 SUSPENSION ORAL DAILY
Status: DISCONTINUED | OUTPATIENT
Start: 2024-10-15 | End: 2024-10-18 | Stop reason: HOSPADM

## 2024-10-14 RX ORDER — ACETAMINOPHEN 325 MG/1
650 TABLET ORAL EVERY 8 HOURS SCHEDULED
Status: DISCONTINUED | OUTPATIENT
Start: 2024-10-14 | End: 2024-10-18 | Stop reason: HOSPADM

## 2024-10-14 RX ORDER — LEVOTHYROXINE SODIUM 88 UG/1
88 TABLET ORAL
Status: DISCONTINUED | OUTPATIENT
Start: 2024-10-15 | End: 2024-10-18 | Stop reason: HOSPADM

## 2024-10-14 RX ORDER — PYRIDOSTIGMINE BROMIDE 60 MG/1
30 TABLET ORAL EVERY 8 HOURS SCHEDULED
Status: DISCONTINUED | OUTPATIENT
Start: 2024-10-14 | End: 2024-10-18 | Stop reason: HOSPADM

## 2024-10-14 RX ORDER — ONDANSETRON 4 MG/1
4 TABLET, ORALLY DISINTEGRATING ORAL EVERY 6 HOURS PRN
Status: DISCONTINUED | OUTPATIENT
Start: 2024-10-14 | End: 2024-10-18 | Stop reason: HOSPADM

## 2024-10-14 RX ORDER — ONDANSETRON 2 MG/ML
4 INJECTION INTRAMUSCULAR; INTRAVENOUS EVERY 6 HOURS PRN
Status: DISCONTINUED | OUTPATIENT
Start: 2024-10-14 | End: 2024-10-18 | Stop reason: HOSPADM

## 2024-10-14 RX ORDER — IBUPROFEN 400 MG/1
400 TABLET, FILM COATED ORAL EVERY 6 HOURS PRN
Status: DISCONTINUED | OUTPATIENT
Start: 2024-10-14 | End: 2024-10-18 | Stop reason: HOSPADM

## 2024-10-14 RX ORDER — CALCIUM CARBONATE 500 MG/1
2 TABLET, CHEWABLE ORAL 3 TIMES DAILY PRN
Status: DISCONTINUED | OUTPATIENT
Start: 2024-10-14 | End: 2024-10-18 | Stop reason: HOSPADM

## 2024-10-14 RX ORDER — CETIRIZINE HYDROCHLORIDE 10 MG/1
5 TABLET ORAL DAILY
Status: DISCONTINUED | OUTPATIENT
Start: 2024-10-15 | End: 2024-10-18 | Stop reason: HOSPADM

## 2024-10-14 RX ORDER — FAMOTIDINE 20 MG/1
20 TABLET, FILM COATED ORAL NIGHTLY
Status: DISCONTINUED | OUTPATIENT
Start: 2024-10-14 | End: 2024-10-18 | Stop reason: HOSPADM

## 2024-10-14 RX ADMIN — PYRIDOSTIGMINE BROMIDE 30 MG: 60 TABLET ORAL at 14:26

## 2024-10-14 RX ADMIN — MEGESTROL ACETATE 200 MG: 40 SUSPENSION ORAL at 09:42

## 2024-10-14 RX ADMIN — CETIRIZINE HYDROCHLORIDE 5 MG: 10 TABLET, FILM COATED ORAL at 09:43

## 2024-10-14 RX ADMIN — HEPARIN SODIUM 5000 UNITS: 5000 INJECTION INTRAVENOUS; SUBCUTANEOUS at 20:59

## 2024-10-14 RX ADMIN — PYRIDOSTIGMINE BROMIDE 30 MG: 60 TABLET ORAL at 20:59

## 2024-10-14 RX ADMIN — LEVOTHYROXINE SODIUM 88 MCG: 0.09 TABLET ORAL at 05:32

## 2024-10-14 RX ADMIN — HEPARIN SODIUM 5000 UNITS: 5000 INJECTION INTRAVENOUS; SUBCUTANEOUS at 05:34

## 2024-10-14 RX ADMIN — LANSOPRAZOLE 15 MG: 15 TABLET, ORALLY DISINTEGRATING, DELAYED RELEASE ORAL at 05:33

## 2024-10-14 RX ADMIN — PYRIDOSTIGMINE BROMIDE 30 MG: 60 TABLET ORAL at 05:32

## 2024-10-14 RX ADMIN — BUDESONIDE AND FORMOTEROL FUMARATE DIHYDRATE 2 PUFF: 160; 4.5 AEROSOL RESPIRATORY (INHALATION) at 09:30

## 2024-10-14 RX ADMIN — FAMOTIDINE 20 MG: 20 TABLET, FILM COATED ORAL at 20:58

## 2024-10-14 RX ADMIN — ACETAMINOPHEN 650 MG: 325 TABLET ORAL at 05:32

## 2024-10-14 RX ADMIN — HEPARIN SODIUM 5000 UNITS: 5000 INJECTION INTRAVENOUS; SUBCUTANEOUS at 14:26

## 2024-10-14 NOTE — PLAN OF CARE
Goal Outcome Evaluation:  Plan of Care Reviewed With: patient        Progress: no change  Outcome Evaluation: Progress report completed. Goals reviewed and revised as appropriate to measure functional progress. Pt ambulated 75ft with CGA and RW for support. Pt continues to be limited by weakness/fatigue and remains below baseline functional mobility. Continue PT POC. Continue to progress per pt tolerance.    Anticipated Discharge Disposition (PT): inpatient rehabilitation facility

## 2024-10-14 NOTE — PROGRESS NOTES
"IM progress note      Valeria Tracy  [id number]  1940     LOS: 13 days     Attending: Angelito Ruiz MD    Primary Care Provider: Peyton Vázquez PA      Chief Complaint/Reason for visit:  Abd pain    Subjective   NGT in place when seen.  Was later removed.  Minimal output from NG tube prior to removal.  Took about 25% of her breakfast and feels like she can eat more with NG tube out  Stoma with output  Objective        Visit Vitals  /70 (BP Location: Left arm, Patient Position: Sitting)   Pulse 99   Temp 96.8 °F (36 °C) (Oral)   Resp 18   Ht 165.1 cm (65\")   Wt 56 kg (123 lb 8 oz)   SpO2 95%   BMI 20.55 kg/m²     Temp (24hrs), Av.8 °F (36.6 °C), Min:96.8 °F (36 °C), Max:98.6 °F (37 °C)         Respiratory: RA    Physical Exam:     General Appearance:    Alert, cooperative, in no acute distress   Head:    Normocephalic, without obvious abnormality, atraumatic    Lungs:     Normal effort, symmetric chest rise, no crepitus, clear to      auscultation bilaterally             Heart:    Regular rhythm and normal rate, normal S1 and S2   Abdomen:     Soft, benign   improved distension. Stoma with output      Extremities:   No clubbing, cyanosis or edema.  No deformities.    Pulses:   Pulses palpable and equal bilaterally   Skin:   No bleeding, bruising or rash          Results Review:     I reviewed the patient's new clinical results.   Results from last 7 days   Lab Units 10/13/24  0441 10/12/24  0444   WBC 10*3/mm3 10.47 9.58   HEMOGLOBIN g/dL 11.1* 10.5*   HEMATOCRIT % 34.7 32.7*   PLATELETS 10*3/mm3 524* 461*     Results from last 7 days   Lab Units 10/13/24  0441 10/12/24  0444   SODIUM mmol/L 140 139   POTASSIUM mmol/L 3.4* 4.3   CHLORIDE mmol/L 102 100   CO2 mmol/L 27.0 24.0   BUN mg/dL 16 16   CREATININE mg/dL 0.74 0.64   CALCIUM mg/dL 8.8 8.9   BILIRUBIN mg/dL 0.2  --    ALK PHOS U/L 82  --    ALT (SGPT) U/L 9  --    AST (SGOT) U/L 11  --    GLUCOSE mg/dL 89 78     I reviewed the patient's new " imaging including images and reports.    All medications reviewed.   acetaminophen, 650 mg, Oral, Q8H  budesonide-formoterol, 2 puff, Inhalation, BID - RT  [START ON 10/15/2024] cetirizine, 5 mg, Oral, Daily  famotidine, 20 mg, Oral, Nightly  heparin (porcine), 5,000 Units, Subcutaneous, Q8H  [START ON 10/15/2024] lansoprazole, 15 mg, Oral, Q AM  [START ON 10/15/2024] levothyroxine, 88 mcg, Oral, Q AM  [START ON 10/15/2024] megestrol, 200 mg, Oral, Daily  pyridostigmine, 30 mg, Oral, Q8H        Assessment & Plan     S/P abdominal perineal resection, partial thickness posterior vaginal resection    GERD (gastroesophageal reflux disease)    Asthma    Hypothyroidism    Acute postoperative pain    Rectal cancer    Severe malnutrition    Postoperative urinary retention    Postop nausea and vomiting    Plan  NG tube removal per Dr. Ruiz  Status post IVF.  Peripheral IV is out.  Monitor  Po as tolerated.   S/p SBFT.      1. Ambulation, encouraged, PT is following  2. Pain control-prns   3. IS-encouraged  4. DVT proph- mechs/heparin       -Asthma: Maintain home regimen with formulary substitution as indicated.  As needed bronchodilators.  Monitor oxygenation.     -Hypothyroidism: Resume replacement      -GERD:  Resumed H2 blocker, now nightly along with PPI.  Tums available prn     -New stoma:  Wound care stoma nurse consult for stoma care and education throughout patient's hospitalization.    -Urinary retention:   Resolved.   Urinalysis unremarkable.         DC to skilled nursing facility when tolerating adequate p.o.    Maggi Lance MD  10/14/24  18:10 EDT

## 2024-10-14 NOTE — PROGRESS NOTES
"[facility] ([facility])    Sputtering bowel function  Just ate dinner/  No ng    Vital Signs:  Blood pressure 125/70, pulse 99, temperature 96.8 °F (36 °C), temperature source Oral, resp. rate 18, height 165.1 cm (65\"), weight 56 kg (123 lb 8 oz), SpO2 95%.    Labs past 24 hours:  Lab Results (last 24 hours)       ** No results found for the last 24 hours. **            I/O last shift:  I/O this shift:  In: 520 [P.O.:480; Other:40]  Out: 221 [Emesis/NG output:21; Stool:200]     PHYSICAL EXAM-  Comfortable  cv- rsr  Abd- soft, mild distention    Pathology:  Order Name Source Comment Collection Info Order Time   PREPARE RBC Other   10/1/2024  8:28 AM     When to Transfuse?   Hold          Indication for Transfusion   Surgery          Surgery Date   10/1/2024          Release to patient   Routine Release        TYPE AND SCREEN   Collected By: Mirta Christianson RN 10/1/2024  8:31 AM     Release to patient   Routine Release        TISSUE PATHOLOGY EXAM Large Intestine, Rectum  Collected By: Angelito Ruiz MD 10/1/2024  2:45 PM     Release to patient   Routine Release        .    Assessment and Plan:  Tolerating diet... feels full after dinner tonight...  Will check abdominal films  >1 liter out colostomy over the weekend....   Hopefully, normalizing bowel function.    Angelito Ruiz MD  10/14/24  17:23 EDT  "

## 2024-10-14 NOTE — THERAPY PROGRESS REPORT/RE-CERT
Patient Name: Valeria Tracy  : 1940    MRN: [id number]                              Today's Date: 10/14/2024       Admit Date: 10/1/2024    Visit Dx:     ICD-10-CM ICD-9-CM   1. S/P right colectomy  Z90.49 V45.89   2. Rectal cancer  C20 154.1     Patient Active Problem List   Diagnosis    GERD (gastroesophageal reflux disease)    Asthma    Hypothyroidism    Colon cancer    S/P right colectomy    Acute postoperative pain    Anemia    Rectal cancer    S/P abdominal perineal resection, partial thickness posterior vaginal resection    Severe malnutrition    Postoperative urinary retention     Past Medical History:   Diagnosis Date    Acid reflux     Anemia     Asthma     Cancer     rectal    History of chemotherapy     History of radiation therapy     History of transfusion     No Reaction    Hypothyroid     Pneumonia     Wears dentures     upper    Wears glasses     Wears hearing aid in both ears      Past Surgical History:   Procedure Laterality Date    ABDOMINAL PERINEAL RESECTION N/A 10/1/2024    Procedure: ABDOMINAL PERINEAL RESECTION, LYSIS OF ADHESIONS AND CREATION OF COLOSTOMY;  Surgeon: Angelito Ruiz MD;  [facility];  Service: General;  Laterality: N/A;    BREAST LUMPECTOMY Right     CATARACT EXTRACTION Bilateral     COLON RESECTION N/A 10/17/2023    Procedure: COLON RESECTION RIGHT LAPAROSCOPIC;  Surgeon: Angelito Ruiz MD;  Location:  [facility];  Service: General;  Laterality: N/A;    COLONOSCOPY      PORTACATH PLACEMENT      SHOULDER SURGERY Right       General Information       Row Name 10/14/24 1313          Physical Therapy Time and Intention    Document Type progress note/recertification  -AE     Mode of Treatment physical therapy  -AE       Row Name 10/14/24 1313          General Information    Patient Profile Reviewed yes  -AE     Existing Precautions/Restrictions fall;other (see comments)  abdominal incision, ostomy; NG  -AE     Barriers to Rehab medically complex;previous  functional deficit  -AE       Row Name 10/14/24 1313          Cognition    Orientation Status (Cognition) oriented x 3  -AE       Row Name 10/14/24 1313          Safety Issues/Impairments Affecting Functional Mobility    Safety Issues Affecting Function (Mobility) awareness of need for assistance;insight into deficits/self-awareness;safety precaution awareness;sequencing abilities  -AE     Impairments Affecting Function (Mobility) balance;endurance/activity tolerance;strength;pain  -AE               User Key  (r) = Recorded By, (t) = Taken By, (c) = Cosigned By      Initials Name Provider Type    AE Rebel Maradiaga PT Physical Therapist                   Mobility       Row Name 10/14/24 1314          Bed Mobility    Comment, (Bed Mobility) Pt received and left UIC.  -AE       Row Name 10/14/24 1314          Transfers    Comment, (Transfers) VCs for hand placement and sequencing. No overt LOB noted.  -AE       Row Name 10/14/24 1314          Sit-Stand Transfer    Sit-Stand [place] (Transfers) contact guard;verbal cues  -AE       Row Name 10/14/24 1314          Gait/Stairs (Locomotion)    [place] Level (Gait) contact guard  -AE     Assistive Device (Gait) walker, front-wheeled  -AE     Distance in Feet (Gait) 75  -AE     Deviations/Abnormal Patterns (Gait) gait speed decreased;bilateral deviations;stride length decreased;tanya decreased  -AE     Bilateral Gait Deviations forward flexed posture  -AE     Comment, (Gait/Stairs) Pt demo step through gait pattern with slowed tanya and decreased gait speed. Pt reports increased fatigue and weakness with mobility this session. Pt continues to have difficulty with increasing gait distance d/t weakness. Further distance limited by fatigue.  -AE               User Key  (r) = Recorded By, (t) = Taken By, (c) = Cosigned By      Initials Name Provider Type    Rebel Maloney PT Physical Therapist                   Obj/Interventions       Row Name 10/14/24 1316           Range of Motion Comprehensive    General Range of Motion bilateral lower extremity ROM WFL  -AE       Row Name 10/14/24 1319          Strength Comprehensive (MMT)    General Manual Muscle Testing (MMT) Assessment lower extremity strength deficits identified  -AE     Comment, General Manual Muscle Testing (MMT) Assessment BLE grossly 4-/5; generalized weakness  -AE       Row Name 10/14/24 1319          Balance    Balance Assessment sitting static balance;sitting dynamic balance;standing static balance;standing dynamic balance  -AE     Static Sitting Balance standby assist  -AE     Dynamic Sitting Balance contact guard  -AE     Position, Sitting Balance unsupported;sitting in chair  -AE     Sit to Stand Dynamic Balance contact guard;verbal cues  -AE     Static Standing Balance contact guard  -AE     Dynamic Standing Balance contact guard  -AE     Position/Device Used, Standing Balance supported;walker, front-wheeled  -AE               User Key  (r) = Recorded By, (t) = Taken By, (c) = Cosigned By      Initials Name Provider Type    AE Rebel Maradiaga, PT Physical Therapist                   Goals/Plan       Row Name 10/14/24 1329          Bed Mobility Goal 1 (PT)    Activity/Assistive Device (Bed Mobility Goal 1, PT) sit to supine/supine to sit  -AE     [place] Level/Cues Needed (Bed Mobility Goal 1, PT) supervision required  -AE     Time Frame (Bed Mobility Goal 1, PT) short term goal (STG);5 days  -AE     Progress/Outcomes (Bed Mobility Goal 1, PT) goal partially met;goal revised this date  -AE       Row Name 10/14/24 1329          Transfer Goal 1 (PT)    Activity/Assistive Device (Transfer Goal 1, PT) sit-to-stand/stand-to-sit  -AE     [place] Level/Cues Needed (Transfer Goal 1, PT) standby assist  -AE     Time Frame (Transfer Goal 1, PT) short term goal (STG);5 days  -AE     Progress/Outcome (Transfer Goal 1, PT) progress slower than expected;continuing progress toward goal  -AE       Row Name  10/14/24 1329          Gait Training Goal 1 (PT)    Activity/Assistive Device (Gait Training Goal 1, PT) gait (walking locomotion)  -AE     [place] Level (Gait Training Goal 1, PT) standby assist  -AE     Distance (Gait Training Goal 1, PT) 200ft  -AE     Time Frame (Gait Training Goal 1, PT) long term goal (LTG);10 days  -AE     Progress/Outcome (Gait Training Goal 1, PT) goal revised this date;progress slower than expected;continuing progress toward goal  -AE       Row Name 10/14/24 1329          Balance Goal 1 (PT)    Progress/Outcomes (Balance Goal 1, PT) goal no longer appropriate  -AE               User Key  (r) = Recorded By, (t) = Taken By, (c) = Cosigned By      Initials Name Provider Type    AE Rebel Maradiaga, PT Physical Therapist                   Clinical Impression       Row Name 10/14/24 1325          Pain    Pain Location abdomen  -AE     Pain Side/Orientation generalized  -AE     Pain Management Interventions activity modification encouraged;positioning techniques utilized  -AE     Response to Pain Interventions activity participation with tolerable pain;mobility function improved  -AE     Additional Documentation Pain Scale: FACES Pre/Post-Treatment (Group)  -AE       Row Name 10/14/24 1325          Pain Scale: FACES Pre/Post-Treatment    Pain: FACES Scale, Pretreatment 2-->hurts little bit  -AE     Posttreatment Pain Rating 2-->hurts little bit  -AE       Row Name 10/14/24 1323          Plan of Care Review    Progress no change  -AE     Outcome Evaluation Progress report completed. Goals reviewed and revised as appropriate to measure functional progress. Pt ambulated 75ft with CGA and RW for support. Pt continues to be limited by weakness/fatigue and remains below baseline functional mobility. Continue PT POC. Continue to progress per pt tolerance.  -AE       Row Name 10/14/24 1325          Vital Signs    Pre Systolic BP Rehab 127  -AE     Pre Treatment Diastolic BP 61  -AE     Pretreatment  Heart Rate (beats/min) 91  -AE     Posttreatment Heart Rate (beats/min) 100  -AE     O2 Delivery Pre Treatment room air  -AE     O2 Delivery Intra Treatment room air  -AE     O2 Delivery Post Treatment room air  -AE     Pre Patient Position Sitting  -AE     Intra Patient Position Standing  -AE     Post Patient Position Sitting  -AE       Row Name 10/14/24 1325          Positioning and Restraints    Pre-Treatment Position sitting in chair/recliner  -AE     Post Treatment Position chair  -AE     In Chair notified nsg;reclined;call light within reach;encouraged to call for assist;legs elevated  exit alarm unchanged  -AE               User Key  (r) = Recorded By, (t) = Taken By, (c) = Cosigned By      Initials Name Provider Type    AE Rebel Maradiaga PT Physical Therapist                   Outcome Measures       Row Name 10/14/24 1331          How much help from another person do you currently need...    Turning from your back to your side while in flat bed without using bedrails? 3  -AE     Moving from lying on back to sitting on the side of a flat bed without bedrails? 3  -AE     Moving to and from a bed to a chair (including a wheelchair)? 3  -AE     Standing up from a chair using your arms (e.g., wheelchair, bedside chair)? 3  -AE     Climbing 3-5 steps with a railing? 2  -AE     To walk in hospital room? 3  -AE     AM-PAC 6 Clicks Score (PT) 17  -AE     Highest Level of Mobility Goal 5 --> Static standing  -AE       Row Name 10/14/24 1331          Functional Assessment    Outcome Measure Options AM-PAC 6 Clicks Basic Mobility (PT)  -AE               User Key  (r) = Recorded By, (t) = Taken By, (c) = Cosigned By      Initials Name Provider Type    Rebel Maloney PT Physical Therapist                                 Physical Therapy Education       Title: PT OT SLP Therapies (In Progress)       Topic: Physical Therapy (Done)       Point: Mobility training (Done)       Learning Progress Summary             Patient Acceptance, E, VU by AE at 10/14/2024 1044    Acceptance, E, NR by AS at 10/9/2024 1000    Acceptance, E, VU by AE at 10/7/2024 1006    Acceptance, E, VU by TM at 10/4/2024 1313    Acceptance, E, NR by TM at 10/2/2024 1129                      Point: Home exercise program (Done)       Learning Progress Summary            Patient Acceptance, E, VU by AE at 10/14/2024 1044    Acceptance, E, NR by AS at 10/9/2024 1000                      Point: Body mechanics (Done)       Learning Progress Summary            Patient Acceptance, E, VU by AE at 10/14/2024 1044    Acceptance, E, NR by AS at 10/9/2024 1000    Acceptance, E, VU by AE at 10/7/2024 1006    Acceptance, E, VU by TM at 10/4/2024 1313    Acceptance, E, NR by TM at 10/2/2024 1129                      Point: Precautions (Done)       Learning Progress Summary            Patient Acceptance, E, VU by AE at 10/14/2024 1044    Acceptance, E, VU by LS at 10/11/2024 0957    Comment: Benefits of activity    Acceptance, E, NR by AS at 10/9/2024 1000    Acceptance, E, VU by AE at 10/7/2024 1006    Acceptance, E, VU by TM at 10/4/2024 1313    Acceptance, E, NR by TM at 10/2/2024 1129                                      User Key       Initials Effective Dates Name Provider Type Discipline    AS 04/28/23 -  Viky Juarez, PTA Physical Therapist Assistant PT    LS 07/11/23 -  Jeni Alexander, PT Physical Therapist PT    AE 09/21/21 -  Rebel Maradiaga, PT Physical Therapist PT    TM 08/13/24 -  Britton Lipscomb, PT Student PT Student PT                  PT Recommendation and Plan     Plan of Care Reviewed With: patient  Progress: no change  Outcome Evaluation: Progress report completed. Goals reviewed and revised as appropriate to measure functional progress. Pt ambulated 75ft with CGA and RW for support. Pt continues to be limited by weakness/fatigue and remains below baseline functional mobility. Continue PT POC. Continue to progress per pt  tolerance.     Time Calculation:         PT Charges       Row Name 10/14/24 1332             Time Calculation    Start Time 1044  -AE      PT Received On 10/14/24  -AE      PT Goal Re-Cert Due Date 10/24/24  -AE         Timed Charges    84977 - PT Therapeutic Activity Minutes 10  -AE         Total Minutes    Timed Charges Total Minutes 10  -AE       Total Minutes 10  -AE                User Key  (r) = Recorded By, (t) = Taken By, (c) = Cosigned By      Initials Name Provider Type    AE Rebel Maradiaga, PATRICIA Physical Therapist                  Therapy Charges for Today       Code Description Service Date Service Provider Modifiers Qty    [id number]  PT THERAPEUTIC ACT EA 15 MIN 10/14/2024 Rebel Maradiaga, PT GP 1            PT G-Codes  Outcome Measure Options: AM-PAC 6 Clicks Basic Mobility (PT)  AM-PAC 6 Clicks Score (PT): 17  AM-PAC 6 Clicks Score (OT): 20  PT Discharge Summary  Anticipated Discharge Disposition (PT): inpatient rehabilitation facility    Rebel Maradiaga PT  10/14/2024

## 2024-10-14 NOTE — CASE MANAGEMENT/SOCIAL WORK
Continued Stay Note  [facility]     Patient Name: Valeria Tracy  MRN: [id number]  Today's Date: 10/14/2024    Admit Date: 10/1/2024    Plan: [facility]   Discharge Plan       Row Name 10/14/24 1148       Plan    Plan [facility]    Patient/Family in Agreement with Plan yes    Plan Comments  met with Ms. Tracy at the bedside to discuss discharge plan. She still wants SNF and prefers [facility]. Called Isabel at [facility] to update, and they still have a bed for patient. We are planning to re-submit for insurance precert once updated PT and OT notes are in.  emailed therapy managers to request patient be added to OT schedule for this afternoon or tomorrow morning. Ms. Tracy still has NG tube in place.  will continue to follow plan of care and assist with discharge planning needs as indicated.    Final Discharge Disposition Code 03 - skilled nursing facility (SNF)                   Discharge Codes    No documentation.                 Expected Discharge Date and Time       Expected Discharge Date Expected Discharge Time    Oct 14, 2024               Rosa Schmidt RN

## 2024-10-15 ENCOUNTER — NURSE NAVIGATOR (OUTPATIENT)
Dept: ONCOLOGY | Facility: CLINIC | Age: 84
End: 2024-10-15
Payer: MEDICARE

## 2024-10-15 ENCOUNTER — APPOINTMENT (OUTPATIENT)
Dept: GENERAL RADIOLOGY | Facility: HOSPITAL | Age: 84
End: 2024-10-15
Payer: MEDICARE

## 2024-10-15 PROCEDURE — 97535 SELF CARE MNGMENT TRAINING: CPT

## 2024-10-15 PROCEDURE — 94761 N-INVAS EAR/PLS OXIMETRY MLT: CPT

## 2024-10-15 PROCEDURE — 97530 THERAPEUTIC ACTIVITIES: CPT

## 2024-10-15 PROCEDURE — 94799 UNLISTED PULMONARY SVC/PX: CPT

## 2024-10-15 PROCEDURE — 25010000002 HEPARIN (PORCINE) PER 1000 UNITS: Performed by: COLON & RECTAL SURGERY

## 2024-10-15 PROCEDURE — 74022 RADEX COMPL AQT ABD SERIES: CPT

## 2024-10-15 PROCEDURE — 94664 DEMO&/EVAL PT USE INHALER: CPT

## 2024-10-15 RX ADMIN — PYRIDOSTIGMINE BROMIDE 30 MG: 60 TABLET ORAL at 15:10

## 2024-10-15 RX ADMIN — MEGESTROL ACETATE 200 MG: 40 SUSPENSION ORAL at 08:57

## 2024-10-15 RX ADMIN — PYRIDOSTIGMINE BROMIDE 30 MG: 60 TABLET ORAL at 22:05

## 2024-10-15 RX ADMIN — HEPARIN SODIUM 5000 UNITS: 5000 INJECTION INTRAVENOUS; SUBCUTANEOUS at 05:35

## 2024-10-15 RX ADMIN — HEPARIN SODIUM 5000 UNITS: 5000 INJECTION INTRAVENOUS; SUBCUTANEOUS at 22:05

## 2024-10-15 RX ADMIN — FAMOTIDINE 20 MG: 20 TABLET, FILM COATED ORAL at 22:05

## 2024-10-15 RX ADMIN — BUDESONIDE AND FORMOTEROL FUMARATE DIHYDRATE 2 PUFF: 160; 4.5 AEROSOL RESPIRATORY (INHALATION) at 09:02

## 2024-10-15 RX ADMIN — LANSOPRAZOLE 15 MG: 15 TABLET, ORALLY DISINTEGRATING, DELAYED RELEASE ORAL at 05:35

## 2024-10-15 RX ADMIN — CETIRIZINE HYDROCHLORIDE 5 MG: 10 TABLET, FILM COATED ORAL at 08:57

## 2024-10-15 RX ADMIN — LEVOTHYROXINE SODIUM 88 MCG: 0.09 TABLET ORAL at 05:35

## 2024-10-15 RX ADMIN — HEPARIN SODIUM 5000 UNITS: 5000 INJECTION INTRAVENOUS; SUBCUTANEOUS at 15:10

## 2024-10-15 RX ADMIN — BUDESONIDE AND FORMOTEROL FUMARATE DIHYDRATE 2 PUFF: 160; 4.5 AEROSOL RESPIRATORY (INHALATION) at 20:34

## 2024-10-15 RX ADMIN — PYRIDOSTIGMINE BROMIDE 30 MG: 60 TABLET ORAL at 05:35

## 2024-10-15 NOTE — PLAN OF CARE
Goal Outcome Evaluation:  Plan of Care Reviewed With: patient        Progress: improving  Outcome Evaluation: Pt CGA LBD, SBA sinkside grooming, SBA toilet transfer, CGA to ambulate with RW.  Pt is progressing, but continues below baseline d/t weakness and decr activity tolerance.  Recommend IP rehab upon d/c.    Anticipated Discharge Disposition (OT): inpatient rehabilitation facility

## 2024-10-15 NOTE — DISCHARGE PLACEMENT REQUEST
"Cris Leonard (84 y.o. Female)  Harshil Garcia, RN Case Manager  [phone]  Do you want us to start precert or will you start it?        Date of Birth   1940    Social Security Number       Address   [address] [place] [address]    Home Phone   [phone]    MRN   [id number]       Mandaen   None    Marital Status   Single                            Admission Date   10/1/24    Admission Type   Elective    Admitting Provider   Angelito Ruiz MD    Attending Provider   Angelito Ruiz MD    Department, Room/Bed   [address], S572/1       Discharge Date       Discharge Disposition       Discharge Destination                                 Attending Provider: Angelito Ruiz MD    Allergies: Milk-related Compounds    Isolation: None   Infection: None   Code Status: CPR    Ht: 165.1 cm (65\")   Wt: 56 kg (123 lb 8 oz)    Admission Cmt: None   Principal Problem: S/P abdominal perineal resection, partial thickness posterior vaginal resection [Z90.49]                   Active Insurance as of 10/1/2024       Primary Coverage       Payor Plan Insurance Group Employer/Plan Group    HUMANA MEDICARE REPLACEMENT HUMANA MED ADV PPO [id number]       Payor Plan Address Payor Plan Phone Number Payor Plan Fax Number Effective Dates    [address] [address] [phone]  3/1/2024 - None Entered    [facility] [id number]         Subscriber Name Subscriber Birth Date Member ID       CRIS LEONARD 1940 [id number]                     Emergency Contacts        (Rel.) Home Phone Work Phone Mobile Phone    ANNABELLA SALES (Grandchild) [phone] -- [phone]              Vital Signs (last day)       Date/Time Temp Temp src Pulse Resp BP Patient Position SpO2    10/15/24 0902 -- -- 89 20 -- -- 96    10/15/24 0815 97.4 (36.3) Oral 95 20 129/70 Lying 95    10/15/24 0357 99.1 (37.3) Oral 79 22 124/60 Lying --    10/14/24 2321 99 (37.2) Oral 84 16 120/66 Lying 94    10/14/24 2057 " Patient made aware of 24/7 emergency services. 96.9 (36.1) Axillary 91 18 139/68 Lying 95    10/14/24 1242 96.8 (36) Oral 99 -- 125/70 Sitting --    10/14/24 0941 -- Oral 89 -- 127/61 Lying 95    10/14/24 0930 -- -- 87 18 -- -- 94    10/14/24 0410 97.8 (36.6) Oral 80 16 140/66 Lying 94          Current Facility-Administered Medications   Medication Dose Route Frequency Provider Last Rate Last Admin    acetaminophen (TYLENOL) tablet 650 mg  650 mg Oral Q8H Angelito Ruiz MD        albuterol (PROVENTIL) nebulizer solution 0.083% 2.5 mg/3mL  2.5 mg Nebulization Q6H PRN Maggi Lance MD        budesonide-formoterol (SYMBICORT) 160-4.5 MCG/ACT inhaler 2 puff  2 puff Inhalation BID - RT Maggi Lance MD   2 puff at 10/15/24 0902    calcium carbonate (TUMS) chewable tablet 500 mg (200 mg elemental)  2 tablet Oral TID PRN Angelito Ruiz MD        cetirizine (zyrTEC) tablet 5 mg  5 mg Oral Daily Angelito Ruiz MD   5 mg at 10/15/24 0857    famotidine (PEPCID) tablet 20 mg  20 mg Oral Nightly Angelito Ruiz MD   20 mg at 10/14/24 2058    heparin (porcine) 5000 UNIT/ML injection 5,000 Units  5,000 Units Subcutaneous Q8H Angelito Ruiz MD   5,000 Units at 10/15/24 0535    ibuprofen (ADVIL,MOTRIN) tablet 400 mg  400 mg Oral Q6H PRN Angelito Ruiz MD        labetalol (NORMODYNE,TRANDATE) injection 10 mg  10 mg Intravenous Q4H PRN Maggi Lance MD        lansoprazole (PREVACID SOLUTAB) disintegrating tablet Tablet Delayed Release Dispersible 15 mg  15 mg Oral Q AM Angelito Ruiz MD   15 mg at 10/15/24 0535    levothyroxine (SYNTHROID, LEVOTHROID) tablet 88 mcg  88 mcg Oral Q AM Angelito Ruiz MD   88 mcg at 10/15/24 0535    Lidocaine HCl gel (XYLOCAINE) urethral/mucosal syringe   Urethral Q4H PRN Maggi Lance MD        megestrol (MEGACE) 40 MG/ML suspension 200 mg  200 mg Oral Daily Angelito Ruiz MD   200 mg at 10/15/24 0857    naloxone (NARCAN) injection 0.4 mg  0.4 mg Intravenous Q5 Min PRN Angelito Ruiz MD        nitroglycerin  "(NITROSTAT) SL tablet 0.4 mg  0.4 mg Sublingual Q5 Min PRN Angelito Ruiz MD        ondansetron ODT (ZOFRAN-ODT) disintegrating tablet 4 mg  4 mg Oral Q6H PRN Angelito Ruiz MD        Or    ondansetron (ZOFRAN) injection 4 mg  4 mg Intravenous Q6H PRN Angelito Ruiz MD        phenol (CHLORASEPTIC) 1.4 % liquid 1 spray  1 spray Mouth/Throat Q2H PRN Maggi Lance MD        pyridostigmine (MESTINON) tablet 30 mg  30 mg Oral Q8H Angelito Ruiz MD   30 mg at 10/15/24 0535        Physician Progress Notes (last 24 hours)        Angelito Ruiz MD at 10/15/24 0737          [facility]    No nausea or vomiting, fresh stool per colostomy    Vital Signs:  Blood pressure 124/60, pulse 79, temperature 99.1 °F (37.3 °C), temperature source Oral, resp. rate 22, height 165.1 cm (65\"), weight 56 kg (123 lb 8 oz), SpO2 94%.    Labs past 24 hours:  Lab Results (last 24 hours)       ** No results found for the last 24 hours. **            I/O last shift:  No intake/output data recorded.     PHYSICAL EXAM-  Comfortable  Not distended  Not toxic  Not tachycardic  Incision okay  Fresh stool per colostomy    Pathology:  Order Name Source Comment Collection Info Order Time   PREPARE RBC Other   10/1/2024  8:28 AM     When to Transfuse?   Hold          Indication for Transfusion   Surgery          Surgery Date   10/1/2024          Release to patient   Routine Release        TYPE AND SCREEN   Collected By: Mirta Christianson RN 10/1/2024  8:31 AM     Release to patient   Routine Release        TISSUE PATHOLOGY EXAM Large Intestine, Rectum  Collected By: Angelito Ruiz MD 10/1/2024  2:45 PM     Release to patient   Routine Release        .    Assessment and Plan:  Well films from last night after dinner look worse, this may be that it was postprandial.  She denies pain and looks forward to eating breakfast.  Will check interval films today.  Have encouraged frequent ambulation  Fresh stool " "per colostomy, no cramping at this time.    Angelito Ruiz MD  10/15/24  07:37 EDT    Electronically signed by Angelito Ruiz MD at 10/15/24 0738       Angelito Ruiz MD at 10/14/24 1723          [facility] ([facility]    Sputtering bowel function  Just ate dinner/  No ng    Vital Signs:  Blood pressure 125/70, pulse 99, temperature 96.8 °F (36 °C), temperature source Oral, resp. rate 18, height 165.1 cm (65\"), weight 56 kg (123 lb 8 oz), SpO2 95%.    Labs past 24 hours:  Lab Results (last 24 hours)       ** No results found for the last 24 hours. **            I/O last shift:  I/O this shift:  In: 520 [P.O.:480; Other:40]  Out: 221 [Emesis/NG output:21; Stool:200]     PHYSICAL EXAM-  Comfortable  cv- rsr  Abd- soft, mild distention    Pathology:  Order Name Source Comment Collection Info Order Time   PREPARE RBC Other   10/1/2024  8:28 AM     When to Transfuse?   Hold          Indication for Transfusion   Surgery          Surgery Date   10/1/2024          Release to patient   Routine Release        TYPE AND SCREEN   Collected By: Mirta Christianson RN 10/1/2024  8:31 AM     Release to patient   Routine Release        TISSUE PATHOLOGY EXAM Large Intestine, Rectum  Collected By: Angelito Ruiz MD 10/1/2024  2:45 PM     Release to patient   Routine Release        .    Assessment and Plan:  Tolerating diet... feels full after dinner tonight...  Will check abdominal films  >1 liter out colostomy over the weekend....   Hopefully, normalizing bowel function.    Angelito Ruiz MD  10/14/24  17:23 EDT    Electronically signed by Angelito Ruiz MD at 10/14/24 1725       Maggi Lance MD at 10/14/24 1230          IM progress note      Valeria Tracy  [id number]  1940     LOS: 13 days     Attending: Angelito Ruiz MD    Primary Care Provider: Peyton Vázquez PA      Chief Complaint/Reason for visit:  Abd pain    Subjective   NGT in place when seen.  Was later " "removed.  Minimal output from NG tube prior to removal.  Took about 25% of her breakfast and feels like she can eat more with NG tube out  Stoma with output  Objective        Visit Vitals  /70 (BP Location: Left arm, Patient Position: Sitting)   Pulse 99   Temp 96.8 °F (36 °C) (Oral)   Resp 18   Ht 165.1 cm (65\")   Wt 56 kg (123 lb 8 oz)   SpO2 95%   BMI 20.55 kg/m²     Temp (24hrs), Av.8 °F (36.6 °C), Min:96.8 °F (36 °C), Max:98.6 °F (37 °C)         Respiratory: RA    Physical Exam:     General Appearance:    Alert, cooperative, in no acute distress   Head:    Normocephalic, without obvious abnormality, atraumatic    Lungs:     Normal effort, symmetric chest rise, no crepitus, clear to      auscultation bilaterally             Heart:    Regular rhythm and normal rate, normal S1 and S2   Abdomen:     Soft, benign   improved distension. Stoma with output      Extremities:   No clubbing, cyanosis or edema.  No deformities.    Pulses:   Pulses palpable and equal bilaterally   Skin:   No bleeding, bruising or rash          Results Review:     I reviewed the patient's new clinical results.   Results from last 7 days   Lab Units 10/13/24  0441 10/12/24  0444   WBC 10*3/mm3 10.47 9.58   HEMOGLOBIN g/dL 11.1* 10.5*   HEMATOCRIT % 34.7 32.7*   PLATELETS 10*3/mm3 524* 461*     Results from last 7 days   Lab Units 10/13/24  0441 10/12/24  0444   SODIUM mmol/L 140 139   POTASSIUM mmol/L 3.4* 4.3   CHLORIDE mmol/L 102 100   CO2 mmol/L 27.0 24.0   BUN mg/dL 16 16   CREATININE mg/dL 0.74 0.64   CALCIUM mg/dL 8.8 8.9   BILIRUBIN mg/dL 0.2  --    ALK PHOS U/L 82  --    ALT (SGPT) U/L 9  --    AST (SGOT) U/L 11  --    GLUCOSE mg/dL 89 78     I reviewed the patient's new imaging including images and reports.    All medications reviewed.   acetaminophen, 650 mg, Oral, Q8H  budesonide-formoterol, 2 puff, Inhalation, BID - RT  [START ON 10/15/2024] cetirizine, 5 mg, Oral, Daily  famotidine, 20 mg, Oral, Nightly  heparin " (porcine), 5,000 Units, Subcutaneous, Q8H  [START ON 10/15/2024] lansoprazole, 15 mg, Oral, Q AM  [START ON 10/15/2024] levothyroxine, 88 mcg, Oral, Q AM  [START ON 10/15/2024] megestrol, 200 mg, Oral, Daily  pyridostigmine, 30 mg, Oral, Q8H        Assessment & Plan     S/P abdominal perineal resection, partial thickness posterior vaginal resection    GERD (gastroesophageal reflux disease)    Asthma    Hypothyroidism    Acute postoperative pain    Rectal cancer    Severe malnutrition    Postoperative urinary retention    Postop nausea and vomiting    Plan  NG tube removal per Dr. Ruiz  Status post IVF.  Peripheral IV is out.  Monitor  Po as tolerated.   S/p SBFT.      1. Ambulation, encouraged, PT is following  2. Pain control-prns   3. IS-encouraged  4. DVT proph- mechs/heparin       -Asthma: Maintain home regimen with formulary substitution as indicated.  As needed bronchodilators.  Monitor oxygenation.     -Hypothyroidism: Resume replacement      -GERD:  Resumed H2 blocker, now nightly along with PPI.  Tums available prn     -New stoma:  Wound care stoma nurse consult for stoma care and education throughout patient's hospitalization.    -Urinary retention:   Resolved.   Urinalysis unremarkable.         DC to skilled nursing facility when tolerating adequate p.o.    Maggi Lance MD  10/14/24  18:10 EDT    Electronically signed by Maggi Lance MD at 10/14/24 1811          Physical Therapy Notes (most recent note)        Rebel Maradiaga, PT at 10/14/24 1044  Version 1 of 1         Patient Name: Valeria Tracy  : 1940    MRN: [id number]                              Today's Date: 10/14/2024       Admit Date: 10/1/2024    Visit Dx:     ICD-10-CM ICD-9-CM   1. S/P right colectomy  Z90.49 V45.89   2. Rectal cancer  C20 154.1     Patient Active Problem List   Diagnosis    GERD (gastroesophageal reflux disease)    Asthma    Hypothyroidism    Colon cancer    S/P right colectomy    Acute  postoperative pain    Anemia    Rectal cancer    S/P abdominal perineal resection, partial thickness posterior vaginal resection    Severe malnutrition    Postoperative urinary retention     Past Medical History:   Diagnosis Date    Acid reflux     Anemia     Asthma     Cancer     rectal    History of chemotherapy     History of radiation therapy     History of transfusion     No Reaction    Hypothyroid     Pneumonia     Wears dentures     upper    Wears glasses     Wears hearing aid in both ears      Past Surgical History:   Procedure Laterality Date    ABDOMINAL PERINEAL RESECTION N/A 10/1/2024    Procedure: ABDOMINAL PERINEAL RESECTION, LYSIS OF ADHESIONS AND CREATION OF COLOSTOMY;  Surgeon: Angelito Ruiz MD;  Location: [facility] OR;  Service: General;  Laterality: N/A;    BREAST LUMPECTOMY Right     CATARACT EXTRACTION Bilateral     COLON RESECTION N/A 10/17/2023    Procedure: COLON RESECTION RIGHT LAPAROSCOPIC;  Surgeon: Angelito Ruiz MD;  Location: [facility] OR;  Service: General;  Laterality: N/A;    COLONOSCOPY      PORTACATH PLACEMENT      SHOULDER SURGERY Right       General Information       Row Name 10/14/24 1313          Physical Therapy Time and Intention    Document Type progress note/recertification  -AE     Mode of Treatment physical therapy  -AE       Row Name 10/14/24 1313          General Information    Patient Profile Reviewed yes  -AE     Existing Precautions/Restrictions fall;other (see comments)  abdominal incision, ostomy; NG  -AE     Barriers to Rehab medically complex;previous functional deficit  -AE       Row Name 10/14/24 1313          Cognition    Orientation Status (Cognition) oriented x 3  -AE       Row Name 10/14/24 1313          Safety Issues/Impairments Affecting Functional Mobility    Safety Issues Affecting Function (Mobility) awareness of need for assistance;insight into deficits/self-awareness;safety precaution awareness;sequencing abilities  -AE     Impairments Affecting  Function (Mobility) balance;endurance/activity tolerance;strength;pain  -AE               User Key  (r) = Recorded By, (t) = Taken By, (c) = Cosigned By      Initials Name Provider Type    AE Rebel Maradiaga PT Physical Therapist                   Mobility       Row Name 10/14/24 1314          Bed Mobility    Comment, (Bed Mobility) Pt received and left UIC.  -AE       Row Name 10/14/24 1314          Transfers    Comment, (Transfers) VCs for hand placement and sequencing. No overt LOB noted.  -AE       Row Name 10/14/24 1314          Sit-Stand Transfer    Sit-Stand [place] (Transfers) contact guard;verbal cues  -AE       Row Name 10/14/24 1314          Gait/Stairs (Locomotion)    [place] Level (Gait) contact guard  -AE     Assistive Device (Gait) walker, front-wheeled  -AE     Distance in Feet (Gait) 75  -AE     Deviations/Abnormal Patterns (Gait) gait speed decreased;bilateral deviations;stride length decreased;tanya decreased  -AE     Bilateral Gait Deviations forward flexed posture  -AE     Comment, (Gait/Stairs) Pt demo step through gait pattern with slowed tanya and decreased gait speed. Pt reports increased fatigue and weakness with mobility this session. Pt continues to have difficulty with increasing gait distance d/t weakness. Further distance limited by fatigue.  -AE               User Key  (r) = Recorded By, (t) = Taken By, (c) = Cosigned By      Initials Name Provider Type    AE Rebel Maradiaga PT Physical Therapist                   Obj/Interventions       Row Name 10/14/24 1319          Range of Motion Comprehensive    General Range of Motion bilateral lower extremity ROM WFL  -AE       Row Name 10/14/24 1319          Strength Comprehensive (MMT)    General Manual Muscle Testing (MMT) Assessment lower extremity strength deficits identified  -AE     Comment, General Manual Muscle Testing (MMT) Assessment BLE grossly 4-/5; generalized weakness  -AE       Row Name 10/14/24 1317           Balance    Balance Assessment sitting static balance;sitting dynamic balance;standing static balance;standing dynamic balance  -AE     Static Sitting Balance standby assist  -AE     Dynamic Sitting Balance contact guard  -AE     Position, Sitting Balance unsupported;sitting in chair  -AE     Sit to Stand Dynamic Balance contact guard;verbal cues  -AE     Static Standing Balance contact guard  -AE     Dynamic Standing Balance contact guard  -AE     Position/Device Used, Standing Balance supported;walker, front-wheeled  -AE               User Key  (r) = Recorded By, (t) = Taken By, (c) = Cosigned By      Initials Name Provider Type    AE Rbeel Maradiaga, PT Physical Therapist                   Goals/Plan       Row Name 10/14/24 1329          Bed Mobility Goal 1 (PT)    Activity/Assistive Device (Bed Mobility Goal 1, PT) sit to supine/supine to sit  -AE     [place] Level/Cues Needed (Bed Mobility Goal 1, PT) supervision required  -AE     Time Frame (Bed Mobility Goal 1, PT) short term goal (STG);5 days  -AE     Progress/Outcomes (Bed Mobility Goal 1, PT) goal partially met;goal revised this date  -AE       Row Name 10/14/24 1329          Transfer Goal 1 (PT)    Activity/Assistive Device (Transfer Goal 1, PT) sit-to-stand/stand-to-sit  -AE     [place] Level/Cues Needed (Transfer Goal 1, PT) standby assist  -AE     Time Frame (Transfer Goal 1, PT) short term goal (STG);5 days  -AE     Progress/Outcome (Transfer Goal 1, PT) progress slower than expected;continuing progress toward goal  -AE       Row Name 10/14/24 1324          Gait Training Goal 1 (PT)    Activity/Assistive Device (Gait Training Goal 1, PT) gait (walking locomotion)  -AE     [place] Level (Gait Training Goal 1, PT) standby assist  -AE     Distance (Gait Training Goal 1, PT) 200ft  -AE     Time Frame (Gait Training Goal 1, PT) long term goal (LTG);10 days  -AE     Progress/Outcome (Gait Training Goal 1, PT) goal revised this date;progress  slower than expected;continuing progress toward goal  -AE       Row Name 10/14/24 1327          Balance Goal 1 (PT)    Progress/Outcomes (Balance Goal 1, PT) goal no longer appropriate  -AE               User Key  (r) = Recorded By, (t) = Taken By, (c) = Cosigned By      Initials Name Provider Type    AE Rebel Maradiaga, PT Physical Therapist                   Clinical Impression       Row Name 10/14/24 1325          Pain    Pain Location abdomen  -AE     Pain Side/Orientation generalized  -AE     Pain Management Interventions activity modification encouraged;positioning techniques utilized  -AE     Response to Pain Interventions activity participation with tolerable pain;mobility function improved  -AE     Additional Documentation Pain Scale: FACES Pre/Post-Treatment (Group)  -AE       Row Name 10/14/24 1325          Pain Scale: FACES Pre/Post-Treatment    Pain: FACES Scale, Pretreatment 2-->hurts little bit  -AE     Posttreatment Pain Rating 2-->hurts little bit  -AE       Row Name 10/14/24 1329          Plan of Care Review    Progress no change  -AE     Outcome Evaluation Progress report completed. Goals reviewed and revised as appropriate to measure functional progress. Pt ambulated 75ft with CGA and RW for support. Pt continues to be limited by weakness/fatigue and remains below baseline functional mobility. Continue PT POC. Continue to progress per pt tolerance.  -AE       Row Name 10/14/24 1327          Vital Signs    Pre Systolic BP Rehab 127  -AE     Pre Treatment Diastolic BP 61  -AE     Pretreatment Heart Rate (beats/min) 91  -AE     Posttreatment Heart Rate (beats/min) 100  -AE     O2 Delivery Pre Treatment room air  -AE     O2 Delivery Intra Treatment room air  -AE     O2 Delivery Post Treatment room air  -AE     Pre Patient Position Sitting  -AE     Intra Patient Position Standing  -AE     Post Patient Position Sitting  -AE       Row Name 10/14/24 1322          Positioning and Restraints     Pre-Treatment Position sitting in chair/recliner  -AE     Post Treatment Position chair  -AE     In Chair notified nsg;reclined;call light within reach;encouraged to call for assist;legs elevated  exit alarm unchanged  -AE               User Key  (r) = Recorded By, (t) = Taken By, (c) = Cosigned By      Initials Name Provider Type    Rebel Maloney, PT Physical Therapist                   Outcome Measures       Row Name 10/14/24 1331          How much help from another person do you currently need...    Turning from your back to your side while in flat bed without using bedrails? 3  -AE     Moving from lying on back to sitting on the side of a flat bed without bedrails? 3  -AE     Moving to and from a bed to a chair (including a wheelchair)? 3  -AE     Standing up from a chair using your arms (e.g., wheelchair, bedside chair)? 3  -AE     Climbing 3-5 steps with a railing? 2  -AE     To walk in hospital room? 3  -AE     AM-PAC 6 Clicks Score (PT) 17  -AE     Highest Level of Mobility Goal 5 --> Static standing  -AE       Row Name 10/14/24 1331          Functional Assessment    Outcome Measure Options AM-PAC 6 Clicks Basic Mobility (PT)  -AE               User Key  (r) = Recorded By, (t) = Taken By, (c) = Cosigned By      Initials Name Provider Type    Rebel Maloney, PT Physical Therapist                                 Physical Therapy Education       Title: PT OT SLP Therapies (In Progress)       Topic: Physical Therapy (Done)       Point: Mobility training (Done)       Learning Progress Summary            Patient Acceptance, E, VU by AE at 10/14/2024 1044    Acceptance, E, NR by AS at 10/9/2024 1000    Acceptance, E, VU by AE at 10/7/2024 1006    Acceptance, E, VU by TM at 10/4/2024 1313    Acceptance, E, NR by TM at 10/2/2024 1129                      Point: Home exercise program (Done)       Learning Progress Summary            Patient Acceptance, E, VU by AE at 10/14/2024 1044    Acceptance, E, NR by  AS at 10/9/2024 1000                      Point: Body mechanics (Done)       Learning Progress Summary            Patient Acceptance, E, VU by AE at 10/14/2024 1044    Acceptance, E, NR by AS at 10/9/2024 1000    Acceptance, E, VU by AE at 10/7/2024 1006    Acceptance, E, VU by TM at 10/4/2024 1313    Acceptance, E, NR by TM at 10/2/2024 1129                      Point: Precautions (Done)       Learning Progress Summary            Patient Acceptance, E, VU by AE at 10/14/2024 1044    Acceptance, E, VU by LS at 10/11/2024 0957    Comment: Benefits of activity    Acceptance, E, NR by AS at 10/9/2024 1000    Acceptance, E, VU by AE at 10/7/2024 1006    Acceptance, E, VU by TM at 10/4/2024 1313    Acceptance, E, NR by TM at 10/2/2024 1129                                      User Key       Initials Effective Dates Name Provider Type Discipline    AS 04/28/23 -  Viky Juarez, PTA Physical Therapist Assistant PT    LS 07/11/23 -  Jeni Alexander, PT Physical Therapist PT    AE 09/21/21 -  Rebel Maradiaga, PT Physical Therapist PT    TM 08/13/24 -  Britton Lipscomb, PT Student PT Student PT                  PT Recommendation and Plan     Plan of Care Reviewed With: patient  Progress: no change  Outcome Evaluation: Progress report completed. Goals reviewed and revised as appropriate to measure functional progress. Pt ambulated 75ft with CGA and RW for support. Pt continues to be limited by weakness/fatigue and remains below baseline functional mobility. Continue PT POC. Continue to progress per pt tolerance.     Time Calculation:         PT Charges       Row Name 10/14/24 1332             Time Calculation    Start Time 1044  -AE      PT Received On 10/14/24  -AE      PT Goal Re-Cert Due Date 10/24/24  -AE         Timed Charges    95131 - PT Therapeutic Activity Minutes 10  -AE         Total Minutes    Timed Charges Total Minutes 10  -AE       Total Minutes 10  -AE                User Key  (r) = Recorded By,  (t) = Taken By, (c) = Cosigned By      Initials Name Provider Type    AE Rebel Maradiaga, PT Physical Therapist                  Therapy Charges for Today       Code Description Service Date Service Provider Modifiers Qty    [id number]  PT THERAPEUTIC ACT EA 15 MIN 10/14/2024 Rebel Maradiaga, PT GP 1            PT G-Codes  Outcome Measure Options: AM-PAC 6 Clicks Basic Mobility (PT)  AM-PAC 6 Clicks Score (PT): 17  AM-PAC 6 Clicks Score (OT): 20  PT Discharge Summary  Anticipated Discharge Disposition (PT): inpatient rehabilitation facility    Rebel Maradiaga PT  10/14/2024      Electronically signed by Rebel Maradiaga, PT at 10/14/24 1333          Occupational Therapy Notes (last 24 hours)        Tequila Nielson, OT at 10/15/24 5595          Patient Name: Valeria Tracy  : 1940    MRN: [id number]                              Today's Date: 10/15/2024       Admit Date: 10/1/2024    Visit Dx:     ICD-10-CM ICD-9-CM   1. S/P right colectomy  Z90.49 V45.89   2. Rectal cancer  C20 154.1     Patient Active Problem List   Diagnosis    GERD (gastroesophageal reflux disease)    Asthma    Hypothyroidism    Colon cancer    S/P right colectomy    Acute postoperative pain    Anemia    Rectal cancer    S/P abdominal perineal resection, partial thickness posterior vaginal resection    Severe malnutrition    Postoperative urinary retention     Past Medical History:   Diagnosis Date    Acid reflux     Anemia     Asthma     Cancer     rectal    History of chemotherapy     History of radiation therapy     History of transfusion     No Reaction    Hypothyroid     Pneumonia     Wears dentures     upper    Wears glasses     Wears hearing aid in both ears      Past Surgical History:   Procedure Laterality Date    ABDOMINAL PERINEAL RESECTION N/A 10/1/2024    Procedure: ABDOMINAL PERINEAL RESECTION, LYSIS OF ADHESIONS AND CREATION OF COLOSTOMY;  Surgeon: Angelito Ruiz MD;  Location: [facility];  Service: General;   Laterality: N/A;    BREAST LUMPECTOMY Right     CATARACT EXTRACTION Bilateral     COLON RESECTION N/A 10/17/2023    Procedure: COLON RESECTION RIGHT LAPAROSCOPIC;  Surgeon: Angelito Ruiz MD;  Location: [facility];  Service: General;  Laterality: N/A;    COLONOSCOPY      PORTACATH PLACEMENT      SHOULDER SURGERY Right       General Information       Row Name 10/15/24 0853          OT Time and Intention    Document Type therapy note (daily note)  -AC     Mode of Treatment occupational therapy  -AC     Patient Effort good  -AC       Row Name 10/15/24 0853          General Information    Patient Profile Reviewed yes  -AC     Existing Precautions/Restrictions fall;other (see comments)  abdominal incision, ostomy  -AC     Barriers to Rehab medically complex;previous functional deficit  -AC       Row Name 10/15/24 0853          Cognition    Orientation Status (Cognition) oriented x 3  -AC       Row Name 10/15/24 0823          Safety Issues/Impairments Affecting Functional Mobility    Safety Issues Affecting Function (Mobility) awareness of need for assistance;insight into deficits/self-awareness;safety precaution awareness  -AC     Impairments Affecting Function (Mobility) balance;endurance/activity tolerance;strength  -AC               User Key  (r) = Recorded By, (t) = Taken By, (c) = Cosigned By      Initials Name Provider Type    AC Tequila Nielson OT Occupational Therapist                     Mobility/ADL's       Row Name 10/15/24 0843          Bed Mobility    Bed Mobility supine-sit;sit-supine  -AC     Supine-Sit [place] (Bed Mobility) standby assist  -     Sit-Supine [place] (Bed Mobility) standby assist  -     Assistive Device (Bed Mobility) air-assisted device;head of bed elevated  -       Row Name 10/15/24 0862          Transfers    Transfers sit-stand transfer;toilet transfer  -       Row Name 10/15/24 0854          Sit-Stand Transfer    Sit-Stand [place] (Transfers) standby assist  -AC      Assistive Device (Sit-Stand Transfers) walker, front-wheeled  -AC       Row Name 10/15/24 0854          Toilet Transfer    [place] Level (Toilet Transfer) standby assist  -AC     Assistive Device (Toilet Transfer) walker, front-wheeled  -AC       Row Name 10/15/24 08          Functional Mobility    Functional Mobility- Ind. Level verbal cues required;contact guard assist  -AC     Functional Mobility- Device walker, front-wheeled  -AC     Functional Mobility-Distance (Feet) --  household distance  -       Row Name 10/15/24 0854          Activities of Daily Living    BADL Assessment/Intervention lower body dressing;grooming;toileting  -       Row Name 10/15/24 0854          Lower Body Dressing Assessment/Training    [place] Level (Lower Body Dressing) don;socks;contact guard assist  -AC     Position (Lower Body Dressing) unsupported sitting  -       Row Name 10/15/24 08          Toileting Assessment/Training    [place] Level (Toileting) adjust/manage clothing;perform perineal hygiene;standby assist  -     Assistive Devices (Toileting) commode  -AC     Position (Toileting) supported standing;unsupported sitting  -       Row Name 10/15/24 0854          Grooming Assessment/Training    [place] Level (Grooming) hair care, combing/brushing;wash face, hands;standby assist  -AC     Position (Grooming) supported standing  -AC               User Key  (r) = Recorded By, (t) = Taken By, (c) = Cosigned By      Initials Name Provider Type    Tequila Zepeda, OT Occupational Therapist                   Obj/Interventions       Row Name 10/15/24 0848          Balance    Balance Assessment sitting static balance;sitting dynamic balance;standing static balance;standing dynamic balance  -     Static Sitting Balance standby assist  -     Dynamic Sitting Balance contact guard  -AC     Position, Sitting Balance unsupported;sitting edge of bed  -AC     Static Standing Balance standby assist  -AC      Dynamic Standing Balance contact guard  -AC     Position/Device Used, Standing Balance supported;walker, front-wheeled  -AC               User Key  (r) = Recorded By, (t) = Taken By, (c) = Cosigned By      Initials Name Provider Type    Tequila Zepeda, OT Occupational Therapist                   Goals/Plan    No documentation.                  Clinical Impression       Row Name 10/15/24 0856          Pain Assessment    Pretreatment Pain Rating 0/10 - no pain  -AC     Posttreatment Pain Rating 0/10 - no pain  -AC       Row Name 10/15/24 0856          Plan of Care Review    Plan of Care Reviewed With patient  -AC     Progress improving  -AC     Outcome Evaluation Pt CGA LBD, SBA sinkside grooming, SBA toilet transfer, CGA to ambulate with RW.  Pt is progressing, but continues below baseline d/t weakness and decr activity tolerance.  Recommend IP rehab upon d/c.  -AC       Row Name 10/15/24 0856          Vital Signs    Pre Systolic BP Rehab 124  -AC     Pre Treatment Diastolic BP 60  -AC     Pretreatment Heart Rate (beats/min) 97  -AC     Posttreatment Heart Rate (beats/min) 101  -AC     Pre SpO2 (%) 94  -AC     O2 Delivery Pre Treatment room air  -AC     O2 Delivery Intra Treatment room air  -AC     Post SpO2 (%) 97  -AC     O2 Delivery Post Treatment room air  -AC     Pre Patient Position Supine  -AC     Post Patient Position Supine  -AC       Row Name 10/15/24 0856          Positioning and Restraints    Pre-Treatment Position in bed  -AC     Post Treatment Position bed  -AC     In Bed notified nsg;fowlers;call light within reach;encouraged to call for assist;exit alarm on  -AC               User Key  (r) = Recorded By, (t) = Taken By, (c) = Cosigned By      Initials Name Provider Type    Tequila Zepeda, OT Occupational Therapist                   Outcome Measures       Row Name 10/15/24 0900          How much help from another is currently needed...    Putting on and taking off regular lower body clothing? 3   -AC     Bathing (including washing, rinsing, and drying) 3  -AC     Toileting (which includes using toilet bed pan or urinal) 3  -AC     Putting on and taking off regular upper body clothing 4  -AC     Taking care of personal grooming (such as brushing teeth) 3  -AC     Eating meals 4  -AC     AM-PAC 6 Clicks Score (OT) 20  -AC       Row Name 10/15/24 0900          Functional Assessment    Outcome Measure Options AM-PAC 6 Clicks Daily Activity (OT)  -               User Key  (r) = Recorded By, (t) = Taken By, (c) = Cosigned By      Initials Name Provider Type    AC Tequila Nielson OT Occupational Therapist                    Occupational Therapy Education       Title: PT OT SLP Therapies (In Progress)       Topic: Occupational Therapy (In Progress)       Point: ADL training (In Progress)       Description:   Instruct learner(s) on proper safety adaptation and remediation techniques during self care or transfers.   Instruct in proper use of assistive devices.                  Learning Progress Summary            Patient Acceptance, E, NR by  at 10/15/2024 0900    Acceptance, E, NR by  at 10/11/2024 1016    Acceptance, E, NR by  at 10/8/2024 1307                      Point: Home exercise program (Not Started)       Description:   Instruct learner(s) on appropriate technique for monitoring, assisting and/or progressing therapeutic exercises/activities.                  Learner Progress:  Not documented in this visit.              Point: Precautions (Not Started)       Description:   Instruct learner(s) on prescribed precautions during self-care and functional transfers.                  Learner Progress:  Not documented in this visit.              Point: Body mechanics (Not Started)       Description:   Instruct learner(s) on proper positioning and spine alignment during self-care, functional mobility activities and/or exercises.                  Learner Progress:  Not documented in this visit.                               User Key       Initials Effective Dates Name Provider Type Discipline     02/03/23 -  Tequila Nielson OT Occupational Therapist OT                  OT Recommendation and Plan  Planned Therapy Interventions (OT): activity tolerance training, BADL retraining, functional balance retraining, occupation/activity based interventions, patient/caregiver education/training, strengthening exercise, transfer/mobility retraining  Therapy Frequency (OT): daily  Plan of Care Review  Plan of Care Reviewed With: patient  Progress: improving  Outcome Evaluation: Pt CGA LBD, SBA sinkside grooming, SBA toilet transfer, CGA to ambulate with RW.  Pt is progressing, but continues below baseline d/t weakness and decr activity tolerance.  Recommend IP rehab upon d/c.     Time Calculation:   Evaluation Complexity (OT)  Review Occupational Profile/Medical/Therapy History Complexity: brief/low complexity  Assessment, Occupational Performance/Identification of Deficit Complexity: 1-3 performance deficits  Clinical Decision Making Complexity (OT): problem focused assessment/low complexity  Overall Complexity of Evaluation (OT): low complexity     Time Calculation- OT       Row Name 10/15/24 0735             Time Calculation- OT    OT Start Time 0735  -AC      OT Received On 10/15/24  -AC      OT Goal Re-Cert Due Date 10/18/24  -AC         Timed Charges    87799 - OT Therapeutic Activity Minutes 10  -AC      76559 - OT Self Care/Mgmt Minutes 15  -AC         Total Minutes    Timed Charges Total Minutes 25  -AC       Total Minutes 25  -AC                User Key  (r) = Recorded By, (t) = Taken By, (c) = Cosigned By      Initials Name Provider Type    AC Tequila Nielson, OT Occupational Therapist                  Therapy Charges for Today       Code Description Service Date Service Provider Modifiers Qty    [id number]  OT THERAPEUTIC ACT EA 15 MIN 10/15/2024 Tequila Nielson OT GO 1    [id number]  OT SELF CARE/MGMT/TRAIN EA 15  MIN 10/15/2024 Tequila Nielson, OT GO 1                 Tequila ADAMS. SRIKANTH Nielson  10/15/2024    Electronically signed by Tequila Nielson OT at 10/15/24 0902       Tequila Nielson OT at 10/15/24 0735          Goal Outcome Evaluation:  Plan of Care Reviewed With: patient        Progress: improving  Outcome Evaluation: Pt CGA LBD, SBA sinkside grooming, SBA toilet transfer, CGA to ambulate with RW.  Pt is progressing, but continues below baseline d/t weakness and decr activity tolerance.  Recommend IP rehab upon d/c.    Anticipated Discharge Disposition (OT): inpatient rehabilitation facility                          Electronically signed by Tequila Nielson OT at 10/15/24 0901

## 2024-10-15 NOTE — PROGRESS NOTES
"[facility] ([facility]    No nausea or vomiting, fresh stool per colostomy    Vital Signs:  Blood pressure 124/60, pulse 79, temperature 99.1 °F (37.3 °C), temperature source Oral, resp. rate 22, height 165.1 cm (65\"), weight 56 kg (123 lb 8 oz), SpO2 94%.    Labs past 24 hours:  Lab Results (last 24 hours)       ** No results found for the last 24 hours. **            I/O last shift:  No intake/output data recorded.     PHYSICAL EXAM-  Comfortable  Not distended  Not toxic  Not tachycardic  Incision okay  Fresh stool per colostomy    Pathology:  Order Name Source Comment Collection Info Order Time   PREPARE RBC Other   10/1/2024  8:28 AM     When to Transfuse?   Hold          Indication for Transfusion   Surgery          Surgery Date   10/1/2024          Release to patient   Routine Release        TYPE AND SCREEN   Collected By: Mirta Christianson RN 10/1/2024  8:31 AM     Release to patient   Routine Release        TISSUE PATHOLOGY EXAM Large Intestine, Rectum  Collected By: Angelito Ruiz MD 10/1/2024  2:45 PM     Release to patient   Routine Release        .    Assessment and Plan:  Well films from last night after dinner look worse, this may be that it was postprandial.  She denies pain and looks forward to eating breakfast.  Will check interval films today.  Have encouraged frequent ambulation  Fresh stool per colostomy, no cramping at this time.    Angelito Ruiz MD  10/15/24  07:37 EDT  "

## 2024-10-15 NOTE — PROGRESS NOTES
Patient's PO Treatment Discussion Summary from [facility] on 10/14/2024 was sent to the presenting Physician, Dr. Angelito Ruiz, per secured email on 10/15/2024. Also sent to [facility] MR Personnel, Shani, per secured email. Per request of [facility] Director, Dr. Carter Mcleod puts in MR at [facility].

## 2024-10-15 NOTE — PLAN OF CARE
Problem: Adult Inpatient Plan of Care  Goal: Plan of Care Review  Outcome: Progressing  Goal: Patient-Specific Goal (Individualized)  Outcome: Progressing  Goal: Absence of Hospital-Acquired Illness or Injury  Outcome: Progressing  Intervention: Identify and Manage Fall Risk  Recent Flowsheet Documentation  Taken 10/15/2024 0400 by Ana Mcnamara RN  Safety Promotion/Fall Prevention:   assistive device/personal items within reach   clutter free environment maintained   fall prevention program maintained   nonskid shoes/slippers when out of bed   room organization consistent   safety round/check completed  Taken 10/15/2024 0000 by Ana Mcnamara RN  Safety Promotion/Fall Prevention:   assistive device/personal items within reach   clutter free environment maintained   fall prevention program maintained   nonskid shoes/slippers when out of bed   room organization consistent   safety round/check completed  Taken 10/14/2024 2000 by Ana Mcnamara RN  Safety Promotion/Fall Prevention:   assistive device/personal items within reach   clutter free environment maintained   fall prevention program maintained   nonskid shoes/slippers when out of bed   room organization consistent   safety round/check completed  Intervention: Prevent Skin Injury  Recent Flowsheet Documentation  Taken 10/15/2024 0400 by Ana Mcnamara RN  Body Position: position changed independently  Taken 10/15/2024 0000 by Ana Mcnamara RN  Body Position: position changed independently  Taken 10/14/2024 2000 by Ana Mcnamara RN  Body Position: position changed independently  Skin Protection: incontinence pads utilized  Intervention: Prevent Infection  Recent Flowsheet Documentation  Taken 10/15/2024 0400 by Ana Mcnamara RN  Infection Prevention:   environmental surveillance performed   hand hygiene promoted   personal protective equipment utilized   single patient room provided  Taken 10/15/2024 0000 by Ana Mcnamara  RN  Infection Prevention:   environmental surveillance performed   hand hygiene promoted   personal protective equipment utilized   single patient room provided  Taken 10/14/2024 2000 by Ana Mcnamara RN  Infection Prevention:   environmental surveillance performed   hand hygiene promoted   personal protective equipment utilized   single patient room provided  Goal: Optimal Comfort and Wellbeing  Outcome: Progressing  Intervention: Provide Person-Centered Care  Recent Flowsheet Documentation  Taken 10/14/2024 2000 by Ana Mcnamara RN  Trust Relationship/Rapport:   care explained   choices provided   questions answered   questions encouraged  Goal: Readiness for Transition of Care  Outcome: Progressing     Problem: Fall Injury Risk  Goal: Absence of Fall and Fall-Related Injury  Outcome: Progressing  Intervention: Identify and Manage Contributors  Recent Flowsheet Documentation  Taken 10/14/2024 2000 by Ana Mcnamara RN  Medication Review/Management: medications reviewed  Intervention: Promote Injury-Free Environment  Recent Flowsheet Documentation  Taken 10/15/2024 0400 by Ana Mcnamara RN  Safety Promotion/Fall Prevention:   assistive device/personal items within reach   clutter free environment maintained   fall prevention program maintained   nonskid shoes/slippers when out of bed   room organization consistent   safety round/check completed  Taken 10/15/2024 0000 by Ana Mcnamara RN  Safety Promotion/Fall Prevention:   assistive device/personal items within reach   clutter free environment maintained   fall prevention program maintained   nonskid shoes/slippers when out of bed   room organization consistent   safety round/check completed  Taken 10/14/2024 2000 by Ana Mcnamara RN  Safety Promotion/Fall Prevention:   assistive device/personal items within reach   clutter free environment maintained   fall prevention program maintained   nonskid shoes/slippers when out of bed   room  organization consistent   safety round/check completed     Problem: Skin Injury Risk Increased  Goal: Skin Health and Integrity  Outcome: Progressing  Intervention: Optimize Skin Protection  Recent Flowsheet Documentation  Taken 10/15/2024 0400 by Ana Mcnamara RN  Activity Management: activity encouraged  Head of Bed (HOB) Positioning: HOB lowered  Taken 10/15/2024 0000 by Ana Mcnamara RN  Activity Management: activity encouraged  Pressure Reduction Techniques: frequent weight shift encouraged  Head of Bed (HOB) Positioning: HOB lowered  Pressure Reduction Devices:   pressure-redistributing mattress utilized   positioning supports utilized  Taken 10/14/2024 2000 by Ana Mcnamara RN  Activity Management: activity encouraged  Pressure Reduction Techniques:   frequent weight shift encouraged   weight shift assistance provided  Head of Bed (HOB) Positioning: HOB lowered  Pressure Reduction Devices:   pressure-redistributing mattress utilized   positioning supports utilized   heel offloading device utilized  Skin Protection: incontinence pads utilized     Problem: Infection  Goal: Absence of Infection Signs and Symptoms  Outcome: Progressing     Problem: Adjustment to Surgery (Colostomy)  Goal: Optimal Coping  Outcome: Progressing     Problem: Bleeding (Colostomy)  Goal: Absence of Bleeding  Outcome: Progressing     Problem: Fluid, Electrolyte and Nutrition Imbalance (Colostomy)  Goal: Fluid, Electrolyte and Nutrition Balance  Outcome: Progressing     Problem: Infection (Colostomy)  Goal: Absence of Infection Signs and Symptoms  Outcome: Progressing     Problem: Ongoing Anesthesia Effects (Colostomy)  Goal: Anesthesia/Sedation Recovery  Outcome: Progressing  Intervention: Optimize Anesthesia Recovery  Recent Flowsheet Documentation  Taken 10/15/2024 0400 by Ana Mcnamara RN  Safety Promotion/Fall Prevention:   assistive device/personal items within reach   clutter free environment maintained   fall  prevention program maintained   nonskid shoes/slippers when out of bed   room organization consistent   safety round/check completed  Taken 10/15/2024 0000 by Ana Mcnamara RN  Safety Promotion/Fall Prevention:   assistive device/personal items within reach   clutter free environment maintained   fall prevention program maintained   nonskid shoes/slippers when out of bed   room organization consistent   safety round/check completed  Taken 10/14/2024 2000 by Ana Mcnamara RN  Safety Promotion/Fall Prevention:   assistive device/personal items within reach   clutter free environment maintained   fall prevention program maintained   nonskid shoes/slippers when out of bed   room organization consistent   safety round/check completed     Problem: Pain (Colostomy)  Goal: Acceptable Pain Control  Outcome: Progressing  Intervention: Prevent or Manage Pain  Recent Flowsheet Documentation  Taken 10/15/2024 0400 by Ana Mcnamara RN  Diversional Activities:   television   smartphone  Taken 10/15/2024 0000 by Ana Mcnamara RN  Diversional Activities:   television   smartphone  Taken 10/14/2024 2000 by Ana Mcnamara RN  Diversional Activities:   television   smartphone     Problem: Postoperative Nausea and Vomiting (Colostomy)  Goal: Nausea and Vomiting Relief  Outcome: Progressing     Problem: Postoperative Stoma Care (Colostomy)  Goal: Optimal Stoma Healing  Outcome: Progressing  Intervention: Provide Ostomy and Peristomal Skin Care  Recent Flowsheet Documentation  Taken 10/14/2024 2000 by Ana Mcnamara RN  Skin Protection: incontinence pads utilized     Problem: Postoperative Urinary Retention (Colostomy)  Goal: Effective Urinary Elimination  Outcome: Progressing     Problem: Respiratory Compromise (Colostomy)  Goal: Effective Oxygenation and Ventilation  Outcome: Progressing  Intervention: Optimize Oxygenation and Ventilation  Recent Flowsheet Documentation  Taken 10/15/2024 0400 by Ana Mcnamara  RN  Head of Bed (HOB) Positioning: HOB lowered  Taken 10/15/2024 0000 by Ana Mcnamara RN  Head of Bed (HOB) Positioning: HOB lowered  Taken 10/14/2024 2000 by Ana Mcnamara RN  Head of Bed (HOB) Positioning: HOB lowered   Goal Outcome Evaluation:

## 2024-10-15 NOTE — DISCHARGE PLACEMENT REQUEST
"Cris Leonard (84 y.o. Female)       Date of Birth   1940    Social Security Number       Address   [address] [place] [address]    Home Phone   [phone]    MRN   [id number]       Mandaeism   None    Marital Status   Single                            Admission Date   10/1/24    Admission Type   Elective    Admitting Provider   Angelito Ruiz MD    Attending Provider   Angelito Ruiz MD    Department, Room/Bed   [address], S572/1       Discharge Date       Discharge Disposition       Discharge Destination                                 Attending Provider: Angelito Ruiz MD    Allergies: Milk-related Compounds    Isolation: None   Infection: None   Code Status: CPR    Ht: 165.1 cm (65\")   Wt: 56 kg (123 lb 8 oz)    Admission Cmt: None   Principal Problem: S/P abdominal perineal resection, partial thickness posterior vaginal resection [Z90.49]                   Active Insurance as of 10/1/2024       Primary Coverage       Payor Plan Insurance Group Employer/Plan Group    HUMANA MEDICARE REPLACEMENT HUMANA MED ADV PPO [id number]       Payor Plan Address Payor Plan Phone Number Payor Plan Fax Number Effective Dates    [address] [address] [phone]  3/1/2024 - None Entered    [facility] [id number]         Subscriber Name Subscriber Birth Date Member ID       CRIS LEONARD 1940 [id number]                     Emergency Contacts        (Rel.) Home Phone Work Phone Mobile Phone    ANNABELLA SALES (Grandchild) [phone] -- [phone]                 History & Physical        Maggi Lance MD at 10/01/24 1815          Admission HP     Patient Name: Cris Leonard  MRN: [id number]  : 1940  DOS: 10/1/2024    Attending: Maggi Lance MD    Primary Care Provider: Peyton Vázquez PA      Patient Care Team:  Peyton Vázquez PA as PCP - General (Physician Assistant)    Chief complaint: Rectal cancer    Subjective  Patient is a pleasant " 84 y.o. female presented for scheduled surgery by Dr. Ruiz.    Patient has history of right colon cancer for which she had a colectomy, T3 N1 has been resected with persistent neoplasia in the posterior distal rectum after YUDELKA prompting plans for APR.    Today she underwent abdominal perineal resection with partial thickness posterior vaginal resection.  Surgery included placement of a JESUS drain into the deep pelvis and colostomy creation in the left abdomen.    Surgery was done under general anesthesia and a block, was tolerated well.  I saw her in PACU where she is still in a drowsy  Postop state.  Not in distress.       Allergies   Allergen Reactions    Milk-Related Compounds Anaphylaxis        Medications Prior to Admission   Medication Sig Dispense Refill Last Dose    albuterol sulfate  (90 Base) MCG/ACT inhaler Inhale 2 puffs Every 4 (Four) Hours As Needed for Wheezing.   Past Week    Calcium-Vitamin D-Vitamin K (VIACTIV CALCIUM PLUS D PO) Take 2 doses by mouth Daily.   Past Week    DOCUSATE SODIUM PO Take 100 mg by mouth Daily As Needed (constipation).   Past Week    famotidine (PEPCID) 10 MG tablet Take 1 tablet by mouth 2 (Two) Times a Day.   Past Month    FLUTICASONE PROPIONATE NA 2 sprays into the nostril(s) as directed by provider Daily.   Past Week    fluticasone-salmeterol (ADVAIR HFA) 115-21 MCG/ACT inhaler Inhale 2 puffs 2 (Two) Times a Day.   10/1/2024    levothyroxine (SYNTHROID, LEVOTHROID) 88 MCG tablet Take 1 tablet by mouth Daily.   9/30/2024    Loratadine 10 MG capsule Take 1 capsule by mouth Daily.   9/30/2024    Multiple Vitamins-Minerals (Womens Multi Gummies) chewable tablet Chew 1 tablet Daily.   Past Week    ibuprofen (ADVIL,MOTRIN) 200 MG tablet Take 1 tablet by mouth Every 6 (Six) Hours As Needed for Mild Pain.       sodium chloride 0.65 % nasal spray Administer 1 spray into the nostril(s) as directed by provider As Needed for Congestion.             Past Medical History:  "  Diagnosis Date    Acid reflux     Anemia     Asthma     Cancer     rectal    History of chemotherapy     History of radiation therapy     History of transfusion     No Reaction    Hypothyroid     Pneumonia     Wears dentures     upper    Wears glasses     Wears hearing aid in both ears      Past Surgical History:   Procedure Laterality Date    BREAST LUMPECTOMY Right     CATARACT EXTRACTION Bilateral     COLON RESECTION N/A 10/17/2023    Procedure: COLON RESECTION RIGHT LAPAROSCOPIC;  Surgeon: Angelito Ruiz MD;  Location: [facility];  Service: General;  Laterality: N/A;    COLONOSCOPY      PORTACATH PLACEMENT      SHOULDER SURGERY Right      History reviewed. No pertinent family history.  Social History     Tobacco Use    Smoking status: Former     Types: Cigarettes    Smokeless tobacco: Never   Vaping Use    Vaping status: Never Used   Substance Use Topics    Alcohol use: Never    Drug use: Never       Review of Systems  Review of systems could not be obtained due to   patient sedation status.    Vital Signs  /73 (BP Location: Right arm, Patient Position: Lying)   Pulse 67   Temp 97.6 °F (36.4 °C) (Axillary)   Resp 16   Ht 165.1 cm (65\")   Wt 56 kg (123 lb 8 oz)   SpO2 99%   BMI 20.55 kg/m²     Physical Exam:    General Appearance:  Drowsy postop in no acute distress   Head:    Normocephalic, without obvious abnormality, atraumatic   Eyes:            Lids and lashes normal, conjunctivae and sclerae normal, no   icterus    Ears:    Ears appear intact with no abnormalities noted   Throat:   No oral lesions, no thrush, oral mucosa moist   Neck:   No adenopathy, supple, trachea midline, no thyromegaly         Lungs:     Clear to auscultation,respirations regular, even and       unlabored. No wheezes or rales.    Heart:    Regular rhythm and normal rate, normal S1 and S2, no murmur    Abdomen:      Soft with midline incision with an intact dressing.  Left colostomy and low abdomen/pelvic JESUS drain. " "  Genitalia:    Deferred   Extremities:   No edema, no cyanosis, no              redness   Pulses:   Pulses palpable and equal bilaterally   Skin:   No bleeding, bruising or rash   Neurologic:   Cranial nerves 2 - 12 grossly intact,             Invalid input(s): \"NEUTOPHILPCT\"        Invalid input(s): \"LABALBU\", \"PROT\"  Lab Results   Component Value Date    HGBA1C 5.20 09/23/2024      Latest Reference Range & Units 09/23/24 13:17   Sodium 136 - 145 mmol/L 139   Potassium 3.5 - 5.2 mmol/L 4.2   Chloride 98 - 107 mmol/L 101   CO2 22.0 - 29.0 mmol/L 29.0   Anion Gap 5.0 - 15.0 mmol/L 9.0   BUN 8 - 23 mg/dL 8   Creatinine 0.57 - 1.00 mg/dL 0.65   BUN/Creatinine Ratio 7.0 - 25.0  12.3   eGFR >60.0 mL/min/1.73 87.5   Glucose 65 - 99 mg/dL 89   Calcium 8.6 - 10.5 mg/dL 9.6   Alkaline Phosphatase 39 - 117 U/L 119 (H)   Total Protein 6.0 - 8.5 g/dL 7.1   Albumin 3.5 - 5.2 g/dL 4.3   Globulin gm/dL 2.8   A/G Ratio g/dL 1.5   AST (SGOT) 1 - 32 U/L 22   ALT (SGPT) 1 - 33 U/L 8   Total Bilirubin 0.0 - 1.2 mg/dL 0.5   Hemoglobin A1C 4.80 - 5.60 % 5.20   WBC 3.40 - 10.80 10*3/mm3 5.08   RBC 3.77 - 5.28 10*6/mm3 3.91   Hemoglobin 12.0 - 15.9 g/dL 12.1   Hematocrit 34.0 - 46.6 % 38.6   Platelets 140 - 450 10*3/mm3 302   RDW 12.3 - 15.4 % 13.0   MCV 79.0 - 97.0 fL 98.7 (H)   MCH 26.6 - 33.0 pg 30.9   MCHC 31.5 - 35.7 g/dL 31.3 (L)   MPV 6.0 - 12.0 fL 9.1   RDW-SD 37.0 - 54.0 fl 47.0   (H): Data is abnormally high  (L): Data is abnormally low    Assessment and Plan:   S/p ABDOMINAL PERINEAL RESECTION  Partial thickness posterior vaginal resection.    Rectal cancer    GERD (gastroesophageal reflux disease)    Asthma    Hypothyroidism      Plan  Postop management to include  1. Ambulation, several times daily  2. Pain control-PRNs, multimodal approach   3. IS-will encourage  4. DVT proph- Mechanical, subcutaneous heparin  5. Bowel regimen, alvimopan  6. Resume home medications as appropriate  7. Monitor post-op labs  8. Discharge " planning   9. Diet, Clears, advance diet as tolerated.  IVF initially, monitor volume status.    -Asthma: Maintain home regimen with formulary substitution as indicated.  As needed bronchodilators.  Monitor oxygenation.    -Hypothyroidism: Resume replacement     -GERD:  Resume H2 blocker.     -New stoma:  Wound care stoma nurse consult for stoma care and education throughout patient's hospitalization.      Dragon disclaimer:  Part of this encounter note is an electronic transcription/translation of spoken language to printed text. The electronic translation of spoken language may permit erroneous, or at times, nonsensical words or phrases to be inadvertently transcribed; Although I have reviewed the note for such errors, some may still exist.    Maggi Lance MD  10/01/24  18:15 EDT              Electronically signed by Maggi Lance MD at 10/01/24 2202       Pina Norman APRN at 10/01/24 0910       Attestation signed by Angelito Ruiz MD at 10/01/24 1130    No interval change    Goals of postoperative care reviewed                [facility] Pre-op    Full history and physical note from office is attached.    /85 (BP Location: Right arm, Patient Position: Lying)   Pulse 85   Temp 98.7 °F (37.1 °C) (Temporal)   Resp 18   SpO2 100%     Immunizations:  Influenza:  No  Pneumococcal:  UTD  Tetanus:  UTD    Review of Systems:  Constitutional-- No fever, chills or sweats. No fatigue.  CV-- No chest pain, palpitation or syncope  Resp-- No SOB, cough, hemoptysis  Skin--No rashes or lesions    Physical Exam:  Heart:   Regular rate and rhythm, S1 and S2 normal  Lungs: Clear to auscultation bilaterally, respirations unlabored    LAB Results:  Lab Results   Component Value Date    WBC 5.08 09/23/2024    HGB 12.1 09/23/2024    HCT 38.6 09/23/2024    MCV 98.7 (H) 09/23/2024     09/23/2024    NEUTROABS 7.07 (H) 10/23/2023    GLUCOSE 89 09/23/2024    BUN 8 09/23/2024    CREATININE  "0.65 09/23/2024     09/23/2024    K 4.2 09/23/2024     09/23/2024    CO2 29.0 09/23/2024    CALCIUM 9.6 09/23/2024    ALBUMIN 4.3 09/23/2024    AST 22 09/23/2024    ALT 8 09/23/2024    BILITOT 0.5 09/23/2024     Study Result    Narrative & Impression   MRI PELVIS WO CONTRAST     Date of Exam: 8/22/2024 6:43 PM EDT     Indication: C20.     Comparison: CT abdomen pelvis 10/20/2023.     Technique:  Routine multiplanar/multisequence images of the pelvis were obtained without contrast administration.     Per chart review: 10/25/2023 patient underwent colonoscopy which demonstrated \"large bulky mass lesion was seen in the rectum. This arose from the posterior wall of the rectum was greater than 5 cm in size, arising from a broad base with distal extent to   within a proximally 3 cm of the dentate line. Several biopsies were obtained, that showed moderately differentiated adenocarcinoma.\" Patient has subsequently undergone chemoradiation at an outside institution for rectal cancer. No pretreatment/baseline   rectal MRI performed.     Findings:     Note there is a large amount of gas in the rectum, which makes evaluation suboptimal. Along the posterior rectal wall there is T2 hypointense signal suggestive of scar/fibrosis, which spans around 3.5 cm in craniocaudal dimension (series 5 image 10-12)   which appears to be at the site of the rectal tumor seen as an enhancing mass on prior CT. The inferior margin of the scar extends to 2.2 cm above the anorectal junction and 4.2 cm above the anal verge. Within the posterior mesorectal fat at this   location at the 6 o'clock position, there are thin linear/radiating areas of T2 hypointensity suggestive of desmoplastic stranding. Otherwise, there is no convincing evidence of mesorectal involvement. No distinct intermediate T2 signal or discrete   restricted diffusion to suggest significant residual viable disease.     There is a 4 x 4 mm lymph node in the mesorectum at " the 7 o'clock position, without prominent diffusion signal or suspicious morphologic features, unchanged in size from prior CT and of low suspicion. No mesorectal lymphadenopathy otherwise. No   extramesorectal lymphadenopathy within the field-of-view.     Small uterus and ovaries, typical of age. Questionable small fibroid present. No free fluid or organized fluid collection. No subcutaneous soft tissue abnormality. No acute or suspicious osseous lesions. Degenerative changes of the bilateral hips.     IMPRESSION:  Impression:     Within the confines of comparing to a prior CT without prior/baseline MRI, as well as a large amount of gas in the rectum which distorts diffusion-weighted sequences, there appears to be positive response to treatment with predominantly T2 hypointense   signal at the site of the treated rectal tumor suggestive of scar/fibrosis. No distinct intermediate T2 signal nor restricted diffusion to suggest measurable residual viable tumor. Mesorectum is generally clear without convincing evidence of tumor   involvement nor suspicious lymphadenopathy, with note made of a tiny/nonsuspicious lymph node. Consider correlation with endoscopy and tissue sampling as clinically indicated.     Cancer Staging (if applicable)  Cancer Patient: __ yes __no __unknown__N/A; If yes, clinical stage T:__ N:__M:__, stage group or __N/A      Impression: History rectal cancer, Stage III      Plan: ABDOMINAL PERINEAL RESECTION, LYSIS OF ADHESIONS       RAINA Arana   10/1/2024   09:10 EDT    Electronically signed by Angelito Ruiz MD at 10/01/24 1130   Source Note: H&P        [Media Unavailable] Scan on 9/9/2024 0913 by [employer], [place]: H&P, [facility], 09/06/2024          Electronically signed by [employer], [place] at 09/09/24 0916                 H&P signed by [employer], [place] at 09/09/24 0916         [Media Unavailable] Scan on 9/9/2024 0913 by [employer], [place]: H&P, [facility], 09/06/2024         "  Electronically signed by [employer], [place] at 09/09/24 0916          Physician Progress Notes (most recent note)        Angelito Ruiz MD at 10/15/24 0737          [facility] ([facility])    No nausea or vomiting, fresh stool per colostomy    Vital Signs:  Blood pressure 124/60, pulse 79, temperature 99.1 °F (37.3 °C), temperature source Oral, resp. rate 22, height 165.1 cm (65\"), weight 56 kg (123 lb 8 oz), SpO2 94%.    Labs past 24 hours:  Lab Results (last 24 hours)       ** No results found for the last 24 hours. **            I/O last shift:  No intake/output data recorded.     PHYSICAL EXAM-  Comfortable  Not distended  Not toxic  Not tachycardic  Incision okay  Fresh stool per colostomy    Pathology:  Order Name Source Comment Collection Info Order Time   PREPARE RBC Other   10/1/2024  8:28 AM     When to Transfuse?   Hold          Indication for Transfusion   Surgery          Surgery Date   10/1/2024          Release to patient   Routine Release        TYPE AND SCREEN   Collected By: Mirta Christianson RN 10/1/2024  8:31 AM     Release to patient   Routine Release        TISSUE PATHOLOGY EXAM Large Intestine, Rectum  Collected By: Angelito Ruiz MD 10/1/2024  2:45 PM     Release to patient   Routine Release        .    Assessment and Plan:  Well films from last night after dinner look worse, this may be that it was postprandial.  She denies pain and looks forward to eating breakfast.  Will check interval films today.  Have encouraged frequent ambulation  Fresh stool per colostomy, no cramping at this time.    Angelito Ruiz MD  10/15/24  07:37 EDT    Electronically signed by Angelito Ruiz MD at 10/15/24 0738          Physical Therapy Notes (last 72 hours)        Rebel Maradiaga, PT at 10/14/24 1044  Version 1 of 1         Goal Outcome Evaluation:  Plan of Care Reviewed With: patient        Progress: no change  Outcome Evaluation: Progress report completed. Goals " reviewed and revised as appropriate to measure functional progress. Pt ambulated 75ft with CGA and RW for support. Pt continues to be limited by weakness/fatigue and remains below baseline functional mobility. Continue PT POC. Continue to progress per pt tolerance.    Anticipated Discharge Disposition (PT): inpatient rehabilitation facility                          Electronically signed by Rebel Maradiaga, PT at 10/14/24 1332       Rebel Maradiaga, PT at 10/14/24 1044  Version 1 of 1         Patient Name: Valeria Tracy  : 1940    MRN: [id number]                              Today's Date: 10/14/2024       Admit Date: 10/1/2024    Visit Dx:     ICD-10-CM ICD-9-CM   1. S/P right colectomy  Z90.49 V45.89   2. Rectal cancer  C20 154.1     Patient Active Problem List   Diagnosis    GERD (gastroesophageal reflux disease)    Asthma    Hypothyroidism    Colon cancer    S/P right colectomy    Acute postoperative pain    Anemia    Rectal cancer    S/P abdominal perineal resection, partial thickness posterior vaginal resection    Severe malnutrition    Postoperative urinary retention     Past Medical History:   Diagnosis Date    Acid reflux     Anemia     Asthma     Cancer     rectal    History of chemotherapy     History of radiation therapy     History of transfusion     No Reaction    Hypothyroid     Pneumonia     Wears dentures     upper    Wears glasses     Wears hearing aid in both ears      Past Surgical History:   Procedure Laterality Date    ABDOMINAL PERINEAL RESECTION N/A 10/1/2024    Procedure: ABDOMINAL PERINEAL RESECTION, LYSIS OF ADHESIONS AND CREATION OF COLOSTOMY;  Surgeon: Angelito Ruiz MD;  Location: [facility] OR;  Service: General;  Laterality: N/A;    BREAST LUMPECTOMY Right     CATARACT EXTRACTION Bilateral     COLON RESECTION N/A 10/17/2023    Procedure: COLON RESECTION RIGHT LAPAROSCOPIC;  Surgeon: Angelito Ruiz MD;  Location: [facility] OR;  Service: General;  Laterality: N/A;    COLONOSCOPY       PORTACATH PLACEMENT      SHOULDER SURGERY Right       General Information       Row Name 10/14/24 1313          Physical Therapy Time and Intention    Document Type progress note/recertification  -AE     Mode of Treatment physical therapy  -AE       Row Name 10/14/24 1313          General Information    Patient Profile Reviewed yes  -AE     Existing Precautions/Restrictions fall;other (see comments)  abdominal incision, ostomy; NG  -AE     Barriers to Rehab medically complex;previous functional deficit  -AE       Row Name 10/14/24 1313          Cognition    Orientation Status (Cognition) oriented x 3  -AE       Row Name 10/14/24 1313          Safety Issues/Impairments Affecting Functional Mobility    Safety Issues Affecting Function (Mobility) awareness of need for assistance;insight into deficits/self-awareness;safety precaution awareness;sequencing abilities  -AE     Impairments Affecting Function (Mobility) balance;endurance/activity tolerance;strength;pain  -AE               User Key  (r) = Recorded By, (t) = Taken By, (c) = Cosigned By      Initials Name Provider Type    AE Rebel Maradiaga PT Physical Therapist                   Mobility       Row Name 10/14/24 1314          Bed Mobility    Comment, (Bed Mobility) Pt received and left UI.  -AE       Row Name 10/14/24 1314          Transfers    Comment, (Transfers) VCs for hand placement and sequencing. No overt LOB noted.  -AE       Row Name 10/14/24 1314          Sit-Stand Transfer    Sit-Stand [place] (Transfers) contact guard;verbal cues  -AE       Row Name 10/14/24 1314          Gait/Stairs (Locomotion)    [place] Level (Gait) contact guard  -AE     Assistive Device (Gait) walker, front-wheeled  -AE     Distance in Feet (Gait) 75  -AE     Deviations/Abnormal Patterns (Gait) gait speed decreased;bilateral deviations;stride length decreased;tanya decreased  -AE     Bilateral Gait Deviations forward flexed posture  -AE     Comment,  (Gait/Stairs) Pt demo step through gait pattern with slowed tanya and decreased gait speed. Pt reports increased fatigue and weakness with mobility this session. Pt continues to have difficulty with increasing gait distance d/t weakness. Further distance limited by fatigue.  -AE               User Key  (r) = Recorded By, (t) = Taken By, (c) = Cosigned By      Initials Name Provider Type    AE Rebel Maradiaga PT Physical Therapist                   Obj/Interventions       Row Name 10/14/24 1319          Range of Motion Comprehensive    General Range of Motion bilateral lower extremity ROM WFL  -AE       Row Name 10/14/24 1319          Strength Comprehensive (MMT)    General Manual Muscle Testing (MMT) Assessment lower extremity strength deficits identified  -AE     Comment, General Manual Muscle Testing (MMT) Assessment BLE grossly 4-/5; generalized weakness  -AE       Row Name 10/14/24 1319          Balance    Balance Assessment sitting static balance;sitting dynamic balance;standing static balance;standing dynamic balance  -AE     Static Sitting Balance standby assist  -AE     Dynamic Sitting Balance contact guard  -AE     Position, Sitting Balance unsupported;sitting in chair  -AE     Sit to Stand Dynamic Balance contact guard;verbal cues  -AE     Static Standing Balance contact guard  -AE     Dynamic Standing Balance contact guard  -AE     Position/Device Used, Standing Balance supported;walker, front-wheeled  -AE               User Key  (r) = Recorded By, (t) = Taken By, (c) = Cosigned By      Initials Name Provider Type    AE Rebel Maradiaga, PT Physical Therapist                   Goals/Plan       Row Name 10/14/24 132          Bed Mobility Goal 1 (PT)    Activity/Assistive Device (Bed Mobility Goal 1, PT) sit to supine/supine to sit  -AE     [place] Level/Cues Needed (Bed Mobility Goal 1, PT) supervision required  -AE     Time Frame (Bed Mobility Goal 1, PT) short term goal (STG);5 days  -AE      Progress/Outcomes (Bed Mobility Goal 1, PT) goal partially met;goal revised this date  -AE       Row Name 10/14/24 1329          Transfer Goal 1 (PT)    Activity/Assistive Device (Transfer Goal 1, PT) sit-to-stand/stand-to-sit  -AE     [place] Level/Cues Needed (Transfer Goal 1, PT) standby assist  -AE     Time Frame (Transfer Goal 1, PT) short term goal (STG);5 days  -AE     Progress/Outcome (Transfer Goal 1, PT) progress slower than expected;continuing progress toward goal  -AE       Row Name 10/14/24 1329          Gait Training Goal 1 (PT)    Activity/Assistive Device (Gait Training Goal 1, PT) gait (walking locomotion)  -AE     [place] Level (Gait Training Goal 1, PT) standby assist  -AE     Distance (Gait Training Goal 1, PT) 200ft  -AE     Time Frame (Gait Training Goal 1, PT) long term goal (LTG);10 days  -AE     Progress/Outcome (Gait Training Goal 1, PT) goal revised this date;progress slower than expected;continuing progress toward goal  -AE       Row Name 10/14/24 1329          Balance Goal 1 (PT)    Progress/Outcomes (Balance Goal 1, PT) goal no longer appropriate  -AE               User Key  (r) = Recorded By, (t) = Taken By, (c) = Cosigned By      Initials Name Provider Type    AE Rebel Maradiaga, PT Physical Therapist                   Clinical Impression       Row Name 10/14/24 1322          Pain    Pain Location abdomen  -AE     Pain Side/Orientation generalized  -AE     Pain Management Interventions activity modification encouraged;positioning techniques utilized  -AE     Response to Pain Interventions activity participation with tolerable pain;mobility function improved  -AE     Additional Documentation Pain Scale: FACES Pre/Post-Treatment (Group)  -AE       Row Name 10/14/24 1327          Pain Scale: FACES Pre/Post-Treatment    Pain: FACES Scale, Pretreatment 2-->hurts little bit  -AE     Posttreatment Pain Rating 2-->hurts little bit  -AE       Row Name 10/14/24 132          Plan of  Care Review    Progress no change  -AE     Outcome Evaluation Progress report completed. Goals reviewed and revised as appropriate to measure functional progress. Pt ambulated 75ft with CGA and RW for support. Pt continues to be limited by weakness/fatigue and remains below baseline functional mobility. Continue PT POC. Continue to progress per pt tolerance.  -AE       Row Name 10/14/24 1325          Vital Signs    Pre Systolic BP Rehab 127  -AE     Pre Treatment Diastolic BP 61  -AE     Pretreatment Heart Rate (beats/min) 91  -AE     Posttreatment Heart Rate (beats/min) 100  -AE     O2 Delivery Pre Treatment room air  -AE     O2 Delivery Intra Treatment room air  -AE     O2 Delivery Post Treatment room air  -AE     Pre Patient Position Sitting  -AE     Intra Patient Position Standing  -AE     Post Patient Position Sitting  -AE       Row Name 10/14/24 1327          Positioning and Restraints    Pre-Treatment Position sitting in chair/recliner  -AE     Post Treatment Position chair  -AE     In Chair notified nsg;reclined;call light within reach;encouraged to call for assist;legs elevated  exit alarm unchanged  -AE               User Key  (r) = Recorded By, (t) = Taken By, (c) = Cosigned By      Initials Name Provider Type    AE Rebel Maradiaga, PT Physical Therapist                   Outcome Measures       Row Name 10/14/24 1331          How much help from another person do you currently need...    Turning from your back to your side while in flat bed without using bedrails? 3  -AE     Moving from lying on back to sitting on the side of a flat bed without bedrails? 3  -AE     Moving to and from a bed to a chair (including a wheelchair)? 3  -AE     Standing up from a chair using your arms (e.g., wheelchair, bedside chair)? 3  -AE     Climbing 3-5 steps with a railing? 2  -AE     To walk in hospital room? 3  -AE     AM-PAC 6 Clicks Score (PT) 17  -AE     Highest Level of Mobility Goal 5 --> Static standing  -AE        Row Name 10/14/24 1331          Functional Assessment    Outcome Measure Options AM-PAC 6 Clicks Basic Mobility (PT)  -AE               User Key  (r) = Recorded By, (t) = Taken By, (c) = Cosigned By      Initials Name Provider Type    AE Rebel Maradiaga, PT Physical Therapist                                 Physical Therapy Education       Title: PT OT SLP Therapies (In Progress)       Topic: Physical Therapy (Done)       Point: Mobility training (Done)       Learning Progress Summary            Patient Acceptance, E, VU by AE at 10/14/2024 1044    Acceptance, E, NR by AS at 10/9/2024 1000    Acceptance, E, VU by AE at 10/7/2024 1006    Acceptance, E, VU by TM at 10/4/2024 1313    Acceptance, E, NR by TM at 10/2/2024 1129                      Point: Home exercise program (Done)       Learning Progress Summary            Patient Acceptance, E, VU by AE at 10/14/2024 1044    Acceptance, E, NR by AS at 10/9/2024 1000                      Point: Body mechanics (Done)       Learning Progress Summary            Patient Acceptance, E, VU by AE at 10/14/2024 1044    Acceptance, E, NR by AS at 10/9/2024 1000    Acceptance, E, VU by AE at 10/7/2024 1006    Acceptance, E, VU by TM at 10/4/2024 1313    Acceptance, E, NR by TM at 10/2/2024 1129                      Point: Precautions (Done)       Learning Progress Summary            Patient Acceptance, E, VU by AE at 10/14/2024 1044    Acceptance, E, VU by LS at 10/11/2024 0957    Comment: Benefits of activity    Acceptance, E, NR by AS at 10/9/2024 1000    Acceptance, E, VU by AE at 10/7/2024 1006    Acceptance, E, VU by TM at 10/4/2024 1313    Acceptance, E, NR by TM at 10/2/2024 1129                                      User Key       Initials Effective Dates Name Provider Type Discipline    AS 04/28/23 -  Viky Juarez, PTA Physical Therapist Assistant PT    LS 07/11/23 -  Jeni Alexander, PT Physical Therapist PT    AE 09/21/21 -  Rebel Maradiaga, PATRICIA Physical  Therapist PT    TM 24 -  Britton Lipscomb PT Student PT Student PT                  PT Recommendation and Plan     Plan of Care Reviewed With: patient  Progress: no change  Outcome Evaluation: Progress report completed. Goals reviewed and revised as appropriate to measure functional progress. Pt ambulated 75ft with CGA and RW for support. Pt continues to be limited by weakness/fatigue and remains below baseline functional mobility. Continue PT POC. Continue to progress per pt tolerance.     Time Calculation:         PT Charges       Row Name 10/14/24 1332             Time Calculation    Start Time 1044  -AE      PT Received On 10/14/24  -AE      PT Goal Re-Cert Due Date 10/24/24  -AE         Timed Charges    82689 - PT Therapeutic Activity Minutes 10  -AE         Total Minutes    Timed Charges Total Minutes 10  -AE       Total Minutes 10  -AE                User Key  (r) = Recorded By, (t) = Taken By, (c) = Cosigned By      Initials Name Provider Type    AE Rebel Maradiaga, PT Physical Therapist                  Therapy Charges for Today       Code Description Service Date Service Provider Modifiers Qty    83514687074 HC PT THERAPEUTIC ACT EA 15 MIN 10/14/2024 Rebel Maradiaga, PT GP 1            PT G-Codes  Outcome Measure Options: AM-PAC 6 Clicks Basic Mobility (PT)  AM-PAC 6 Clicks Score (PT): 17  AM-PAC 6 Clicks Score (OT): 20  PT Discharge Summary  Anticipated Discharge Disposition (PT): inpatient rehabilitation facility    Rebel Maradiaga PT  10/14/2024      Electronically signed by Rebel Maradiaga, PT at 10/14/24 1333          Occupational Therapy Notes (last 72 hours)        Tequila Nielson, OT at 10/15/24 5566          Patient Name: Valeria Tracy  : 1940    MRN: [id number]                              Today's Date: 10/15/2024       Admit Date: 10/1/2024    Visit Dx:     ICD-10-CM ICD-9-CM   1. S/P right colectomy  Z90.49 V45.89   2. Rectal cancer  C20 154.1     Patient Active Problem  List   Diagnosis    GERD (gastroesophageal reflux disease)    Asthma    Hypothyroidism    Colon cancer    S/P right colectomy    Acute postoperative pain    Anemia    Rectal cancer    S/P abdominal perineal resection, partial thickness posterior vaginal resection    Severe malnutrition    Postoperative urinary retention     Past Medical History:   Diagnosis Date    Acid reflux     Anemia     Asthma     Cancer     rectal    History of chemotherapy     History of radiation therapy     History of transfusion     No Reaction    Hypothyroid     Pneumonia     Wears dentures     upper    Wears glasses     Wears hearing aid in both ears      Past Surgical History:   Procedure Laterality Date    ABDOMINAL PERINEAL RESECTION N/A 10/1/2024    Procedure: ABDOMINAL PERINEAL RESECTION, LYSIS OF ADHESIONS AND CREATION OF COLOSTOMY;  Surgeon: Angelito Ruiz MD;  Location:  [facility];  Service: General;  Laterality: N/A;    BREAST LUMPECTOMY Right     CATARACT EXTRACTION Bilateral     COLON RESECTION N/A 10/17/2023    Procedure: COLON RESECTION RIGHT LAPAROSCOPIC;  Surgeon: Angelito Ruiz MD;  Location:  [facility];  Service: General;  Laterality: N/A;    COLONOSCOPY      PORTACATH PLACEMENT      SHOULDER SURGERY Right       General Information       Row Name 10/15/24 0853          OT Time and Intention    Document Type therapy note (daily note)  -AC     Mode of Treatment occupational therapy  -AC     Patient Effort good  -AC       Row Name 10/15/24 0853          General Information    Patient Profile Reviewed yes  -AC     Existing Precautions/Restrictions fall;other (see comments)  abdominal incision, ostomy  -AC     Barriers to Rehab medically complex;previous functional deficit  -AC       Row Name 10/15/24 8357          Cognition    Orientation Status (Cognition) oriented x 3  -AC       Row Name 10/15/24 8157          Safety Issues/Impairments Affecting Functional Mobility    Safety Issues Affecting Function (Mobility) awareness  of need for assistance;insight into deficits/self-awareness;safety precaution awareness  -     Impairments Affecting Function (Mobility) balance;endurance/activity tolerance;strength  -               User Key  (r) = Recorded By, (t) = Taken By, (c) = Cosigned By      Initials Name Provider Type     Tequila Nielson OT Occupational Therapist                     Mobility/ADL's       Row Name 10/15/24 08          Bed Mobility    Bed Mobility supine-sit;sit-supine  -     Supine-Sit [place] (Bed Mobility) standby assist  -     Sit-Supine [place] (Bed Mobility) standby assist  -     Assistive Device (Bed Mobility) air-assisted device;head of bed elevated  -       Row Name 10/15/24 0854          Transfers    Transfers sit-stand transfer;toilet transfer  -       Row Name 10/15/24 0854          Sit-Stand Transfer    Sit-Stand [place] (Transfers) standby assist  -     Assistive Device (Sit-Stand Transfers) walker, front-wheeled  -       Row Name 10/15/24 08          Toilet Transfer    [place] Level (Toilet Transfer) standby assist  -     Assistive Device (Toilet Transfer) walker, front-wheeled  -       Row Name 10/15/24 08          Functional Mobility    Functional Mobility- Ind. Level verbal cues required;contact guard assist  -     Functional Mobility- Device walker, front-wheeled  -     Functional Mobility-Distance (Feet) --  household distance  -       Row Name 10/15/24 08          Activities of Daily Living    BADL Assessment/Intervention lower body dressing;grooming;toileting  -       Row Name 10/15/24 08          Lower Body Dressing Assessment/Training    [place] Level (Lower Body Dressing) don;socks;contact guard assist  [facility]     Position (Lower Body Dressing) unsupported sitting  -       Row Name 10/15/24 08          Toileting Assessment/Training    [place] Level (Toileting) adjust/manage clothing;perform perineal hygiene;standby assist  -      Assistive Devices (Toileting) commode  -AC     Position (Toileting) supported standing;unsupported sitting  -AC       Row Name 10/15/24 0854          Grooming Assessment/Training    [place] Level (Grooming) hair care, combing/brushing;wash face, hands;standby assist  -AC     Position (Grooming) supported standing  -AC               User Key  (r) = Recorded By, (t) = Taken By, (c) = Cosigned By      Initials Name Provider Type    Tequila Zepeda, OT Occupational Therapist                   Obj/Interventions       Row Name 10/15/24 0855          Balance    Balance Assessment sitting static balance;sitting dynamic balance;standing static balance;standing dynamic balance  -AC     Static Sitting Balance standby assist  -AC     Dynamic Sitting Balance contact guard  -AC     Position, Sitting Balance unsupported;sitting edge of bed  -AC     Static Standing Balance standby assist  -AC     Dynamic Standing Balance contact guard  -AC     Position/Device Used, Standing Balance supported;walker, front-wheeled  -AC               User Key  (r) = Recorded By, (t) = Taken By, (c) = Cosigned By      Initials Name Provider Type    Tequila Zepeda, OT Occupational Therapist                   Goals/Plan    No documentation.                  Clinical Impression       Row Name 10/15/24 0856          Pain Assessment    Pretreatment Pain Rating 0/10 - no pain  -     Posttreatment Pain Rating 0/10 - no pain  -AC       Row Name 10/15/24 0856          Plan of Care Review    Plan of Care Reviewed With patient  -     Progress improving  -     Outcome Evaluation Pt CGA LBD, SBA sinkside grooming, SBA toilet transfer, CGA to ambulate with RW.  Pt is progressing, but continues below baseline d/t weakness and decr activity tolerance.  Recommend IP rehab upon d/c.  -       Row Name 10/15/24 0856          Vital Signs    Pre Systolic BP Rehab 124  -AC     Pre Treatment Diastolic BP 60  -     Pretreatment Heart Rate (beats/min) 97   -AC     Posttreatment Heart Rate (beats/min) 101  -AC     Pre SpO2 (%) 94  -AC     O2 Delivery Pre Treatment room air  -AC     O2 Delivery Intra Treatment room air  -AC     Post SpO2 (%) 97  -AC     O2 Delivery Post Treatment room air  -AC     Pre Patient Position Supine  -AC     Post Patient Position Supine  -AC       Row Name 10/15/24 0856          Positioning and Restraints    Pre-Treatment Position in bed  -AC     Post Treatment Position bed  -AC     In Bed notified nsg;fowlers;call light within reach;encouraged to call for assist;exit alarm on  -AC               User Key  (r) = Recorded By, (t) = Taken By, (c) = Cosigned By      Initials Name Provider Type    Tequila Zepeda OT Occupational Therapist                   Outcome Measures       Row Name 10/15/24 0900          How much help from another is currently needed...    Putting on and taking off regular lower body clothing? 3  -AC     Bathing (including washing, rinsing, and drying) 3  -AC     Toileting (which includes using toilet bed pan or urinal) 3  -AC     Putting on and taking off regular upper body clothing 4  -AC     Taking care of personal grooming (such as brushing teeth) 3  -AC     Eating meals 4  -AC     AM-PAC 6 Clicks Score (OT) 20  -AC       Row Name 10/15/24 0900          Functional Assessment    Outcome Measure Options AM-PAC 6 Clicks Daily Activity (OT)  -AC               User Key  (r) = Recorded By, (t) = Taken By, (c) = Cosigned By      Initials Name Provider Type    Tequila Zepeda OT Occupational Therapist                    Occupational Therapy Education       Title: PT OT SLP Therapies (In Progress)       Topic: Occupational Therapy (In Progress)       Point: ADL training (In Progress)       Description:   Instruct learner(s) on proper safety adaptation and remediation techniques during self care or transfers.   Instruct in proper use of assistive devices.                  Learning Progress Summary            Patient  Acceptance, E, NR by  at 10/15/2024 0900    Acceptance, E, NR by  at 10/11/2024 1016    Acceptance, E, NR by  at 10/8/2024 1307                      Point: Home exercise program (Not Started)       Description:   Instruct learner(s) on appropriate technique for monitoring, assisting and/or progressing therapeutic exercises/activities.                  Learner Progress:  Not documented in this visit.              Point: Precautions (Not Started)       Description:   Instruct learner(s) on prescribed precautions during self-care and functional transfers.                  Learner Progress:  Not documented in this visit.              Point: Body mechanics (Not Started)       Description:   Instruct learner(s) on proper positioning and spine alignment during self-care, functional mobility activities and/or exercises.                  Learner Progress:  Not documented in this visit.                              User Key       Initials Effective Dates Name Provider Type Discipline     02/03/23 -  Tequila Nielson OT Occupational Therapist OT                  OT Recommendation and Plan  Planned Therapy Interventions (OT): activity tolerance training, BADL retraining, functional balance retraining, occupation/activity based interventions, patient/caregiver education/training, strengthening exercise, transfer/mobility retraining  Therapy Frequency (OT): daily  Plan of Care Review  Plan of Care Reviewed With: patient  Progress: improving  Outcome Evaluation: Pt CGA LBD, SBA sinkside grooming, SBA toilet transfer, CGA to ambulate with RW.  Pt is progressing, but continues below baseline d/t weakness and decr activity tolerance.  Recommend IP rehab upon d/c.     Time Calculation:   Evaluation Complexity (OT)  Review Occupational Profile/Medical/Therapy History Complexity: brief/low complexity  Assessment, Occupational Performance/Identification of Deficit Complexity: 1-3 performance deficits  Clinical Decision Making  Complexity (OT): problem focused assessment/low complexity  Overall Complexity of Evaluation (OT): low complexity     Time Calculation- OT       Row Name 10/15/24 0735             Time Calculation- OT    OT Start Time 0735  -AC      OT Received On 10/15/24  -AC      OT Goal Re-Cert Due Date 10/18/24  -AC         Timed Charges    93530 - OT Therapeutic Activity Minutes 10  -AC      88050 - OT Self Care/Mgmt Minutes 15  -AC         Total Minutes    Timed Charges Total Minutes 25  -AC       Total Minutes 25  -AC                User Key  (r) = Recorded By, (t) = Taken By, (c) = Cosigned By      Initials Name Provider Type    AC Tequila Nielson OT Occupational Therapist                  Therapy Charges for Today       Code Description Service Date Service Provider Modifiers Qty    09320445443 HC OT THERAPEUTIC ACT EA 15 MIN 10/15/2024 Tequila Nielson OT GO 1    38971287251 HC OT SELF CARE/MGMT/TRAIN EA 15 MIN 10/15/2024 Tequila Nielson OT GO 1                 Tequila Nielson OT  10/15/2024    Electronically signed by Tequila Nielson OT at 10/15/24 0902       Tequila Nielson OT at 10/15/24 0735          Goal Outcome Evaluation:  Plan of Care Reviewed With: patient        Progress: improving  Outcome Evaluation: Pt CGA LBD, SBA sinkside grooming, SBA toilet transfer, CGA to ambulate with RW.  Pt is progressing, but continues below baseline d/t weakness and decr activity tolerance.  Recommend IP rehab upon d/c.    Anticipated Discharge Disposition (OT): inpatient rehabilitation facility                          Electronically signed by Tequila Nielson OT at 10/15/24 0901

## 2024-10-15 NOTE — CASE MANAGEMENT/SOCIAL WORK
Continued Stay Note  [facility]     Patient Name: Valeria Tracy  MRN: [id number]  Today's Date: 10/15/2024    Admit Date: 10/1/2024    Plan: [facility]   Discharge Plan       Row Name 10/15/24 0919       Plan    Plan [facility]    Patient/Family in Agreement with Plan yes    Plan Comments Spoke with Isabel at [facility]. She said to start the precert for the patient with [employer]. Email sent to Fermin. Plan is [facility] and precert started today. CM will continue to follow.    Final Discharge Disposition Code 03 - skilled nursing facility (SNF)                   Discharge Codes    No documentation.                 Expected Discharge Date and Time       Expected Discharge Date Expected Discharge Time    Oct 14, 2024               Heath Garcia RN

## 2024-10-15 NOTE — PROGRESS NOTES
"          Clinical Nutrition Assessment     Patient Name: Valeria Tracy  YOB: 1940  MRN: [id number]  Date of Encounter: 10/15/24 10:15 EDT  Admission date: 10/1/2024  Reason for Visit: Follow-up protocol    Assessment   Nutrition Assessment   Admission Diagnosis:  Rectal cancer [C20]    Problem List:    S/P abdominal perineal resection, partial thickness posterior vaginal resection    GERD (gastroesophageal reflux disease)    Asthma    Hypothyroidism    Acute postoperative pain    Rectal cancer    Severe malnutrition    Postoperative urinary retention      PMH:   She  has a past medical history of Acid reflux, Anemia, Asthma, Cancer, History of chemotherapy, History of radiation therapy, History of transfusion, Hypothyroid, Pneumonia, Wears dentures, Wears glasses, and Wears hearing aid in both ears.    PSH:  She  has a past surgical history that includes Shoulder surgery (Right); Breast lumpectomy (Right); Colonoscopy; Cataract extraction (Bilateral); Colectomy (N/A, 10/17/2023); Portacath placement; and Abdominal Perineal Resection (N/A, 10/1/2024).    Applicable Nutrition History:   10/1 abdominal perineal resection, +colostomy    Anthropometrics     Height: Height: 165.1 cm (65\")  Last Filed Weight: Weight: 56 kg (123 lb 8 oz) (10/01/24 1645)  Method: Weight Method: Bed scale  BMI: BMI (Calculated): 20.6    UBW:  140lbs per pt  Weight change: weight loss of 13 lbs (9.6%) over 1 month(s)    Significant?  Yes    Weight      Weight (kg) Weight (lbs) Weight Method   10/17/2023 61.236 kg  135 lb  Stated    9/23/2024 61.8 kg  136 lb 3.9 oz  Standing scale    10/1/2024 56.019 kg  123 lb 8 oz  Bed scale      Nutrition Focused Physical Exam    Date: 10/15    Patient meets criteria for malnutrition diagnosis, see MSA note.     Subjective   Reported/Observed/Food/Nutrition Related History:   10/15  Pt OOR yesterday for f/u visit, weekend RD adjusted ONS to improve protein intake. Pt states she has not " tried it yet and is hesitant to try 2/2 not wanting to cause more stomach problems. Pt noted to have outside food at bedside. Discussed ONS w/nsg, will encourage use.     10/4  Pt reports good appetite PTA however notes she has had poor intake in the past month 2/2 nerves about having surgery. Pt states she used to be good about eating regular meals and has not been lately. Pt states her VEV=250hbs, was unaware of amount of recent wt loss but states she does feel like she has lost wt. Pt reports allergy to milk, noted in EPIC. Pt states she drinks OWYN shakes at home. Pt denies chewing diffiuculties, states she sometimes has difficulty swallowing 2/2 reflux.     Current Nutrition Prescription   PO: Diet: Gastrointestinal; Fiber-Restricted, Lactose-Controlled; Texture: Soft to Chew (NDD 3); Soft to Chew: Whole Meat; Fluid Consistency: Thin (IDDSI 0)  Oral Nutrition Supplement:   Intake:38%x 6 meals    Assessment & Plan   Nutrition Diagnosis   Date:  10/4            Updated:    Problem Malnutrition severe acute   Etiology Energy in < energy out 2/2 nerves, stress pending sx   Signs/Symptoms Severe muscle wasting and moderate subcutaneous fat loss, po intake <75% EEN x >/=7 days   Status: New    Date:  10/15 Updated:  Problem Predicted suboptimal energy intake   Etiology Prolonged hospital stay/food preferences, s/p abdominal sx   Signs/Symptoms Intake avg <50%   Status:    Goal / Objectives:   Nutrition to support treatment and Tolerate PO, Increase intake      Nutrition Intervention      Follow treatment progress, Care plan reviewed, Encourage intake, Supplement provided    Encourage po intake 2/2 malnutrition (see MSA)  Trial prosource TF (collagen based) + lemonade  standing wt as able to verify significant wt changes    Monitoring/Evaluation:   Per protocol, PO intake, Supplement intake, Weight, GI status    Danuta Colon, MS,RD,LD  Time Spent:20

## 2024-10-15 NOTE — PROGRESS NOTES
"IM progress note      Valeria Tracy  [id number]  1940     LOS: 14 days     Attending: Angelito Ruiz MD    Primary Care Provider: Peyton Vázquez PA      Chief Complaint/Reason for visit:  Abd pain    Subjective   Doing okay when seen earlier today eating breakfast after coming back from x-ray.  No complains of pain, no nausea or vomiting.  Stoma has output.  Follow-up x-ray indicates resolution of small bowel distention  Objective        Visit Vitals  /70 (BP Location: Left arm, Patient Position: Lying)   Pulse 89   Temp 97.4 °F (36.3 °C) (Oral)   Resp 20   Ht 165.1 cm (65\")   Wt 56 kg (123 lb 8 oz)   SpO2 96%   BMI 20.55 kg/m²     Temp (24hrs), Av.8 °F (36.6 °C), Min:96.8 °F (36 °C), Max:99.1 °F (37.3 °C)         Respiratory: RA    Physical Exam:     General Appearance:    Alert, cooperative, in no acute distress   Head:    Normocephalic, without obvious abnormality, atraumatic    Lungs:     Normal effort, symmetric chest rise, no crepitus, clear to      auscultation bilaterally             Heart:    Regular rhythm and normal rate, normal S1 and S2   Abdomen:     Soft, benign   improved distension. Stoma with output      Extremities:   No clubbing, cyanosis or edema.  No deformities.    Pulses:   Pulses palpable and equal bilaterally   Skin:   No bleeding, bruising or rash          Results Review:     I reviewed the patient's new clinical results.   Results from last 7 days   Lab Units 10/13/24  0441 10/12/24  0444   WBC 10*3/mm3 10.47 9.58   HEMOGLOBIN g/dL 11.1* 10.5*   HEMATOCRIT % 34.7 32.7*   PLATELETS 10*3/mm3 524* 461*     Results from last 7 days   Lab Units 10/13/24  0441 10/12/24  0444   SODIUM mmol/L 140 139   POTASSIUM mmol/L 3.4* 4.3   CHLORIDE mmol/L 102 100   CO2 mmol/L 27.0 24.0   BUN mg/dL 16 16   CREATININE mg/dL 0.74 0.64   CALCIUM mg/dL 8.8 8.9   BILIRUBIN mg/dL 0.2  --    ALK PHOS U/L 82  --    ALT (SGPT) U/L 9  --    AST (SGOT) U/L 11  --    GLUCOSE mg/dL 89 78     I " reviewed the patient's new imaging including images and reports.    All medications reviewed.   acetaminophen, 650 mg, Oral, Q8H  budesonide-formoterol, 2 puff, Inhalation, BID - RT  cetirizine, 5 mg, Oral, Daily  famotidine, 20 mg, Oral, Nightly  heparin (porcine), 5,000 Units, Subcutaneous, Q8H  lansoprazole, 15 mg, Oral, Q AM  levothyroxine, 88 mcg, Oral, Q AM  megestrol, 200 mg, Oral, Daily  pyridostigmine, 30 mg, Oral, Q8H        Assessment & Plan     S/P abdominal perineal resection, partial thickness posterior vaginal resection    GERD (gastroesophageal reflux disease)    Asthma    Hypothyroidism    Acute postoperative pain    Rectal cancer    Severe malnutrition    Postoperative urinary retention    Postop nausea and vomiting    Plan  P.o. diet as tolerated, encouraged.    1. Ambulation, encouraged, PT is following  2. Pain control-prns   3. IS-encouraged  4. DVT proph- mechs/heparin       -Asthma: Maintain home regimen with formulary substitution as indicated.  As needed bronchodilators.  Monitor oxygenation.     -Hypothyroidism: Resume replacement      -GERD:  Resumed H2 blocker, now nightly along with PPI.  Tums available prn     -New stoma:  Wound care stoma nurse consult for stoma care and education throughout patient's hospitalization.    -Urinary retention:   Resolved.   Urinalysis unremarkable.         DC to skilled nursing facility soon, pre-CERT started today    Maggi Lance MD  10/15/24  09:36 EDT

## 2024-10-15 NOTE — THERAPY TREATMENT NOTE
Patient Name: Valeria Tracy  : 1940    MRN: [id number]                              Today's Date: 10/15/2024       Admit Date: 10/1/2024    Visit Dx:     ICD-10-CM ICD-9-CM   1. S/P right colectomy  Z90.49 V45.89   2. Rectal cancer  C20 154.1     Patient Active Problem List   Diagnosis    GERD (gastroesophageal reflux disease)    Asthma    Hypothyroidism    Colon cancer    S/P right colectomy    Acute postoperative pain    Anemia    Rectal cancer    S/P abdominal perineal resection, partial thickness posterior vaginal resection    Severe malnutrition    Postoperative urinary retention     Past Medical History:   Diagnosis Date    Acid reflux     Anemia     Asthma     Cancer     rectal    History of chemotherapy     History of radiation therapy     History of transfusion     No Reaction    Hypothyroid     Pneumonia     Wears dentures     upper    Wears glasses     Wears hearing aid in both ears      Past Surgical History:   Procedure Laterality Date    ABDOMINAL PERINEAL RESECTION N/A 10/1/2024    Procedure: ABDOMINAL PERINEAL RESECTION, LYSIS OF ADHESIONS AND CREATION OF COLOSTOMY;  Surgeon: Angelito Ruiz MD;  Location: [facility] OR;  Service: General;  Laterality: N/A;    BREAST LUMPECTOMY Right     CATARACT EXTRACTION Bilateral     COLON RESECTION N/A 10/17/2023    Procedure: COLON RESECTION RIGHT LAPAROSCOPIC;  Surgeon: Angelito Ruiz MD;  Location: [facility] OR;  Service: General;  Laterality: N/A;    COLONOSCOPY      PORTACATH PLACEMENT      SHOULDER SURGERY Right       General Information       Row Name 10/15/24 0853          OT Time and Intention    Document Type therapy note (daily note)  -AC     Mode of Treatment occupational therapy  -AC     Patient Effort good  -AC       Row Name 10/15/24 0853          General Information    Patient Profile Reviewed yes  -AC     Existing Precautions/Restrictions fall;other (see comments)  abdominal incision, ostomy  -AC     Barriers to Rehab medically  complex;previous functional deficit  -       Row Name 10/15/24 0853          Cognition    Orientation Status (Cognition) oriented x 3  -       Row Name 10/15/24 0853          Safety Issues/Impairments Affecting Functional Mobility    Safety Issues Affecting Function (Mobility) awareness of need for assistance;insight into deficits/self-awareness;safety precaution awareness  -     Impairments Affecting Function (Mobility) balance;endurance/activity tolerance;strength  -               User Key  (r) = Recorded By, (t) = Taken By, (c) = Cosigned By      Initials Name Provider Type     Tequila Nielson OT Occupational Therapist                     Mobility/ADL's       Row Name 10/15/24 0854          Bed Mobility    Bed Mobility supine-sit;sit-supine  -     Supine-Sit [place] (Bed Mobility) standby assist  -     Sit-Supine [place] (Bed Mobility) standby assist  -     Assistive Device (Bed Mobility) air-assisted device;head of bed elevated  -       Row Name 10/15/24 0854          Transfers    Transfers sit-stand transfer;toilet transfer  -       Row Name 10/15/24 0854          Sit-Stand Transfer    Sit-Stand [place] (Transfers) standby assist  -     Assistive Device (Sit-Stand Transfers) walker, front-wheeled  -       Row Name 10/15/24 0854          Toilet Transfer    [place] Level (Toilet Transfer) standby assist  -     Assistive Device (Toilet Transfer) walker, front-wheeled  -       Row Name 10/15/24 0854          Functional Mobility    Functional Mobility- Ind. Level verbal cues required;contact guard assist  -     Functional Mobility- Device walker, front-wheeled  -     Functional Mobility-Distance (Feet) --  household distance  -       Row Name 10/15/24 0854          Activities of Daily Living    BADL Assessment/Intervention lower body dressing;grooming;toileting  -       Row Name 10/15/24 0854          Lower Body Dressing Assessment/Training    [place]  Level (Lower Body Dressing) don;socks;contact guard assist  -AC     Position (Lower Body Dressing) unsupported sitting  -AC       Row Name 10/15/24 0854          Toileting Assessment/Training    [place] Level (Toileting) adjust/manage clothing;perform perineal hygiene;standby assist  -AC     Assistive Devices (Toileting) commode  -AC     Position (Toileting) supported standing;unsupported sitting  -AC       Row Name 10/15/24 0854          Grooming Assessment/Training    [place] Level (Grooming) hair care, combing/brushing;wash face, hands;standby assist  -AC     Position (Grooming) supported standing  -AC               User Key  (r) = Recorded By, (t) = Taken By, (c) = Cosigned By      Initials Name Provider Type    Tequila Zepeda, OT Occupational Therapist                   Obj/Interventions       Row Name 10/15/24 0855          Balance    Balance Assessment sitting static balance;sitting dynamic balance;standing static balance;standing dynamic balance  -AC     Static Sitting Balance standby assist  -AC     Dynamic Sitting Balance contact guard  -AC     Position, Sitting Balance unsupported;sitting edge of bed  -AC     Static Standing Balance standby assist  -AC     Dynamic Standing Balance contact guard  -AC     Position/Device Used, Standing Balance supported;walker, front-wheeled  -AC               User Key  (r) = Recorded By, (t) = Taken By, (c) = Cosigned By      Initials Name Provider Type    Tequila Zepeda, OT Occupational Therapist                   Goals/Plan    No documentation.                  Clinical Impression       Row Name 10/15/24 0856          Pain Assessment    Pretreatment Pain Rating 0/10 - no pain  -AC     Posttreatment Pain Rating 0/10 - no pain  -AC       Row Name 10/15/24 0856          Plan of Care Review    Plan of Care Reviewed With patient  -AC     Progress improving  -     Outcome Evaluation Pt CGA LBD, SBA sinkside grooming, SBA toilet transfer, CGA to ambulate with  RW.  Pt is progressing, but continues below baseline d/t weakness and decr activity tolerance.  Recommend IP rehab upon d/c.  -AC       Row Name 10/15/24 0856          Vital Signs    Pre Systolic BP Rehab 124  -AC     Pre Treatment Diastolic BP 60  -AC     Pretreatment Heart Rate (beats/min) 97  -AC     Posttreatment Heart Rate (beats/min) 101  -AC     Pre SpO2 (%) 94  -AC     O2 Delivery Pre Treatment room air  -AC     O2 Delivery Intra Treatment room air  -AC     Post SpO2 (%) 97  -AC     O2 Delivery Post Treatment room air  -AC     Pre Patient Position Supine  -AC     Post Patient Position Supine  -AC       Row Name 10/15/24 0856          Positioning and Restraints    Pre-Treatment Position in bed  -AC     Post Treatment Position bed  -AC     In Bed notified nsg;fowlers;call light within reach;encouraged to call for assist;exit alarm on  -AC               User Key  (r) = Recorded By, (t) = Taken By, (c) = Cosigned By      Initials Name Provider Type    Tequila Zepeda, SRIKANTH Occupational Therapist                   Outcome Measures       Row Name 10/15/24 0900          How much help from another is currently needed...    Putting on and taking off regular lower body clothing? 3  -AC     Bathing (including washing, rinsing, and drying) 3  -AC     Toileting (which includes using toilet bed pan or urinal) 3  -AC     Putting on and taking off regular upper body clothing 4  -AC     Taking care of personal grooming (such as brushing teeth) 3  -AC     Eating meals 4  -AC     AM-PAC 6 Clicks Score (OT) 20  -AC       Row Name 10/15/24 0900          Functional Assessment    Outcome Measure Options AM-PAC 6 Clicks Daily Activity (OT)  -AC               User Key  (r) = Recorded By, (t) = Taken By, (c) = Cosigned By      Initials Name Provider Type    Tequila Zepeda OT Occupational Therapist                    Occupational Therapy Education       Title: PT OT SLP Therapies (In Progress)       Topic: Occupational Therapy (In  Progress)       Point: ADL training (In Progress)       Description:   Instruct learner(s) on proper safety adaptation and remediation techniques during self care or transfers.   Instruct in proper use of assistive devices.                  Learning Progress Summary            Patient Acceptance, E, NR by  at 10/15/2024 0900    Acceptance, E, NR by  at 10/11/2024 1016    Acceptance, E, NR by  at 10/8/2024 1307                      Point: Home exercise program (Not Started)       Description:   Instruct learner(s) on appropriate technique for monitoring, assisting and/or progressing therapeutic exercises/activities.                  Learner Progress:  Not documented in this visit.              Point: Precautions (Not Started)       Description:   Instruct learner(s) on prescribed precautions during self-care and functional transfers.                  Learner Progress:  Not documented in this visit.              Point: Body mechanics (Not Started)       Description:   Instruct learner(s) on proper positioning and spine alignment during self-care, functional mobility activities and/or exercises.                  Learner Progress:  Not documented in this visit.                              User Key       Initials Effective Dates Name Provider Type Discipline     02/03/23 -  Tequila Nielson, OT Occupational Therapist OT                  OT Recommendation and Plan  Planned Therapy Interventions (OT): activity tolerance training, BADL retraining, functional balance retraining, occupation/activity based interventions, patient/caregiver education/training, strengthening exercise, transfer/mobility retraining  Therapy Frequency (OT): daily  Plan of Care Review  Plan of Care Reviewed With: patient  Progress: improving  Outcome Evaluation: Pt CGA LBD, SBA sinkside grooming, SBA toilet transfer, CGA to ambulate with RW.  Pt is progressing, but continues below baseline d/t weakness and decr activity tolerance.  Recommend  IP rehab upon d/c.     Time Calculation:   Evaluation Complexity (OT)  Review Occupational Profile/Medical/Therapy History Complexity: brief/low complexity  Assessment, Occupational Performance/Identification of Deficit Complexity: 1-3 performance deficits  Clinical Decision Making Complexity (OT): problem focused assessment/low complexity  Overall Complexity of Evaluation (OT): low complexity     Time Calculation- OT       Row Name 10/15/24 0735             Time Calculation- OT    OT Start Time 0735  -AC      OT Received On 10/15/24  -AC      OT Goal Re-Cert Due Date 10/18/24  -AC         Timed Charges    76907 - OT Therapeutic Activity Minutes 10  -AC      83014 - OT Self Care/Mgmt Minutes 15  -AC         Total Minutes    Timed Charges Total Minutes 25  -AC       Total Minutes 25  -AC                User Key  (r) = Recorded By, (t) = Taken By, (c) = Cosigned By      Initials Name Provider Type    AC Tequila Nielson, OT Occupational Therapist                  Therapy Charges for Today       Code Description Service Date Service Provider Modifiers Qty    36654023201 HC OT THERAPEUTIC ACT EA 15 MIN 10/15/2024 Tequila Nielson OT GO 1    06771440433 HC OT SELF CARE/MGMT/TRAIN EA 15 MIN 10/15/2024 Tequila Nielson OT GO 1                 Tequila Nielson OT  10/15/2024

## 2024-10-15 NOTE — DISCHARGE PLACEMENT REQUEST
"Cris Leonard (84 y.o. Female)       Date of Birth   1940    Social Security Number       Address   [address] [place] [address]    Home Phone   [phone]    MRN   [id number]       Pentecostal   None    Marital Status   Single                            Admission Date   10/1/24    Admission Type   Elective    Admitting Provider   Angelito Ruiz MD    Attending Provider   Angelito Ruiz MD    Department, Room/Bed   [address], S572/1       Discharge Date       Discharge Disposition       Discharge Destination                                 Attending Provider: Angelito Ruiz MD    Allergies: Milk-related Compounds    Isolation: None   Infection: None   Code Status: CPR    Ht: 165.1 cm (65\")   Wt: 56 kg (123 lb 8 oz)    Admission Cmt: None   Principal Problem: S/P abdominal perineal resection, partial thickness posterior vaginal resection [Z90.49]                   Active Insurance as of 10/1/2024       Primary Coverage       Payor Plan Insurance Group Employer/Plan Group    HUMANA MEDICARE REPLACEMENT HUMANA MED ADV PPO [id number]       Payor Plan Address Payor Plan Phone Number Payor Plan Fax Number Effective Dates    [address] [address] [phone]  3/1/2024 - None Entered    [facility] [id number]         Subscriber Name Subscriber Birth Date Member ID       CRIS LEONARD 1940 [id number]                     Emergency Contacts        (Rel.) Home Phone Work Phone Mobile Phone    ANNABELLA SALES (Grandchild) [phone] -- [phone]                 History & Physical        Maggi Lance MD at 10/01/24 1815          Admission HP     Patient Name: Cris Leonard  MRN: [id number]  : 1940  DOS: 10/1/2024    Attending: Maggi Lance MD    Primary Care Provider: Peyton Vázquez PA      Patient Care Team:  Peyton Vázquez PA as PCP - General (Physician Assistant)    Chief complaint: Rectal cancer    Subjective  Patient is a pleasant " 84 y.o. female presented for scheduled surgery by Dr. Ruiz.    Patient has history of right colon cancer for which she had a colectomy, T3 N1 has been resected with persistent neoplasia in the posterior distal rectum after YUDELKA prompting plans for APR.    Today she underwent abdominal perineal resection with partial thickness posterior vaginal resection.  Surgery included placement of a JESUS drain into the deep pelvis and colostomy creation in the left abdomen.    Surgery was done under general anesthesia and a block, was tolerated well.  I saw her in PACU where she is still in a drowsy  Postop state.  Not in distress.       Allergies   Allergen Reactions    Milk-Related Compounds Anaphylaxis        Medications Prior to Admission   Medication Sig Dispense Refill Last Dose    albuterol sulfate  (90 Base) MCG/ACT inhaler Inhale 2 puffs Every 4 (Four) Hours As Needed for Wheezing.   Past Week    Calcium-Vitamin D-Vitamin K (VIACTIV CALCIUM PLUS D PO) Take 2 doses by mouth Daily.   Past Week    DOCUSATE SODIUM PO Take 100 mg by mouth Daily As Needed (constipation).   Past Week    famotidine (PEPCID) 10 MG tablet Take 1 tablet by mouth 2 (Two) Times a Day.   Past Month    FLUTICASONE PROPIONATE NA 2 sprays into the nostril(s) as directed by provider Daily.   Past Week    fluticasone-salmeterol (ADVAIR HFA) 115-21 MCG/ACT inhaler Inhale 2 puffs 2 (Two) Times a Day.   10/1/2024    levothyroxine (SYNTHROID, LEVOTHROID) 88 MCG tablet Take 1 tablet by mouth Daily.   9/30/2024    Loratadine 10 MG capsule Take 1 capsule by mouth Daily.   9/30/2024    Multiple Vitamins-Minerals (Womens Multi Gummies) chewable tablet Chew 1 tablet Daily.   Past Week    ibuprofen (ADVIL,MOTRIN) 200 MG tablet Take 1 tablet by mouth Every 6 (Six) Hours As Needed for Mild Pain.       sodium chloride 0.65 % nasal spray Administer 1 spray into the nostril(s) as directed by provider As Needed for Congestion.             Past Medical History:  "  Diagnosis Date    Acid reflux     Anemia     Asthma     Cancer     rectal    History of chemotherapy     History of radiation therapy     History of transfusion     No Reaction    Hypothyroid     Pneumonia     Wears dentures     upper    Wears glasses     Wears hearing aid in both ears      Past Surgical History:   Procedure Laterality Date    BREAST LUMPECTOMY Right     CATARACT EXTRACTION Bilateral     COLON RESECTION N/A 10/17/2023    Procedure: COLON RESECTION RIGHT LAPAROSCOPIC;  Surgeon: Angelito Ruiz MD;  Location: [facility];  Service: General;  Laterality: N/A;    COLONOSCOPY      PORTACATH PLACEMENT      SHOULDER SURGERY Right      History reviewed. No pertinent family history.  Social History     Tobacco Use    Smoking status: Former     Types: Cigarettes    Smokeless tobacco: Never   Vaping Use    Vaping status: Never Used   Substance Use Topics    Alcohol use: Never    Drug use: Never       Review of Systems  Review of systems could not be obtained due to   patient sedation status.    Vital Signs  /73 (BP Location: Right arm, Patient Position: Lying)   Pulse 67   Temp 97.6 °F (36.4 °C) (Axillary)   Resp 16   Ht 165.1 cm (65\")   Wt 56 kg (123 lb 8 oz)   SpO2 99%   BMI 20.55 kg/m²     Physical Exam:    General Appearance:  Drowsy postop in no acute distress   Head:    Normocephalic, without obvious abnormality, atraumatic   Eyes:            Lids and lashes normal, conjunctivae and sclerae normal, no   icterus    Ears:    Ears appear intact with no abnormalities noted   Throat:   No oral lesions, no thrush, oral mucosa moist   Neck:   No adenopathy, supple, trachea midline, no thyromegaly         Lungs:     Clear to auscultation,respirations regular, even and       unlabored. No wheezes or rales.    Heart:    Regular rhythm and normal rate, normal S1 and S2, no murmur    Abdomen:      Soft with midline incision with an intact dressing.  Left colostomy and low abdomen/pelvic JESUS drain. " "  Genitalia:    Deferred   Extremities:   No edema, no cyanosis, no              redness   Pulses:   Pulses palpable and equal bilaterally   Skin:   No bleeding, bruising or rash   Neurologic:   Cranial nerves 2 - 12 grossly intact,             Invalid input(s): \"NEUTOPHILPCT\"        Invalid input(s): \"LABALBU\", \"PROT\"  Lab Results   Component Value Date    HGBA1C 5.20 09/23/2024      Latest Reference Range & Units 09/23/24 13:17   Sodium 136 - 145 mmol/L 139   Potassium 3.5 - 5.2 mmol/L 4.2   Chloride 98 - 107 mmol/L 101   CO2 22.0 - 29.0 mmol/L 29.0   Anion Gap 5.0 - 15.0 mmol/L 9.0   BUN 8 - 23 mg/dL 8   Creatinine 0.57 - 1.00 mg/dL 0.65   BUN/Creatinine Ratio 7.0 - 25.0  12.3   eGFR >60.0 mL/min/1.73 87.5   Glucose 65 - 99 mg/dL 89   Calcium 8.6 - 10.5 mg/dL 9.6   Alkaline Phosphatase 39 - 117 U/L 119 (H)   Total Protein 6.0 - 8.5 g/dL 7.1   Albumin 3.5 - 5.2 g/dL 4.3   Globulin gm/dL 2.8   A/G Ratio g/dL 1.5   AST (SGOT) 1 - 32 U/L 22   ALT (SGPT) 1 - 33 U/L 8   Total Bilirubin 0.0 - 1.2 mg/dL 0.5   Hemoglobin A1C 4.80 - 5.60 % 5.20   WBC 3.40 - 10.80 10*3/mm3 5.08   RBC 3.77 - 5.28 10*6/mm3 3.91   Hemoglobin 12.0 - 15.9 g/dL 12.1   Hematocrit 34.0 - 46.6 % 38.6   Platelets 140 - 450 10*3/mm3 302   RDW 12.3 - 15.4 % 13.0   MCV 79.0 - 97.0 fL 98.7 (H)   MCH 26.6 - 33.0 pg 30.9   MCHC 31.5 - 35.7 g/dL 31.3 (L)   MPV 6.0 - 12.0 fL 9.1   RDW-SD 37.0 - 54.0 fl 47.0   (H): Data is abnormally high  (L): Data is abnormally low    Assessment and Plan:   S/p ABDOMINAL PERINEAL RESECTION  Partial thickness posterior vaginal resection.    Rectal cancer    GERD (gastroesophageal reflux disease)    Asthma    Hypothyroidism      Plan  Postop management to include  1. Ambulation, several times daily  2. Pain control-PRNs, multimodal approach   3. IS-will encourage  4. DVT proph- Mechanical, subcutaneous heparin  5. Bowel regimen, alvimopan  6. Resume home medications as appropriate  7. Monitor post-op labs  8. Discharge " planning   9. Diet, Clears, advance diet as tolerated.  IVF initially, monitor volume status.    -Asthma: Maintain home regimen with formulary substitution as indicated.  As needed bronchodilators.  Monitor oxygenation.    -Hypothyroidism: Resume replacement     -GERD:  Resume H2 blocker.     -New stoma:  Wound care stoma nurse consult for stoma care and education throughout patient's hospitalization.      Dragon disclaimer:  Part of this encounter note is an electronic transcription/translation of spoken language to printed text. The electronic translation of spoken language may permit erroneous, or at times, nonsensical words or phrases to be inadvertently transcribed; Although I have reviewed the note for such errors, some may still exist.    Maggi Lance MD  10/01/24  18:15 EDT              Electronically signed by Maggi Lance MD at 10/01/24 2202       Pina Norman APRN at 10/01/24 0910       Attestation signed by Angelito Ruiz MD at 10/01/24 1130    No interval change    Goals of postoperative care reviewed                [facility] Pre-op    Full history and physical note from office is attached.    /85 (BP Location: Right arm, Patient Position: Lying)   Pulse 85   Temp 98.7 °F (37.1 °C) (Temporal)   Resp 18   SpO2 100%     Immunizations:  Influenza:  No  Pneumococcal:  UTD  Tetanus:  UTD    Review of Systems:  Constitutional-- No fever, chills or sweats. No fatigue.  CV-- No chest pain, palpitation or syncope  Resp-- No SOB, cough, hemoptysis  Skin--No rashes or lesions    Physical Exam:  Heart:   Regular rate and rhythm, S1 and S2 normal  Lungs: Clear to auscultation bilaterally, respirations unlabored    LAB Results:  Lab Results   Component Value Date    WBC 5.08 09/23/2024    HGB 12.1 09/23/2024    HCT 38.6 09/23/2024    MCV 98.7 (H) 09/23/2024     09/23/2024    NEUTROABS 7.07 (H) 10/23/2023    GLUCOSE 89 09/23/2024    BUN 8 09/23/2024    CREATININE  "0.65 09/23/2024     09/23/2024    K 4.2 09/23/2024     09/23/2024    CO2 29.0 09/23/2024    CALCIUM 9.6 09/23/2024    ALBUMIN 4.3 09/23/2024    AST 22 09/23/2024    ALT 8 09/23/2024    BILITOT 0.5 09/23/2024     Study Result    Narrative & Impression   MRI PELVIS WO CONTRAST     Date of Exam: 8/22/2024 6:43 PM EDT     Indication: C20.     Comparison: CT abdomen pelvis 10/20/2023.     Technique:  Routine multiplanar/multisequence images of the pelvis were obtained without contrast administration.     Per chart review: 10/25/2023 patient underwent colonoscopy which demonstrated \"large bulky mass lesion was seen in the rectum. This arose from the posterior wall of the rectum was greater than 5 cm in size, arising from a broad base with distal extent to   within a proximally 3 cm of the dentate line. Several biopsies were obtained, that showed moderately differentiated adenocarcinoma.\" Patient has subsequently undergone chemoradiation at an outside institution for rectal cancer. No pretreatment/baseline   rectal MRI performed.     Findings:     Note there is a large amount of gas in the rectum, which makes evaluation suboptimal. Along the posterior rectal wall there is T2 hypointense signal suggestive of scar/fibrosis, which spans around 3.5 cm in craniocaudal dimension (series 5 image 10-12)   which appears to be at the site of the rectal tumor seen as an enhancing mass on prior CT. The inferior margin of the scar extends to 2.2 cm above the anorectal junction and 4.2 cm above the anal verge. Within the posterior mesorectal fat at this   location at the 6 o'clock position, there are thin linear/radiating areas of T2 hypointensity suggestive of desmoplastic stranding. Otherwise, there is no convincing evidence of mesorectal involvement. No distinct intermediate T2 signal or discrete   restricted diffusion to suggest significant residual viable disease.     There is a 4 x 4 mm lymph node in the mesorectum at " the 7 o'clock position, without prominent diffusion signal or suspicious morphologic features, unchanged in size from prior CT and of low suspicion. No mesorectal lymphadenopathy otherwise. No   extramesorectal lymphadenopathy within the field-of-view.     Small uterus and ovaries, typical of age. Questionable small fibroid present. No free fluid or organized fluid collection. No subcutaneous soft tissue abnormality. No acute or suspicious osseous lesions. Degenerative changes of the bilateral hips.     IMPRESSION:  Impression:     Within the confines of comparing to a prior CT without prior/baseline MRI, as well as a large amount of gas in the rectum which distorts diffusion-weighted sequences, there appears to be positive response to treatment with predominantly T2 hypointense   signal at the site of the treated rectal tumor suggestive of scar/fibrosis. No distinct intermediate T2 signal nor restricted diffusion to suggest measurable residual viable tumor. Mesorectum is generally clear without convincing evidence of tumor   involvement nor suspicious lymphadenopathy, with note made of a tiny/nonsuspicious lymph node. Consider correlation with endoscopy and tissue sampling as clinically indicated.     Cancer Staging (if applicable)  Cancer Patient: __ yes __no __unknown__N/A; If yes, clinical stage T:__ N:__M:__, stage group or __N/A      Impression: History rectal cancer, Stage III      Plan: ABDOMINAL PERINEAL RESECTION, LYSIS OF ADHESIONS       RAINA Arana   10/1/2024   09:10 EDT    Electronically signed by Angelito Ruiz MD at 10/01/24 1130   Source Note: H&P        [Media Unavailable] Scan on 9/9/2024 0913 by [employer], [place]: H&P, [facility], 09/06/2024          Electronically signed by [employer], [employer] at 09/09/24 0916                 H&P signed by [employer], [employer] at 09/09/24 0916         [Media Unavailable] Scan on 9/9/2024 0913 by [employer], [place]: H&P, [facility], 09/06/2024         "  Electronically signed by [employer], [employer] at 24 0916          Physician Progress Notes (most recent note)        Angelito Ruiz MD at 10/15/24 0737          [facility] ([facility])    No nausea or vomiting, fresh stool per colostomy    Vital Signs:  Blood pressure 124/60, pulse 79, temperature 99.1 °F (37.3 °C), temperature source Oral, resp. rate 22, height 165.1 cm (65\"), weight 56 kg (123 lb 8 oz), SpO2 94%.    Labs past 24 hours:  Lab Results (last 24 hours)       ** No results found for the last 24 hours. **            I/O last shift:  No intake/output data recorded.     PHYSICAL EXAM-  Comfortable  Not distended  Not toxic  Not tachycardic  Incision okay  Fresh stool per colostomy    Pathology:  Order Name Source Comment Collection Info Order Time   PREPARE RBC Other   10/1/2024  8:28 AM     When to Transfuse?   Hold          Indication for Transfusion   Surgery          Surgery Date   10/1/2024          Release to patient   Routine Release        TYPE AND SCREEN   Collected By: Mirta Christianson RN 10/1/2024  8:31 AM     Release to patient   Routine Release        TISSUE PATHOLOGY EXAM Large Intestine, Rectum  Collected By: Angelito Ruiz MD 10/1/2024  2:45 PM     Release to patient   Routine Release        .    Assessment and Plan:  Well films from last night after dinner look worse, this may be that it was postprandial.  She denies pain and looks forward to eating breakfast.  Will check interval films today.  Have encouraged frequent ambulation  Fresh stool per colostomy, no cramping at this time.    Angelito Ruiz MD  10/15/24  07:37 EDT    Electronically signed by Angelito Ruiz MD at 10/15/24 0738          Physical Therapy Notes (most recent note)        Rebel Maradiaga, PT at 10/14/24 1044  Version 1 of 1         Patient Name: Valeria Tracy  : 1940    MRN: [id number]                              Today's Date: 10/14/2024       Admit Date: " 10/1/2024    Visit Dx:     ICD-10-CM ICD-9-CM   1. S/P right colectomy  Z90.49 V45.89   2. Rectal cancer  C20 154.1     Patient Active Problem List   Diagnosis    GERD (gastroesophageal reflux disease)    Asthma    Hypothyroidism    Colon cancer    S/P right colectomy    Acute postoperative pain    Anemia    Rectal cancer    S/P abdominal perineal resection, partial thickness posterior vaginal resection    Severe malnutrition    Postoperative urinary retention     Past Medical History:   Diagnosis Date    Acid reflux     Anemia     Asthma     Cancer     rectal    History of chemotherapy     History of radiation therapy     History of transfusion     No Reaction    Hypothyroid     Pneumonia     Wears dentures     upper    Wears glasses     Wears hearing aid in both ears      Past Surgical History:   Procedure Laterality Date    ABDOMINAL PERINEAL RESECTION N/A 10/1/2024    Procedure: ABDOMINAL PERINEAL RESECTION, LYSIS OF ADHESIONS AND CREATION OF COLOSTOMY;  Surgeon: Angelito Ruiz MD;  Location: [facility];  Service: General;  Laterality: N/A;    BREAST LUMPECTOMY Right     CATARACT EXTRACTION Bilateral     COLON RESECTION N/A 10/17/2023    Procedure: COLON RESECTION RIGHT LAPAROSCOPIC;  Surgeon: Angelito Ruiz MD;  Location: [facility];  Service: General;  Laterality: N/A;    COLONOSCOPY      PORTACATH PLACEMENT      SHOULDER SURGERY Right       General Information       Row Name 10/14/24 1313          Physical Therapy Time and Intention    Document Type progress note/recertification  -AE     Mode of Treatment physical therapy  -AE       Row Name 10/14/24 1313          General Information    Patient Profile Reviewed yes  -AE     Existing Precautions/Restrictions fall;other (see comments)  abdominal incision, ostomy; NG  -AE     Barriers to Rehab medically complex;previous functional deficit  -AE       Row Name 10/14/24 1313          Cognition    Orientation Status (Cognition) oriented x 3  -AE       Row Name  10/14/24 1313          Safety Issues/Impairments Affecting Functional Mobility    Safety Issues Affecting Function (Mobility) awareness of need for assistance;insight into deficits/self-awareness;safety precaution awareness;sequencing abilities  -AE     Impairments Affecting Function (Mobility) balance;endurance/activity tolerance;strength;pain  -AE               User Key  (r) = Recorded By, (t) = Taken By, (c) = Cosigned By      Initials Name Provider Type    AE Rebel Maradiaga PT Physical Therapist                   Mobility       Row Name 10/14/24 1314          Bed Mobility    Comment, (Bed Mobility) Pt received and left UIC.  -AE       Row Name 10/14/24 1314          Transfers    Comment, (Transfers) VCs for hand placement and sequencing. No overt LOB noted.  -AE       Row Name 10/14/24 1314          Sit-Stand Transfer    Sit-Stand Justice (Transfers) contact guard;verbal cues  -AE       Row Name 10/14/24 1314          Gait/Stairs (Locomotion)    Justice Level (Gait) contact guard  -AE     Assistive Device (Gait) walker, front-wheeled  -AE     Distance in Feet (Gait) 75  -AE     Deviations/Abnormal Patterns (Gait) gait speed decreased;bilateral deviations;stride length decreased;tanya decreased  -AE     Bilateral Gait Deviations forward flexed posture  -AE     Comment, (Gait/Stairs) Pt demo step through gait pattern with slowed tanya and decreased gait speed. Pt reports increased fatigue and weakness with mobility this session. Pt continues to have difficulty with increasing gait distance d/t weakness. Further distance limited by fatigue.  -AE               User Key  (r) = Recorded By, (t) = Taken By, (c) = Cosigned By      Initials Name Provider Type    AE Rebel Maradiaga PT Physical Therapist                   Obj/Interventions       Row Name 10/14/24 1319          Range of Motion Comprehensive    General Range of Motion bilateral lower extremity ROM WFL  -AE       Row Name 10/14/24 1315           Strength Comprehensive (MMT)    General Manual Muscle Testing (MMT) Assessment lower extremity strength deficits identified  -AE     Comment, General Manual Muscle Testing (MMT) Assessment BLE grossly 4-/5; generalized weakness  -AE       Row Name 10/14/24 1319          Balance    Balance Assessment sitting static balance;sitting dynamic balance;standing static balance;standing dynamic balance  -AE     Static Sitting Balance standby assist  -AE     Dynamic Sitting Balance contact guard  -AE     Position, Sitting Balance unsupported;sitting in chair  -AE     Sit to Stand Dynamic Balance contact guard;verbal cues  -AE     Static Standing Balance contact guard  -AE     Dynamic Standing Balance contact guard  -AE     Position/Device Used, Standing Balance supported;walker, front-wheeled  -AE               User Key  (r) = Recorded By, (t) = Taken By, (c) = Cosigned By      Initials Name Provider Type    AE Rebel Maradiaga, PT Physical Therapist                   Goals/Plan       Row Name 10/14/24 1327          Bed Mobility Goal 1 (PT)    Activity/Assistive Device (Bed Mobility Goal 1, PT) sit to supine/supine to sit  -AE     [place] Level/Cues Needed (Bed Mobility Goal 1, PT) supervision required  -AE     Time Frame (Bed Mobility Goal 1, PT) short term goal (STG);5 days  -AE     Progress/Outcomes (Bed Mobility Goal 1, PT) goal partially met;goal revised this date  -AE       Row Name 10/14/24 3131          Transfer Goal 1 (PT)    Activity/Assistive Device (Transfer Goal 1, PT) sit-to-stand/stand-to-sit  -AE     [place] Level/Cues Needed (Transfer Goal 1, PT) standby assist  -AE     Time Frame (Transfer Goal 1, PT) short term goal (STG);5 days  -AE     Progress/Outcome (Transfer Goal 1, PT) progress slower than expected;continuing progress toward goal  -AE       Row Name 10/14/24 1324          Gait Training Goal 1 (PT)    Activity/Assistive Device (Gait Training Goal 1, PT) gait (walking locomotion)  -AE      [place] Level (Gait Training Goal 1, PT) standby assist  -AE     Distance (Gait Training Goal 1, PT) 200ft  -AE     Time Frame (Gait Training Goal 1, PT) long term goal (LTG);10 days  -AE     Progress/Outcome (Gait Training Goal 1, PT) goal revised this date;progress slower than expected;continuing progress toward goal  -AE       Row Name 10/14/24 1329          Balance Goal 1 (PT)    Progress/Outcomes (Balance Goal 1, PT) goal no longer appropriate  -AE               User Key  (r) = Recorded By, (t) = Taken By, (c) = Cosigned By      Initials Name Provider Type    AE Rebel Maradiaga, PT Physical Therapist                   Clinical Impression       Row Name 10/14/24 1325          Pain    Pain Location abdomen  -AE     Pain Side/Orientation generalized  -AE     Pain Management Interventions activity modification encouraged;positioning techniques utilized  -AE     Response to Pain Interventions activity participation with tolerable pain;mobility function improved  -AE     Additional Documentation Pain Scale: FACES Pre/Post-Treatment (Group)  -AE       Row Name 10/14/24 1325          Pain Scale: FACES Pre/Post-Treatment    Pain: FACES Scale, Pretreatment 2-->hurts little bit  -AE     Posttreatment Pain Rating 2-->hurts little bit  -AE       Row Name 10/14/24 1326          Plan of Care Review    Progress no change  -AE     Outcome Evaluation Progress report completed. Goals reviewed and revised as appropriate to measure functional progress. Pt ambulated 75ft with CGA and RW for support. Pt continues to be limited by weakness/fatigue and remains below baseline functional mobility. Continue PT POC. Continue to progress per pt tolerance.  -AE       Row Name 10/14/24 1322          Vital Signs    Pre Systolic BP Rehab 127  -AE     Pre Treatment Diastolic BP 61  -AE     Pretreatment Heart Rate (beats/min) 91  -AE     Posttreatment Heart Rate (beats/min) 100  -AE     O2 Delivery Pre Treatment room air  -AE     O2  Delivery Intra Treatment room air  -AE     O2 Delivery Post Treatment room air  -AE     Pre Patient Position Sitting  -AE     Intra Patient Position Standing  -AE     Post Patient Position Sitting  -AE       Row Name 10/14/24 1325          Positioning and Restraints    Pre-Treatment Position sitting in chair/recliner  -AE     Post Treatment Position chair  -AE     In Chair notified nsg;reclined;call light within reach;encouraged to call for assist;legs elevated  exit alarm unchanged  -AE               User Key  (r) = Recorded By, (t) = Taken By, (c) = Cosigned By      Initials Name Provider Type    Rebel Maloney, PT Physical Therapist                   Outcome Measures       Row Name 10/14/24 1331          How much help from another person do you currently need...    Turning from your back to your side while in flat bed without using bedrails? 3  -AE     Moving from lying on back to sitting on the side of a flat bed without bedrails? 3  -AE     Moving to and from a bed to a chair (including a wheelchair)? 3  -AE     Standing up from a chair using your arms (e.g., wheelchair, bedside chair)? 3  -AE     Climbing 3-5 steps with a railing? 2  -AE     To walk in hospital room? 3  -AE     AM-PAC 6 Clicks Score (PT) 17  -AE     Highest Level of Mobility Goal 5 --> Static standing  -AE       Row Name 10/14/24 1331          Functional Assessment    Outcome Measure Options AM-PAC 6 Clicks Basic Mobility (PT)  -AE               User Key  (r) = Recorded By, (t) = Taken By, (c) = Cosigned By      Initials Name Provider Type    Rebel Maloney, PT Physical Therapist                                 Physical Therapy Education       Title: PT OT SLP Therapies (In Progress)       Topic: Physical Therapy (Done)       Point: Mobility training (Done)       Learning Progress Summary            Patient Acceptance, E, VU by AE at 10/14/2024 1044    Acceptance, E, NR by AS at 10/9/2024 1000    Acceptance, E, VU by AE at 10/7/2024  1006    Acceptance, E, VU by TM at 10/4/2024 1313    Acceptance, E, NR by TM at 10/2/2024 1129                      Point: Home exercise program (Done)       Learning Progress Summary            Patient Acceptance, E, VU by AE at 10/14/2024 1044    Acceptance, E, NR by AS at 10/9/2024 1000                      Point: Body mechanics (Done)       Learning Progress Summary            Patient Acceptance, E, VU by AE at 10/14/2024 1044    Acceptance, E, NR by AS at 10/9/2024 1000    Acceptance, E, VU by AE at 10/7/2024 1006    Acceptance, E, VU by TM at 10/4/2024 1313    Acceptance, E, NR by TM at 10/2/2024 1129                      Point: Precautions (Done)       Learning Progress Summary            Patient Acceptance, E, VU by AE at 10/14/2024 1044    Acceptance, E, VU by LS at 10/11/2024 0957    Comment: Benefits of activity    Acceptance, E, NR by AS at 10/9/2024 1000    Acceptance, E, VU by AE at 10/7/2024 1006    Acceptance, E, VU by TM at 10/4/2024 1313    Acceptance, E, NR by TM at 10/2/2024 1129                                      User Key       Initials Effective Dates Name Provider Type Discipline    AS 04/28/23 -  Viky Juarez, PTA Physical Therapist Assistant PT    LS 07/11/23 -  Jeni Alexander, PT Physical Therapist PT    AE 09/21/21 -  Rebel Maradiaga, PT Physical Therapist PT    TM 08/13/24 -  Britton Lipscomb, PT Student PT Student PT                  PT Recommendation and Plan     Plan of Care Reviewed With: patient  Progress: no change  Outcome Evaluation: Progress report completed. Goals reviewed and revised as appropriate to measure functional progress. Pt ambulated 75ft with CGA and RW for support. Pt continues to be limited by weakness/fatigue and remains below baseline functional mobility. Continue PT POC. Continue to progress per pt tolerance.     Time Calculation:         PT Charges       Row Name 10/14/24 1845             Time Calculation    Start Time 1044  -AE      PT Received  On 10/14/24  -AE      PT Goal Re-Cert Due Date 10/24/24  -AE         Timed Charges    07856 - PT Therapeutic Activity Minutes 10  -AE         Total Minutes    Timed Charges Total Minutes 10  -AE       Total Minutes 10  -AE                User Key  (r) = Recorded By, (t) = Taken By, (c) = Cosigned By      Initials Name Provider Type    AE Rebel Maradiaga, PT Physical Therapist                  Therapy Charges for Today       Code Description Service Date Service Provider Modifiers Qty    67495636031 HC PT THERAPEUTIC ACT EA 15 MIN 10/14/2024 Rebel Maradiaga, PT GP 1            PT G-Codes  Outcome Measure Options: AM-PAC 6 Clicks Basic Mobility (PT)  AM-PAC 6 Clicks Score (PT): 17  AM-PAC 6 Clicks Score (OT): 20  PT Discharge Summary  Anticipated Discharge Disposition (PT): inpatient rehabilitation facility    Rebel Maradiaga PT  10/14/2024      Electronically signed by Rebel Maradiaga PT at 10/14/24 1333          Occupational Therapy Notes (most recent note)        Tequila Nielson, OT at 10/15/24 0735          Patient Name: Valeria Tracy  : 1940    MRN: [id number]                              Today's Date: 10/15/2024       Admit Date: 10/1/2024    Visit Dx:     ICD-10-CM ICD-9-CM   1. S/P right colectomy  Z90.49 V45.89   2. Rectal cancer  C20 154.1     Patient Active Problem List   Diagnosis    GERD (gastroesophageal reflux disease)    Asthma    Hypothyroidism    Colon cancer    S/P right colectomy    Acute postoperative pain    Anemia    Rectal cancer    S/P abdominal perineal resection, partial thickness posterior vaginal resection    Severe malnutrition    Postoperative urinary retention     Past Medical History:   Diagnosis Date    Acid reflux     Anemia     Asthma     Cancer     rectal    History of chemotherapy     History of radiation therapy     History of transfusion     No Reaction    Hypothyroid     Pneumonia     Wears dentures     upper    Wears glasses     Wears hearing aid in both ears       Past Surgical History:   Procedure Laterality Date    ABDOMINAL PERINEAL RESECTION N/A 10/1/2024    Procedure: ABDOMINAL PERINEAL RESECTION, LYSIS OF ADHESIONS AND CREATION OF COLOSTOMY;  Surgeon: Angelito Ruiz MD;  Location: [facility] OR;  Service: General;  Laterality: N/A;    BREAST LUMPECTOMY Right     CATARACT EXTRACTION Bilateral     COLON RESECTION N/A 10/17/2023    Procedure: COLON RESECTION RIGHT LAPAROSCOPIC;  Surgeon: Angelito Ruiz MD;  Location: [facility] OR;  Service: General;  Laterality: N/A;    COLONOSCOPY      PORTACATH PLACEMENT      SHOULDER SURGERY Right       General Information       Row Name 10/15/24 0853          OT Time and Intention    Document Type therapy note (daily note)  -AC     Mode of Treatment occupational therapy  -AC     Patient Effort good  -AC       Row Name 10/15/24 0875          General Information    Patient Profile Reviewed yes  -AC     Existing Precautions/Restrictions fall;other (see comments)  abdominal incision, ostomy  -AC     Barriers to Rehab medically complex;previous functional deficit  -AC       Row Name 10/15/24 0806          Cognition    Orientation Status (Cognition) oriented x 3  -AC       Row Name 10/15/24 0869          Safety Issues/Impairments Affecting Functional Mobility    Safety Issues Affecting Function (Mobility) awareness of need for assistance;insight into deficits/self-awareness;safety precaution awareness  -AC     Impairments Affecting Function (Mobility) balance;endurance/activity tolerance;strength  -AC               User Key  (r) = Recorded By, (t) = Taken By, (c) = Cosigned By      Initials Name Provider Type    AC Tequila Nielson OT Occupational Therapist                     Mobility/ADL's       Row Name 10/15/24 0868          Bed Mobility    Bed Mobility supine-sit;sit-supine  -AC     Supine-Sit Whitt (Bed Mobility) standby assist  -AC     Sit-Supine Whitt (Bed Mobility) standby assist  -AC     Assistive Device (Bed Mobility)  air-assisted device;head of bed elevated  -       Row Name 10/15/24 0854          Transfers    Transfers sit-stand transfer;toilet transfer  -       Row Name 10/15/24 0854          Sit-Stand Transfer    Sit-Stand Karnes (Transfers) standby assist  -AC     Assistive Device (Sit-Stand Transfers) walker, front-wheeled  -AC       Row Name 10/15/24 0854          Toilet Transfer    Karnes Level (Toilet Transfer) standby assist  -AC     Assistive Device (Toilet Transfer) walker, front-wheeled  -AC       Row Name 10/15/24 0854          Functional Mobility    Functional Mobility- Ind. Level verbal cues required;contact guard assist  -     Functional Mobility- Device walker, front-wheeled  -AC     Functional Mobility-Distance (Feet) --  household distance  -       Row Name 10/15/24 0854          Activities of Daily Living    BADL Assessment/Intervention lower body dressing;grooming;toileting  -       Row Name 10/15/24 0854          Lower Body Dressing Assessment/Training    Karnes Level (Lower Body Dressing) don;socks;contact guard assist  -AC     Position (Lower Body Dressing) unsupported sitting  -       Row Name 10/15/24 0854          Toileting Assessment/Training    Karnes Level (Toileting) adjust/manage clothing;perform perineal hygiene;standby assist  -     Assistive Devices (Toileting) commode  -     Position (Toileting) supported standing;unsupported sitting  -       Row Name 10/15/24 0854          Grooming Assessment/Training    Karnes Level (Grooming) hair care, combing/brushing;wash face, hands;standby assist  -AC     Position (Grooming) supported standing  -               User Key  (r) = Recorded By, (t) = Taken By, (c) = Cosigned By      Initials Name Provider Type    Tequila Zepeda, OT Occupational Therapist                   Obj/Interventions       Row Name 10/15/24 0855          Balance    Balance Assessment sitting static balance;sitting dynamic  balance;standing static balance;standing dynamic balance  -AC     Static Sitting Balance standby assist  -AC     Dynamic Sitting Balance contact guard  -AC     Position, Sitting Balance unsupported;sitting edge of bed  -AC     Static Standing Balance standby assist  -AC     Dynamic Standing Balance contact guard  -AC     Position/Device Used, Standing Balance supported;walker, front-wheeled  -AC               User Key  (r) = Recorded By, (t) = Taken By, (c) = Cosigned By      Initials Name Provider Type    AC Tequila Nielson, OT Occupational Therapist                   Goals/Plan    No documentation.                  Clinical Impression       Row Name 10/15/24 0856          Pain Assessment    Pretreatment Pain Rating 0/10 - no pain  -AC     Posttreatment Pain Rating 0/10 - no pain  -AC       Row Name 10/15/24 0856          Plan of Care Review    Plan of Care Reviewed With patient  -AC     Progress improving  -AC     Outcome Evaluation Pt CGA LBD, SBA sinkside grooming, SBA toilet transfer, CGA to ambulate with RW.  Pt is progressing, but continues below baseline d/t weakness and decr activity tolerance.  Recommend IP rehab upon d/c.  -AC       Row Name 10/15/24 0856          Vital Signs    Pre Systolic BP Rehab 124  -AC     Pre Treatment Diastolic BP 60  -AC     Pretreatment Heart Rate (beats/min) 97  -AC     Posttreatment Heart Rate (beats/min) 101  -AC     Pre SpO2 (%) 94  -AC     O2 Delivery Pre Treatment room air  -AC     O2 Delivery Intra Treatment room air  -AC     Post SpO2 (%) 97  -AC     O2 Delivery Post Treatment room air  -AC     Pre Patient Position Supine  -AC     Post Patient Position Supine  -AC       Row Name 10/15/24 0856          Positioning and Restraints    Pre-Treatment Position in bed  -AC     Post Treatment Position bed  -AC     In Bed notified nsg;fowlers;call light within reach;encouraged to call for assist;exit alarm on  -AC               User Key  (r) = Recorded By, (t) = Taken By, (c) =  Cosigned By      Initials Name Provider Type    Tequila Zepeda, OT Occupational Therapist                   Outcome Measures       Row Name 10/15/24 0900          How much help from another is currently needed...    Putting on and taking off regular lower body clothing? 3  -AC     Bathing (including washing, rinsing, and drying) 3  -AC     Toileting (which includes using toilet bed pan or urinal) 3  -AC     Putting on and taking off regular upper body clothing 4  -AC     Taking care of personal grooming (such as brushing teeth) 3  -AC     Eating meals 4  -AC     AM-PAC 6 Clicks Score (OT) 20  -AC       Row Name 10/15/24 0900          Functional Assessment    Outcome Measure Options AM-PAC 6 Clicks Daily Activity (OT)  -AC               User Key  (r) = Recorded By, (t) = Taken By, (c) = Cosigned By      Initials Name Provider Type    Tequila Zepeda, SRIKANTH Occupational Therapist                    Occupational Therapy Education       Title: PT OT SLP Therapies (In Progress)       Topic: Occupational Therapy (In Progress)       Point: ADL training (In Progress)       Description:   Instruct learner(s) on proper safety adaptation and remediation techniques during self care or transfers.   Instruct in proper use of assistive devices.                  Learning Progress Summary            Patient Acceptance, E, NR by  at 10/15/2024 0900    Acceptance, E, NR by  at 10/11/2024 1016    Acceptance, E, NR by  at 10/8/2024 1307                      Point: Home exercise program (Not Started)       Description:   Instruct learner(s) on appropriate technique for monitoring, assisting and/or progressing therapeutic exercises/activities.                  Learner Progress:  Not documented in this visit.              Point: Precautions (Not Started)       Description:   Instruct learner(s) on prescribed precautions during self-care and functional transfers.                  Learner Progress:  Not documented in this visit.               Point: Body mechanics (Not Started)       Description:   Instruct learner(s) on proper positioning and spine alignment during self-care, functional mobility activities and/or exercises.                  Learner Progress:  Not documented in this visit.                              User Key       Initials Effective Dates Name Provider Type Discipline     02/03/23 -  Tequila Nielson, OT Occupational Therapist OT                  OT Recommendation and Plan  Planned Therapy Interventions (OT): activity tolerance training, BADL retraining, functional balance retraining, occupation/activity based interventions, patient/caregiver education/training, strengthening exercise, transfer/mobility retraining  Therapy Frequency (OT): daily  Plan of Care Review  Plan of Care Reviewed With: patient  Progress: improving  Outcome Evaluation: Pt CGA LBD, SBA sinkside grooming, SBA toilet transfer, CGA to ambulate with RW.  Pt is progressing, but continues below baseline d/t weakness and decr activity tolerance.  Recommend IP rehab upon d/c.     Time Calculation:   Evaluation Complexity (OT)  Review Occupational Profile/Medical/Therapy History Complexity: brief/low complexity  Assessment, Occupational Performance/Identification of Deficit Complexity: 1-3 performance deficits  Clinical Decision Making Complexity (OT): problem focused assessment/low complexity  Overall Complexity of Evaluation (OT): low complexity     Time Calculation- OT       Row Name 10/15/24 0735             Time Calculation- OT    OT Start Time 0735  -AC      OT Received On 10/15/24  -      OT Goal Re-Cert Due Date 10/18/24  -AC         Timed Charges    62857 - OT Therapeutic Activity Minutes 10  -AC      84292 - OT Self Care/Mgmt Minutes 15  -AC         Total Minutes    Timed Charges Total Minutes 25  -AC       Total Minutes 25  -AC                User Key  (r) = Recorded By, (t) = Taken By, (c) = Cosigned By      Initials Name Provider Type    AC Naga  Tequila ADAMS OT Occupational Therapist                  Therapy Charges for Today       Code Description Service Date Service Provider Modifiers Qty    25999647539  OT THERAPEUTIC ACT EA 15 MIN 10/15/2024 Tequila Nielson OT GO 1    [id number]  OT SELF CARE/MGMT/TRAIN EA 15 MIN 10/15/2024 Tequila Nielson OT GO 1                 Tequila Nielson OT  10/15/2024    Electronically signed by Tequila Nielson OT at 10/15/24 0902

## 2024-10-15 NOTE — PROGRESS NOTES
[facility] Definitive Treatment: Post-Op        Treating Physician: Dr. Loc Cabrera, Colorectal Surgeon  Pathologist at Tumor Board: Dr. Delfino Salas showed PO path slides/photos of surgical specimens  Radiologist at Tumor Board: Dr. Edwin Salazar  Radiologist Reading MRI Pelvis with RP: Dr. Edwin Salazar    MRN: [id number]     Patient Name: Valeria Tracy  : 1940     Age/Sex: 84 y.o. female  Address: [address] [place] [address]  Home Phone: [phone]     Patient Care Team:  Peyton Vázquez PA as PCP - General (Physician Assistant)    PRE-SURGICAL TREATMENT   Neoadjuvant Therapy was recommended and completed using the following regimen of treatment:  5FU/Radiation  and from 2024-2024 .  Date Neoadjuvant Therapy Completed: 2024  Patient tolerance to therapy: Manageable    SURGICAL OUTCOME REVIEW   Review Date: 10/15/24  Surgical Outcome:        Approach: Open       Stoma Present: Colostomy in place  Specimen photographs obtained: anterior view, posterior view, and 2 lateral views  Complications / Unexpected Findings: Malnutrition PO  Discharge Date: 10/8/2024  Readmissions: no    FINAL PATHOLOGY   Histology Response: Adenoma, Residual  Circumferential resection margins and distal margin status: All margins negative for carcinoma. Distance from radial/mesenteric margin: N/A; Tumor Deposits: Not identified.  Tumor Regression Grade:  No residual tumor identified  Mesorectal Grade: Complete and  per Dr. Salas at . He said that he would make corrections on Path Report.  ASA: III in Op Note per Dr. Ruiz  MSI: Intact  Sites of Involvement: Rectum  Lymph node status: 0/9  Pretreatment clinical stage:      [id number] due to pt not getting an MRI pelvis with RP before treatment elsewhere.  Final AJCC pathologic stage or post therapy path clinical stage:        Post-Therapy pathologic stage: yT 0, N 0, and M 0  Pre-treatment CEA: 2024-3.3, couldn't find CEA from treatment elsewhere     Post-treatment CEA: 9/23/2024-4.23     Deepika Priest RN - 10/15/24, 2:30 PM EDT

## 2024-10-16 PROCEDURE — 97530 THERAPEUTIC ACTIVITIES: CPT

## 2024-10-16 PROCEDURE — 94761 N-INVAS EAR/PLS OXIMETRY MLT: CPT

## 2024-10-16 PROCEDURE — 94799 UNLISTED PULMONARY SVC/PX: CPT

## 2024-10-16 PROCEDURE — 25010000002 HEPARIN (PORCINE) PER 1000 UNITS: Performed by: COLON & RECTAL SURGERY

## 2024-10-16 PROCEDURE — 94664 DEMO&/EVAL PT USE INHALER: CPT

## 2024-10-16 RX ADMIN — HEPARIN SODIUM 5000 UNITS: 5000 INJECTION INTRAVENOUS; SUBCUTANEOUS at 20:44

## 2024-10-16 RX ADMIN — MEGESTROL ACETATE 200 MG: 40 SUSPENSION ORAL at 09:31

## 2024-10-16 RX ADMIN — BUDESONIDE AND FORMOTEROL FUMARATE DIHYDRATE 2 PUFF: 160; 4.5 AEROSOL RESPIRATORY (INHALATION) at 09:14

## 2024-10-16 RX ADMIN — HEPARIN SODIUM 5000 UNITS: 5000 INJECTION INTRAVENOUS; SUBCUTANEOUS at 14:37

## 2024-10-16 RX ADMIN — PYRIDOSTIGMINE BROMIDE 30 MG: 60 TABLET ORAL at 14:37

## 2024-10-16 RX ADMIN — LANSOPRAZOLE 15 MG: 15 TABLET, ORALLY DISINTEGRATING, DELAYED RELEASE ORAL at 05:51

## 2024-10-16 RX ADMIN — PYRIDOSTIGMINE BROMIDE 30 MG: 60 TABLET ORAL at 05:52

## 2024-10-16 RX ADMIN — BUDESONIDE AND FORMOTEROL FUMARATE DIHYDRATE 2 PUFF: 160; 4.5 AEROSOL RESPIRATORY (INHALATION) at 20:27

## 2024-10-16 RX ADMIN — LEVOTHYROXINE SODIUM 88 MCG: 0.09 TABLET ORAL at 05:51

## 2024-10-16 RX ADMIN — PYRIDOSTIGMINE BROMIDE 30 MG: 60 TABLET ORAL at 20:44

## 2024-10-16 RX ADMIN — CETIRIZINE HYDROCHLORIDE 5 MG: 10 TABLET, FILM COATED ORAL at 09:31

## 2024-10-16 RX ADMIN — FAMOTIDINE 20 MG: 20 TABLET, FILM COATED ORAL at 20:44

## 2024-10-16 RX ADMIN — HEPARIN SODIUM 5000 UNITS: 5000 INJECTION INTRAVENOUS; SUBCUTANEOUS at 05:52

## 2024-10-16 NOTE — THERAPY TREATMENT NOTE
Patient Name: Valeria Tracy  : 1940    MRN: [id number]                              Today's Date: 10/16/2024       Admit Date: 10/1/2024    Visit Dx:     ICD-10-CM ICD-9-CM   1. S/P right colectomy  Z90.49 V45.89   2. Rectal cancer  C20 154.1     Patient Active Problem List   Diagnosis    GERD (gastroesophageal reflux disease)    Asthma    Hypothyroidism    Colon cancer    S/P right colectomy    Acute postoperative pain    Anemia    Rectal cancer    S/P abdominal perineal resection, partial thickness posterior vaginal resection    Severe malnutrition    Postoperative urinary retention     Past Medical History:   Diagnosis Date    Acid reflux     Anemia     Asthma     Cancer     rectal    History of chemotherapy     History of radiation therapy     History of transfusion     No Reaction    Hypothyroid     Pneumonia     Wears dentures     upper    Wears glasses     Wears hearing aid in both ears      Past Surgical History:   Procedure Laterality Date    ABDOMINAL PERINEAL RESECTION N/A 10/1/2024    Procedure: ABDOMINAL PERINEAL RESECTION, LYSIS OF ADHESIONS AND CREATION OF COLOSTOMY;  Surgeon: Angelito Ruiz MD;  Location: [facility] OR;  Service: General;  Laterality: N/A;    BREAST LUMPECTOMY Right     CATARACT EXTRACTION Bilateral     COLON RESECTION N/A 10/17/2023    Procedure: COLON RESECTION RIGHT LAPAROSCOPIC;  Surgeon: Angelito Ruiz MD;  Location: [facility] OR;  Service: General;  Laterality: N/A;    COLONOSCOPY      PORTACATH PLACEMENT      SHOULDER SURGERY Right       General Information       Row Name 10/16/24 1140          Physical Therapy Time and Intention    Document Type therapy note (daily note)  -AE     Mode of Treatment physical therapy  -AE       Row Name 10/16/24 1145          General Information    Patient Profile Reviewed yes  -AE     Existing Precautions/Restrictions fall;other (see comments)  abdominal incision, ostomy  -AE     Barriers to Rehab medically complex;previous functional  deficit  -AE       Row Name 10/16/24 1140          Cognition    Orientation Status (Cognition) oriented x 3  -AE       Row Name 10/16/24 1140          Safety Issues/Impairments Affecting Functional Mobility    Safety Issues Affecting Function (Mobility) awareness of need for assistance;insight into deficits/self-awareness;safety precaution awareness;safety precautions follow-through/compliance;sequencing abilities  -AE     Impairments Affecting Function (Mobility) balance;endurance/activity tolerance;strength  -AE               User Key  (r) = Recorded By, (t) = Taken By, (c) = Cosigned By      Initials Name Provider Type    AE Rebel Maradiaga PT Physical Therapist                   Mobility       Row Name 10/16/24 1141          Bed Mobility    Comment, (Bed Mobility) Pt received standing in bathroom. Left UIC.  -AE       Row Name 10/16/24 1141          Transfers    Comment, (Transfers) Good use of RW for safety.  -AE       Row Name 10/16/24 1141          Sit-Stand Transfer    Sit-Stand [place] (Transfers) standby assist  -AE     Assistive Device (Sit-Stand Transfers) walker, front-wheeled  -AE       Row Name 10/16/24 1141          Gait/Stairs (Locomotion)    [place] Level (Gait) standby assist  -AE     Assistive Device (Gait) walker, front-wheeled  -AE     Distance in Feet (Gait) 325  -AE     Deviations/Abnormal Patterns (Gait) gait speed decreased;bilateral deviations;stride length decreased;tanya decreased  -AE     Bilateral Gait Deviations forward flexed posture  -AE     Comment, (Gait/Stairs) Pt demo step through gait pattern with slowed tanya and forward flexed posture. Pt demo improved endurance and strength with activity this session. Further distance limited by fatigue.  -AE               User Key  (r) = Recorded By, (t) = Taken By, (c) = Cosigned By      Initials Name Provider Type    AE Reble Maradiaga PT Physical Therapist                   Obj/Interventions       Row Name 10/16/24 1140           Balance    Balance Assessment sitting static balance;sitting dynamic balance;standing static balance;standing dynamic balance  -AE     Static Sitting Balance standby assist  -AE     Dynamic Sitting Balance standby assist  -AE     Position, Sitting Balance unsupported;sitting in chair  -AE     Sit to Stand Dynamic Balance contact guard;verbal cues  -AE     Static Standing Balance contact guard  -AE     Dynamic Standing Balance contact guard  -AE     Position/Device Used, Standing Balance supported;walker, front-wheeled  -AE               User Key  (r) = Recorded By, (t) = Taken By, (c) = Cosigned By      Initials Name Provider Type    AE Rebel Maradiaga, PT Physical Therapist                   Goals/Plan    No documentation.                  Clinical Impression       Row Name 10/16/24 1320          Pain    Pretreatment Pain Rating 0/10 - no pain  -AE     Posttreatment Pain Rating 0/10 - no pain  -AE     Pre/Posttreatment Pain Comment Pt reports some pain with mobility but improved with rest  -AE     Pain Intervention(s) Repositioned;Ambulation/increased activity  -AE       Row Name 10/16/24 1320          Plan of Care Review    Plan of Care Reviewed With patient  -AE     Progress improving  -AE     Outcome Evaluation Pt continues to present with decreased functional independence and decreased activity tolerance. Pt ambulated 325ft with CGA and RW for support. Pt demo significant improvement with mobility and endurance this session. Continue to progress per pt tolerance.  -AE       Row Name 10/16/24 1320          Vital Signs    Pre Systolic BP Rehab 128  -AE     Pre Treatment Diastolic BP 83  -AE     Posttreatment Heart Rate (beats/min) 88  -AE     O2 Delivery Pre Treatment room air  -AE     O2 Delivery Intra Treatment room air  -AE     Post SpO2 (%) 96  -AE     O2 Delivery Post Treatment room air  -AE     Pre Patient Position Standing  -AE     Intra Patient Position Standing  -AE     Post Patient Position  Sitting  -AE       Row Name 10/16/24 1320          Positioning and Restraints    Pre-Treatment Position standing in room  -AE     Post Treatment Position chair  -AE     In Chair notified nsg;reclined;call light within reach;encouraged to call for assist;waffle cushion;legs elevated  exit alarm unchanged  -AE               User Key  (r) = Recorded By, (t) = Taken By, (c) = Cosigned By      Initials Name Provider Type    AE Rebel Maradiaga, PT Physical Therapist                   Outcome Measures       Row Name 10/16/24 1322 10/16/24 0800       How much help from another person do you currently need...    Turning from your back to your side while in flat bed without using bedrails? 3  -AE 3  -HM    Moving from lying on back to sitting on the side of a flat bed without bedrails? 3  -AE 3  -HM    Moving to and from a bed to a chair (including a wheelchair)? 3  -AE 3  -HM    Standing up from a chair using your arms (e.g., wheelchair, bedside chair)? 3  -AE 3  -HM    Climbing 3-5 steps with a railing? 2  -AE 2  -HM    To walk in hospital room? 3  -AE 3  -HM    AM-PAC 6 Clicks Score (PT) 17  -AE 17  -HM    Highest Level of Mobility Goal 5 --> Static standing  -AE 5 --> Static standing  -HM      Row Name 10/16/24 1322          Functional Assessment    Outcome Measure Options AM-PAC 6 Clicks Basic Mobility (PT)  -AE               User Key  (r) = Recorded By, (t) = Taken By, (c) = Cosigned By      Initials Name Provider Type    Rebel Maloney, PATRICIA Physical Therapist     Rosa Madera RN Registered Nurse                                 Physical Therapy Education       Title: PT OT SLP Therapies (In Progress)       Topic: Physical Therapy (Done)       Point: Mobility training (Done)       Learning Progress Summary            Patient Acceptance, E, VU by AE at 10/16/2024 1118    Acceptance, E, VU by AE at 10/14/2024 1044    Acceptance, E, NR by AS at 10/9/2024 1000    Acceptance, E, VU by AE at 10/7/2024 1006     Acceptance, E, VU by TM at 10/4/2024 1313    Acceptance, E, NR by TM at 10/2/2024 1129                      Point: Home exercise program (Done)       Learning Progress Summary            Patient Acceptance, E, VU by AE at 10/16/2024 1118    Acceptance, E, VU by AE at 10/14/2024 1044    Acceptance, E, NR by AS at 10/9/2024 1000                      Point: Body mechanics (Done)       Learning Progress Summary            Patient Acceptance, E, VU by AE at 10/16/2024 1118    Acceptance, E, VU by AE at 10/14/2024 1044    Acceptance, E, NR by AS at 10/9/2024 1000    Acceptance, E, VU by AE at 10/7/2024 1006    Acceptance, E, VU by TM at 10/4/2024 1313    Acceptance, E, NR by TM at 10/2/2024 1129                      Point: Precautions (Done)       Learning Progress Summary            Patient Acceptance, E, VU by AE at 10/16/2024 1118    Acceptance, E, VU by AE at 10/14/2024 1044    Acceptance, E, VU by LS at 10/11/2024 0957    Comment: Benefits of activity    Acceptance, E, NR by AS at 10/9/2024 1000    Acceptance, E, VU by AE at 10/7/2024 1006    Acceptance, E, VU by TM at 10/4/2024 1313    Acceptance, E, NR by TM at 10/2/2024 1129                                      User Key       Initials Effective Dates Name Provider Type Discipline    AS 04/28/23 -  Viky Juarez, PTA Physical Therapist Assistant PT    LS 07/11/23 -  Jeni Alexander, PT Physical Therapist PT    AE 09/21/21 -  Rebel Maradiaga, PT Physical Therapist PT    TM 08/13/24 -  Britton Lipscomb, PT Student PT Student PT                  PT Recommendation and Plan     Plan of Care Reviewed With: patient  Progress: improving  Outcome Evaluation: Pt continues to present with decreased functional independence and decreased activity tolerance. Pt ambulated 325ft with CGA and RW for support. Pt demo significant improvement with mobility and endurance this session. Continue to progress per pt tolerance.     Time Calculation:         PT Charges       Row  Name 10/16/24 1326             Time Calculation    Start Time 1118  -AE      PT Received On 10/16/24  -AE      PT Goal Re-Cert Due Date 10/24/24  -AE         Timed Charges    73569 - PT Therapeutic Activity Minutes 11  -AE         Total Minutes    Timed Charges Total Minutes 11  -AE       Total Minutes 11  -AE                User Key  (r) = Recorded By, (t) = Taken By, (c) = Cosigned By      Initials Name Provider Type    AE Rebel Maradiaga, PATRICIA Physical Therapist                  Therapy Charges for Today       Code Description Service Date Service Provider Modifiers Qty    52088751263 HC PT THERAPEUTIC ACT EA 15 MIN 10/16/2024 Rebel Maradiaga, PT GP 1            PT G-Codes  Outcome Measure Options: AM-PAC 6 Clicks Basic Mobility (PT)  AM-PAC 6 Clicks Score (PT): 17  AM-PAC 6 Clicks Score (OT): 20  PT Discharge Summary  Anticipated Discharge Disposition (PT): inpatient rehabilitation facility    Rebel Maradiaga PT  10/16/2024

## 2024-10-16 NOTE — CASE MANAGEMENT/SOCIAL WORK
Continued Stay Note  [facility]     Patient Name: Valeria Tracy  MRN: [id number]  Today's Date: 10/16/2024    Admit Date: 10/1/2024    Plan: [facility]   Discharge Plan       Row Name 10/16/24 0845       Plan    Plan [facility]    Patient/Family in Agreement with Plan yes    Plan Comments Met with Ms. Tracy at the bedside to discuss discharge plan. Her plan is SNF, and she has been accepted by [facility]. Insurance precert was started yesterday and is still pending.  will continue to follow plan of care and assist with discharge planning needs as indicated.    Final Discharge Disposition Code 03 - skilled nursing facility (SNF)                   Discharge Codes    No documentation.                 Expected Discharge Date and Time       Expected Discharge Date Expected Discharge Time    Oct 14, 2024               Rosa Schmidt RN

## 2024-10-16 NOTE — PLAN OF CARE
Problem: Adult Inpatient Plan of Care  Goal: Plan of Care Review  Outcome: Progressing  Goal: Patient-Specific Goal (Individualized)  Outcome: Progressing  Goal: Absence of Hospital-Acquired Illness or Injury  Outcome: Progressing  Intervention: Identify and Manage Fall Risk  Recent Flowsheet Documentation  Taken 10/16/2024 0400 by Ana Mcnamara RN  Safety Promotion/Fall Prevention:   assistive device/personal items within reach   clutter free environment maintained   fall prevention program maintained   nonskid shoes/slippers when out of bed   room organization consistent   safety round/check completed  Taken 10/16/2024 0000 by Ana Mcnamara RN  Safety Promotion/Fall Prevention:   assistive device/personal items within reach   clutter free environment maintained   fall prevention program maintained   nonskid shoes/slippers when out of bed   room organization consistent   safety round/check completed  Taken 10/15/2024 2000 by Ana Mcnamara RN  Safety Promotion/Fall Prevention:   assistive device/personal items within reach   clutter free environment maintained   fall prevention program maintained   nonskid shoes/slippers when out of bed   room organization consistent   safety round/check completed  Intervention: Prevent Skin Injury  Recent Flowsheet Documentation  Taken 10/16/2024 0400 by Ana Mcnamara RN  Body Position: position changed independently  Skin Protection:   incontinence pads utilized   skin sealant/moisture barrier applied   transparent dressing maintained  Taken 10/16/2024 0000 by Ana Mcnamara RN  Body Position: position changed independently  Skin Protection: incontinence pads utilized  Taken 10/15/2024 2000 by Ana Mcnamara RN  Body Position: position changed independently  Skin Protection:   transparent dressing maintained   skin sealant/moisture barrier applied  Intervention: Prevent Infection  Recent Flowsheet Documentation  Taken 10/16/2024 0400 by Ana Mcnamara  RN  Infection Prevention:   environmental surveillance performed   hand hygiene promoted   personal protective equipment utilized   single patient room provided  Taken 10/16/2024 0000 by Ana Mcnamara RN  Infection Prevention:   environmental surveillance performed   hand hygiene promoted   personal protective equipment utilized   single patient room provided  Taken 10/15/2024 2000 by Ana Mcnamara RN  Infection Prevention:   environmental surveillance performed   hand hygiene promoted   personal protective equipment utilized   single patient room provided  Goal: Optimal Comfort and Wellbeing  Outcome: Progressing  Intervention: Provide Person-Centered Care  Recent Flowsheet Documentation  Taken 10/15/2024 2000 by Ana Mcnamara RN  Trust Relationship/Rapport:   care explained   choices provided   questions answered   questions encouraged  Goal: Readiness for Transition of Care  Outcome: Progressing     Problem: Fall Injury Risk  Goal: Absence of Fall and Fall-Related Injury  Outcome: Progressing  Intervention: Identify and Manage Contributors  Recent Flowsheet Documentation  Taken 10/15/2024 2000 by Ana Mcnamara RN  Medication Review/Management: medications reviewed  Intervention: Promote Injury-Free Environment  Recent Flowsheet Documentation  Taken 10/16/2024 0400 by Ana Mcnamara RN  Safety Promotion/Fall Prevention:   assistive device/personal items within reach   clutter free environment maintained   fall prevention program maintained   nonskid shoes/slippers when out of bed   room organization consistent   safety round/check completed  Taken 10/16/2024 0000 by Ana Mcnamara RN  Safety Promotion/Fall Prevention:   assistive device/personal items within reach   clutter free environment maintained   fall prevention program maintained   nonskid shoes/slippers when out of bed   room organization consistent   safety round/check completed  Taken 10/15/2024 2000 by Ana Mcnamara RN  Safety  Promotion/Fall Prevention:   assistive device/personal items within reach   clutter free environment maintained   fall prevention program maintained   nonskid shoes/slippers when out of bed   room organization consistent   safety round/check completed     Problem: Skin Injury Risk Increased  Goal: Skin Health and Integrity  Outcome: Progressing  Intervention: Optimize Skin Protection  Recent Flowsheet Documentation  Taken 10/16/2024 0400 by Ana Mcnamara RN  Activity Management: activity encouraged  Pressure Reduction Techniques: frequent weight shift encouraged  Head of Bed (HOB) Positioning: HOB lowered  Pressure Reduction Devices:   pressure-redistributing mattress utilized   positioning supports utilized   heel offloading device utilized  Skin Protection:   incontinence pads utilized   skin sealant/moisture barrier applied   transparent dressing maintained  Taken 10/16/2024 0000 by Ana Mcnamara RN  Activity Management: activity encouraged  Pressure Reduction Techniques: frequent weight shift encouraged  Head of Bed (HOB) Positioning: HOB lowered  Pressure Reduction Devices:   pressure-redistributing mattress utilized   positioning supports utilized   heel offloading device utilized  Skin Protection: incontinence pads utilized  Taken 10/15/2024 2000 by Ana Mcnamara RN  Activity Management: activity encouraged  Pressure Reduction Techniques:   frequent weight shift encouraged   weight shift assistance provided  Head of Bed (HOB) Positioning: HOB lowered  Pressure Reduction Devices:   pressure-redistributing mattress utilized   positioning supports utilized  Skin Protection:   transparent dressing maintained   skin sealant/moisture barrier applied     Problem: Infection  Goal: Absence of Infection Signs and Symptoms  Outcome: Progressing     Problem: Adjustment to Surgery (Colostomy)  Goal: Optimal Coping  Outcome: Progressing     Problem: Bleeding (Colostomy)  Goal: Absence of Bleeding  Outcome:  Progressing     Problem: Fluid, Electrolyte and Nutrition Imbalance (Colostomy)  Goal: Fluid, Electrolyte and Nutrition Balance  Outcome: Progressing     Problem: Infection (Colostomy)  Goal: Absence of Infection Signs and Symptoms  Outcome: Progressing     Problem: Ongoing Anesthesia Effects (Colostomy)  Goal: Anesthesia/Sedation Recovery  Outcome: Progressing  Intervention: Optimize Anesthesia Recovery  Recent Flowsheet Documentation  Taken 10/16/2024 0400 by Ana Mcnamara RN  Safety Promotion/Fall Prevention:   assistive device/personal items within reach   clutter free environment maintained   fall prevention program maintained   nonskid shoes/slippers when out of bed   room organization consistent   safety round/check completed  Taken 10/16/2024 0000 by Ana Mcnamara RN  Safety Promotion/Fall Prevention:   assistive device/personal items within reach   clutter free environment maintained   fall prevention program maintained   nonskid shoes/slippers when out of bed   room organization consistent   safety round/check completed  Taken 10/15/2024 2000 by Ana Mcnamara RN  Safety Promotion/Fall Prevention:   assistive device/personal items within reach   clutter free environment maintained   fall prevention program maintained   nonskid shoes/slippers when out of bed   room organization consistent   safety round/check completed     Problem: Pain (Colostomy)  Goal: Acceptable Pain Control  Outcome: Progressing  Intervention: Prevent or Manage Pain  Recent Flowsheet Documentation  Taken 10/16/2024 0400 by Ana Mcnamara RN  Diversional Activities:   television   smartphone  Taken 10/16/2024 0000 by Ana Mcnamara RN  Diversional Activities:   television   smartphone  Taken 10/15/2024 2000 by Ana Mcnamara RN  Diversional Activities:   television   smartphone     Problem: Postoperative Nausea and Vomiting (Colostomy)  Goal: Nausea and Vomiting Relief  Outcome: Progressing     Problem: Postoperative  Stoma Care (Colostomy)  Goal: Optimal Stoma Healing  Outcome: Progressing  Intervention: Provide Ostomy and Peristomal Skin Care  Recent Flowsheet Documentation  Taken 10/16/2024 0400 by Ana Mcnamara RN  Skin Protection:   incontinence pads utilized   skin sealant/moisture barrier applied   transparent dressing maintained  Taken 10/16/2024 0000 by Ana Mcnamara RN  Skin Protection: incontinence pads utilized  Taken 10/15/2024 2000 by Ana Mcnamara RN  Skin Protection:   transparent dressing maintained   skin sealant/moisture barrier applied     Problem: Postoperative Urinary Retention (Colostomy)  Goal: Effective Urinary Elimination  Outcome: Progressing     Problem: Respiratory Compromise (Colostomy)  Goal: Effective Oxygenation and Ventilation  Outcome: Progressing  Intervention: Optimize Oxygenation and Ventilation  Recent Flowsheet Documentation  Taken 10/16/2024 0400 by Ana Mcnamara RN  Head of Bed (HOB) Positioning: HOB lowered  Taken 10/16/2024 0000 by Ana Mcnamara RN  Head of Bed (HOB) Positioning: HOB lowered  Taken 10/15/2024 2000 by Ana Mcnamara RN  Head of Bed (HOB) Positioning: HOB lowered   Goal Outcome Evaluation:

## 2024-10-16 NOTE — PROGRESS NOTES
"IM progress note      Valeria Tracy  [id number]  1940     LOS: 15 days     Attending: Angelito Ruiz MD    Primary Care Provider: Peyton Vázquez PA      Chief Complaint/Reason for visit:  Abd pain    Subjective   Doing fairly well. Good pain control. Tolerating diet. Ambulating. Stoma with output. Denies f/c/n/v/sob/cp.    Objective        Visit Vitals  /83 (BP Location: Left arm, Patient Position: Lying)   Pulse 84   Temp 97.9 °F (36.6 °C) (Oral)   Resp 18   Ht 165.1 cm (65\")   Wt 56 kg (123 lb 8 oz)   SpO2 95%   BMI 20.55 kg/m²     Temp (24hrs), Av.8 °F (36.6 °C), Min:97.5 °F (36.4 °C), Max:98 °F (36.7 °C)    Respiratory: RA    Physical Exam:     General Appearance:    Alert, cooperative, in no acute distress   Head:    Normocephalic, without obvious abnormality, atraumatic    Lungs:     Normal effort, symmetric chest rise, no crepitus, clear to      auscultation bilaterally             Heart:    Regular rhythm and normal rate, normal S1 and S2   Abdomen:     Soft, benign   improved distension. Stoma with output      Extremities:   No clubbing, cyanosis or edema.  No deformities.    Pulses:   Pulses palpable and equal bilaterally   Skin:   No bleeding, bruising or rash          Results Review:     I reviewed the patient's new clinical results.   Results from last 7 days   Lab Units 10/13/24  0441 10/12/24  0444   WBC 10*3/mm3 10.47 9.58   HEMOGLOBIN g/dL 11.1* 10.5*   HEMATOCRIT % 34.7 32.7*   PLATELETS 10*3/mm3 524* 461*     Results from last 7 days   Lab Units 10/13/24  0441 10/12/24  0444   SODIUM mmol/L 140 139   POTASSIUM mmol/L 3.4* 4.3   CHLORIDE mmol/L 102 100   CO2 mmol/L 27.0 24.0   BUN mg/dL 16 16   CREATININE mg/dL 0.74 0.64   CALCIUM mg/dL 8.8 8.9   BILIRUBIN mg/dL 0.2  --    ALK PHOS U/L 82  --    ALT (SGPT) U/L 9  --    AST (SGOT) U/L 11  --    GLUCOSE mg/dL 89 78     Study Result    Narrative & Impression   XR ABDOMEN 2+ VIEWS W CHEST 1 VW     Date of Exam: 10/15/2024 8:30 AM " EDT     Indication: interval bowel gas pattern     Comparison: 10/14/2024     Findings:  Left chest port is unchanged in position. Lung fields appear clear of acute infiltrates or effusions. The heart size is normal.     There is no free air. There are no significant air-fluid levels. There is a left abdominal ostomy. Previously seen small bowel dilatation has resolved. Large and small bowel are nondilated.     IMPRESSION:  Impression:  Resolution of small bowel distention.       I reviewed the patient's new imaging including images and reports.    All medications reviewed.   acetaminophen, 650 mg, Oral, Q8H  budesonide-formoterol, 2 puff, Inhalation, BID - RT  cetirizine, 5 mg, Oral, Daily  famotidine, 20 mg, Oral, Nightly  heparin (porcine), 5,000 Units, Subcutaneous, Q8H  lansoprazole, 15 mg, Oral, Q AM  levothyroxine, 88 mcg, Oral, Q AM  megestrol, 200 mg, Oral, Daily  pyridostigmine, 30 mg, Oral, Q8H        Assessment & Plan     S/P abdominal perineal resection, partial thickness posterior vaginal resection    Rectal cancer    GERD (gastroesophageal reflux disease)    Asthma    Hypothyroidism    Acute postoperative pain    Severe malnutrition    Postoperative urinary retention    Postop nausea and vomiting    Plan  P.o. diet as tolerated, encouraged.  1. Ambulation, encouraged, PT is following  2. Pain control-prns   3. IS-encouraged  4. DVT proph- mechs/heparin     -Asthma: Maintain home regimen with formulary substitution as indicated.  As needed bronchodilators.  Monitor oxygenation.     -Hypothyroidism: Resume replacement      -GERD:  Resumed H2 blocker, now nightly along with PPI.  Tums available prn     -New stoma:  Wound care stoma nurse consult for stoma care and education throughout patient's hospitalization.    -Urinary retention:   Resolved.   Urinalysis unremarkable.       DC to skilled nursing facility soon, pre-CERT pending 10/16/24      RAINA Arana  10/16/24  12:47 EDT

## 2024-10-16 NOTE — PROGRESS NOTES
"[facility] ([facility]      Stool per stoma  Feels well  Awaiting placement    Vital Signs:  Blood pressure 121/69, pulse 82, temperature 97.9 °F (36.6 °C), temperature source Oral, resp. rate 18, height 165.1 cm (65\"), weight 56 kg (123 lb 8 oz), SpO2 95%.    Labs past 24 hours:  Lab Results (last 24 hours)       ** No results found for the last 24 hours. **            I/O last shift:  I/O this shift:  In: -   Out: 550 [Urine:500; Stool:50]     PHYSICAL EXAM-  Comfortable  No problems  Abdomen is soft with good stoma output  No distention or other ongoing difficulty.    Pathology:  Order Name Source Comment Collection Info Order Time   PREPARE RBC Other   10/1/2024  8:28 AM     When to Transfuse?   Hold          Indication for Transfusion   Surgery          Surgery Date   10/1/2024          Release to patient   Routine Release        TYPE AND SCREEN   Collected By: Mirta Christianson RN 10/1/2024  8:31 AM     Release to patient   Routine Release        TISSUE PATHOLOGY EXAM Large Intestine, Rectum  Collected By: Angelito Ruiz MD 10/1/2024  2:45 PM     Release to patient   Routine Release        .    Assessment and Plan:  Discharge planning, awaiting placement    Angelito Ruiz MD  10/16/24  17:09 EDT  "

## 2024-10-16 NOTE — PLAN OF CARE
Goal Outcome Evaluation:  Plan of Care Reviewed With: patient  Plan of Care Reviewed With: patient        Progress: improving  Outcome Evaluation: Pt continues to present with decreased functional independence and decreased activity tolerance. Pt ambulated 325ft with CGA and RW for support. Pt demo significant improvement with mobility and endurance this session. Continue to progress per pt tolerance.    Anticipated Discharge Disposition (PT): inpatient rehabilitation facility

## 2024-10-17 PROCEDURE — 25010000002 HEPARIN (PORCINE) PER 1000 UNITS: Performed by: COLON & RECTAL SURGERY

## 2024-10-17 PROCEDURE — 94799 UNLISTED PULMONARY SVC/PX: CPT

## 2024-10-17 RX ORDER — PYRIDOSTIGMINE BROMIDE 60 MG/1
30 TABLET ORAL EVERY 8 HOURS SCHEDULED
Qty: 30 TABLET | Refills: 0 | Status: SHIPPED | OUTPATIENT
Start: 2024-10-17 | End: 2024-10-18

## 2024-10-17 RX ORDER — MEGESTROL ACETATE 40 MG/ML
200 SUSPENSION ORAL DAILY
Qty: 150 ML | Refills: 2 | Status: SHIPPED | OUTPATIENT
Start: 2024-10-18 | End: 2024-10-18

## 2024-10-17 RX ADMIN — HEPARIN SODIUM 5000 UNITS: 5000 INJECTION INTRAVENOUS; SUBCUTANEOUS at 13:15

## 2024-10-17 RX ADMIN — CETIRIZINE HYDROCHLORIDE 5 MG: 10 TABLET, FILM COATED ORAL at 08:36

## 2024-10-17 RX ADMIN — LEVOTHYROXINE SODIUM 88 MCG: 0.09 TABLET ORAL at 05:55

## 2024-10-17 RX ADMIN — PYRIDOSTIGMINE BROMIDE 30 MG: 60 TABLET ORAL at 13:16

## 2024-10-17 RX ADMIN — HEPARIN SODIUM 5000 UNITS: 5000 INJECTION INTRAVENOUS; SUBCUTANEOUS at 05:55

## 2024-10-17 RX ADMIN — HEPARIN SODIUM 5000 UNITS: 5000 INJECTION INTRAVENOUS; SUBCUTANEOUS at 20:16

## 2024-10-17 RX ADMIN — PYRIDOSTIGMINE BROMIDE 30 MG: 60 TABLET ORAL at 05:55

## 2024-10-17 RX ADMIN — LANSOPRAZOLE 15 MG: 15 TABLET, ORALLY DISINTEGRATING, DELAYED RELEASE ORAL at 05:55

## 2024-10-17 RX ADMIN — FAMOTIDINE 20 MG: 20 TABLET, FILM COATED ORAL at 20:08

## 2024-10-17 RX ADMIN — BUDESONIDE AND FORMOTEROL FUMARATE DIHYDRATE 2 PUFF: 160; 4.5 AEROSOL RESPIRATORY (INHALATION) at 08:18

## 2024-10-17 RX ADMIN — BUDESONIDE AND FORMOTEROL FUMARATE DIHYDRATE 2 PUFF: 160; 4.5 AEROSOL RESPIRATORY (INHALATION) at 23:34

## 2024-10-17 RX ADMIN — PYRIDOSTIGMINE BROMIDE 30 MG: 60 TABLET ORAL at 20:16

## 2024-10-17 RX ADMIN — ACETAMINOPHEN 650 MG: 325 TABLET ORAL at 20:17

## 2024-10-17 RX ADMIN — MEGESTROL ACETATE 200 MG: 40 SUSPENSION ORAL at 08:36

## 2024-10-17 NOTE — PLAN OF CARE
Problem: Adult Inpatient Plan of Care  Goal: Plan of Care Review  Outcome: Progressing  Goal: Patient-Specific Goal (Individualized)  Outcome: Progressing  Goal: Absence of Hospital-Acquired Illness or Injury  Outcome: Progressing  Intervention: Identify and Manage Fall Risk  Recent Flowsheet Documentation  Taken 10/17/2024 0400 by Ana Mcnamara RN  Safety Promotion/Fall Prevention:   assistive device/personal items within reach   clutter free environment maintained   fall prevention program maintained   nonskid shoes/slippers when out of bed   room organization consistent   safety round/check completed  Taken 10/17/2024 0000 by Ana Mcnamara RN  Safety Promotion/Fall Prevention:   assistive device/personal items within reach   clutter free environment maintained   fall prevention program maintained   nonskid shoes/slippers when out of bed   room organization consistent   safety round/check completed  Taken 10/16/2024 2000 by Ana Mcnamara RN  Safety Promotion/Fall Prevention:   assistive device/personal items within reach   clutter free environment maintained   fall prevention program maintained   nonskid shoes/slippers when out of bed   room organization consistent   safety round/check completed  Intervention: Prevent Skin Injury  Recent Flowsheet Documentation  Taken 10/17/2024 0400 by Ana Mcnamara RN  Body Position: position changed independently  Skin Protection:   incontinence pads utilized   skin sealant/moisture barrier applied   transparent dressing maintained  Taken 10/17/2024 0000 by Ana Mcnamara RN  Body Position: position changed independently  Skin Protection: incontinence pads utilized  Taken 10/16/2024 2000 by Ana Mcnamara RN  Body Position: position changed independently  Skin Protection:   incontinence pads utilized   transparent dressing maintained  Intervention: Prevent Infection  Recent Flowsheet Documentation  Taken 10/17/2024 0400 by Ana Mcnamara RN  Infection  Prevention:   environmental surveillance performed   hand hygiene promoted   personal protective equipment utilized   single patient room provided  Taken 10/17/2024 0000 by Ana Mcnamara RN  Infection Prevention:   environmental surveillance performed   hand hygiene promoted   personal protective equipment utilized   single patient room provided  Taken 10/16/2024 2000 by Ana Mcnamara RN  Infection Prevention:   environmental surveillance performed   hand hygiene promoted   personal protective equipment utilized   single patient room provided  Goal: Optimal Comfort and Wellbeing  Outcome: Progressing  Intervention: Provide Person-Centered Care  Recent Flowsheet Documentation  Taken 10/16/2024 2000 by Ana Mcnamara RN  Trust Relationship/Rapport:   care explained   choices provided   questions answered   questions encouraged  Goal: Readiness for Transition of Care  Outcome: Progressing     Problem: Fall Injury Risk  Goal: Absence of Fall and Fall-Related Injury  Outcome: Progressing  Intervention: Identify and Manage Contributors  Recent Flowsheet Documentation  Taken 10/16/2024 2000 by Ana Mcnamara RN  Medication Review/Management: medications reviewed  Intervention: Promote Injury-Free Environment  Recent Flowsheet Documentation  Taken 10/17/2024 0400 by Ana Mcnamara RN  Safety Promotion/Fall Prevention:   assistive device/personal items within reach   clutter free environment maintained   fall prevention program maintained   nonskid shoes/slippers when out of bed   room organization consistent   safety round/check completed  Taken 10/17/2024 0000 by Ana Mcnamara RN  Safety Promotion/Fall Prevention:   assistive device/personal items within reach   clutter free environment maintained   fall prevention program maintained   nonskid shoes/slippers when out of bed   room organization consistent   safety round/check completed  Taken 10/16/2024 2000 by Ana Mcnamara RN  Safety  Promotion/Fall Prevention:   assistive device/personal items within reach   clutter free environment maintained   fall prevention program maintained   nonskid shoes/slippers when out of bed   room organization consistent   safety round/check completed     Problem: Skin Injury Risk Increased  Goal: Skin Health and Integrity  Outcome: Progressing  Intervention: Optimize Skin Protection  Recent Flowsheet Documentation  Taken 10/17/2024 0400 by Ana Mcnamara, RN  Activity Management: activity encouraged  Pressure Reduction Techniques:   frequent weight shift encouraged   weight shift assistance provided  Head of Bed (HOB) Positioning: HOB lowered  Pressure Reduction Devices:   pressure-redistributing mattress utilized   positioning supports utilized   heel offloading device utilized  Skin Protection:   incontinence pads utilized   skin sealant/moisture barrier applied   transparent dressing maintained  Taken 10/17/2024 0000 by Ana Mcnamara RN  Activity Management: activity encouraged  Pressure Reduction Techniques:   frequent weight shift encouraged   heels elevated off bed   positioned off wounds   pressure points protected  Head of Bed (HOB) Positioning: HOB lowered  Pressure Reduction Devices:   pressure-redistributing mattress utilized   positioning supports utilized   heel offloading device utilized  Skin Protection: incontinence pads utilized  Taken 10/16/2024 2000 by Ana Mcnamara RN  Activity Management: activity encouraged  Pressure Reduction Techniques:   frequent weight shift encouraged   pressure points protected   heels elevated off bed   weight shift assistance provided  Head of Bed (HOB) Positioning: HOB lowered  Pressure Reduction Devices:   pressure-redistributing mattress utilized   positioning supports utilized   heel offloading device utilized   foam padding utilized  Skin Protection:   incontinence pads utilized   transparent dressing maintained     Problem: Infection  Goal: Absence of  Infection Signs and Symptoms  Outcome: Progressing     Problem: Adjustment to Surgery (Colostomy)  Goal: Optimal Coping  Outcome: Progressing     Problem: Bleeding (Colostomy)  Goal: Absence of Bleeding  Outcome: Progressing     Problem: Fluid, Electrolyte and Nutrition Imbalance (Colostomy)  Goal: Fluid, Electrolyte and Nutrition Balance  Outcome: Progressing     Problem: Infection (Colostomy)  Goal: Absence of Infection Signs and Symptoms  Outcome: Progressing     Problem: Ongoing Anesthesia Effects (Colostomy)  Goal: Anesthesia/Sedation Recovery  Outcome: Progressing  Intervention: Optimize Anesthesia Recovery  Recent Flowsheet Documentation  Taken 10/17/2024 0400 by Ana Mcnamara RN  Safety Promotion/Fall Prevention:   assistive device/personal items within reach   clutter free environment maintained   fall prevention program maintained   nonskid shoes/slippers when out of bed   room organization consistent   safety round/check completed  Taken 10/17/2024 0000 by Ana Mcnamara RN  Safety Promotion/Fall Prevention:   assistive device/personal items within reach   clutter free environment maintained   fall prevention program maintained   nonskid shoes/slippers when out of bed   room organization consistent   safety round/check completed  Taken 10/16/2024 2000 by Ana Mcnamara RN  Safety Promotion/Fall Prevention:   assistive device/personal items within reach   clutter free environment maintained   fall prevention program maintained   nonskid shoes/slippers when out of bed   room organization consistent   safety round/check completed     Problem: Pain (Colostomy)  Goal: Acceptable Pain Control  Outcome: Progressing  Intervention: Prevent or Manage Pain  Recent Flowsheet Documentation  Taken 10/17/2024 0400 by Ana Mcnamara RN  Diversional Activities:   television   smartphone  Taken 10/17/2024 0000 by Ana Mcnamara RN  Diversional Activities:   television   smartphone  Taken 10/16/2024 2000 by  Ana Mcnamara RN  Diversional Activities:   television   [employer]     Problem: Postoperative Nausea and Vomiting (Colostomy)  Goal: Nausea and Vomiting Relief  Outcome: Progressing     Problem: Postoperative Stoma Care (Colostomy)  Goal: Optimal Stoma Healing  Outcome: Progressing  Intervention: Provide Ostomy and Peristomal Skin Care  Recent Flowsheet Documentation  Taken 10/17/2024 0400 by Ana Mcnamara RN  Skin Protection:   incontinence pads utilized   skin sealant/moisture barrier applied   transparent dressing maintained  Taken 10/17/2024 0000 by Ana Mcnamara RN  Skin Protection: incontinence pads utilized  Taken 10/16/2024 2000 by Ana Mcnamara RN  Skin Protection:   incontinence pads utilized   transparent dressing maintained     Problem: Postoperative Urinary Retention (Colostomy)  Goal: Effective Urinary Elimination  Outcome: Progressing     Problem: Respiratory Compromise (Colostomy)  Goal: Effective Oxygenation and Ventilation  Outcome: Progressing  Intervention: Optimize Oxygenation and Ventilation  Recent Flowsheet Documentation  Taken 10/17/2024 0400 by Ana Mcnamara RN  Head of Bed (HOB) Positioning: HOB lowered  Taken 10/17/2024 0000 by Ana Mcnamara RN  Head of Bed (HOB) Positioning: HOB lowered  Taken 10/16/2024 2000 by Ana Mcnamara RN  Head of Bed (HOB) Positioning: HOB lowered   Goal Outcome Evaluation:

## 2024-10-17 NOTE — CASE MANAGEMENT/SOCIAL WORK
Continued Stay Note  [facility]     Patient Name: Valeria Tracy  MRN: [id number]  Today's Date: 10/17/2024    Admit Date: 10/1/2024    Plan: Home health   Discharge Plan       Row Name 10/17/24 1503       Plan    Plan Home health    Patient/Family in Agreement with Plan yes    Plan Comments Met with Ms. Tracy at the bedside to discuss discharge plan. Insurance denied rehab, and peer to peer was declined. Ms. Tracy requested referral to home health.  called [facility] per her request and faxed referral for nursing. Called and left message for granddaughter to let her know of anticipated discharge tomorrow.   will continue to follow plan of care and assist with discharge planning needs as indicated.    Final Discharge Disposition Code 06 - home with home health care                   Discharge Codes    No documentation.                 Expected Discharge Date and Time       Expected Discharge Date Expected Discharge Time    Oct 17, 2024               Rosa Schmidt RN

## 2024-10-17 NOTE — DISCHARGE PLACEMENT REQUEST
To: [facility]  From: Rosa Schmidt,  [phone]       [address]  [address] [facility] [address]  Phone:  [phone]  Fax:  [phone] Date: Oct 17, 2024      Ambulatory Referral to Home Health     Patient:  Valeria Tracy MRN:  [id number]   [address] [facility] [address] :  1940  SSN:    Phone: [phone] Sex:  F      INSURANCE PAYOR PLAN GROUP # SUBSCRIBER ID   Primary:    [employer] MEDICARE REPLACEMENT [id number]      Referring Provider Information:  NORRIS FRANCE Phone: [phone] Fax: [phone]       Referral Information:   # Visits:  999 Referral Type: Home Health [42]   Urgency:  Routine Referral Reason: Specialty Services Required   Start Date: Oct 17, 2024 End Date:  To be determined by Insurer   Diagnosis: Rectal cancer (C20 [ICD-10-CM] 154.1 [ICD-9-CM])  S/P right colectomy (Z90.49 [ICD-10-CM] V45.89 [ICD-9-CM])  Postoperative urinary retention (N99.89,R33.8 [ICD-10-CM] 997.5 [ICD-9-CM])  Severe malnutrition (E43 [ICD-10-CM] 261 [ICD-9-CM])  S/P colon resection (Z90.49 [ICD-10-CM] V45.89 [ICD-9-CM])  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 I treated the patient in an acute care facility and will not continue treatment after discharge.  Follow-up provider: KATHRYN VALERA [[id number]]  Reason/Clinical Findings: New ostomy. Impaired functional mobility, endurance, balance, and gait.  Describe mobility limitations that make leaving home difficult: New ostomy. Impaired functional mobility, endurance, balance, and gait.  Nursing/Therapeutic Services Requested: Skilled Nursing  Nursing/Therapeutic Services Requested: Physical Therapy  Skilled nursing orders: Medication  "education  Skilled nursing orders: Ostomy instruction  Skilled nursing orders: Cardiopulmonary assessments     This document serves as a request of services and does not constitute Insurance authorization or approval of services.  To determine eligibility, please contact the members Insurance carrier to verify and review coverage.     If you have medical questions regarding this request for services. Please contact [address] at [phone] during normal business hours.        Authorizing Provider:Maggi Lance MD  Authorizing Provider's NPI: [id number]  Order Entered By: Rosa Schmidt RN 10/17/2024  3:00 PM     Electronically signed by: Maggi Lance MD 10/17/2024  3:00 PM         Valeria Tracy (84 y.o. Female)       Date of Birth   1940    Social Security Number       Address   [address] [place] [address]    Home Phone   [phone]    MRN   [id number]       Restorationism   None    Marital Status   Single                            Admission Date   10/1/24    Admission Type   Elective    Admitting Provider   Angelito Ruiz MD    Attending Provider   Angelito Ruiz MD    Department, Room/Bed   [address], S572/1       Discharge Date       Discharge Disposition       Discharge Destination                                 Attending Provider: Angelito Ruiz MD    Allergies: Milk-related Compounds    Isolation: None   Infection: None   Code Status: CPR    Ht: 165.1 cm (65\")   Wt: 56 kg (123 lb 8 oz)    Admission Cmt: None   Principal Problem: S/P abdominal perineal resection, partial thickness posterior vaginal resection [Z90.49]                   Active Insurance as of 10/1/2024       Primary Coverage       Payor Plan Insurance Group Employer/Plan Group    HUMANA MEDICARE REPLACEMENT HUMANA MED ADV PPO [id number]       Payor Plan Address Payor Plan Phone Number Payor Plan Fax Number Effective Dates    [address] [address] [phone]  " "3/1/2024 - None Entered    [facility] [address]         Subscriber Name Subscriber Birth Date Member ID       CRIS LEONARD 1940 [id number]                     Emergency Contacts        (Rel.) Home Phone Work Phone Mobile Phone    ANNABELLA SALES (Grandchild) [phone] -- [phone]                 Physician Progress Notes (most recent note)        Maggi Lance MD at 10/17/24 1125          IM progress note      Cris Leonard  [id number]  1940     LOS: 16 days     Attending: Angelito Ruiz MD    Primary Care Provider: Peyton Vázquez PA      Chief Complaint/Reason for visit:  Abd pain    Subjective   Doing fairly well. Good pain control.   Tolerating diet. Ambulating. Stoma with output.   Denies f/c/n/v/sob/cp.    Objective        Visit Vitals  /81 (BP Location: Left arm, Patient Position: Lying)   Pulse 81   Temp 97.6 °F (36.4 °C) (Oral)   Resp 18   Ht 165.1 cm (65\")   Wt 56 kg (123 lb 8 oz)   SpO2 96%   BMI 20.55 kg/m²     Temp (24hrs), Av.7 °F (36.5 °C), Min:96.6 °F (35.9 °C), Max:98.5 °F (36.9 °C)    Respiratory: RA    Physical Exam:     General Appearance:    Alert, cooperative, in no acute distress   Head:    Normocephalic, without obvious abnormality, atraumatic    Lungs:     Normal effort, symmetric chest rise, no crepitus, clear to      auscultation bilaterally             Heart:    Regular rhythm and normal rate, normal S1 and S2   Abdomen:     Soft, benign   improved distension. Stoma with output      Extremities:   No clubbing, cyanosis or edema.  No deformities.    Pulses:   Pulses palpable and equal bilaterally   Skin:   No bleeding, bruising or rash          Results Review:     I reviewed the patient's new clinical results.   Results from last 7 days   Lab Units 10/13/24  0441 10/12/24  0444   WBC 10*3/mm3 10.47 9.58   HEMOGLOBIN g/dL 11.1* 10.5*   HEMATOCRIT % 34.7 32.7*   PLATELETS 10*3/mm3 524* 461*     Results from last 7 days   Lab Units " 10/13/24  0441 10/12/24  0444   SODIUM mmol/L 140 139   POTASSIUM mmol/L 3.4* 4.3   CHLORIDE mmol/L 102 100   CO2 mmol/L 27.0 24.0   BUN mg/dL 16 16   CREATININE mg/dL 0.74 0.64   CALCIUM mg/dL 8.8 8.9   BILIRUBIN mg/dL 0.2  --    ALK PHOS U/L 82  --    ALT (SGPT) U/L 9  --    AST (SGOT) U/L 11  --    GLUCOSE mg/dL 89 78     Study Result    Narrative & Impression   XR ABDOMEN 2+ VIEWS W CHEST 1 VW     Date of Exam: 10/15/2024 8:30 AM EDT     Indication: interval bowel gas pattern     Comparison: 10/14/2024     Findings:  Left chest port is unchanged in position. Lung fields appear clear of acute infiltrates or effusions. The heart size is normal.     There is no free air. There are no significant air-fluid levels. There is a left abdominal ostomy. Previously seen small bowel dilatation has resolved. Large and small bowel are nondilated.     IMPRESSION:  Impression:  Resolution of small bowel distention.       I reviewed the patient's new imaging including images and reports.    All medications reviewed.   acetaminophen, 650 mg, Oral, Q8H  budesonide-formoterol, 2 puff, Inhalation, BID - RT  cetirizine, 5 mg, Oral, Daily  famotidine, 20 mg, Oral, Nightly  heparin (porcine), 5,000 Units, Subcutaneous, Q8H  lansoprazole, 15 mg, Oral, Q AM  levothyroxine, 88 mcg, Oral, Q AM  megestrol, 200 mg, Oral, Daily  pyridostigmine, 30 mg, Oral, Q8H        Assessment & Plan     S/P abdominal perineal resection, partial thickness posterior vaginal resection    GERD (gastroesophageal reflux disease)    Asthma    Hypothyroidism    Acute postoperative pain    Rectal cancer    Severe malnutrition    Postoperative urinary retention    Postop nausea and vomiting    Plan  P.o. diet as tolerated, encouraged.  1. Ambulation, encouraged, PT is following  2. Pain control-prns   3. IS-encouraged  4. DVT proph- mechs/heparin     -Asthma: Maintain home regimen with formulary substitution as indicated.  As needed bronchodilators.  Monitor  oxygenation.     -Hypothyroidism: Resumed replacement      -GERD:  Resumed H2 blocker, now nightly along with PPI.  Tums available prn     -New stoma:  Wound care stoma nurse consult for stoma care and education throughout patient's hospitalization.    -Urinary retention:   Resolved.   Urinalysis unremarkable.       DC to skilled nursing facility soon, pre-CERT pending 10/17/24    D/w pt and RN.    Maggi Lance MD  10/17/24  11:25 EDT    Electronically signed by Maggi Lance MD at 10/17/24 1126       Consult Notes (most recent note)    No notes of this type exist for this encounter.

## 2024-10-17 NOTE — DISCHARGE PLACEMENT REQUEST
"To: [facility] ATTN: Prema  From: Rosa Schmidt,  [phone]    Cris Leonard (84 y.o. Female)       Date of Birth   1940    Social Security Number       Address   [address] [place] [address]    Home Phone   [phone]    MRN   [id number]       Yarsani   None    Marital Status   Single                            Admission Date   10/1/24    Admission Type   Elective    Admitting Provider   Angelito Ruiz MD    Attending Provider   Angelito Ruiz MD    Department, Room/Bed   [address], S572/1       Discharge Date       Discharge Disposition       Discharge Destination                                 Attending Provider: Angelito Ruiz MD    Allergies: Milk-related Compounds    Isolation: None   Infection: None   Code Status: CPR    Ht: 165.1 cm (65\")   Wt: 56 kg (123 lb 8 oz)    Admission Cmt: None   Principal Problem: S/P abdominal perineal resection, partial thickness posterior vaginal resection [Z90.49]                   Active Insurance as of 10/1/2024       Primary Coverage       Payor Plan Insurance Group Employer/Plan Group    HUMANA MEDICARE REPLACEMENT HUMANA MED ADV PPO [id number]       Payor Plan Address Payor Plan Phone Number Payor Plan Fax Number Effective Dates    [address] [address] [phone]  3/1/2024 - None Entered    [facility] [address]         Subscriber Name Subscriber Birth Date Member ID       CRIS LEONARD 1940 [id number]                     Emergency Contacts        (Rel.) Home Phone Work Phone Mobile Phone    ANNABELLA SALES (Grandchild) [phone] -- [phone]                 History & Physical        Maggi Lance MD at 10/01/24 1815          Admission HP     Patient Name: Cris Leonard  MRN: [id number]  : 1940  DOS: 10/1/2024    Attending: Maggi Lance MD    Primary Care Provider: Peyton Vázquez PA      Patient Care Team:  Peyton Vázquez PA as PCP - General " (Physician Assistant)    Chief complaint: Rectal cancer    Subjective  Patient is a pleasant 84 y.o. female presented for scheduled surgery by Dr. Ruiz.    Patient has history of right colon cancer for which she had a colectomy, T3 N1 has been resected with persistent neoplasia in the posterior distal rectum after YUDELKA prompting plans for APR.    Today she underwent abdominal perineal resection with partial thickness posterior vaginal resection.  Surgery included placement of a JESUS drain into the deep pelvis and colostomy creation in the left abdomen.    Surgery was done under general anesthesia and a block, was tolerated well.  I saw her in PACU where she is still in a drowsy  Postop state.  Not in distress.       Allergies   Allergen Reactions    Milk-Related Compounds Anaphylaxis        Medications Prior to Admission   Medication Sig Dispense Refill Last Dose    albuterol sulfate  (90 Base) MCG/ACT inhaler Inhale 2 puffs Every 4 (Four) Hours As Needed for Wheezing.   Past Week    Calcium-Vitamin D-Vitamin K (VIACTIV CALCIUM PLUS D PO) Take 2 doses by mouth Daily.   Past Week    DOCUSATE SODIUM PO Take 100 mg by mouth Daily As Needed (constipation).   Past Week    famotidine (PEPCID) 10 MG tablet Take 1 tablet by mouth 2 (Two) Times a Day.   Past Month    FLUTICASONE PROPIONATE NA 2 sprays into the nostril(s) as directed by provider Daily.   Past Week    fluticasone-salmeterol (ADVAIR HFA) 115-21 MCG/ACT inhaler Inhale 2 puffs 2 (Two) Times a Day.   10/1/2024    levothyroxine (SYNTHROID, LEVOTHROID) 88 MCG tablet Take 1 tablet by mouth Daily.   9/30/2024    Loratadine 10 MG capsule Take 1 capsule by mouth Daily.   9/30/2024    Multiple Vitamins-Minerals (Womens Multi Gummies) chewable tablet Chew 1 tablet Daily.   Past Week    ibuprofen (ADVIL,MOTRIN) 200 MG tablet Take 1 tablet by mouth Every 6 (Six) Hours As Needed for Mild Pain.       sodium chloride 0.65 % nasal spray Administer 1 spray into the  "nostril(s) as directed by provider As Needed for Congestion.             Past Medical History:   Diagnosis Date    Acid reflux     Anemia     Asthma     Cancer     rectal    History of chemotherapy     History of radiation therapy     History of transfusion     No Reaction    Hypothyroid     Pneumonia     Wears dentures     upper    Wears glasses     Wears hearing aid in both ears      Past Surgical History:   Procedure Laterality Date    BREAST LUMPECTOMY Right     CATARACT EXTRACTION Bilateral     COLON RESECTION N/A 10/17/2023    Procedure: COLON RESECTION RIGHT LAPAROSCOPIC;  Surgeon: Angelito Ruiz MD;  Location: [facility];  Service: General;  Laterality: N/A;    COLONOSCOPY      PORTACATH PLACEMENT      SHOULDER SURGERY Right      History reviewed. No pertinent family history.  Social History     Tobacco Use    Smoking status: Former     Types: Cigarettes    Smokeless tobacco: Never   Vaping Use    Vaping status: Never Used   Substance Use Topics    Alcohol use: Never    Drug use: Never       Review of Systems  Review of systems could not be obtained due to   patient sedation status.    Vital Signs  /73 (BP Location: Right arm, Patient Position: Lying)   Pulse 67   Temp 97.6 °F (36.4 °C) (Axillary)   Resp 16   Ht 165.1 cm (65\")   Wt 56 kg (123 lb 8 oz)   SpO2 99%   BMI 20.55 kg/m²     Physical Exam:    General Appearance:  Drowsy postop in no acute distress   Head:    Normocephalic, without obvious abnormality, atraumatic   Eyes:            Lids and lashes normal, conjunctivae and sclerae normal, no   icterus    Ears:    Ears appear intact with no abnormalities noted   Throat:   No oral lesions, no thrush, oral mucosa moist   Neck:   No adenopathy, supple, trachea midline, no thyromegaly         Lungs:     Clear to auscultation,respirations regular, even and       unlabored. No wheezes or rales.    Heart:    Regular rhythm and normal rate, normal S1 and S2, no murmur    Abdomen:      Soft with " "midline incision with an intact dressing.  Left colostomy and low abdomen/pelvic JESUS drain.   Genitalia:    Deferred   Extremities:   No edema, no cyanosis, no              redness   Pulses:   Pulses palpable and equal bilaterally   Skin:   No bleeding, bruising or rash   Neurologic:   Cranial nerves 2 - 12 grossly intact,             Invalid input(s): \"NEUTOPHILPCT\"        Invalid input(s): \"LABALBU\", \"PROT\"  Lab Results   Component Value Date    HGBA1C 5.20 09/23/2024      Latest Reference Range & Units 09/23/24 13:17   Sodium 136 - 145 mmol/L 139   Potassium 3.5 - 5.2 mmol/L 4.2   Chloride 98 - 107 mmol/L 101   CO2 22.0 - 29.0 mmol/L 29.0   Anion Gap 5.0 - 15.0 mmol/L 9.0   BUN 8 - 23 mg/dL 8   Creatinine 0.57 - 1.00 mg/dL 0.65   BUN/Creatinine Ratio 7.0 - 25.0  12.3   eGFR >60.0 mL/min/1.73 87.5   Glucose 65 - 99 mg/dL 89   Calcium 8.6 - 10.5 mg/dL 9.6   Alkaline Phosphatase 39 - 117 U/L 119 (H)   Total Protein 6.0 - 8.5 g/dL 7.1   Albumin 3.5 - 5.2 g/dL 4.3   Globulin gm/dL 2.8   A/G Ratio g/dL 1.5   AST (SGOT) 1 - 32 U/L 22   ALT (SGPT) 1 - 33 U/L 8   Total Bilirubin 0.0 - 1.2 mg/dL 0.5   Hemoglobin A1C 4.80 - 5.60 % 5.20   WBC 3.40 - 10.80 10*3/mm3 5.08   RBC 3.77 - 5.28 10*6/mm3 3.91   Hemoglobin 12.0 - 15.9 g/dL 12.1   Hematocrit 34.0 - 46.6 % 38.6   Platelets 140 - 450 10*3/mm3 302   RDW 12.3 - 15.4 % 13.0   MCV 79.0 - 97.0 fL 98.7 (H)   MCH 26.6 - 33.0 pg 30.9   MCHC 31.5 - 35.7 g/dL 31.3 (L)   MPV 6.0 - 12.0 fL 9.1   RDW-SD 37.0 - 54.0 fl 47.0   (H): Data is abnormally high  (L): Data is abnormally low    Assessment and Plan:   S/p ABDOMINAL PERINEAL RESECTION  Partial thickness posterior vaginal resection.    Rectal cancer    GERD (gastroesophageal reflux disease)    Asthma    Hypothyroidism      Plan  Postop management to include  1. Ambulation, several times daily  2. Pain control-PRNs, multimodal approach   3. IS-will encourage  4. DVT proph- Mechanical, subcutaneous heparin  5. Bowel regimen, " alvimopan  6. Resume home medications as appropriate  7. Monitor post-op labs  8. Discharge planning   9. Diet, Clears, advance diet as tolerated.  IVF initially, monitor volume status.    -Asthma: Maintain home regimen with formulary substitution as indicated.  As needed bronchodilators.  Monitor oxygenation.    -Hypothyroidism: Resume replacement     -GERD:  Resume H2 blocker.     -New stoma:  Wound care stoma nurse consult for stoma care and education throughout patient's hospitalization.      Dragon disclaimer:  Part of this encounter note is an electronic transcription/translation of spoken language to printed text. The electronic translation of spoken language may permit erroneous, or at times, nonsensical words or phrases to be inadvertently transcribed; Although I have reviewed the note for such errors, some may still exist.    Maggi Lance MD  10/01/24  18:15 EDT              Electronically signed by Maggi Lance MD at 10/01/24 2202       Pina Norman APRN at 10/01/24 0910       Attestation signed by Angelito Ruiz MD at 10/01/24 1130    No interval change    Goals of postoperative care reviewed                [facility] Pre-op    Full history and physical note from office is attached.    /85 (BP Location: Right arm, Patient Position: Lying)   Pulse 85   Temp 98.7 °F (37.1 °C) (Temporal)   Resp 18   SpO2 100%     Immunizations:  Influenza:  No  Pneumococcal:  UTD  Tetanus:  UTD    Review of Systems:  Constitutional-- No fever, chills or sweats. No fatigue.  CV-- No chest pain, palpitation or syncope  Resp-- No SOB, cough, hemoptysis  Skin--No rashes or lesions    Physical Exam:  Heart:   Regular rate and rhythm, S1 and S2 normal  Lungs: Clear to auscultation bilaterally, respirations unlabored    LAB Results:  Lab Results   Component Value Date    WBC 5.08 09/23/2024    HGB 12.1 09/23/2024    HCT 38.6 09/23/2024    MCV 98.7 (H) 09/23/2024     09/23/2024     "NEUTROABS 7.07 (H) 10/23/2023    GLUCOSE 89 09/23/2024    BUN 8 09/23/2024    CREATININE 0.65 09/23/2024     09/23/2024    K 4.2 09/23/2024     09/23/2024    CO2 29.0 09/23/2024    CALCIUM 9.6 09/23/2024    ALBUMIN 4.3 09/23/2024    AST 22 09/23/2024    ALT 8 09/23/2024    BILITOT 0.5 09/23/2024     Study Result    Narrative & Impression   MRI PELVIS WO CONTRAST     Date of Exam: 8/22/2024 6:43 PM EDT     Indication: C20.     Comparison: CT abdomen pelvis 10/20/2023.     Technique:  Routine multiplanar/multisequence images of the pelvis were obtained without contrast administration.     Per chart review: 10/25/2023 patient underwent colonoscopy which demonstrated \"large bulky mass lesion was seen in the rectum. This arose from the posterior wall of the rectum was greater than 5 cm in size, arising from a broad base with distal extent to   within a proximally 3 cm of the dentate line. Several biopsies were obtained, that showed moderately differentiated adenocarcinoma.\" Patient has subsequently undergone chemoradiation at an outside institution for rectal cancer. No pretreatment/baseline   rectal MRI performed.     Findings:     Note there is a large amount of gas in the rectum, which makes evaluation suboptimal. Along the posterior rectal wall there is T2 hypointense signal suggestive of scar/fibrosis, which spans around 3.5 cm in craniocaudal dimension (series 5 image 10-12)   which appears to be at the site of the rectal tumor seen as an enhancing mass on prior CT. The inferior margin of the scar extends to 2.2 cm above the anorectal junction and 4.2 cm above the anal verge. Within the posterior mesorectal fat at this   location at the 6 o'clock position, there are thin linear/radiating areas of T2 hypointensity suggestive of desmoplastic stranding. Otherwise, there is no convincing evidence of mesorectal involvement. No distinct intermediate T2 signal or discrete   restricted diffusion to suggest " significant residual viable disease.     There is a 4 x 4 mm lymph node in the mesorectum at the 7 o'clock position, without prominent diffusion signal or suspicious morphologic features, unchanged in size from prior CT and of low suspicion. No mesorectal lymphadenopathy otherwise. No   extramesorectal lymphadenopathy within the field-of-view.     Small uterus and ovaries, typical of age. Questionable small fibroid present. No free fluid or organized fluid collection. No subcutaneous soft tissue abnormality. No acute or suspicious osseous lesions. Degenerative changes of the bilateral hips.     IMPRESSION:  Impression:     Within the confines of comparing to a prior CT without prior/baseline MRI, as well as a large amount of gas in the rectum which distorts diffusion-weighted sequences, there appears to be positive response to treatment with predominantly T2 hypointense   signal at the site of the treated rectal tumor suggestive of scar/fibrosis. No distinct intermediate T2 signal nor restricted diffusion to suggest measurable residual viable tumor. Mesorectum is generally clear without convincing evidence of tumor   involvement nor suspicious lymphadenopathy, with note made of a tiny/nonsuspicious lymph node. Consider correlation with endoscopy and tissue sampling as clinically indicated.     Cancer Staging (if applicable)  Cancer Patient: __ yes __no __unknown__N/A; If yes, clinical stage T:__ N:__M:__, stage group or __N/A      Impression: History rectal cancer, Stage III      Plan: ABDOMINAL PERINEAL RESECTION, LYSIS OF ADHESIONS       RAINA Arana   10/1/2024   09:10 EDT    Electronically signed by Angelito Ruiz MD at 10/01/24 1130   Source Note: H&P        [Media Unavailable] Scan on 9/9/2024 0913 by [employer], [place]: H&P, CSGA, 09/06/2024          Electronically signed by [employer], [employer] at 09/09/24 0916                 H&P signed by [employer], [employer] at 09/09/24 0916         [Media  Unavailable] Scan on 9/9/2024 0913 by [employer], [employer]: H&PVIJAYA, 09/06/2024          Electronically signed by [employer], [employer] at 09/09/24 0916

## 2024-10-17 NOTE — PROGRESS NOTES
"IM progress note      Valeria Tracy  [id number]  1940     LOS: 16 days     Attending: Angelito Ruiz MD    Primary Care Provider: Peyton Vázquez PA      Chief Complaint/Reason for visit:  Abd pain    Subjective   Doing fairly well. Good pain control.   Tolerating diet. Ambulating. Stoma with output.   Denies f/c/n/v/sob/cp.    Objective        Visit Vitals  /81 (BP Location: Left arm, Patient Position: Lying)   Pulse 81   Temp 97.6 °F (36.4 °C) (Oral)   Resp 18   Ht 165.1 cm (65\")   Wt 56 kg (123 lb 8 oz)   SpO2 96%   BMI 20.55 kg/m²     Temp (24hrs), Av.7 °F (36.5 °C), Min:96.6 °F (35.9 °C), Max:98.5 °F (36.9 °C)    Respiratory: RA    Physical Exam:     General Appearance:    Alert, cooperative, in no acute distress   Head:    Normocephalic, without obvious abnormality, atraumatic    Lungs:     Normal effort, symmetric chest rise, no crepitus, clear to      auscultation bilaterally             Heart:    Regular rhythm and normal rate, normal S1 and S2   Abdomen:     Soft, benign   improved distension. Stoma with output      Extremities:   No clubbing, cyanosis or edema.  No deformities.    Pulses:   Pulses palpable and equal bilaterally   Skin:   No bleeding, bruising or rash          Results Review:     I reviewed the patient's new clinical results.   Results from last 7 days   Lab Units 10/13/24  0441 10/12/24  0444   WBC 10*3/mm3 10.47 9.58   HEMOGLOBIN g/dL 11.1* 10.5*   HEMATOCRIT % 34.7 32.7*   PLATELETS 10*3/mm3 524* 461*     Results from last 7 days   Lab Units 10/13/24  0441 10/12/24  0444   SODIUM mmol/L 140 139   POTASSIUM mmol/L 3.4* 4.3   CHLORIDE mmol/L 102 100   CO2 mmol/L 27.0 24.0   BUN mg/dL 16 16   CREATININE mg/dL 0.74 0.64   CALCIUM mg/dL 8.8 8.9   BILIRUBIN mg/dL 0.2  --    ALK PHOS U/L 82  --    ALT (SGPT) U/L 9  --    AST (SGOT) U/L 11  --    GLUCOSE mg/dL 89 78     Study Result    Narrative & Impression   XR ABDOMEN 2+ VIEWS W CHEST 1 VW     Date of Exam: 10/15/2024 8:30 " AM EDT     Indication: interval bowel gas pattern     Comparison: 10/14/2024     Findings:  Left chest port is unchanged in position. Lung fields appear clear of acute infiltrates or effusions. The heart size is normal.     There is no free air. There are no significant air-fluid levels. There is a left abdominal ostomy. Previously seen small bowel dilatation has resolved. Large and small bowel are nondilated.     IMPRESSION:  Impression:  Resolution of small bowel distention.       I reviewed the patient's new imaging including images and reports.    All medications reviewed.   acetaminophen, 650 mg, Oral, Q8H  budesonide-formoterol, 2 puff, Inhalation, BID - RT  cetirizine, 5 mg, Oral, Daily  famotidine, 20 mg, Oral, Nightly  heparin (porcine), 5,000 Units, Subcutaneous, Q8H  lansoprazole, 15 mg, Oral, Q AM  levothyroxine, 88 mcg, Oral, Q AM  megestrol, 200 mg, Oral, Daily  pyridostigmine, 30 mg, Oral, Q8H        Assessment & Plan     S/P abdominal perineal resection, partial thickness posterior vaginal resection    GERD (gastroesophageal reflux disease)    Asthma    Hypothyroidism    Acute postoperative pain    Rectal cancer    Severe malnutrition    Postoperative urinary retention    Postop nausea and vomiting    Plan  P.o. diet as tolerated, encouraged.  1. Ambulation, encouraged, PT is following  2. Pain control-prns   3. IS-encouraged  4. DVT proph- mechs/heparin     -Asthma: Maintain home regimen with formulary substitution as indicated.  As needed bronchodilators.  Monitor oxygenation.     -Hypothyroidism: Resumed replacement      -GERD:  Resumed H2 blocker, now nightly along with PPI.  Tums available prn     -New stoma:  Wound care stoma nurse consult for stoma care and education throughout patient's hospitalization.    -Urinary retention:   Resolved.   Urinalysis unremarkable.       DC to skilled nursing facility soon, pre-CERT pending 10/17/24    D/w pt and RN.    Maggi Lance MD  10/17/24  11:25  EDT

## 2024-10-17 NOTE — PROGRESS NOTES
No problems    After partial small bowel obstruction, resolved 3 days ago after Gastrografin small bowel follow-through.  Good tolerance of diet, good bowel function, interval films until resolution to obstructive pattern complete.  Last films without atypical bowel gas pattern.    Awaiting bed placement.

## 2024-10-18 ENCOUNTER — READMISSION MANAGEMENT (OUTPATIENT)
Dept: CALL CENTER | Facility: HOSPITAL | Age: 84
End: 2024-10-18
Payer: MEDICARE

## 2024-10-18 VITALS
TEMPERATURE: 97.6 F | DIASTOLIC BLOOD PRESSURE: 63 MMHG | HEIGHT: 65 IN | WEIGHT: 123.5 LBS | HEART RATE: 84 BPM | SYSTOLIC BLOOD PRESSURE: 120 MMHG | RESPIRATION RATE: 18 BRPM | BODY MASS INDEX: 20.58 KG/M2 | OXYGEN SATURATION: 96 %

## 2024-10-18 PROCEDURE — 94799 UNLISTED PULMONARY SVC/PX: CPT

## 2024-10-18 PROCEDURE — 25010000002 HEPARIN (PORCINE) PER 1000 UNITS: Performed by: COLON & RECTAL SURGERY

## 2024-10-18 RX ORDER — PYRIDOSTIGMINE BROMIDE 60 MG/1
30 TABLET ORAL EVERY 8 HOURS SCHEDULED
Qty: 30 TABLET | Refills: 0 | Status: SHIPPED | OUTPATIENT
Start: 2024-10-18

## 2024-10-18 RX ORDER — MEGESTROL ACETATE 40 MG/ML
200 SUSPENSION ORAL DAILY
Qty: 150 ML | Refills: 2 | Status: SHIPPED | OUTPATIENT
Start: 2024-10-18 | End: 2025-01-16

## 2024-10-18 RX ADMIN — MEGESTROL ACETATE 200 MG: 40 SUSPENSION ORAL at 08:43

## 2024-10-18 RX ADMIN — BUDESONIDE AND FORMOTEROL FUMARATE DIHYDRATE 2 PUFF: 160; 4.5 AEROSOL RESPIRATORY (INHALATION) at 08:33

## 2024-10-18 RX ADMIN — LEVOTHYROXINE SODIUM 88 MCG: 0.09 TABLET ORAL at 05:09

## 2024-10-18 RX ADMIN — LANSOPRAZOLE 15 MG: 15 TABLET, ORALLY DISINTEGRATING, DELAYED RELEASE ORAL at 05:08

## 2024-10-18 RX ADMIN — ACETAMINOPHEN 650 MG: 325 TABLET ORAL at 05:08

## 2024-10-18 RX ADMIN — PYRIDOSTIGMINE BROMIDE 30 MG: 60 TABLET ORAL at 05:08

## 2024-10-18 RX ADMIN — CETIRIZINE HYDROCHLORIDE 5 MG: 10 TABLET, FILM COATED ORAL at 08:43

## 2024-10-18 RX ADMIN — HEPARIN SODIUM 5000 UNITS: 5000 INJECTION INTRAVENOUS; SUBCUTANEOUS at 05:08

## 2024-10-18 NOTE — DISCHARGE PLACEMENT REQUEST
"To: [facility]  From: Rosa Brayan,  [phone]    Cris Leonard (84 y.o. Female)       Date of Birth   1940    Social Security Number       Address   [address] [place] [address]    Home Phone   [phone]    MRN   [id number]       Anabaptism   None    Marital Status   Single                            Admission Date   10/1/24    Admission Type   Elective    Admitting Provider   Angelito Ruiz MD    Attending Provider   Angelito Ruiz MD    Department, Room/Bed   [address], S572/1       Discharge Date       Discharge Disposition   Home or Self Care    Discharge Destination                                 Attending Provider: Angelito Ruiz MD    Allergies: Milk-related Compounds    Isolation: None   Infection: None   Code Status: CPR    Ht: 165.1 cm (65\")   Wt: 56 kg (123 lb 8 oz)    Admission Cmt: None   Principal Problem: S/P abdominal perineal resection, partial thickness posterior vaginal resection [Z90.49]                   Active Insurance as of 10/1/2024       Primary Coverage       Payor Plan Insurance Group Employer/Plan Group    HUMANA MEDICARE REPLACEMENT HUMANA MED ADV PPO [id number]       Payor Plan Address Payor Plan Phone Number Payor Plan Fax Number Effective Dates    [address] [address] [phone]  3/1/2024 - None Entered    [facility] [id number]         Subscriber Name Subscriber Birth Date Member ID       CRIS LEONARD 1940 [id number]                     Emergency Contacts        (Rel.) Home Phone Work Phone Mobile Phone    ANNABELLA SALES (Grandchild) [phone] -- [phone]                 Discharge Summary        Maggi Lance MD at 10/17/24 1432            Patient Name: Cris Leonard  MRN: [id number]  : 1940  DOS: 10/17/2024    Attending: Angelito Ruiz MD    Primary Care Provider: Peyton Vázquez PA    Date of Admission:.10/1/2024  5:29 AM    Date of Discharge:  " 10/17/2024    Discharge Diagnosis:   S/P abdominal perineal resection, partial thickness posterior vaginal resection    GERD (gastroesophageal reflux disease)    Asthma    Hypothyroidism    Acute postoperative pain    Rectal cancer    Severe malnutrition    Postop ileus , resolved.     Postoperative urinary retention, resolved.      Hospital Course  At admit  Patient is a pleasant 84 y.o. female presented for scheduled surgery by Dr. Ruiz.     Patient has history of right colon cancer for which she had a colectomy, T3 N1 has been resected with persistent neoplasia in the posterior distal rectum after YUDELKA prompting plans for APR.     Today she underwent abdominal perineal resection with partial thickness posterior vaginal resection.  Surgery included placement of a JESUS drain into the deep pelvis and colostomy creation in the left abdomen.     Surgery was done under general anesthesia and a block, was tolerated well.  I saw her in PACU where she is still in a drowsy  Postop state.  Not in distress.      After admit    She was provided pain medication as needed for pain control.  Pt received DVT prophylaxis with subcutaneous heparin as well as mechanicals      Patient was started on clear liquid diet, this was advanced when she showed evidence of bowel function return.  She had difficulty with PO diet, and episodes of nausea, heartburn and emesis, was diagnosed with possible ileus, required NGT ( 2 different occasions) , she has since been able to have it removed, had po diet started and advanced and she tolerated diet without difficulty.    He was seen by WOCN, received extensive education and instructions during his stay. All pt questions were addressed;  education folder, stomal care, fluids, diet, activity, work, lifting restrictions for 6-8 weeks were reviewed.    She had transient urinary retention after Harrington removal, required I/O cath a few times, UA was unremarkable.     She was placed on Flomax, she has since  been able to void spontaneously without difficulty.  I recommend keeping Flomax on for a few more days.    During her stay patient used an IS for atelectasis prophylaxis.    She was seen by physical therapy and Occupational Therapy, was recommended for inpatient rehab. After staying in the hospital a long while , she achieved much better walking distance. She was denied IPR by her insureance. And she wishes to go home.     Home medications were resumed as appropriate, and labs were monitored and remained fairly stable.    With the progress she has made, pt is ready for DC home.     Discussed with patient regarding plan and she shows understanding and agreement.       Procedures Performed  Procedure(s):  ABDOMINAL PERINEAL RESECTION, LYSIS OF ADHESIONS AND CREATION OF COLOSTOMY       Pertinent Test Results:    I reviewed the patient's new clinical results.   Results from last 7 days   Lab Units 10/13/24  0441 10/12/24  0444   WBC 10*3/mm3 10.47 9.58   HEMOGLOBIN g/dL 11.1* 10.5*   HEMATOCRIT % 34.7 32.7*   PLATELETS 10*3/mm3 524* 461*     Results from last 7 days   Lab Units 10/13/24  0441 10/12/24  0444   SODIUM mmol/L 140 139   POTASSIUM mmol/L 3.4* 4.3   CHLORIDE mmol/L 102 100   CO2 mmol/L 27.0 24.0   BUN mg/dL 16 16   CREATININE mg/dL 0.74 0.64   CALCIUM mg/dL 8.8 8.9   BILIRUBIN mg/dL 0.2  --    ALK PHOS U/L 82  --    ALT (SGPT) U/L 9  --    AST (SGOT) U/L 11  --    GLUCOSE mg/dL 89 78     I reviewed the patient's new imaging including images and reports.      Date of Exam: 10/15/2024 8:30 AM EDT     Indication: interval bowel gas pattern     Comparison: 10/14/2024     Findings:  Left chest port is unchanged in position. Lung fields appear clear of acute infiltrates or effusions. The heart size is normal.     There is no free air. There are no significant air-fluid levels. There is a left abdominal ostomy. Previously seen small bowel dilatation has resolved. Large and small bowel are nondilated.    "  IMPRESSION:  Impression:  Resolution of small bowel distention.        Electronically Signed: Judy Billingsley MD    10/15/2024 8:46 AM EDT    Workstation ID: [id number]    Physical therapy    Date of Service: 10/07/24 100  Creation Time: 10/07/24 1107     Signed         Goal Outcome Evaluation:  Plan of Care Reviewed With: patient  Progress: improving  Outcome Evaluation: Pt continues to present with decreased functional mobility and decreased activity tolerance. Pt ambulated 150ft with CGA and RW for support. Pt demo increased fatigue with mobility and difficulty improving independence with increased distance. Continue to progress per pt tolerance.        Anticipated Discharge Disposition (PT): inpatient rehabilitation facility                 Discharge Assessment:       Visit Vitals  /63 (BP Location: Left arm, Patient Position: Lying)   Pulse 86   Temp 97.5 °F (36.4 °C) (Oral)   Resp 18   Ht 165.1 cm (65\")   Wt 56 kg (123 lb 8 oz)   SpO2 96%   BMI 20.55 kg/m²     Temp (24hrs), Av.9 °F (36.6 °C), Min:96.6 °F (35.9 °C), Max:99.4 °F (37.4 °C)      General Appearance:    Alert, cooperative, in no acute distress   Lungs:     Clear to auscultation,respirations regular, even and     unlabored    Heart:    Regular rhythm and normal rate, normal S1 and S2    Abdomen:   Soft and benign with clean incisions.  Pink stoma with output.   Extremities:   Moves all extremities well, no edema, no cyanosis, no              redness   Pulses:   Pulses palpable and equal bilaterally   Skin:   No bleeding, bruising or rash            Discharge Disposition:          Discharge Medications        New Medications        Instructions Start Date   megestrol 40 MG/ML suspension  Commonly known as: MEGACE   200 mg, Oral, Daily   Start Date: 2024     pyridostigmine 60 MG tablet  Commonly known as: MESTINON   Take 0.5 tablet by mouth Every 8 (Eight) Hours for 7 days, THEN use as needed if stoma output is low         "     Continue These Medications        Instructions Start Date   albuterol sulfate  (90 Base) MCG/ACT inhaler  Commonly known as: PROVENTIL HFA;VENTOLIN HFA;PROAIR HFA   2 puffs, Inhalation, Every 4 Hours PRN      DOCUSATE SODIUM PO   100 mg, Oral, Daily PRN      famotidine 10 MG tablet  Commonly known as: PEPCID   10 mg, Oral, 2 Times Daily      FLUTICASONE PROPIONATE NA   2 sprays, Nasal, Daily      fluticasone-salmeterol 115-21 MCG/ACT inhaler  Commonly known as: ADVAIR HFA   2 puffs, Inhalation, 2 Times Daily - RT      ibuprofen 200 MG tablet  Commonly known as: ADVIL,MOTRIN   200 mg, Oral, Every 6 Hours PRN      levothyroxine 88 MCG tablet  Commonly known as: SYNTHROID, LEVOTHROID   88 mcg, Oral, Daily      Loratadine 10 MG capsule   10 mg, Oral, Daily      sodium chloride 0.65 % nasal spray   1 spray, Nasal, As Needed      VIACTIV CALCIUM PLUS D PO   2 doses, Oral, Daily      Womens Multi Gummies chewable tablet   1 tablet, Oral, Daily               Discharge Diet: Soft GI.  Low fiber diet    Activity at Discharge: Ambulate.  No lifting, pushing or pulling.    Follow-up Appointments:  Angelito Ruiz MD per his orders.    Dragon disclaimer:  Part of this encounter note is an electronic transcription/translation of spoken language to printed text. The electronic translation of spoken language may permit erroneous, or at times, nonsensical words or phrases to be inadvertently transcribed; Although I have reviewed the note for such errors, some may still exist.       Maggi Lance MD  10/17/24  23:46 EDT          Electronically signed by Maggi Lance MD at 10/17/24 7976       Pina Norman APRN at 10/18/24 1124            Patient Name: Valeria Tracy  MRN: [id number]  : 1940  DOS: 10/18/2024    Attending: Angelito Ruiz MD    Primary Care Provider: Peyton Vázquez PA    Date of Admission:.10/1/2024  5:29 AM    Date of Discharge:  10/18/2024    Discharge Diagnosis:   S/P abdominal  perineal resection, partial thickness posterior vaginal resection    GERD (gastroesophageal reflux disease)    Asthma    Hypothyroidism    Acute postoperative pain    Rectal cancer    Severe malnutrition    Postop ileus , resolved.     Postoperative urinary retention, resolved.      Hospital Course  At admit  Patient is a pleasant 84 y.o. female presented for scheduled surgery by Dr. Ruiz.     Patient has history of right colon cancer for which she had a colectomy, T3 N1 has been resected with persistent neoplasia in the posterior distal rectum after YUDELKA prompting plans for APR.     Today she underwent abdominal perineal resection with partial thickness posterior vaginal resection.  Surgery included placement of a JESUS drain into the deep pelvis and colostomy creation in the left abdomen.     Surgery was done under general anesthesia and a block, was tolerated well.  I saw her in PACU where she is still in a drowsy  Postop state.  Not in distress.      After admit    She was provided pain medication as needed for pain control.  Pt received DVT prophylaxis with subcutaneous heparin as well as mechanicals      Patient was started on clear liquid diet, this was advanced when she showed evidence of bowel function return.  She had difficulty with PO diet, and episodes of nausea, heartburn and emesis, was diagnosed with possible ileus, required NGT ( 2 different occasions) , she has since been able to have it removed, had po diet started and advanced and she tolerated diet without difficulty.    He was seen by WOCN, received extensive education and instructions during his stay. All pt questions were addressed;  education folder, stomal care, fluids, diet, activity, work, lifting restrictions for 6-8 weeks were reviewed.    She had transient urinary retention after Harrington removal, required I/O cath a few times, UA was unremarkable.     She was placed on Flomax, she has since been able to void spontaneously without  difficulty.  I recommend keeping Flomax on for a few more days.    During her stay patient used an IS for atelectasis prophylaxis.    She was seen by physical therapy and Occupational Therapy, was recommended for inpatient rehab. After staying in the hospital a long while , she achieved much better walking distance. She was denied IPR by her insureance. And she wishes to go home.     Home medications were resumed as appropriate, and labs were monitored and remained fairly stable.    With the progress she has made, pt is ready for DC home.     Discussed with patient regarding plan and she shows understanding and agreement.       Procedures Performed  Procedure(s):  ABDOMINAL PERINEAL RESECTION, LYSIS OF ADHESIONS AND CREATION OF COLOSTOMY       Pertinent Test Results:    I reviewed the patient's new clinical results.   Results from last 7 days   Lab Units 10/13/24  0441 10/12/24  0444   WBC 10*3/mm3 10.47 9.58   HEMOGLOBIN g/dL 11.1* 10.5*   HEMATOCRIT % 34.7 32.7*   PLATELETS 10*3/mm3 524* 461*     Results from last 7 days   Lab Units 10/13/24  0441 10/12/24  0444   SODIUM mmol/L 140 139   POTASSIUM mmol/L 3.4* 4.3   CHLORIDE mmol/L 102 100   CO2 mmol/L 27.0 24.0   BUN mg/dL 16 16   CREATININE mg/dL 0.74 0.64   CALCIUM mg/dL 8.8 8.9   BILIRUBIN mg/dL 0.2  --    ALK PHOS U/L 82  --    ALT (SGPT) U/L 9  --    AST (SGOT) U/L 11  --    GLUCOSE mg/dL 89 78     I reviewed the patient's new imaging including images and reports.      Date of Exam: 10/15/2024 8:30 AM EDT     Indication: interval bowel gas pattern     Comparison: 10/14/2024     Findings:  Left chest port is unchanged in position. Lung fields appear clear of acute infiltrates or effusions. The heart size is normal.     There is no free air. There are no significant air-fluid levels. There is a left abdominal ostomy. Previously seen small bowel dilatation has resolved. Large and small bowel are nondilated.     IMPRESSION:  Impression:  Resolution of small bowel  "distention.        Electronically Signed: Judy Billingsley MD    10/15/2024 8:46 AM EDT    Workstation ID: [id number]    Physical therapy    Date of Service: 10/07/24 1006  Creation Time: 10/07/24 1107     Signed         Goal Outcome Evaluation:  Plan of Care Reviewed With: patient  Progress: improving  Outcome Evaluation: Pt continues to present with decreased functional mobility and decreased activity tolerance. Pt ambulated 150ft with CGA and RW for support. Pt demo increased fatigue with mobility and difficulty improving independence with increased distance. Continue to progress per pt tolerance.        Anticipated Discharge Disposition (PT): inpatient rehabilitation facility                 Discharge Assessment:       Visit Vitals  /63 (BP Location: Left arm, Patient Position: Lying)   Pulse 80   Temp 97.6 °F (36.4 °C) (Oral)   Resp 18   Ht 165.1 cm (65\")   Wt 56 kg (123 lb 8 oz)   SpO2 96%   BMI 20.55 kg/m²     Temp (24hrs), Av.2 °F (36.8 °C), Min:97.5 °F (36.4 °C), Max:99.4 °F (37.4 °C)      General Appearance:    Alert, cooperative, in no acute distress   Lungs:     Clear to auscultation,respirations regular, even and     unlabored    Heart:    Regular rhythm and normal rate, normal S1 and S2    Abdomen:   Soft and benign with clean incisions.  Pink stoma with output.   Extremities:   Moves all extremities well, no edema, no cyanosis, no              redness   Pulses:   Pulses palpable and equal bilaterally   Skin:   No bleeding, bruising or rash            Discharge Disposition:          Discharge Medications        New Medications        Instructions Start Date   megestrol 40 MG/ML suspension  Commonly known as: MEGACE   200 mg, Oral, Daily      pyridostigmine 60 MG tablet  Commonly known as: MESTINON   Take 0.5 tablet by mouth Every 8 (Eight) Hours for 7 days, THEN use as needed if stoma output is low             Continue These Medications        Instructions Start Date   albuterol sulfate  " (90 Base) MCG/ACT inhaler  Commonly known as: PROVENTIL HFA;VENTOLIN HFA;PROAIR HFA   2 puffs, Inhalation, Every 4 Hours PRN      DOCUSATE SODIUM PO   100 mg, Oral, Daily PRN      famotidine 10 MG tablet  Commonly known as: PEPCID   10 mg, Oral, 2 Times Daily      FLUTICASONE PROPIONATE NA   2 sprays, Nasal, Daily      fluticasone-salmeterol 115-21 MCG/ACT inhaler  Commonly known as: ADVAIR HFA   2 puffs, Inhalation, 2 Times Daily - RT      ibuprofen 200 MG tablet  Commonly known as: ADVIL,MOTRIN   200 mg, Oral, Every 6 Hours PRN      levothyroxine 88 MCG tablet  Commonly known as: SYNTHROID, LEVOTHROID   88 mcg, Oral, Daily      Loratadine 10 MG capsule   10 mg, Oral, Daily      sodium chloride 0.65 % nasal spray   1 spray, Nasal, As Needed      VIACTIV CALCIUM PLUS D PO   2 doses, Oral, Daily      Womens Multi Gummies chewable tablet   1 tablet, Oral, Daily               Discharge Diet:   Diet Instructions       Diet: Gastrointestinal Diets; Fiber-Restricted; Regular (IDDSI 7); Thin (IDDSI 0)      Discharge Diet: Gastrointestinal Diets    Gastrointestinal Diet: Fiber-Restricted    Texture: Regular (IDDSI 7)    Fluid Consistency: Thin (IDDSI 0)        Soft GI.  Low fiber diet    Activity at Discharge: Ambulate.  No lifting, pushing or pulling.    Follow-up Appointments:  Angelito Ruiz MD per his orders.    Dragon disclaimer:  Part of this encounter note is an electronic transcription/translation of spoken language to printed text. The electronic translation of spoken language may permit erroneous, or at times, nonsensical words or phrases to be inadvertently transcribed; Although I have reviewed the note for such errors, some may still exist.       RAINA Arana  10/18/24  11:24 EDT          Electronically signed by Pina Norman APRN at 10/18/24 1168

## 2024-10-18 NOTE — CASE MANAGEMENT/SOCIAL WORK
Case Management Discharge Note      Final Note: Met with Ms. Tracy at the bedside to finalize discharge plan. She will be discharging to her home with [facility] (nursing). She has [employer] wheelchair van for transportation. They will pick her up at the bedside at 1230.  will fax discharge summary to [facility]. No other discharge needs were identified.         Selected Continued Care - Admitted Since 10/1/2024       Destination    No services have been selected for the patient.                Durable Medical Equipment    No services have been selected for the patient.                Dialysis/Infusion    No services have been selected for the patient.                Home Medical Care Coordination complete.      Service Provider Services Address Phone Fax Patient Preferred    [facility] [address] [id number] [phone] --              Therapy    No services have been selected for the patient.                Community Resources    No services have been selected for the patient.                Community & DME    No services have been selected for the patient.                    Transportation Services  W/C Van: Other ([employer])    Final Discharge Disposition Code: 06 - home with home health care

## 2024-10-18 NOTE — PLAN OF CARE
Goal Outcome Evaluation:      Patient discharged, ostomy care reviewed although patient reluctant to participate much but she did express understanding and denied any concerns. Meds and f/u with Dr. Ruiz reviewed. This nurse attempted to make appt with PCP but office did not answer phone. Patient instructed to call the office and schedule with a week.. Patient voiced understanding. Supplies sent with patient

## 2024-10-18 NOTE — DISCHARGE SUMMARY
Patient Name: Valeria Tracy  MRN: [id number]  : 1940  DOS: 10/18/2024    Attending: Angelito Ruiz MD    Primary Care Provider: Peyton Vázquez PA    Date of Admission:.10/1/2024  5:29 AM    Date of Discharge:  10/18/2024    Discharge Diagnosis:   S/P abdominal perineal resection, partial thickness posterior vaginal resection    GERD (gastroesophageal reflux disease)    Asthma    Hypothyroidism    Acute postoperative pain    Rectal cancer    Severe malnutrition    Postop ileus , resolved.     Postoperative urinary retention, resolved.      Hospital Course  At admit  Patient is a pleasant 84 y.o. female presented for scheduled surgery by Dr. Ruiz.     Patient has history of right colon cancer for which she had a colectomy, T3 N1 has been resected with persistent neoplasia in the posterior distal rectum after YUDELKA prompting plans for APR.     Today she underwent abdominal perineal resection with partial thickness posterior vaginal resection.  Surgery included placement of a JESUS drain into the deep pelvis and colostomy creation in the left abdomen.     Surgery was done under general anesthesia and a block, was tolerated well.  I saw her in PACU where she is still in a drowsy  Postop state.  Not in distress.      After admit    She was provided pain medication as needed for pain control.  Pt received DVT prophylaxis with subcutaneous heparin as well as mechanicals      Patient was started on clear liquid diet, this was advanced when she showed evidence of bowel function return.  She had difficulty with PO diet, and episodes of nausea, heartburn and emesis, was diagnosed with possible ileus, required NGT ( 2 different occasions) , she has since been able to have it removed, had po diet started and advanced and she tolerated diet without difficulty.    He was seen by [facility]OC[facility], received extensive education and instructions during his stay. All pt questions were addressed;  education folder, stomal care, fluids,  diet, activity, work, lifting restrictions for 6-8 weeks were reviewed.    She had transient urinary retention after Harrington removal, required I/O cath a few times, UA was unremarkable.     She was placed on Flomax, she has since been able to void spontaneously without difficulty.  I recommend keeping Flomax on for a few more days.    During her stay patient used an IS for atelectasis prophylaxis.    She was seen by physical therapy and Occupational Therapy, was recommended for inpatient rehab. After staying in the hospital a long while , she achieved much better walking distance. She was denied IPR by her insureance. And she wishes to go home.     Home medications were resumed as appropriate, and labs were monitored and remained fairly stable.    With the progress she has made, pt is ready for DC home.     Discussed with patient regarding plan and she shows understanding and agreement.       Procedures Performed  Procedure(s):  ABDOMINAL PERINEAL RESECTION, LYSIS OF ADHESIONS AND CREATION OF COLOSTOMY       Pertinent Test Results:    I reviewed the patient's new clinical results.   Results from last 7 days   Lab Units 10/13/24  0441 10/12/24  0444   WBC 10*3/mm3 10.47 9.58   HEMOGLOBIN g/dL 11.1* 10.5*   HEMATOCRIT % 34.7 32.7*   PLATELETS 10*3/mm3 524* 461*     Results from last 7 days   Lab Units 10/13/24  0441 10/12/24  0444   SODIUM mmol/L 140 139   POTASSIUM mmol/L 3.4* 4.3   CHLORIDE mmol/L 102 100   CO2 mmol/L 27.0 24.0   BUN mg/dL 16 16   CREATININE mg/dL 0.74 0.64   CALCIUM mg/dL 8.8 8.9   BILIRUBIN mg/dL 0.2  --    ALK PHOS U/L 82  --    ALT (SGPT) U/L 9  --    AST (SGOT) U/L 11  --    GLUCOSE mg/dL 89 78     I reviewed the patient's new imaging including images and reports.      Date of Exam: 10/15/2024 8:30 AM EDT     Indication: interval bowel gas pattern     Comparison: 10/14/2024     Findings:  Left chest port is unchanged in position. Lung fields appear clear of acute infiltrates or effusions. The  "heart size is normal.     There is no free air. There are no significant air-fluid levels. There is a left abdominal ostomy. Previously seen small bowel dilatation has resolved. Large and small bowel are nondilated.     IMPRESSION:  Impression:  Resolution of small bowel distention.        Electronically Signed: Judy Billingsley MD    10/15/2024 8:46 AM EDT    Workstation ID: [id number]    Physical therapy    Date of Service: 10/07/24 1006  Creation Time: 10/07/24 110     Signed         Goal Outcome Evaluation:  Plan of Care Reviewed With: patient  Progress: improving  Outcome Evaluation: Pt continues to present with decreased functional mobility and decreased activity tolerance. Pt ambulated 150ft with CGA and RW for support. Pt demo increased fatigue with mobility and difficulty improving independence with increased distance. Continue to progress per pt tolerance.        Anticipated Discharge Disposition (PT): inpatient rehabilitation facility                 Discharge Assessment:       Visit Vitals  /63 (BP Location: Left arm, Patient Position: Lying)   Pulse 80   Temp 97.6 °F (36.4 °C) (Oral)   Resp 18   Ht 165.1 cm (65\")   Wt 56 kg (123 lb 8 oz)   SpO2 96%   BMI 20.55 kg/m²     Temp (24hrs), Av.2 °F (36.8 °C), Min:97.5 °F (36.4 °C), Max:99.4 °F (37.4 °C)      General Appearance:    Alert, cooperative, in no acute distress   Lungs:     Clear to auscultation,respirations regular, even and     unlabored    Heart:    Regular rhythm and normal rate, normal S1 and S2    Abdomen:   Soft and benign with clean incisions.  Pink stoma with output.   Extremities:   Moves all extremities well, no edema, no cyanosis, no              redness   Pulses:   Pulses palpable and equal bilaterally   Skin:   No bleeding, bruising or rash            Discharge Disposition:          Discharge Medications        New Medications        Instructions Start Date   megestrol 40 MG/ML suspension  Commonly known as: MEGACE   200 mg, " Oral, Daily      pyridostigmine 60 MG tablet  Commonly known as: MESTINON   Take 0.5 tablet by mouth Every 8 (Eight) Hours for 7 days, THEN use as needed if stoma output is low             Continue These Medications        Instructions Start Date   albuterol sulfate  (90 Base) MCG/ACT inhaler  Commonly known as: PROVENTIL HFA;VENTOLIN HFA;PROAIR HFA   2 puffs, Inhalation, Every 4 Hours PRN      DOCUSATE SODIUM PO   100 mg, Oral, Daily PRN      famotidine 10 MG tablet  Commonly known as: PEPCID   10 mg, Oral, 2 Times Daily      FLUTICASONE PROPIONATE NA   2 sprays, Nasal, Daily      fluticasone-salmeterol 115-21 MCG/ACT inhaler  Commonly known as: ADVAIR HFA   2 puffs, Inhalation, 2 Times Daily - RT      ibuprofen 200 MG tablet  Commonly known as: ADVIL,MOTRIN   200 mg, Oral, Every 6 Hours PRN      levothyroxine 88 MCG tablet  Commonly known as: SYNTHROID, LEVOTHROID   88 mcg, Oral, Daily      Loratadine 10 MG capsule   10 mg, Oral, Daily      sodium chloride 0.65 % nasal spray   1 spray, Nasal, As Needed      VIACTIV CALCIUM PLUS D PO   2 doses, Oral, Daily      Womens Multi Gummies chewable tablet   1 tablet, Oral, Daily               Discharge Diet:   Diet Instructions       Diet: Gastrointestinal Diets; Fiber-Restricted; Regular (IDDSI 7); Thin (IDDSI 0)      Discharge Diet: Gastrointestinal Diets    Gastrointestinal Diet: Fiber-Restricted    Texture: Regular (IDDSI 7)    Fluid Consistency: Thin (IDDSI 0)        Soft GI.  Low fiber diet    Activity at Discharge: Ambulate.  No lifting, pushing or pulling.    Follow-up Appointments:  Angelito Ruiz MD per his orders.    Dragon disclaimer:  Part of this encounter note is an electronic transcription/translation of spoken language to printed text. The electronic translation of spoken language may permit erroneous, or at times, nonsensical words or phrases to be inadvertently transcribed; Although I have reviewed the note for such errors, some may still exist.        Pina Norman, RAINA  10/18/24  11:24 EDT

## 2024-10-18 NOTE — PLAN OF CARE
Goal Outcome Evaluation:      Plan of care reviewed with patient  Progress: improving  Outcome Evaluation:  A&Ox4, VSS, midline incision dry, no bleeding noted  No acute changes this shift

## 2024-10-19 NOTE — OUTREACH NOTE
Prep Survey      Flowsheet Row Responses   [facility] facility patient discharged from? [facility]   Is LACE score < 7 ? No   Eligibility Readm Mgmt   Discharge diagnosis ABDOMINAL PERINEAL RESECTION, LYSIS OF ADHESIONS AND CREATION OF COLOSTOMY   Does the patient have one of the following disease processes/diagnoses(primary or secondary)? General Surgery   Does the patient have Home health ordered? Yes   What is the Home health agency?  [facility]   Prep survey completed? Yes            Umu CANALES - Registered Nurse

## 2024-10-21 ENCOUNTER — READMISSION MANAGEMENT (OUTPATIENT)
Dept: CALL CENTER | Facility: HOSPITAL | Age: 84
End: 2024-10-21
Payer: MEDICARE

## 2024-10-21 NOTE — OUTREACH NOTE
General Surgery Week 1 Survey      Flowsheet Row Responses   [facility] facility patient discharged from? [place]   Does the patient have one of the following disease processes/diagnoses(primary or secondary)? General Surgery   Week 1 attempt successful? No   Unsuccessful attempts Attempt 1  [attempted all numbers]            Linn AVILES - Registered Nurse

## 2024-10-24 ENCOUNTER — READMISSION MANAGEMENT (OUTPATIENT)
Dept: CALL CENTER | Facility: HOSPITAL | Age: 84
End: 2024-10-24
Payer: MEDICARE

## 2024-10-24 NOTE — OUTREACH NOTE
General Surgery Week 1 Survey      Flowsheet Row Responses   [facility] patient discharged from? [place]   Does the patient have one of the following disease processes/diagnoses(primary or secondary)? General Surgery   Week 1 attempt successful? Yes   Call start time 1822   Call end time 1825   Discharge diagnosis ABDOMINAL PERINEAL RESECTION, LYSIS OF ADHESIONS AND CREATION OF COLOSTOMY   Meds reviewed with patient/caregiver? Yes   Is the patient having any side effects they believe may be caused by any medication additions or changes? No   Does the patient have all medications related to this admission filled (includes all antibiotics, pain medications, etc.) Yes   Is the patient taking all medications as directed (includes completed medication regime)? Yes   Does the patient have a follow up appointment scheduled with their surgeon? Yes   Has the patient kept scheduled appointments due by today? N/A   What is the Home health agency?  [facility]   Has home health visited the patient within 72 hours of discharge? Yes   Psychosocial issues? No   Did the patient receive a copy of their discharge instructions? Yes   Nursing interventions Reviewed instructions with patient   What is the patient's perception of their health status since discharge? Improving   Is the patient /caregiver able to teach back basic post-op care? Practice 'cough and deep breath', No tub bath, swimming, or hot tub until instructed by MD, Lifting as instructed by MD in discharge instructions   Is the patient/caregiver able to teach back signs and symptoms of incisional infection? Fever, Pus or odor from incision, Incisional warmth, Increased drainage or bleeding, Increased redness, swelling or pain at the incisonal site   Is the patient/caregiver able to teach back steps to recovery at home? Rest and rebuild strength, gradually increase activity, Set small, achievable goals for return to baseline health, Eat a  well-balance diet   If the patient is a current smoker, are they able to teach back resources for cessation? Not a smoker   Is the patient/caregiver able to teach back the hierarchy of who to call/visit for symptoms/problems? PCP, Specialist, Home health nurse, Urgent Care, ED, 911 Yes   Additional teach back comments States she is sore but doing ok.  Has had good output.   Week 1 call completed? Yes   Wrap up additional comments No questions or needs at this time.   Call end time [id number]            Evonne HERNÁNDEZ - Licensed Nurse

## 2024-11-12 ENCOUNTER — TELEPHONE (OUTPATIENT)
Dept: WOUND CARE | Facility: HOSPITAL | Age: 84
End: 2024-11-12
Payer: MEDICARE

## 2024-11-12 NOTE — TELEPHONE ENCOUNTER
Valeria Tracy  1940  [id number]    Summary: Follow-up call to see about scheduling patient in outpatient ostomy clinic.  No answer.  Left voicemail.    Time spent teleconferencing with patient was: 15 minutes

## 2024-11-15 ENCOUNTER — TELEPHONE (OUTPATIENT)
Dept: WOUND CARE | Facility: HOSPITAL | Age: 84
End: 2024-11-15
Payer: MEDICARE

## 2024-11-15 NOTE — TELEPHONE ENCOUNTER
Valeria Tracy  1940  [id number]    Summary: Voicemail with patient x 2, was able to get in contact with granddaughter who states that patient is home with home health.  Asked granddaughter to see if patient would like to come to outpatient ostomy clinic next time she is in town.      Ostomy referral has been made through epic.  Will await phone call from patient.    Time spent teleconferencing with  granddaughter  was: 15 minutes

## 2024-11-26 ENCOUNTER — HOSPITAL ENCOUNTER (OUTPATIENT)
Dept: WOUND CARE | Facility: HOSPITAL | Age: 84
Discharge: HOME | End: 2024-11-26
Admitting: COLON & RECTAL SURGERY
Payer: MEDICARE

## 2024-11-26 PROCEDURE — G0463 HOSPITAL OUTPT CLINIC VISIT: HCPCS

## 2024-11-26 NOTE — NURSING NOTE
[facility] outpatient ostomy visit.    History and current problem: patient has noticed two small ulcers around her stoma - 11 o'clock and 5 o'clock.     Surgeon: Joseph    Patient awake, oriented. daughter at beside.     Patient reports no issues with leakage from her appliance. Her colostomy measures 32 mm., is pink, protruding and remains viable. Patient states she had been measuring her stoma at 25 mm which could be the contributing factor for the two ulcerations. We dicussed importance of fiber in her diet.     The two ulcerations were treated with a 2 min. Vashe soak, followed by application of topical stoma powder. Then a piece of hollihesive was applied over the ulceration to prevent direct contact from the wafer.  A convex wafer was selected to help provide better securement and fit due to her uneven abdominal countor.     I asked Ms. Tracy to contact me in two weeks and give me an update on her peristomal wounds. I instructed her to have her home  health nurse order convex wafers as opposed to flat wafers moving forward.      I spent 60 minutes, face-to-face, caring for Valeria today.     Follow up appointment: No.     Yassine Ascencio RN, BSN, CCRN, CWOCN  Wound, Ostomy and Continence (WOC) Department  [facility]

## (undated) DEVICE — ACCESS PLATFORM FOR MINIMALLY INVASIVE SURGERY.: Brand: GELPORT® LAPAROSCOPIC  SYSTEM

## (undated) DEVICE — JP PERF DRN SIL FLT 10MM FULL: Brand: CARDINAL HEALTH

## (undated) DEVICE — INTENDED FOR TISSUE SEPARATION, AND OTHER PROCEDURES THAT REQUIRE A SHARP SURGICAL BLADE TO PUNCTURE OR CUT.: Brand: BARD-PARKER ® STAINLESS STEEL BLADES

## (undated) DEVICE — ANTIBACTERIAL UNDYED BRAIDED (POLYGLACTIN 910), SYNTHETIC ABSORBABLE SUTURE: Brand: COATED VICRYL

## (undated) DEVICE — CLTH CLENS READYCLEANSE PERI CARE PK/5

## (undated) DEVICE — ENSEAL 20 CM SHAFT, LARGE JAW: Brand: ENSEAL X1

## (undated) DEVICE — TOWEL,OR,DSP,ST,NATURAL,DLX,4/PK,20PK/CS: Brand: MEDLINE

## (undated) DEVICE — TOWEL,OR,DSP,ST,BLUE,STD,4/PK,20PK/CS: Brand: MEDLINE

## (undated) DEVICE — DRSNG WND BORDR/ADHS NONADHR/GZ LF 2X2IN STRL

## (undated) DEVICE — SUT VIC 3/0 SH CR8 27IN VCP784D

## (undated) DEVICE — GLV SURG SENSICARE PI ORTHO PF SZ7 LF STRL

## (undated) DEVICE — DRSNG WND BORDR/ADHS NONADHR/GZ LF 4X10IN STRL

## (undated) DEVICE — PK LAP LASR CHOLE 10

## (undated) DEVICE — GOWN,REINFORCE,POLY,SIRUS,BREATH SLV,XLG: Brand: MEDLINE

## (undated) DEVICE — SUCTION RESERVOIR 400CC LG VOL: Brand: CARDINAL HEALTH

## (undated) DEVICE — TRAP FLD MINIVAC MEGADYNE 100ML

## (undated) DEVICE — TRENDELENBURG WINGPAD POSITIONING KIT DELUXE - WITHOUT BODY STRAP: Brand: SOULE MEDICAL

## (undated) DEVICE — BLANKT WARM UPPR/BDY ARM/OUT 57X196CM

## (undated) DEVICE — SAFESECURE,SECUREMENT,FOLEY CATH,STERILE: Brand: MEDLINE

## (undated) DEVICE — ENDOPATH XCEL BLADELESS TROCARS WITH STABILITY SLEEVES: Brand: ENDOPATH XCEL

## (undated) DEVICE — ADHS SKIN PREMIERPRO EXOFIN TOPICAL HI/VISC .5ML

## (undated) DEVICE — PREMIUM DRY TRAY LF: Brand: MEDLINE INDUSTRIES, INC.

## (undated) DEVICE — GLV SURG SENSICARE PI MIC PF SZ6.5 LF STRL

## (undated) DEVICE — SUT VIC 0 CTD BR 18IN UNDYE VCP724D

## (undated) DEVICE — POOLE SUCTION INSTRUMENT WITH REMOVABLE SHEATH: Brand: POOLE

## (undated) DEVICE — TBG PENCL TELESCP MEGADYNE SMOKE EVAC 10FT

## (undated) DEVICE — MARYLAND JAW LAPAROSCOPIC SEALER/DIVIDER COATED: Brand: LIGASURE

## (undated) DEVICE — PK MINOR SPLT 10

## (undated) DEVICE — ENDOPATH XCEL UNIVERSAL TROCAR STABLILITY SLEEVES: Brand: ENDOPATH XCEL

## (undated) DEVICE — PREP SOL POVIDONE/IODINE BT 4OZ

## (undated) DEVICE — PENCL SMOKE/EVAC MEGADYNE TELESCP 10FT

## (undated) DEVICE — SUT PDS 1 CTX 36IN VIO PDP371T

## (undated) DEVICE — TUBING, SUCTION, 1/4" X 10', STRAIGHT: Brand: MEDLINE

## (undated) DEVICE — SUT VIC PLS CTD ANTIB 2/0 18IN VCP111G

## (undated) DEVICE — 450 ML BOTTLE OF 0.05% CHLORHEXIDINE GLUCONATE IN 99.95% STERILE WATER FOR IRRIGATION, USP AND APPLICATOR.: Brand: IRRISEPT ANTIMICROBIAL WOUND LAVAGE

## (undated) DEVICE — LEX BASIC NO DRAPE: Brand: MEDLINE INDUSTRIES, INC.

## (undated) DEVICE — SYSTEM 1200 "Y" SMARTFILTER: Brand: SYSTEM 1200

## (undated) DEVICE — JELLY,LUBE,STERILE,FLIP TOP,TUBE,2-OZ: Brand: MEDLINE

## (undated) DEVICE — DRSNG PAD ABD 8X10IN STRL

## (undated) DEVICE — DRSNG WND GZ PAD BORDERED 4X8IN STRL

## (undated) DEVICE — NITINOL WIRE WITH HYDROPHILIC TIP: Brand: SENSOR

## (undated) DEVICE — SUT MNCRYL PLS ANTIB UD 4/0 PS2 18IN

## (undated) DEVICE — LEGGINGS, PAIR, 29X43, STERILE: Brand: MEDLINE

## (undated) DEVICE — 3M™ IOBAN™ 2 ANTIMICROBIAL INCISE DRAPE 6650EZ: Brand: IOBAN™ 2

## (undated) DEVICE — TOTAL TRAY, 16FR 10ML SIL FOLEY, URN: Brand: MEDLINE

## (undated) DEVICE — COVER,MAYO STAND,XL,STERILE: Brand: MEDLINE

## (undated) DEVICE — LAPAROVUE VISIBILITY SYSTEM LAPAROSCOPIC SOLUTIONS: Brand: LAPAROVUE

## (undated) DEVICE — PLUG,CATHETER,DRAINAGE PROTECTOR,TUBE: Brand: MEDLINE

## (undated) DEVICE — SUT SILK 2/0 PS 18IN 1588H

## (undated) DEVICE — DRAPE,UNDERBUTTOCKS,PCH,STERILE: Brand: MEDLINE

## (undated) DEVICE — PCH INST SURG INVISISHIELD 2PCKT

## (undated) DEVICE — MEDI-VAC YANKAUER SUCTION HANDLE: Brand: CARDINAL HEALTH

## (undated) DEVICE — DRN PENRS SIL 1X18IN LXF

## (undated) DEVICE — ELECTRD BLD EZ CLN STD 6.5IN

## (undated) DEVICE — SUT PDS LP 1 TP1 96IN VIO PDP880GA

## (undated) DEVICE — [HIGH FLOW INSUFFLATOR,  DO NOT USE IF PACKAGE IS DAMAGED,  KEEP DRY,  KEEP AWAY FROM SUNLIGHT,  PROTECT FROM HEAT AND RADIOACTIVE SOURCES.]: Brand: PNEUMOSURE

## (undated) DEVICE — PROXIMATE RH ROTATING HEAD SKIN STAPLERS (35 WIDE) CONTAINS 35 STAINLESS STEEL STAPLES: Brand: PROXIMATE